# Patient Record
Sex: MALE | Race: OTHER | Employment: UNEMPLOYED | URBAN - METROPOLITAN AREA
[De-identification: names, ages, dates, MRNs, and addresses within clinical notes are randomized per-mention and may not be internally consistent; named-entity substitution may affect disease eponyms.]

---

## 2017-01-04 ENCOUNTER — OFFICE VISIT (OUTPATIENT)
Dept: CARDIOLOGY CLINIC | Age: 57
End: 2017-01-04

## 2017-01-04 VITALS
WEIGHT: 208 LBS | SYSTOLIC BLOOD PRESSURE: 119 MMHG | HEIGHT: 71 IN | HEART RATE: 63 BPM | BODY MASS INDEX: 29.12 KG/M2 | DIASTOLIC BLOOD PRESSURE: 60 MMHG

## 2017-01-04 DIAGNOSIS — Z95.5 S/P CORONARY ARTERY STENT PLACEMENT: ICD-10-CM

## 2017-01-04 DIAGNOSIS — I25.708 CORONARY ARTERY DISEASE OF BYPASS GRAFT OF NATIVE HEART WITH STABLE ANGINA PECTORIS (HCC): Primary | ICD-10-CM

## 2017-01-04 DIAGNOSIS — I10 ESSENTIAL HYPERTENSION: ICD-10-CM

## 2017-01-04 DIAGNOSIS — E11.9 TYPE 2 DIABETES MELLITUS WITHOUT COMPLICATION, UNSPECIFIED LONG TERM INSULIN USE STATUS: ICD-10-CM

## 2017-01-04 DIAGNOSIS — E78.5 DYSLIPIDEMIA: ICD-10-CM

## 2017-01-04 NOTE — PROGRESS NOTES
HISTORY OF PRESENT ILLNESS  Claudia Medrano is a 49 y.o. male. Hypertension   The history is provided by the patient. This is a chronic problem. The current episode started more than 1 week ago. The problem occurs every several days. The problem has not changed since onset. Associated symptoms include chest pain. Pertinent negatives include no shortness of breath. Chest Pain (Angina)    The history is provided by the patient. This is a chronic problem. The current episode started more than 1 week ago. The problem has been gradually improving. The problem occurs every several days. The pain is associated with exertion. The pain is present in the substernal region. The pain is at a severity of 2/10. The pain is mild. The quality of the pain is described as tightness. The pain does not radiate. Pertinent negatives include no claudication, no cough, no diaphoresis, no dizziness, no fever, no hemoptysis, no leg pain, no lower extremity edema, no malaise/fatigue, no nausea, no orthopnea, no palpitations, no PND, no shortness of breath, no sputum production, no vomiting and no weakness. Risk factors include cardiac disease, dyslipidemia, diabetes mellitus, hypertension and male gender. His past medical history is significant for DM and HTN. Procedural history includes cardiac catheterization, echocardiogram and cardiac stents. Review of Systems   Constitutional: Negative for chills, diaphoresis, fever, malaise/fatigue and weight loss. HENT: Negative for congestion, ear discharge, ear pain, hearing loss, nosebleeds and tinnitus. Eyes: Negative for blurred vision. Respiratory: Negative for cough, hemoptysis, sputum production, shortness of breath, wheezing and stridor. Cardiovascular: Positive for chest pain. Negative for palpitations, orthopnea, claudication, leg swelling and PND. Gastrointestinal: Negative for heartburn, nausea and vomiting. Musculoskeletal: Negative for myalgias and neck pain.    Skin: Negative for itching and rash. Neurological: Negative for dizziness, tingling, tremors, focal weakness, loss of consciousness and weakness. Psychiatric/Behavioral: Negative for depression and suicidal ideas. Family History   Problem Relation Age of Onset    Family history unknown: Yes       Past Medical History   Diagnosis Date    CAD (coronary artery disease)     Diabetes ([facility] [address]     Essential hypertension     Hyperlipidemia        Past Surgical History   Procedure Laterality Date    Hx heart catheterization      Hx coronary artery bypass graft      Hx coronary stent placement         Social History   Substance Use Topics    Smoking status: Never Smoker    Smokeless tobacco: Not on file    Alcohol use Not on file       No Known Allergies    Outpatient Prescriptions Marked as Taking for the 1/4/17 encounter (Office Visit) with Ines Fuller MD   Medication Sig Dispense Refill    Ranolazine (RANEXA) 1,000 mg Tb12 Take 1 Tab by mouth two (2) times a day. 60 Tab 3    aspirin delayed-release 81 mg tablet Take  by mouth daily.  atorvastatin (LIPITOR) 40 mg tablet Take  by mouth daily.  losartan (COZAAR) 25 mg tablet Take  by mouth two (2) times a day.  metFORMIN (GLUCOPHAGE) 500 mg tablet Take  by mouth two (2) times daily (with meals).  nitroglycerin (NITROSTAT) 0.4 mg SL tablet by SubLINGual route every five (5) minutes as needed for Chest Pain.  pantoprazole (PROTONIX) 40 mg tablet Take 40 mg by mouth daily.  prasugrel (EFFIENT) 10 mg tablet Take  by mouth.  metoprolol tartrate (LOPRESSOR) 25 mg tablet Take 1 Tab by mouth two (2) times a day. 60 Tab 4        Visit Vitals    /60    Pulse 63    Ht 5' 11\" (1.803 m)    Wt 94.3 kg (208 lb)    BMI 29.01 kg/m2     Physical Exam   Constitutional: He is oriented to person, place, and time. He appears well-developed and well-nourished. No distress. HENT:   Head: Atraumatic.    Mouth/Throat: No oropharyngeal exudate. Eyes: Conjunctivae are normal. Right eye exhibits no discharge. Left eye exhibits no discharge. No scleral icterus. Neck: Neck supple. No JVD present. No tracheal deviation present. No thyromegaly present. Cardiovascular: Normal rate and regular rhythm. Exam reveals no gallop. No murmur heard. Pulmonary/Chest: Effort normal and breath sounds normal. No stridor. No respiratory distress. He has no wheezes. He has no rales. He exhibits no tenderness. Abdominal: Soft. There is no tenderness. There is no rebound and no guarding. Musculoskeletal: Normal range of motion. He exhibits no edema or tenderness. Lymphadenopathy:     He has no cervical adenopathy. Neurological: He is alert and oriented to person, place, and time. He exhibits normal muscle tone. Skin: Skin is warm. He is not diaphoretic. Psychiatric: He has a normal mood and affect. His behavior is normal.     ekg sinus rhythm with no acute st-t changes  ASSESSMENT and PLAN    ICD-10-CM ICD-9-CM    1. Coronary artery disease of bypass graft of native heart with stable angina pectoris ([facility]) I25.708 414.05      413.9    2. S/P coronary artery stent placement Z95.5 V45.82 AMB POC EKG ROUTINE W/ 12 LEADS, INTER & REP   3. Essential hypertension I10 401.9    4. Dyslipidemia E78.5 272.4    5. Type 2 diabetes mellitus without complication, unspecified long term insulin use status ([facility]) E11.9 250.00      Orders Placed This Encounter    AMB POC EKG ROUTINE W/ 12 LEADS, INTER & REP     Order Specific Question:   Reason for Exam:     Answer:   follow up     Follow-up Disposition:  Return in about 6 months (around 7/4/2017). reviewed diet, exercise and weight control  cardiovascular risk and specific lipid/LDL goals reviewed  use of aspirin to prevent MI and TIA's discussed. Patient's symptom now improving on ranexa 1000mg bid. Continue intense risk factor modification.   Will obtain lipid panel and consider decreasing lipitor dose due to muscle pain.

## 2017-01-04 NOTE — PROGRESS NOTES
1. Have you been to the ER, urgent care clinic since your last visit? Hospitalized since your last visit? No    2. Have you seen or consulted any other health care providers outside of the [address] [place] [address] since your last visit? Include any pap smears or colon screening. No     3. Since your last visit, have you had any of the following symptoms? no         4. Have you had any blood work, X-rays or cardiac testing?       No

## 2017-01-04 NOTE — MR AVS SNAPSHOT
Visit Information Date & Time Provider Department Dept. Phone Encounter #  
 1/4/2017 12:30 PM Carla Johnston MD [facility] [id number] Follow-up Instructions Return in about 6 months (around 7/4/2017). Follow-up and Disposition History Your Appointments 7/26/2017  1:00 PM  
Follow Up with Carla Johnston MD  
[facility] ([address]) Appt Note: 6 month follow up  
 [address] [facility], [facility] [address]  
  
   
 [address] [place] [id number] Upcoming Health Maintenance Date Due Hepatitis C Screening 1960 HEMOGLOBIN A1C Q6M 1962 LIPID PANEL Q1 1962 FOOT EXAM Q1 2/21/1972 MICROALBUMIN Q1 2/21/1972 EYE EXAM RETINAL OR DILATED Q1 2/21/1972 Pneumococcal 19-64 Medium Risk (1 of 1 - PPSV23) 2/21/1981 DTaP/Tdap/Td series (1 - Tdap) 2/21/1983 FOBT Q 1 YEAR AGE 50-75 2/21/2012 INFLUENZA AGE 9 TO ADULT 8/1/2016 Allergies as of 1/4/2017  Review Complete On: 1/4/2017 By: Carla Johnston MD  
 No Known Allergies Current Immunizations  Never Reviewed No immunizations on file. Not reviewed this visit You Were Diagnosed With   
  
 Codes Comments Coronary artery disease of bypass graft of native heart with stable angina pectoris ([facility] [address]    -  Primary ICD-10-CM: I25.708 ICD-9-CM: 414.05, 413.9 S/P coronary artery stent placement     ICD-10-CM: Z95.5 ICD-9-CM: V45.82 Essential hypertension     ICD-10-CM: I10 
ICD-9-CM: 401.9 Dyslipidemia     ICD-10-CM: E78.5 ICD-9-CM: 272.4 Type 2 diabetes mellitus without complication, unspecified long term insulin use status ([facility])     ICD-10-CM: E11.9 ICD-9-CM: 250.00 Vitals BP Pulse Height(growth percentile) Weight(growth percentile) BMI Smoking Status 119/60 63 5' 11\" (1.803 m) 208 lb (94.3 kg) 29.01 kg/m2 Never Smoker Vitals History BMI and BSA Data Body Mass Index Body Surface Area  
 29.01 kg/m 2 2.17 m 2 Preferred Pharmacy Pharmacy Name Phone [facility] [address] [address] Your Updated Medication List  
  
   
This list is accurate as of: 1/4/17  1:21 PM.  Always use your most recent med list.  
  
  
  
  
 aspirin delayed-release 81 mg tablet Take  by mouth daily. atorvastatin 40 mg tablet Commonly known as:  LIPITOR Take  by mouth daily. EFFIENT 10 mg tablet Generic drug:  prasugrel Take  by mouth.  
  
 losartan 25 mg tablet Commonly known as:  COZAAR Take  by mouth two (2) times a day. metFORMIN 500 mg tablet Commonly known as:  GLUCOPHAGE Take  by mouth two (2) times daily (with meals). metoprolol tartrate 25 mg tablet Commonly known as:  LOPRESSOR Take 1 Tab by mouth two (2) times a day. nitroglycerin 0.4 mg SL tablet Commonly known as:  NITROSTAT  
by SubLINGual route every five (5) minutes as needed for Chest Pain.  
  
 pantoprazole 40 mg tablet Commonly known as:  PROTONIX Take 40 mg by mouth daily. Ranolazine 1,000 mg Tb12 Commonly known as:  RANEXA Take 1 Tab by mouth two (2) times a day. Follow-up Instructions Return in about 6 months (around 7/4/2017). Patient Instructions Learning About Coronary Artery Disease (CAD) What is coronary artery disease? Coronary artery disease (CAD) occurs when plaque builds up in the arteries that bring oxygen-rich blood to your heart. Plaque is a fatty substance made of cholesterol, calcium, and other substances in the blood. This process is called hardening of the arteries, or atherosclerosis. What happens when you have coronary artery disease? · Plaque may narrow the coronary arteries. Narrowed arteries cause poor blood flow.  This can lead to angina symptoms such as chest pain or discomfort. If blood flow is completely blocked, you could have a heart attack. · You can slow CAD and reduce the risk of future problems by making changes in your lifestyle. These include quitting smoking and eating heart-healthy foods. · Treatments for CAD, along with changes in your lifestyle, can help you live a longer and healthier life. How can you prevent coronary artery disease? · Do not smoke. It may be the best thing you can do to prevent heart disease. If you need help quitting, talk to your doctor about stop-smoking programs and medicines. These can increase your chances of quitting for good. · Be active. Get at least 30 minutes of exercise on most days of the week. Walking is a good choice. You also may want to do other activities, such as running, swimming, cycling, or playing tennis or team sports. · Eat heart-healthy foods. Eat more fruits and vegetables and less foods that contain saturated and trans fats. Limit alcohol, sodium, and sweets. · Stay at a healthy weight. Lose weight if you need to. · Manage other health problems such as diabetes, high blood pressure, and high cholesterol. · Manage stress. Stress can hurt your heart. To keep stress low, talk about your problems and feelings. Don't keep your feelings hidden. · If you have talked about it with your doctor, take a low-dose aspirin every day. Aspirin can help certain people lower their risk of a heart attack or stroke. But taking aspirin isn't right for everyone, because it can cause serious bleeding. Do not start taking daily aspirin unless your doctor knows about it. How is coronary artery disease treated? · Your doctor will suggest that you make lifestyle changes. For example, your doctor may ask you to eat healthy foods, quit smoking, lose extra weight, and be more active. · You will have to take medicines.  
· Your doctor may suggest a procedure to open narrowed or blocked arteries. This is called angioplasty. Or your doctor may suggest using healthy blood vessels to create detours around narrowed or blocked arteries. This is called bypass surgery. Follow-up care is a key part of your treatment and safety. Be sure to make and go to all appointments, and call your doctor if you are having problems. It's also a good idea to know your test results and keep a list of the medicines you take. Where can you learn more? Go to http://karl-faye.info/. Enter ([phone] [phone] in the search box to learn more about \"Learning About Coronary Artery Disease (CAD). \" Current as of: January 27, 2016 Content Version: 11.1 © 0953-4509 [employer]. Care instructions adapted under license by [employer] (which disclaims liability or warranty for this information). If you have questions about a medical condition or this instruction, always ask your healthcare professional. [address] any warranty or liability for your use of this information. Patient Instructions History Introducing [facility]! Dusty Spivey introduces Trusight patient portal. Now you can access parts of your medical record, email your doctor's office, and request medication refills online. 1. In your internet browser, go to [email or web address]. [employer] 2. Click on the First Time User? Click Here link in the Sign In box. You will see the New Member Sign Up page. 3. Enter your Trusight Access Code exactly as it appears below. You will not need to use this code after youve completed the sign-up process. If you do not sign up before the expiration date, you must request a new code. · Trusight Access Code: [email or web address] Expires: 4/4/2017 12:15 PM 
 
4. Enter the last four digits of your Social Security Number (xxxx) and Date of Birth (mm/dd/yyyy) as indicated and click Submit. You will be taken to the next sign-up page. 5. Create a ClearApp ID. This will be your ClearApp login ID and cannot be changed, so think of one that is secure and easy to remember. 6. Create a ClearApp password. You can change your password at any time. 7. Enter your Password Reset Question and Answer. This can be used at a later time if you forget your password. 8. Enter your e-mail address. You will receive e-mail notification when new information is available in [address] 9. Click Sign Up. You can now view and download portions of your medical record. 10. Click the Download Summary menu link to download a portable copy of your medical information. If you have questions, please visit the Frequently Asked Questions section of the ClearApp website. Remember, ClearApp is NOT to be used for urgent needs. For medical emergencies, dial 911. Now available from your iPhone and Android! Please provide this summary of care documentation to your next provider. Your primary care clinician is listed as Ceci Prim. If you have any questions after today's visit, please call [phone].

## 2017-01-04 NOTE — PATIENT INSTRUCTIONS
Learning About Coronary Artery Disease (CAD)  What is coronary artery disease? Coronary artery disease (CAD) occurs when plaque builds up in the arteries that bring oxygen-rich blood to your heart. Plaque is a fatty substance made of cholesterol, calcium, and other substances in the blood. This process is called hardening of the arteries, or atherosclerosis. What happens when you have coronary artery disease? · Plaque may narrow the coronary arteries. Narrowed arteries cause poor blood flow. This can lead to angina symptoms such as chest pain or discomfort. If blood flow is completely blocked, you could have a heart attack. · You can slow CAD and reduce the risk of future problems by making changes in your lifestyle. These include quitting smoking and eating heart-healthy foods. · Treatments for CAD, along with changes in your lifestyle, can help you live a longer and healthier life. How can you prevent coronary artery disease? · Do not smoke. It may be the best thing you can do to prevent heart disease. If you need help quitting, talk to your doctor about stop-smoking programs and medicines. These can increase your chances of quitting for good. · Be active. Get at least 30 minutes of exercise on most days of the week. Walking is a good choice. You also may want to do other activities, such as running, swimming, cycling, or playing tennis or team sports. · Eat heart-healthy foods. Eat more fruits and vegetables and less foods that contain saturated and trans fats. Limit alcohol, sodium, and sweets. · Stay at a healthy weight. Lose weight if you need to. · Manage other health problems such as diabetes, high blood pressure, and high cholesterol. · Manage stress. Stress can hurt your heart. To keep stress low, talk about your problems and feelings. Don't keep your feelings hidden. · If you have talked about it with your doctor, take a low-dose aspirin every day.  Aspirin can help certain people lower their risk of a heart attack or stroke. But taking aspirin isn't right for everyone, because it can cause serious bleeding. Do not start taking daily aspirin unless your doctor knows about it. How is coronary artery disease treated? · Your doctor will suggest that you make lifestyle changes. For example, your doctor may ask you to eat healthy foods, quit smoking, lose extra weight, and be more active. · You will have to take medicines. · Your doctor may suggest a procedure to open narrowed or blocked arteries. This is called angioplasty. Or your doctor may suggest using healthy blood vessels to create detours around narrowed or blocked arteries. This is called bypass surgery. Follow-up care is a key part of your treatment and safety. Be sure to make and go to all appointments, and call your doctor if you are having problems. It's also a good idea to know your test results and keep a list of the medicines you take. Where can you learn more? Go to http://karl-faye.info/. Enter ([phone] [phone] in the search box to learn more about \"Learning About Coronary Artery Disease (CAD). \"  Current as of: January 27, 2016  Content Version: 11.1  © 1365-0232 [employer]. Care instructions adapted under license by [employer] (which disclaims liability or warranty for this information). If you have questions about a medical condition or this instruction, always ask your healthcare professional. [place] [address] any warranty or liability for your use of this information.

## 2017-01-18 RX ORDER — CLOPIDOGREL BISULFATE 75 MG/1
75 TABLET ORAL DAILY
Qty: 90 TAB | Refills: 3 | Status: SHIPPED | OUTPATIENT
Start: 2017-01-18 | End: 2017-06-03

## 2017-01-26 ENCOUNTER — TELEPHONE (OUTPATIENT)
Dept: CARDIOLOGY CLINIC | Age: 57
End: 2017-01-26

## 2017-01-26 NOTE — TELEPHONE ENCOUNTER
Patient's son calling to discuss extreme cost for Ranexa, will attempt to apply for patient assistance.

## 2017-01-26 NOTE — TELEPHONE ENCOUNTER
Insurance company called and prior authorizations is not needed for Ranexa. Pt has not met deductable yet, co pay card may be effective, or when deductable is met drug will cost normal copay.

## 2017-01-27 NOTE — TELEPHONE ENCOUNTER
Patient's son returning call. Patient does not have the co pay card. Ranexa copay card mailed to his home address.

## 2017-04-06 RX ORDER — METOPROLOL TARTRATE 25 MG/1
TABLET, FILM COATED ORAL
Qty: 60 TAB | Refills: 0 | Status: SHIPPED | OUTPATIENT
Start: 2017-04-06 | End: 2017-06-03

## 2017-05-19 ENCOUNTER — HOSPITAL ENCOUNTER (INPATIENT)
Age: 57
LOS: 15 days | Discharge: NURSING, REHAB OR LONG-TERM CARE | DRG: 091 | End: 2017-06-03
Attending: HOSPITALIST | Admitting: HOSPITALIST
Payer: COMMERCIAL

## 2017-05-19 ENCOUNTER — APPOINTMENT (OUTPATIENT)
Dept: GENERAL RADIOLOGY | Age: 57
DRG: 091 | End: 2017-05-19
Attending: INTERNAL MEDICINE
Payer: COMMERCIAL

## 2017-05-19 DIAGNOSIS — J96.11 CHRONIC RESPIRATORY FAILURE WITH HYPOXIA (HCC): ICD-10-CM

## 2017-05-19 DIAGNOSIS — G93.1 MODERATE ANOXIC-ISCHEMIC ENCEPHALOPATHY (HCC): ICD-10-CM

## 2017-05-19 DIAGNOSIS — G93.1 ANOXIC BRAIN INJURY (HCC): ICD-10-CM

## 2017-05-19 DIAGNOSIS — Z86.74 H/O CARDIAC ARREST: ICD-10-CM

## 2017-05-19 DIAGNOSIS — I65.23 STENOSIS OF BOTH INTERNAL CAROTID ARTERIES: ICD-10-CM

## 2017-05-19 DIAGNOSIS — Z71.89 COUNSELING REGARDING GOALS OF CARE: ICD-10-CM

## 2017-05-19 DIAGNOSIS — E78.5 DYSLIPIDEMIA: ICD-10-CM

## 2017-05-19 DIAGNOSIS — I63.312 THROMBOTIC STROKE INVOLVING LEFT MIDDLE CEREBRAL ARTERY (HCC): ICD-10-CM

## 2017-05-19 DIAGNOSIS — R40.4 TRANSIENT ALTERATION OF AWARENESS: ICD-10-CM

## 2017-05-19 DIAGNOSIS — I67.82 MODERATE ANOXIC-ISCHEMIC ENCEPHALOPATHY (HCC): ICD-10-CM

## 2017-05-19 DIAGNOSIS — I10 ESSENTIAL HYPERTENSION: ICD-10-CM

## 2017-05-19 PROBLEM — J96.90 RESPIRATORY FAILURE (HCC): Status: ACTIVE | Noted: 2017-05-19

## 2017-05-19 LAB
ALBUMIN SERPL BCP-MCNC: 1.8 G/DL (ref 3.5–5)
ALBUMIN/GLOB SERPL: 0.3 {RATIO} (ref 1.1–2.2)
ALP SERPL-CCNC: 236 U/L (ref 45–117)
ALT SERPL-CCNC: 58 U/L (ref 12–78)
ANION GAP BLD CALC-SCNC: 10 MMOL/L (ref 5–15)
AST SERPL W P-5'-P-CCNC: 46 U/L (ref 15–37)
BASOPHILS # BLD AUTO: 0 K/UL (ref 0–0.1)
BASOPHILS # BLD: 0 % (ref 0–1)
BILIRUB SERPL-MCNC: 0.6 MG/DL (ref 0.2–1)
BUN SERPL-MCNC: 13 MG/DL (ref 6–20)
BUN/CREAT SERPL: 26 (ref 12–20)
CALCIUM SERPL-MCNC: 7.4 MG/DL (ref 8.5–10.1)
CHLORIDE SERPL-SCNC: 105 MMOL/L (ref 97–108)
CO2 SERPL-SCNC: 24 MMOL/L (ref 21–32)
CREAT SERPL-MCNC: 0.5 MG/DL (ref 0.7–1.3)
DIFFERENTIAL METHOD BLD: ABNORMAL
EOSINOPHIL # BLD: 0.1 K/UL (ref 0–0.4)
EOSINOPHIL NFR BLD: 1 % (ref 0–7)
ERYTHROCYTE [DISTWIDTH] IN BLOOD BY AUTOMATED COUNT: 17.3 % (ref 11.5–14.5)
GLOBULIN SER CALC-MCNC: 5.7 G/DL (ref 2–4)
GLUCOSE BLD STRIP.AUTO-MCNC: 105 MG/DL (ref 65–100)
GLUCOSE BLD STRIP.AUTO-MCNC: 86 MG/DL (ref 65–100)
GLUCOSE SERPL-MCNC: 76 MG/DL (ref 65–100)
HCT VFR BLD AUTO: 21.8 % (ref 36.6–50.3)
HGB BLD-MCNC: 6.8 G/DL (ref 12.1–17)
LYMPHOCYTES # BLD AUTO: 17 % (ref 12–49)
LYMPHOCYTES # BLD: 1.7 K/UL (ref 0.8–3.5)
MAGNESIUM SERPL-MCNC: 2.4 MG/DL (ref 1.6–2.4)
MCH RBC QN AUTO: 26.9 PG (ref 26–34)
MCHC RBC AUTO-ENTMCNC: 31.2 G/DL (ref 30–36.5)
MCV RBC AUTO: 86.2 FL (ref 80–99)
MONOCYTES # BLD: 0.9 K/UL (ref 0–1)
MONOCYTES NFR BLD AUTO: 9 % (ref 5–13)
NEUTS SEG # BLD: 7.1 K/UL (ref 1.8–8)
NEUTS SEG NFR BLD AUTO: 73 % (ref 32–75)
PHOSPHATE SERPL-MCNC: 2.3 MG/DL (ref 2.6–4.7)
PLATELET # BLD AUTO: 372 K/UL (ref 150–400)
POTASSIUM SERPL-SCNC: 3.4 MMOL/L (ref 3.5–5.1)
PROT SERPL-MCNC: 7.5 G/DL (ref 6.4–8.2)
RBC # BLD AUTO: 2.53 M/UL (ref 4.1–5.7)
RBC MORPH BLD: ABNORMAL
SERVICE CMNT-IMP: ABNORMAL
SERVICE CMNT-IMP: NORMAL
SODIUM SERPL-SCNC: 139 MMOL/L (ref 136–145)
WBC # BLD AUTO: 9.8 K/UL (ref 4.1–11.1)

## 2017-05-19 PROCEDURE — 74011250637 HC RX REV CODE- 250/637: Performed by: HOSPITALIST

## 2017-05-19 PROCEDURE — 77030008793 HC TU TRACH CUF COVD -B

## 2017-05-19 PROCEDURE — 77030021668 HC NEB PREFIL KT VYRM -A

## 2017-05-19 PROCEDURE — 94002 VENT MGMT INPAT INIT DAY: CPT

## 2017-05-19 PROCEDURE — 80053 COMPREHEN METABOLIC PANEL: CPT | Performed by: HOSPITALIST

## 2017-05-19 PROCEDURE — 36415 COLL VENOUS BLD VENIPUNCTURE: CPT | Performed by: HOSPITALIST

## 2017-05-19 PROCEDURE — 84100 ASSAY OF PHOSPHORUS: CPT | Performed by: HOSPITALIST

## 2017-05-19 PROCEDURE — 83735 ASSAY OF MAGNESIUM: CPT | Performed by: HOSPITALIST

## 2017-05-19 PROCEDURE — 85025 COMPLETE CBC W/AUTO DIFF WBC: CPT | Performed by: HOSPITALIST

## 2017-05-19 PROCEDURE — 74011250636 HC RX REV CODE- 250/636: Performed by: INTERNAL MEDICINE

## 2017-05-19 PROCEDURE — 82962 GLUCOSE BLOOD TEST: CPT

## 2017-05-19 PROCEDURE — 74011250637 HC RX REV CODE- 250/637: Performed by: INTERNAL MEDICINE

## 2017-05-19 PROCEDURE — 65610000006 HC RM INTENSIVE CARE

## 2017-05-19 PROCEDURE — 77030018798 HC PMP KT ENTRL FED COVD -A

## 2017-05-19 PROCEDURE — 71010 XR CHEST PORT: CPT

## 2017-05-19 PROCEDURE — 74011250637 HC RX REV CODE- 250/637: Performed by: NURSE PRACTITIONER

## 2017-05-19 RX ORDER — FAMOTIDINE 20 MG/1
20 TABLET, FILM COATED ORAL 2 TIMES DAILY
Status: DISCONTINUED | OUTPATIENT
Start: 2017-05-19 | End: 2017-05-26

## 2017-05-19 RX ORDER — LIDOCAINE 50 MG/G
1 PATCH TOPICAL EVERY 24 HOURS
Status: DISCONTINUED | OUTPATIENT
Start: 2017-05-19 | End: 2017-06-03 | Stop reason: HOSPADM

## 2017-05-19 RX ORDER — ATORVASTATIN CALCIUM 40 MG/1
40 TABLET, FILM COATED ORAL
Status: DISCONTINUED | OUTPATIENT
Start: 2017-05-19 | End: 2017-06-03 | Stop reason: HOSPADM

## 2017-05-19 RX ORDER — PRASUGREL 10 MG/1
10 TABLET, FILM COATED ORAL DAILY
Status: DISCONTINUED | OUTPATIENT
Start: 2017-05-20 | End: 2017-06-03 | Stop reason: HOSPADM

## 2017-05-19 RX ORDER — SODIUM CHLORIDE 0.9 % (FLUSH) 0.9 %
5-10 SYRINGE (ML) INJECTION AS NEEDED
Status: DISCONTINUED | OUTPATIENT
Start: 2017-05-19 | End: 2017-06-03 | Stop reason: HOSPADM

## 2017-05-19 RX ORDER — IPRATROPIUM BROMIDE AND ALBUTEROL SULFATE 2.5; .5 MG/3ML; MG/3ML
3 SOLUTION RESPIRATORY (INHALATION)
COMMUNITY

## 2017-05-19 RX ORDER — ONDANSETRON 2 MG/ML
4 INJECTION INTRAMUSCULAR; INTRAVENOUS
Status: DISCONTINUED | OUTPATIENT
Start: 2017-05-19 | End: 2017-06-03 | Stop reason: HOSPADM

## 2017-05-19 RX ORDER — ONDANSETRON 4 MG/1
4 TABLET, FILM COATED ORAL
COMMUNITY
End: 2017-06-03

## 2017-05-19 RX ORDER — INSULIN LISPRO 100 [IU]/ML
INJECTION, SOLUTION INTRAVENOUS; SUBCUTANEOUS EVERY 6 HOURS
Status: DISCONTINUED | OUTPATIENT
Start: 2017-05-19 | End: 2017-06-03 | Stop reason: HOSPADM

## 2017-05-19 RX ORDER — MUPIROCIN 20 MG/G
OINTMENT TOPICAL EVERY 12 HOURS
Status: COMPLETED | OUTPATIENT
Start: 2017-05-19 | End: 2017-05-23

## 2017-05-19 RX ORDER — MAGNESIUM SULFATE 100 %
4 CRYSTALS MISCELLANEOUS AS NEEDED
Status: DISCONTINUED | OUTPATIENT
Start: 2017-05-19 | End: 2017-06-03 | Stop reason: HOSPADM

## 2017-05-19 RX ORDER — SODIUM CHLORIDE 0.9 % (FLUSH) 0.9 %
5-10 SYRINGE (ML) INJECTION EVERY 8 HOURS
Status: DISCONTINUED | OUTPATIENT
Start: 2017-05-19 | End: 2017-06-03 | Stop reason: HOSPADM

## 2017-05-19 RX ORDER — LIDOCAINE 50 MG/G
1 PATCH TOPICAL EVERY 24 HOURS
COMMUNITY

## 2017-05-19 RX ORDER — PRASUGREL 10 MG/1
10 TABLET, FILM COATED ORAL DAILY
COMMUNITY
End: 2017-06-14

## 2017-05-19 RX ORDER — OXYCODONE HCL 20 MG/ML
5 CONCENTRATE, ORAL ORAL
Status: DISCONTINUED | OUTPATIENT
Start: 2017-05-19 | End: 2017-06-02

## 2017-05-19 RX ORDER — CARVEDILOL 3.12 MG/1
3.12 TABLET ORAL 2 TIMES DAILY WITH MEALS
COMMUNITY
End: 2017-06-03

## 2017-05-19 RX ORDER — DEXTROSE MONOHYDRATE 100 MG/ML
125-250 INJECTION, SOLUTION INTRAVENOUS AS NEEDED
Status: DISCONTINUED | OUTPATIENT
Start: 2017-05-19 | End: 2017-06-03 | Stop reason: HOSPADM

## 2017-05-19 RX ORDER — PIPERACILLIN SODIUM, TAZOBACTAM SODIUM 3; .375 G/15ML; G/15ML
3.38 INJECTION, POWDER, LYOPHILIZED, FOR SOLUTION INTRAVENOUS EVERY 6 HOURS
COMMUNITY
End: 2017-06-03

## 2017-05-19 RX ORDER — GUAIFENESIN 100 MG/5ML
81 LIQUID (ML) ORAL DAILY
Status: DISCONTINUED | OUTPATIENT
Start: 2017-05-20 | End: 2017-06-03 | Stop reason: HOSPADM

## 2017-05-19 RX ORDER — ENOXAPARIN SODIUM 100 MG/ML
40 INJECTION SUBCUTANEOUS EVERY 24 HOURS
Status: DISCONTINUED | OUTPATIENT
Start: 2017-05-19 | End: 2017-06-03 | Stop reason: HOSPADM

## 2017-05-19 RX ORDER — CARVEDILOL 3.12 MG/1
3.12 TABLET ORAL 2 TIMES DAILY WITH MEALS
Status: DISCONTINUED | OUTPATIENT
Start: 2017-05-19 | End: 2017-05-30

## 2017-05-19 RX ORDER — FAMOTIDINE 20 MG/1
20 TABLET, FILM COATED ORAL 2 TIMES DAILY
COMMUNITY
End: 2017-06-03

## 2017-05-19 RX ORDER — HYDROCODONE BITARTRATE AND ACETAMINOPHEN 5; 325 MG/1; MG/1
1 TABLET ORAL
Status: DISCONTINUED | OUTPATIENT
Start: 2017-05-19 | End: 2017-05-19

## 2017-05-19 RX ORDER — IPRATROPIUM BROMIDE AND ALBUTEROL SULFATE 2.5; .5 MG/3ML; MG/3ML
3 SOLUTION RESPIRATORY (INHALATION)
Status: DISCONTINUED | OUTPATIENT
Start: 2017-05-19 | End: 2017-06-03 | Stop reason: HOSPADM

## 2017-05-19 RX ORDER — OXYCODONE AND ACETAMINOPHEN 5; 325 MG/1; MG/1
1 TABLET ORAL
COMMUNITY
End: 2017-06-03

## 2017-05-19 RX ADMIN — Medication 10 ML: at 21:51

## 2017-05-19 RX ADMIN — MUPIROCIN: 20 OINTMENT TOPICAL at 16:35

## 2017-05-19 RX ADMIN — CARVEDILOL 3.12 MG: 3.12 TABLET, FILM COATED ORAL at 16:36

## 2017-05-19 RX ADMIN — Medication 10 ML: at 16:36

## 2017-05-19 RX ADMIN — FAMOTIDINE 20 MG: 20 TABLET ORAL at 18:44

## 2017-05-19 RX ADMIN — ENOXAPARIN SODIUM 40 MG: 40 INJECTION SUBCUTANEOUS at 16:35

## 2017-05-19 RX ADMIN — ATORVASTATIN CALCIUM 40 MG: 40 TABLET, FILM COATED ORAL at 21:51

## 2017-05-19 RX ADMIN — MUPIROCIN: 20 OINTMENT TOPICAL at 21:50

## 2017-05-19 NOTE — CONSULTS
[address], [address] [facility]  [address] [facility]     Date of  Admission: 5/19/2017  2:02 PM     Admission type:Elective    Consult for: s/p cardiac arrest, known CAD  Consult by: hospitalist      Subjective:     Job Barron is a 56 y.o. male with PMH HTN, HLD, DM, CAD s/p CABG (2014) who was transferred from [facility]. Per Intensivist note, \"Pt is a 56 Lin male who is transferred to [facility] from [place] [address]. Was in [place] at a wedding party April 28 and became ill with nausea and chest pain. While in a car, he became unresponsive and EMS activated. Pt found pulseless on side of the road and resuscitation started by EMS. He had ROSC and taken to [facility] where CPR resumed for asystole. Pt had emesis within [facility] airway and reintubated. Pt eventually had ROCS after another 10 minutes and placed in hypothermia protocol. Pt kept in CCU and required pressors, abx. He was seen by Cardiology and Neurology. Pt felt not to have ACS. LVEF 75% on pressors with minimal enzymes. Head Ct showed anoxic injury. Pt had trach and PEG on 5/14. He was transferred to [address] [place] [address] 5/17 where he had CABGx 4 a couple of years ago and was cared for by Shiva Ron. After transfer, it became apparent that his insurance would only provide coverage in a [place] [address]. Note from Dr. Carol Humphrey describes complex coronary anatomy and stenosis of one of his grafts. It ws suggested that pt have another CABG but family declines and he has since seen a cardiologist in [facility]"    Prior to the event, Mr. Margarito Quinn had began following with Dr. Leanna De La Vega for his cardiology needs. Last stress test in our system 05/15, which was negative for ischemia. No caths in our system, but notes by Dr. Niall Wayne at [facility] indicate multiple stents and in-stent restenoses. No ECHOs in our system.        ROS limited due to patient condition and limited English. Patient denies pain at this time. Cardiac risk factors: dyslipidemia, diabetes mellitus, obesity, sedentary life style, male gender, hypertension. Patient Active Problem List    Diagnosis Date Noted    H/O cardiac arrest 05/19/2017    Coronary artery disease of bypass graft with stable angina pectoris ([facility] 10/05/2016    S/P coronary artery stent placement 10/05/2016    Essential hypertension 10/05/2016    Dyslipidemia 10/05/2016    Type 2 diabetes mellitus without complication ([facility] 16/89/6427    Chest pain 10/05/2016      Sophie Rodriguez MD  Past Medical History:   Diagnosis Date    Anoxic brain injury [facility])     following cardiac arrest; MRI shows involvement BG and temporal lobes    Aspiration pneumonia ([facility]     during cardiac arrest intubation    Bradycardia     CAD (coronary artery disease)     Cardiac arrest ([facility]     no intervention by [place] cardiology.   [address] [facility]    Diabetes [facility])     Essential hypertension     Hyperlipidemia       Social History     Social History    Marital status:      Spouse name: N/A    Number of children: N/A    Years of education: N/A     Social History Main Topics    Smoking status: Never Smoker    Smokeless tobacco: Not on file    Alcohol use Not on file    Drug use: Not on file    Sexual activity: Not on file     Other Topics Concern    Not on file     Social History Narrative     Allergies   Allergen Reactions    Cymbalta [Duloxetine] Unknown (comments)      Family History   Problem Relation Age of Onset    Family history unknown: Yes      Current Facility-Administered Medications   Medication Dose Route Frequency    mupirocin (BACTROBAN) 2 % ointment   Topical Q12H    insulin lispro (HUMALOG) injection   SubCUTAneous Q6H    glucose chewable tablet 16 g  4 Tab Oral PRN    dextrose 10% infusion 125-250 mL  125-250 mL IntraVENous PRN    glucagon (GLUCAGEN) injection 1 mg  1 mg IntraMUSCular PRN    enoxaparin (LOVENOX) injection 40 mg  40 mg SubCUTAneous Q24H    sodium chloride (NS) flush 5-10 mL  5-10 mL IntraVENous Q8H    sodium chloride (NS) flush 5-10 mL  5-10 mL IntraVENous PRN    acetaminophen (TYLENOL) solution 650 mg  650 mg Per G Tube Q6H PRN    ondansetron (ZOFRAN) injection 4 mg  4 mg IntraVENous Q6H PRN    famotidine (PEPCID) tablet 20 mg  20 mg Per G Tube BID    albuterol-ipratropium (DUO-NEB) 2.5 MG-0.5 MG/3 ML  3 mL Nebulization Q4H PRN    carvedilol (COREG) tablet 3.125 mg  3.125 mg Per G Tube BID WITH MEALS    [START ON 5/20/2017] prasugrel (EFFIENT) tablet 10 mg  10 mg Per G Tube DAILY        Review of Symptoms: unable to perform fully due to patient condition with anoxic brain injury, trach, and language barrier. Objective:      Visit Vitals    /60    Pulse 61    Temp 98.6 °F (37 °C)    Resp 18    SpO2 100%       Physical:   General: obese, [place] ([facility]) male, resting in bed in NAD. Eyes close between questions or stimulation. Heart: RRR, distant heart sounds   Lungs: diminished; upper airway secretions;  trach  Abdomen: Soft, +BS, NTND   Extremities: LE chan +DP/PT, no edema   Neurologic: trached;  Nods head. Moves LUE and BLE to command. Data Review:   Recent Labs      05/19/17   1607   WBC  9.8   HGB  6.8*   HCT  21.8*   PLT  372     No results for input(s): NA, K, CL, CO2, GLU, BUN, CREA, CA, MG, PHOS, ALB, TBIL, TBILI, SGOT, ALT, INR in the last 72 hours. No lab exists for component:  BNP, INREXT, INREXT    No results for input(s): TROIQ, CPK, CKMB in the last 72 hours.       Intake/Output Summary (Last 24 hours) at 05/19/17 1648  Last data filed at 05/19/17 1411   Gross per 24 hour   Intake                0 ml   Output                0 ml   Net                0 ml        Cardiographics    Telemetry: SR  ECG: ordered   Echocardiogram: ordered   CXRAY: no acute process        Assessment:       Active Problems:    Coronary artery disease of bypass graft with stable angina pectoris ([facility] (10/5/2016)      Essential hypertension (10/5/2016)      Dyslipidemia (10/5/2016)      Type 2 diabetes mellitus without complication ([facility] (16/1/9265)      H/O cardiac arrest (5/19/2017)      Overview: 4/28/17:  OSH in [facility]         Plan:     Nyasia Irvin is a 56 y.o. male who was transferred from an OSH as above in [facility]. S/p cardiac arrest with anoxic brain injury. Extensive CAD history. · BB, effient, add statin and ASA   · ECHO ordered will help re-eval EF and WMA  · Add ACEi/ARB if EF low on ECHO   · Dr. Jeanine Peterson to contact Dr. Aamir Kaufman for further information on difficult coronary anatomy and prior caths. · Unclear on how patient's neurological status will progress      Thank you for consulting [facility]. Will follow. Dr. Arnie Miller to see over the weekend.         Nathalie Thompson NP  DNP, RN, AGACNP-BC

## 2017-05-19 NOTE — PROGRESS NOTES
Initial Nutrition Assessment:    INTERVENTIONS/RECOMMENDATIONS:   · Initiate TF: TwoCal @ 10 ml/hr advancing 10 ml/hr as tolerated q 12 hrs to a goal rate of 50 ml/hr plus 75 ml water flush q 4 hrs  · Goal rate of 50 ml/hr provides: 2200 kcals, 92 g pro and 1220 ml free water  · Does not meet hydration needs, will need adjusted by primary team  · Monitor lytes, at risk for refeeding due to no nutrition in 5 days    ASSESSMENT:   Chart reviewed. RD called by CCU RN for TF orders. Pt was transferred to [facility] today. S/p PEG placement 5 days ago and per conversation with family and RN, outside facility has not started TF yet. Pt was having low BG, RN wanting to start TF. Past Medical History:   Diagnosis Date    Anoxic brain injury [facility])     following cardiac arrest    Aspiration pneumonia ([facility]     during cardiac arrest intubation    CAD (coronary artery disease)     Diabetes ([facility]     Essential hypertension     Hyperlipidemia        Diet Order: NPO  % Eaten:  No data found. Pertinent Medications: [x]Reviewed []Other  Pertinent Labs: [x]Reviewed []Other  Food Allergies: [x]NKFA  []Other   Last BM: unknown     Skin:    [x] Intact   [] Incision  [] Breakdown  [] Other: Wt Readings from Last 30 Encounters:   01/04/17 94.3 kg (208 lb)   10/05/16 92.5 kg (204 lb)       Anthropometrics:   Height:   Weight:     IBW (%IBW):   ( ) UBW (%UBW):   (  %)   Last Weight Metrics:  Weight Loss Metrics 1/4/2017 10/5/2016   Today's Wt 208 lb 204 lb   BMI 29.01 kg/m2 28.45 kg/m2       BMI: There is no height or weight on file to calculate BMI. This BMI is indicative of:   []Underweight    []Normal    []Overweight    [] Obesity   [] Extreme Obesity (BMI>40)     Estimated Nutrition Needs (Based on):   2250 Kcals/day (MSJ: 1725 x 1.3) , 90 g (1 g/kg) Protein  Carbohydrate:  At Least 130 g/day  Fluids: 2250 mL/day (1ml/kcal) or per primary team    NUTRITION DIAGNOSES:   Problem:  Altered GI function      Etiology: related to dysphagia     Signs/Symptoms: as evidenced by PEG dependent      NUTRITION INTERVENTIONS:    Enteral/Parenteral Nutrition: Initiate enteral nutrition                GOAL:   meet TF goal rate in 2-4 days    LEARNING NEEDS (Diet, Food/Nutrient-Drug Interaction):    [x] None Identified   [] Identified and Education Provided/Documented   [] Identified and Pt declined/was not appropriate     Cultureal, Christianity, OR Ethnic Dietary Needs:    [x] None Identified   [] Identified and Addressed     [x] Interdisciplinary Care Plan Reviewed/Documented    [x] Discharge Planning:  TBD     MONITORING /EVALUATION:      Food/Nutrient Intake Outcomes: Enteral/parenteral nutrition intake  Physical Signs/Symptoms Outcomes: Weight/weight change, Electrolyte and renal profile, Glucose profile, GI    NUTRITION RISK:    [] High              [x] Moderate           []  Low  []  Minimal/Uncompromised    PT SEEN FOR:    [x]  MD Consult: []Calorie Count      []Diabetic Diet Education        []Diet Education     []Electrolyte Management     [x]General Nutrition Management and Supplements     []Management of Tube Feeding     []TPN Recommendations    []  RN Referral:  []MST score >=2     []Enteral/Parenteral Nutrition PTA     []Pregnant: Gestational DM or Multigestation     []Pressure Ulcer/Wound Care needs        []  Low BMI  []  DTR Referral       Long Elder RDN  Pager [phone]      Weekend Pager [phone]

## 2017-05-19 NOTE — IP AVS SNAPSHOT
Current Discharge Medication List  
  
START taking these medications Dose & Instructions Dispensing Information Comments Morning Noon Evening Bedtime  
 acetaminophen 325 mg tablet Commonly known as:  TYLENOL Your last dose was: Your next dose is:    
   
   
 Dose:  650 mg  
2 Tabs by Per G Tube route every four (4) hours as needed for Pain. Quantity:  30 Tab Refills:  0  
     
   
   
   
  
 aspirin 81 mg chewable tablet Replaces:  aspirin delayed-release 81 mg tablet Your last dose was: Your next dose is:    
   
   
 Dose:  81 mg  
1 Tab by Per G Tube route daily. Quantity:  30 Tab Refills:  1  
     
   
   
   
  
 furosemide 40 mg tablet Commonly known as:  LASIX Your last dose was: Your next dose is:    
   
   
 Dose:  40 mg  
1 Tab by Per G Tube route daily. Quantity:  30 Tab Refills:  1  
     
   
   
   
  
 levoFLOXacin 750 mg tablet Commonly known as:  Georgianne Pique Your last dose was: Your next dose is:    
   
   
 Dose:  750 mg  
1 Tab by Per G Tube route Daily (before breakfast) for 5 days. Quantity:  5 Tab Refills:  0  
     
   
   
   
  
 pantoprazole 40 mg granules for oral suspension Commonly known as:  PROTONIX Replaces:  pantoprazole 40 mg tablet Your last dose was: Your next dose is:    
   
   
 Dose:  40 mg  
40 mg by Per G Tube route Daily (before breakfast). Quantity:  30 Each Refills:  1  
     
   
   
   
  
 traMADol 50 mg tablet Commonly known as:  ULTRAM  
   
Your last dose was: Your next dose is:    
   
   
 Dose:  50 mg  
1 Tab by Per G Tube route every six (6) hours as needed. Max Daily Amount: 200 mg. Quantity:  25 Tab Refills:  0 CONTINUE these medications which have CHANGED Dose & Instructions Dispensing Information Comments Morning Noon Evening Bedtime  
 atorvastatin 40 mg tablet Commonly known as:  LIPITOR What changed:   
- how much to take 
- how to take this Your last dose was: Your next dose is:    
   
   
 Dose:  40 mg  
1 Tab by Per G Tube route daily. Quantity:  30 Tab Refills:  1 CONTINUE these medications which have NOT CHANGED Dose & Instructions Dispensing Information Comments Morning Noon Evening Bedtime DUONEB 2.5 mg-0.5 mg/3 ml Nebu Generic drug:  albuterol-ipratropium Your last dose was: Your next dose is:    
   
   
 Dose:  3 mL  
3 mL by Nebulization route every four (4) hours as needed. Refills:  0  
     
   
   
   
  
 prasugrel 10 mg tablet Commonly known as:  EFFIENT Your last dose was: Your next dose is:    
   
   
 Dose:  10 mg  
10 mg by PEG Tube route daily. Refills:  0 STOP taking these medications   
 aspirin delayed-release 81 mg tablet Replaced by:  aspirin 81 mg chewable tablet  
   
  
 clopidogrel 75 mg Tab Commonly known as:  PLAVIX COREG 3.125 mg tablet Generic drug:  carvedilol  
   
  
 losartan 25 mg tablet Commonly known as:  COZAAR  
   
  
 metFORMIN 500 mg tablet Commonly known as:  GLUCOPHAGE  
   
  
 metoprolol tartrate 25 mg tablet Commonly known as:  LOPRESSOR  
   
  
 nitroglycerin 0.4 mg SL tablet Commonly known as:  NITROSTAT  
   
  
 pantoprazole 40 mg tablet Commonly known as:  PROTONIX Replaced by:  pantoprazole 40 mg granules for oral suspension PEPCID 20 mg tablet Generic drug:  famotidine PERCOCET 5-325 mg per tablet Generic drug:  oxyCODONE-acetaminophen Ranolazine 1,000 mg Tb12 Commonly known as:  RANEXA  
   
  
 ZOFRAN (AS HYDROCHLORIDE) 4 mg tablet Generic drug:  ondansetron hcl ZOSYN 3.375 gram injection Generic drug:  piperacillin-tazobactam  
   
  
  
ASK your doctor about these medications Dose & Instructions Dispensing Information Comments Morning Noon Evening Bedtime  
 lidocaine 5 % Commonly known as:  Margaret First Your last dose was: Your next dose is:    
   
   
 Dose:  1 Patch 1 Patch by TransDERmal route every twenty-four (24) hours. Apply patch to the affected area for 12 hours a day and remove for 12 hours a day. Refills:  0  
     
   
   
   
  
 vancomycin 1,000 mg 1,000 mg IVPB Your last dose was: Your next dose is:    
   
   
 Dose:  1000 mg  
1,000 mg by IntraVENous route. Refills:  0 Where to Get Your Medications Information on where to get these meds will be given to you by the nurse or doctor. ! Ask your nurse or doctor about these medications  
  acetaminophen 325 mg tablet  
 aspirin 81 mg chewable tablet  
 atorvastatin 40 mg tablet  
 furosemide 40 mg tablet  
 levoFLOXacin 750 mg tablet  
 pantoprazole 40 mg granules for oral suspension  
 traMADol 50 mg tablet

## 2017-05-19 NOTE — H&P
Hospitalist Admission Note    NAME: Elena Garrett   :  1960   MRN:  [id number]     Date/Time:  2017 4:14 PM    Patient PCP: Bill Dacosta MD   Cardiologist in past:  Dr. Gilford Maltos and Dr. Lenora Cortez  ______________________________________________________________________   Assessment & Plan:  Anoxic brain injury following cardiac arrest  --outside MRI brain with abnormalities in basal ganglia and bilateral temporal lobes  --has right>left sided weakness (right face and right arm paralyze; right leg 2/5); left side 3/5 arm, 2/5 leg. Following commands given in his native [place] language from family members. --s/p PEG and trach     S/p out of hospital cardiac arrest 17, POA  CAD s/p CABG  x 3v at HD  Hx cardiac stents (twice before CABG and once after)  HTN  --hospitalized at [facility] in [place], [place]; transferred to [facility]  and to [facility]   --per Dr. Bahena Friend had stenosis of circumflex anastomosis graft, PTCA -->restenosis -->stent-->restenosis and redo bypass recommended but patient seeked second opinion with Dr. Leonra Cortez in [place] and reportedly told redo bypass not needed. --continue coreg, effient. EKG, cardiology consult    Hx aspiration PNA during intubation of cardiac   Chronic respiratory failure, s/p trach  -- treated with invanz in [place].  CXR here pending.    --d/w Dr. Gray Michael -- plan for trach collar during daytime but rest on pressure support during night time for now. Vomiting during transport to [place] with question of recurrent aspiration  --empiric started on zosyn and vanc at [facility]. Repeat CXR clear. Hold abx    Diarrhea  --C. Diff negative in [place] recently prior to transfer. Repeat C. Diff done at [facility] today. --has rectal tube. May be related to tubefeed.   Consider add metamucil.    --stop abx for now    DM 2  --SSI    Code: full  DVT prophylaxis: lovenox  Surrogate decision maker:  Wife \"Savanna\" Daniella Northern [phone], 2 children Subjective:   CHIEF COMPLAINT:  S/p cardiac    HISTORY OF PRESENT ILLNESS:     Della Londono is a 56 y.o. right handed [place] ([facility]) male who is transferred from [address] [place] [address] due to insurance reason. Patient with hx CAD s/p CABG 2014 and stents, DM, hyperlipidemia who had out of hospital cardiac arrest in [place] on 4/28/17 while attending rehearsal wedding dinner of his cousin's daughter. Patient was hospitalized at [facility], had vomited during intubation. Echo showed EF 74%, unclear whether he had MI but no cardiology intervention done. Patient was unresponsive after hypothermic protocol and outside MRI showed abnormal changes in basal ganglia and bilateral temporal lobes c/w with anoxic brain injury. Per cousin at bedside, he has become responsive to motor commands in past week but that right side seems weaker than left. Patient treated for aspiration pneumonia with abx. Underwent PEG and trach on 5/12. He had diarrhea; C. Diff was negative. Has rectal tube. Family requested transfer to [facility] where patient resides. Declined by [facility]. He was transfered to [facility] where his prior cardiologist, Dr. Lyn Hargrove, has clinical privilege. However, [facility] is not in network with his insurance so request made [facility]. [facility]    Two cousins present at bedside during exam.    Past Medical History:   Diagnosis Date    Anoxic brain injury [facility])     following cardiac arrest; MRI shows involvement BG and temporal lobes    Aspiration pneumonia ([facility]     during cardiac arrest intubation    Bradycardia     CAD (coronary artery disease)     Cardiac arrest ([address]     no intervention by [place] cardiology.   [facility]    Diabetes [facility])     Essential hypertension     Hyperlipidemia       Past Surgical History:   Procedure Laterality Date    HX CORONARY ARTERY BYPASS GRAFT      HX CORONARY STENT PLACEMENT      HX HEART CATHETERIZATION      HX OTHER SURGICAL  2017    PEG and trach     Social History   Substance Use Topics    Smoking status: Never Smoker    Smokeless tobacco: Not on file    Alcohol use Not on file    Drug use:  None  , living in [address] [place] [address],[address] x 4 years, speaks very little [place]. Wife speaks [place]. Has a son and daughter who speak English well. Unemployed prior to cardiac arrest.    FH: Father  MI age 62. DM, HTN    Allergies   Allergen Reactions    Cymbalta [Duloxetine] Rash        Prior to Admission medications    Medication Sig Start Date End Date Taking? Authorizing Provider   prasugrel (EFFIENT) 10 mg tablet 10 mg by PEG Tube route daily. Yes Historical Provider   carvedilol (COREG) 3.125 mg tablet 3.125 mg by PEG Tube route two (2) times daily (with meals). Yes Historical Provider   vancomycin 1,000 mg 1,000 mg IVPB 1,000 mg by IntraVENous route. Yes Historical Provider   oxyCODONE-acetaminophen (PERCOCET) 5-325 mg per tablet Take 1 Tab by mouth every four (4) hours as needed for Pain. Yes Historical Provider   lidocaine (LIDODERM) 5 % 1 Patch by TransDERmal route every twenty-four (24) hours. Apply patch to the affected area for 12 hours a day and remove for 12 hours a day. Yes Historical Provider   piperacillin-tazobactam (ZOSYN) 3.375 gram injection 3.375 g by IntraVENous route every six (6) hours. Yes Historical Provider   albuterol-ipratropium (DUONEB) 2.5 mg-0.5 mg/3 ml nebu 3 mL by Nebulization route every four (4) hours as needed. Yes Historical Provider    Zofran 4mg IV q6h prn   pepcid 20mg IV q12h      REVIEW OF SYSTEMS:  POSITIVE= Bold. Negative = normal text  Unable to obtain due to mental status, trach    Objective:   VITALS:    Visit Vitals    /60    Pulse 61    Temp 98.6 °F (37 °C)    Resp 18    SpO2 100%     Temp (24hrs), Av.6 °F (37 °C), Min:98.6 °F (37 °C), Max:98.6 °F (37 °C)    There is no height or weight on file to calculate BMI.   PHYSICAL EXAM:    General:    Chronically ill stocky male, sleeping, arouses easily, follow commands only when given in his native language ([place]) by cousins, no distress, appears stated age. HEENT: Bushy hairy full beard tied into pony tail, atraumatic, anicteric sclerae, pink conjunctivae     No oral ulcers, mucosa dry, throat clear. Hearing intact. Neck:  Trach in place, large amount of white secretions  Lungs:   Junky wet diffuse bilateral rhonchi  Chest wall:  No tenderness  No Accessory muscle use. Heart:   Regular  rhythm,  No  murmur   No gallop. No edema. Abdomen:   Soft,mild distention. G tube insertion site and fasteners with small amount oozing dark red blood  Extremities: No cyanosis. No clubbing  Skin:     Not pale Not Jaundiced  No rashes. Intact heel skin   Psych:  Not anxious or agitated. Neurologic: Unable to gaze to right lateral.  Right cheek muscle paralysis. Not moving right arm, no  strength. Left arm 3/5, weak . Legs 2/5, Right leg weaker than left. Peripheral pulse: Bilateral, DP, 2+  Capillary refill:  normal    IMAGING RESULTS:   []       I have personally reviewed the actual   []     CXR  []     CT scan  CXR:  CT :  EKG:   ________________________________________________________________________  Care Plan discussed with:    Comments   Patient     Family      RN     Care Manager                    Consultant:  charo Wharton   ________________________________________________________________________  Prophylaxis:  GI pepcid   DVT lovenox   ________________________________________________________________________  Recommended Disposition: TBD, likely LTAC  _____________________________________________________  Code Status: presumed FULL.   Immediate family not present currently.  ________________________________________________________________________  TOTAL TIME:  65 minutes      Comments    y Reviewed previous records from [address] [place] [address] Rd ______________________________________________________________________  Nati Sparks MD      Procedures: see electronic medical records for all procedures/Xrays and details which were not copied into this note but were reviewed prior to creation of Plan.     LAB DATA REVIEWED:    Recent Results (from the past 24 hour(s))   GLUCOSE, POC    Collection Time: 05/19/17  2:22 PM   Result Value Ref Range    Glucose (POC) 86 65 - 100 mg/dL    Performed by Emi Contreras

## 2017-05-19 NOTE — PROGRESS NOTES
1900 Bedside verbal report received from [address] Assessment complete. Patient is alert. Follow commands with the left side. Right side is flaccid. Limited english. Lung sounds are clear. Bowel sounds are active. Condom catheter draining clear yellow urine. flexi seal in place draining stool. 2200 Family requesting pain patch for patient's back. Patient nodded to family yes that his back was hurting. Family does not want him to have narcotics. Paged hospitalist on call. 1 Spoke with Dr. Marlee Sanchez received order for Lidocaine patch. 2250 patch placed on patient's back. Family going home for the night. 0000 Assessment complete. No changes from previous. 0130 Increased rate of tube feeding to 20mL/hr    0400 Assessment complete. No changes from previous. 5191 Patient placed on 40% trach collar.  Patient tolerating well.    0700 bedside verbal report given to [facility] JENNIFER GRANADOS RN

## 2017-05-19 NOTE — IP AVS SNAPSHOT
[address] [facility] 
[phone] Patient: Mellisa Peterson MRN: [id number] JAYNA:[birth year] You are allergic to the following Allergen Reactions Cymbalta (Duloxetine) Rash Recent Documentation Height Weight BMI Smoking Status 1.803 m 82.2 kg 25.29 kg/m2 Never Smoker Emergency Contacts Name Discharge Info Relation Home Work Mobile Gurinder Anne  Spouse [3] [phone] ESE Cordero  Child [2] [phone] Bart Guadalupe  Child [2] [phone] About your hospitalization You were admitted on:  May 19, 2017 You last received care in the:  [facility] You were discharged on:  Mimi 3, 2017 Unit phone number:  [phone] Why you were hospitalized Your primary diagnosis was: Anoxic Brain Injury (Hcc) Your diagnoses also included:  H/O Cardiac Arrest, Coronary Artery Disease Of Bypass Graft With Stable Angina Pectoris (Hcc), Essential Hypertension, Dyslipidemia, Type 2 Diabetes Mellitus Without Complication (Hcc), Respiratory Failure (Hcc), Pneumonia Due To Serratia Marcescens (Hcc), Lice Infestation, Cardiomyopathy (Hcc), Stenosis Of Both Internal Carotid Arteries, 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), Moderate Anoxic-Ischemic Encephalopathy (Hcc), Counseling Regarding Goals Of Care Providers Seen During Your Hospitalizations Provider Role Specialty Primary office phone Stella Madrigal MD Attending Provider Internal Medicine [phone] Hossein Brooks MD Attending Provider Internal Medicine [phone] Clovis Arroyo MD Attending Provider Internal Medicine [phone] Juan Alberto Cherry MD Attending Provider Internal Medicine [phone] Your Primary Care Physician (PCP) Primary Care Physician Office Phone Office Fax [address] [address] [phone] Follow-up Information Follow up With Details Comments Contact Info Rodrigue Batista MD   [address] [place] [address] 
[phone] Ranjan Pastrana MD In 1 month  [address] [facility] 
[phone] [facility] In 1 week  [address] [address] [address]   [address] [address] Octavio Chris MD In 1 month  [address] [facility] [address] [facility] 
[phone] Current Discharge Medication List  
  
START taking these medications Dose & Instructions Dispensing Information Comments Morning Noon Evening Bedtime  
 acetaminophen 325 mg tablet Commonly known as:  TYLENOL Your last dose was: Your next dose is:    
   
   
 Dose:  650 mg  
2 Tabs by Per G Tube route every four (4) hours as needed for Pain. Quantity:  30 Tab Refills:  0  
     
   
   
   
  
 aspirin 81 mg chewable tablet Replaces:  aspirin delayed-release 81 mg tablet Your last dose was: Your next dose is:    
   
   
 Dose:  81 mg  
1 Tab by Per G Tube route daily. Quantity:  30 Tab Refills:  1  
     
   
   
   
  
 furosemide 40 mg tablet Commonly known as:  LASIX Your last dose was: Your next dose is:    
   
   
 Dose:  40 mg  
1 Tab by Per G Tube route daily. Quantity:  30 Tab Refills:  1  
     
   
   
   
  
 levoFLOXacin 750 mg tablet Commonly known as:  Clifton Belling Your last dose was: Your next dose is:    
   
   
 Dose:  750 mg  
1 Tab by Per G Tube route Daily (before breakfast) for 5 days. Quantity:  5 Tab Refills:  0  
     
   
   
   
  
 pantoprazole 40 mg granules for oral suspension Commonly known as:  PROTONIX Replaces:  pantoprazole 40 mg tablet Your last dose was: Your next dose is:    
   
   
 Dose:  40 mg  
40 mg by Per G Tube route Daily (before breakfast). Quantity:  30 Each Refills:  1 traMADol 50 mg tablet Commonly known as:  ULTRAM  
   
Your last dose was: Your next dose is:    
   
   
 Dose:  50 mg  
1 Tab by Per G Tube route every six (6) hours as needed. Max Daily Amount: 200 mg. Quantity:  25 Tab Refills:  0 CONTINUE these medications which have CHANGED Dose & Instructions Dispensing Information Comments Morning Noon Evening Bedtime  
 atorvastatin 40 mg tablet Commonly known as:  LIPITOR What changed:   
- how much to take 
- how to take this Your last dose was: Your next dose is:    
   
   
 Dose:  40 mg  
1 Tab by Per G Tube route daily. Quantity:  30 Tab Refills:  1 CONTINUE these medications which have NOT CHANGED Dose & Instructions Dispensing Information Comments Morning Noon Evening Bedtime DUONEB 2.5 mg-0.5 mg/3 ml Nebu Generic drug:  albuterol-ipratropium Your last dose was: Your next dose is:    
   
   
 Dose:  3 mL  
3 mL by Nebulization route every four (4) hours as needed. Refills:  0  
     
   
   
   
  
 prasugrel 10 mg tablet Commonly known as:  EFFIENT Your last dose was: Your next dose is:    
   
   
 Dose:  10 mg  
10 mg by PEG Tube route daily. Refills:  0 STOP taking these medications   
 aspirin delayed-release 81 mg tablet Replaced by:  aspirin 81 mg chewable tablet  
   
  
 clopidogrel 75 mg Tab Commonly known as:  PLAVIX COREG 3.125 mg tablet Generic drug:  carvedilol  
   
  
 losartan 25 mg tablet Commonly known as:  COZAAR  
   
  
 metFORMIN 500 mg tablet Commonly known as:  GLUCOPHAGE  
   
  
 metoprolol tartrate 25 mg tablet Commonly known as:  LOPRESSOR  
   
  
 nitroglycerin 0.4 mg SL tablet Commonly known as:  NITROSTAT  
   
  
 pantoprazole 40 mg tablet Commonly known as:  PROTONIX Replaced by:  pantoprazole 40 mg granules for oral suspension PEPCID 20 mg tablet Generic drug:  famotidine PERCOCET 5-325 mg per tablet Generic drug:  oxyCODONE-acetaminophen Ranolazine 1,000 mg Tb12 Commonly known as:  RANEXA  
   
  
 ZOFRAN (AS HYDROCHLORIDE) 4 mg tablet Generic drug:  ondansetron hcl ZOSYN 3.375 gram injection Generic drug:  piperacillin-tazobactam  
   
  
  
ASK your doctor about these medications Dose & Instructions Dispensing Information Comments Morning Noon Evening Bedtime  
 lidocaine 5 % Commonly known as:  Jean Repress Your last dose was: Your next dose is:    
   
   
 Dose:  1 Patch 1 Patch by TransDERmal route every twenty-four (24) hours. Apply patch to the affected area for 12 hours a day and remove for 12 hours a day. Refills:  0  
     
   
   
   
  
 vancomycin 1,000 mg 1,000 mg IVPB Your last dose was: Your next dose is:    
   
   
 Dose:  1000 mg  
1,000 mg by IntraVENous route. Refills:  0 Where to Get Your Medications Information on where to get these meds will be given to you by the nurse or doctor. ! Ask your nurse or doctor about these medications  
  acetaminophen 325 mg tablet  
 aspirin 81 mg chewable tablet  
 atorvastatin 40 mg tablet  
 furosemide 40 mg tablet  
 levoFLOXacin 750 mg tablet  
 pantoprazole 40 mg granules for oral suspension  
 traMADol 50 mg tablet Discharge Instructions HOSPITALIST DISCHARGE INSTRUCTIONS 
 
NAME: Nyasia Irvin :  1960 MRN:  [id number] Date/Time:  6/3/2017 8:30 AM 
 
ADMIT DATE: 2017 DISCHARGE DATE: 6/3/2017 Attending Physician: Helen Edward MD 
 
DISCHARGE DIAGNOSIS: 
Hypotension 
tracheobronchitis Medications: Per above medication reconciliation. Pain Management: per above medications Recommended diet: peg feeds, advance as tolerated to goal of 45ml/hr Recommended activity: as tolerated Wound care ( peg site) :q 8hrs, remove split sponge, cleanse with NS moistened q-tips and strips of 4x4's under bumper, dry with dry q-tip and dry 4x4 strips, place a small amount of Sensicare zinc cream on Q-tip and apply to skin under bumper. Replace split drainage sponge and dry 4x4, secure with paper tape. Indwelling devices: pinto Required Lab work: per rehab Glucose management:  poc and achs Sliding scale per sah Code status: full Outside physician follow up: Follow-up Information Follow up With Details Comments Contact Info Floyd Astudillo MD   [address] [facility] [address] [facility] [address] 
[phone] Lashae Gill MD In 1 month  [facility] 
[phone] [facility] In 1 week  hospitals [address] [facility] [address]   [phone] Kira López MD In 1 month  [address] [place] [address] [address] [facility] 
[phone] Skilled nursing facility/ SNF MD responsible for above on discharge. Information obtained by : 
I understand that if any problems occur once I am at home I am to contact my physician. I understand and acknowledge receipt of the instructions indicated above. Physician's or R.N.'s Signature                                                                  Date/Time Patient or Repres Discharge Instructions Attachments/References HEART FAILURE: AVOIDING TRIGGERS (ENGLISH) Discharge Orders None MyChart Announcement We are excited to announce that we are making your provider's discharge notes available to you in itzat. You will see these notes when they are completed and signed by the physician that discharged you from your recent hospital stay. If you have any questions or concerns about any information you see in itzat, please call the Health Information Department where you were seen or reach out to your Primary Care Provider for more information about your plan of care. Introducing [facility]! [place] [employer] introduces itzat patient portal. Now you can access parts of your medical record, email your doctor's office, and request medication refills online. 1. In your internet browser, go to [email or web address] 2. Click on the First Time User? Click Here link in the Sign In box. You will see the New Member Sign Up page. 3. Enter your itzat Access Code exactly as it appears below. You will not need to use this code after youve completed the sign-up process. If you do not sign up before the expiration date, you must request a new code. · itzat Access Code: [email or web address] Expires: 8/2/2017  5:02 PM 
 
4. Enter the last four digits of your Social Security Number (xxxx) and Date of Birth (mm/dd/yyyy) as indicated and click Submit. You will be taken to the next sign-up page. 5. Create a itzat ID. This will be your itzat login ID and cannot be changed, so think of one that is secure and easy to remember. 6. Create a itzat password. You can change your password at any time. 7. Enter your Password Reset Question and Answer. This can be used at a later time if you forget your password. 8. Enter your e-mail address. You will receive e-mail notification when new information is available in [address] 9. Click Sign Up. You can now view and download portions of your medical record.  
10. Click the Download Summary menu link to download a portable copy of your medical information. If you have questions, please visit the Frequently Asked Questions section of the [employer] website. Remember, Mult[employer] is NOT to be used for urgent needs. For medical emergencies, dial 911. Now available from your iPhone and Android! General Information Please provide this summary of care documentation to your next provider. Patient Signature:  ____________________________________________________________ Date:  ____________________________________________________________  
  
Michelle Varma Provider Signature:  ____________________________________________________________ Date:  ____________________________________________________________ More Information Avoiding Triggers With Heart Failure: Care Instructions Your Care Instructions Triggers are anything that make your heart failure flare up. A flare-up is also called \"sudden heart failure\" or \"acute heart failure. \" When you have a flare-up, fluid builds up in your lungs, and you have problems breathing. You might need to go to the hospital. By watching for changes in your condition and avoiding triggers, you can prevent heart failure flare-ups. Follow-up care is a key part of your treatment and safety. Be sure to make and go to all appointments, and call your doctor if you are having problems. It's also a good idea to know your test results and keep a list of the medicines you take. How can you care for yourself at home? Watch for changes in your weight and condition · Weigh yourself without clothing at the same time each day. Record your weight. Call your doctor if you gain 3 pounds or more in 2 to 3 days. A sudden weight gain may mean that your heart failure is getting worse. · Keep a daily record of your symptoms. Write down any changes in how you feel, such as new shortness of breath, cough, or problems eating.  Also record if your ankles are more swollen than usual and if you have to urinate in the night more often. Note anything that you ate or did that could have triggered these changes. Limit sodium Sodium causes your body to hold on to extra water. This may cause your heart failure symptoms to get worse. People get most of their sodium from processed foods. Fast food and restaurant meals also tend to be very high in sodium. · Your doctor may suggest that you limit sodium to 2,000 milligrams (mg) a day or less. That is less than 1 teaspoon of salt a day, including all the salt you eat in cooking or in packaged foods. · Read food labels on cans and food packages. They tell you how much sodium you get in one serving. Check the serving size. If you eat more than one serving, you are getting more sodium. · Be aware that sodium can come in forms other than salt, including monosodium glutamate (MSG), sodium citrate, and sodium bicarbonate (baking soda). MSG is often added to Asian food. You can sometimes ask for food without MSG or salt. · Slowly reducing salt will help you adjust to the taste. Take the salt shaker off the table. · Flavor your food with garlic, lemon juice, onion, vinegar, herbs, and spices instead of salt. Do not use soy sauce, steak sauce, onion salt, garlic salt, mustard, or ketchup on your food, unless it is labeled \"low-sodium\" or \"low-salt. \" 
· Make your own salad dressings, sauces, and ketchup without adding salt. · Use fresh or frozen ingredients, instead of canned ones, whenever you can. Choose low-sodium canned goods. · Eat less processed food and food from restaurants, including fast food. Exercise as directed Moderate, regular exercise is very good for your heart. It improves your blood flow and helps control your weight. But too much exercise can stress your heart and cause a heart failure flare-up.  
· Check with your doctor before you start an exercise program. 
 · Walking is an easy way to get exercise. Start out slowly. Gradually increase the length and pace of your walk. Swimming, riding a bike, and using a treadmill are also good forms of exercise. · When you exercise, watch for signs that your heart is working too hard. You are pushing yourself too hard if you cannot talk while you are exercising. If you become short of breath or dizzy or have chest pain, stop, sit down, and rest. 
· Do not exercise when you do not feel well. Take medicines correctly · Take your medicines exactly as prescribed. Call your doctor if you think you are having a problem with your medicine. · Make a list of all the medicines you take. Include those prescribed to you by other doctors and any over-the-counter medicines, vitamins, or supplements you take. Take this list with you when you go to any doctor. · Take your medicines at the same time every day. It may help you to post a list of all the medicines you take every day and what time of day you take them. · Make taking your medicine as simple as you can. Plan times to take your medicines when you are doing other things, such as eating a meal or getting ready for bed. This will make it easier to remember to take your medicines. · Get organized. Use helpful tools, such as daily or weekly pill containers. When should you call for help? Call 911 if you have symptoms of sudden heart failure such as: 
· You have severe trouble breathing. · You cough up pink, foamy mucus. · You have a new irregular or rapid heartbeat. Call your doctor now or seek immediate medical care if: 
· You have new or increased shortness of breath. · You are dizzy or lightheaded, or you feel like you may faint. · You have sudden weight gain, such as 3 pounds or more in 2 to 3 days. · You have increased swelling in your legs, ankles, or feet. · You are suddenly so tired or weak that you cannot do your usual activities. Watch closely for changes in your health, and be sure to contact your doctor if you develop new symptoms. Where can you learn more? Go to http://karl-faye.info/. Enter G938 in the search box to learn more about \"Avoiding Triggers With Heart Failure: Care Instructions. \" Current as of: November 15, 2016 Content Version: 11.2 © 5221-3994 [employer]. Care instructions adapted under license by [employer] (which disclaims liability or warranty for this information). If you have questions about a medical condition or this instruction, always ask your healthcare professional. [address] any warranty or liability for your use of this information.

## 2017-05-19 NOTE — PROGRESS NOTES
[facility] Consult Service Progress NOTE  Pulmonary, Critical Care, and Sleep Medicine    Name: Mercy Jules MRN: [id number]   : 1960 Hospital: [facility]   Date: 2017  Admission Date: 2017     Chart and notes reviewed. Data reviewed. Pt seen on rounds earlier today. I have evaluated and examined the patient. Pt is acutely ill. Reviewed the evaluation and will assist in implementing the plan as outlined by Dr. Aristeo Latham and team    Pt is a 56 [place] male who is transferred to [facility] from [place] [address]. Was in [place] at a wedding party  and became ill with nausea and chest pain. While in a car, he became unresponsive and EMS activated. Pt found pulseless on side of the road and resuscitation started by EMS. He had ROSC and taken to [facility] where CPR resumed for asystole. Pt had emesis within [facility] airway and reintubated. Pt eventually had ROCS after another 10 minutes and placed in hypothermia protocol. Pt kept in CCU and required pressors, abx. He was seen by Cardiology and Neurology. Pt felt not to have ACS. LVEF 75% on pressors with minimal enzymes. Head Ct showed anoxic injury. Pt had trach and PEG on . He was transferred to [address] [place] [address]  where he had CABGx 4  a couple of years ago and was cared for by Svitlana Mcdonough. After transfer, it became apparent that his insurance would only provide [facility] Note from Dr. Cherie Reyna describes complex coronary anatomy and stenosis of one of his grafts.  It ws suggested that pt have another CABG but family declines and he has since seen a cardiologist in [facility] Day: 1        IMPRESSION:   · Acute respiratory failure with hypoxia- was not on home O2 prior to arrest  · S/p tracheostomy for prolonged vent dependence  · Dysphagia s/p PEG  · Diarrhea C diff negative in NC  · Anoxic brain injury- some cognitive recovery but significant motor deficits; CT and MRI done at [facility]-  Injury to basal ganglia and temporal lobes  · S/p cardiac arrest, s/p hypothermia protocol  · ASCVD  · H/o CABG [facility] 2014 and in  had another cath showing complex anatomy; has successful laser atherectomy PTCCA and stenting of the distal anastomotic site of the vein graft with Promus ROBERT; chest pain persisted; pt offered cath 2016 and possbile referral for another CABg at [facility] but evidently declined  · HTN  · Diabetes Mellitus  · Left ear deafness POA  · Language barrier- limited English but family available, complicated with trach and recent DORA  · Never smoker      RECOMMENDATIONS/PLAN:   · sputum culture; can hold on any abx for now  · keep sats low 90's  · Trach collar from 8 AM to 10 PM as tolerated and then place on vent overnight  · ABG on tach collar at bedtime BEFORE placing back on vent  · Repeat echo  · Resume tube feedings  · Follow glucose  · Discussed with Dr. Lety Duran  · Cardiology consult prior [facility]  · Case management consult LTAC  · CXR  · Empiric abx until cultures final   · Pulmonary toilet  · routine trach care  · PT, OT consults  · DVT prophylaxis  · Will be available to assist in medical management while in the CCU pending disposition     Code: No Order        Ventilator Settings:      Mode Rate TV Press PEEP FiO2 PIP Min.  Vent                              Vital Signs: Intake/Output: Intake/Output:   Visit Vitals    /61    Pulse 76    Temp 98.6 °F (37 °C)    Resp 24    SpO2 100%            Temp (24hrs), Av.6 °F (37 °C), Min:98.6 °F (37 °C), Max:98.6 °F (37 °C)   No intake or output data in the 24 hours ending 17 [id number] Last shift:         Last 3 shifts:           Telemetry:    normal sinus rhythm    Physical Exam:    General: large, obese Lin male well developed and well nourished, in no respiratory distress and acyanotic and moderately ill   HEENT: NCAT, bearded; no thrush? limited mouth opening,moist   Neck: No abnormally enlarged lymph nodes. Faith Oliveira #8   Chest: normal   Lungs: decreased air exchange bilaterally   Heart: Regular rate and rhythm or S1S2 present   Abdomen: soft, non-tender, without masses or organomegaly, PEG   :    Extremity: negative, cyanosis, clubbing;    Neuro: lethargic; opens eyes and EOMI; tongue midline; will follow simple commands and try to move left arm but no fine motor and right arm flaccid. Minimal BLE movement   Psych: lethargic   Skin: Pallor and Cool;   Pulses: Bilateral, Radial, 2+ Normal upper and lower extremity pulses   Capillary refill: normal brisk capillary refill, cool;      DATA:    MAR reviewed and pertinent medications noted or modified as needed    MEDS:   Current Facility-Administered Medications   Medication    mupirocin (BACTROBAN) 2 % ointment    insulin lispro (HUMALOG) injection    glucose chewable tablet 16 g    dextrose 10% infusion 125-250 mL    glucagon (GLUCAGEN) injection 1 mg    enoxaparin (LOVENOX) injection 40 mg          Labs:  No results for input(s): WBC, HGB, PLT, INR, APTT, HGBEXT, PLTEXT in the last 72 hours. No lab exists for component: FIB, DDMER, INREXT  No results for input(s): NA, K, CL, CO2, GLU, BUN, CREA, CA, MG, PHOS, LAC, ALB, TBIL, SGOT, ALT, AML, LPSE in the last 72 hours. No results for input(s): PH, PCO2, PO2, HCO3, FIO2 in the last 72 hours. No results for input(s): PH, PCO2, PO2, HCO3, FIO2 in the last 72 hours. No results for input(s): CPK, CKNDX, TROIQ in the last 72 hours.     No lab exists for component: CPKMB    No results found for: IRON, FE, TIBC, IBCT, PSAT, FERR    No results found for: SR, CRP, THIEN, ANAIGG, RA, RPR, RPRT, VDRLT, VDRLS, TSH, TSHEXT     No results found for: CPK, RCK1, RCK2, RCK3, RCK4, CKNDX, CKND1, TROPT, TROIQ, BNPP, BNP     No results found for: CULT    No results found for: TOXA1, RPR, HBCM, HBSAG, HAAB, HCAB, HCAB1, HAAT, G6PD, CRYAC, HIVGT, HIVR, HIV1, HIV12, HIVPC, HIVRPI    No results found for: VANCT, CPK    No results found for: COLOR, APPRN, SPGRU, JOHANNE, PROTU, GLUCU, KETU, BILU, BLDU, UROU, DILAN, LEUKU, WBCU, RBCU, UEPI, BACTU, CASTS, UCRY    Imaging:  [x]                           I have personally reviewed the patients radiographs and reports:clear    No results found for this or any previous visit. No results found for this or any previous visit.     Jensen Carney MD

## 2017-05-20 PROBLEM — G93.1 ANOXIC BRAIN INJURY (HCC): Status: ACTIVE | Noted: 2017-05-20

## 2017-05-20 LAB
ALBUMIN SERPL BCP-MCNC: 2.2 G/DL (ref 3.5–5)
ALBUMIN/GLOB SERPL: 0.3 {RATIO} (ref 1.1–2.2)
ALP SERPL-CCNC: 272 U/L (ref 45–117)
ALT SERPL-CCNC: 67 U/L (ref 12–78)
ANION GAP BLD CALC-SCNC: 7 MMOL/L (ref 5–15)
AST SERPL W P-5'-P-CCNC: 53 U/L (ref 15–37)
BILIRUB SERPL-MCNC: 1.3 MG/DL (ref 0.2–1)
BUN SERPL-MCNC: 12 MG/DL (ref 6–20)
BUN/CREAT SERPL: 17 (ref 12–20)
CALCIUM SERPL-MCNC: 8.6 MG/DL (ref 8.5–10.1)
CHLORIDE SERPL-SCNC: 98 MMOL/L (ref 97–108)
CO2 SERPL-SCNC: 30 MMOL/L (ref 21–32)
CREAT SERPL-MCNC: 0.72 MG/DL (ref 0.7–1.3)
ERYTHROCYTE [DISTWIDTH] IN BLOOD BY AUTOMATED COUNT: 16.7 % (ref 11.5–14.5)
FERRITIN SERPL-MCNC: 191 NG/ML (ref 26–388)
GLOBULIN SER CALC-MCNC: 6.4 G/DL (ref 2–4)
GLUCOSE BLD STRIP.AUTO-MCNC: 128 MG/DL (ref 65–100)
GLUCOSE BLD STRIP.AUTO-MCNC: 168 MG/DL (ref 65–100)
GLUCOSE BLD STRIP.AUTO-MCNC: 180 MG/DL (ref 65–100)
GLUCOSE BLD STRIP.AUTO-MCNC: 90 MG/DL (ref 65–100)
GLUCOSE SERPL-MCNC: 171 MG/DL (ref 65–100)
HCT VFR BLD AUTO: 35 % (ref 36.6–50.3)
HGB BLD-MCNC: 11.3 G/DL (ref 12.1–17)
IRON SATN MFR SERPL: 15 % (ref 20–50)
IRON SERPL-MCNC: 34 UG/DL (ref 35–150)
MCH RBC QN AUTO: 27.4 PG (ref 26–34)
MCHC RBC AUTO-ENTMCNC: 32.3 G/DL (ref 30–36.5)
MCV RBC AUTO: 85 FL (ref 80–99)
PLATELET # BLD AUTO: 420 K/UL (ref 150–400)
POTASSIUM SERPL-SCNC: 3.5 MMOL/L (ref 3.5–5.1)
PREALB SERPL-MCNC: 12.7 MG/DL (ref 20–40)
PROT SERPL-MCNC: 8.6 G/DL (ref 6.4–8.2)
RBC # BLD AUTO: 4.12 M/UL (ref 4.1–5.7)
SERVICE CMNT-IMP: ABNORMAL
SERVICE CMNT-IMP: NORMAL
SODIUM SERPL-SCNC: 135 MMOL/L (ref 136–145)
TIBC SERPL-MCNC: 229 UG/DL (ref 250–450)
VIT B12 SERPL-MCNC: 632 PG/ML (ref 211–911)
WBC # BLD AUTO: 12.8 K/UL (ref 4.1–11.1)

## 2017-05-20 PROCEDURE — 77030020186 HC BOOT HL PROTCT SAGE -B

## 2017-05-20 PROCEDURE — G8987 SELF CARE CURRENT STATUS: HCPCS

## 2017-05-20 PROCEDURE — 86920 COMPATIBILITY TEST SPIN: CPT | Performed by: HOSPITALIST

## 2017-05-20 PROCEDURE — 80053 COMPREHEN METABOLIC PANEL: CPT | Performed by: INTERNAL MEDICINE

## 2017-05-20 PROCEDURE — P9016 RBC LEUKOCYTES REDUCED: HCPCS | Performed by: HOSPITALIST

## 2017-05-20 PROCEDURE — 36415 COLL VENOUS BLD VENIPUNCTURE: CPT | Performed by: HOSPITALIST

## 2017-05-20 PROCEDURE — 77030029131 HC ADMN ST IV BLD N DEHP ICUM -B

## 2017-05-20 PROCEDURE — 87186 SC STD MICRODIL/AGAR DIL: CPT | Performed by: INTERNAL MEDICINE

## 2017-05-20 PROCEDURE — 94003 VENT MGMT INPAT SUBQ DAY: CPT

## 2017-05-20 PROCEDURE — 36430 TRANSFUSION BLD/BLD COMPNT: CPT

## 2017-05-20 PROCEDURE — 83540 ASSAY OF IRON: CPT | Performed by: HOSPITALIST

## 2017-05-20 PROCEDURE — 74011250636 HC RX REV CODE- 250/636: Performed by: INTERNAL MEDICINE

## 2017-05-20 PROCEDURE — 74011636637 HC RX REV CODE- 636/637: Performed by: INTERNAL MEDICINE

## 2017-05-20 PROCEDURE — 74011000250 HC RX REV CODE- 250: Performed by: HOSPITALIST

## 2017-05-20 PROCEDURE — 65610000006 HC RM INTENSIVE CARE

## 2017-05-20 PROCEDURE — G8979 MOBILITY GOAL STATUS: HCPCS

## 2017-05-20 PROCEDURE — 77030032490 HC SLV COMPR SCD KNE COVD -B

## 2017-05-20 PROCEDURE — 86900 BLOOD TYPING SEROLOGIC ABO: CPT | Performed by: HOSPITALIST

## 2017-05-20 PROCEDURE — 97161 PT EVAL LOW COMPLEX 20 MIN: CPT

## 2017-05-20 PROCEDURE — 87077 CULTURE AEROBIC IDENTIFY: CPT | Performed by: INTERNAL MEDICINE

## 2017-05-20 PROCEDURE — 82607 VITAMIN B-12: CPT | Performed by: HOSPITALIST

## 2017-05-20 PROCEDURE — 77030018836 HC SOL IRR NACL ICUM -A

## 2017-05-20 PROCEDURE — G8978 MOBILITY CURRENT STATUS: HCPCS

## 2017-05-20 PROCEDURE — 74011250637 HC RX REV CODE- 250/637: Performed by: INTERNAL MEDICINE

## 2017-05-20 PROCEDURE — 97166 OT EVAL MOD COMPLEX 45 MIN: CPT

## 2017-05-20 PROCEDURE — 74011250637 HC RX REV CODE- 250/637: Performed by: HOSPITALIST

## 2017-05-20 PROCEDURE — 85027 COMPLETE CBC AUTOMATED: CPT | Performed by: INTERNAL MEDICINE

## 2017-05-20 PROCEDURE — 82962 GLUCOSE BLOOD TEST: CPT

## 2017-05-20 PROCEDURE — 30233N0 TRANSFUSION OF AUTOLOGOUS RED BLOOD CELLS INTO PERIPHERAL VEIN, PERCUTANEOUS APPROACH: ICD-10-PCS | Performed by: HOSPITALIST

## 2017-05-20 PROCEDURE — 82728 ASSAY OF FERRITIN: CPT | Performed by: HOSPITALIST

## 2017-05-20 PROCEDURE — 87070 CULTURE OTHR SPECIMN AEROBIC: CPT | Performed by: INTERNAL MEDICINE

## 2017-05-20 PROCEDURE — 74011250637 HC RX REV CODE- 250/637: Performed by: NURSE PRACTITIONER

## 2017-05-20 PROCEDURE — 74011250636 HC RX REV CODE- 250/636: Performed by: HOSPITALIST

## 2017-05-20 PROCEDURE — 84134 ASSAY OF PREALBUMIN: CPT | Performed by: HOSPITALIST

## 2017-05-20 PROCEDURE — 93306 TTE W/DOPPLER COMPLETE: CPT

## 2017-05-20 RX ORDER — FUROSEMIDE 10 MG/ML
40 INJECTION INTRAMUSCULAR; INTRAVENOUS ONCE
Status: COMPLETED | OUTPATIENT
Start: 2017-05-20 | End: 2017-05-20

## 2017-05-20 RX ORDER — SODIUM CHLORIDE 9 MG/ML
250 INJECTION, SOLUTION INTRAVENOUS AS NEEDED
Status: DISCONTINUED | OUTPATIENT
Start: 2017-05-20 | End: 2017-06-03 | Stop reason: HOSPADM

## 2017-05-20 RX ORDER — PEPPERMINT OIL
SPIRIT ORAL ONCE
Status: COMPLETED | OUTPATIENT
Start: 2017-05-20 | End: 2017-05-20

## 2017-05-20 RX ADMIN — FAMOTIDINE 20 MG: 20 TABLET ORAL at 08:29

## 2017-05-20 RX ADMIN — ATORVASTATIN CALCIUM 40 MG: 40 TABLET, FILM COATED ORAL at 21:13

## 2017-05-20 RX ADMIN — POTASSIUM PHOSPHATE, MONOBASIC AND POTASSIUM PHOSPHATE, DIBASIC: 224; 236 INJECTION, SOLUTION INTRAVENOUS at 01:53

## 2017-05-20 RX ADMIN — CARVEDILOL 3.12 MG: 3.12 TABLET, FILM COATED ORAL at 18:57

## 2017-05-20 RX ADMIN — FUROSEMIDE 40 MG: 10 INJECTION, SOLUTION INTRAMUSCULAR; INTRAVENOUS at 13:30

## 2017-05-20 RX ADMIN — Medication 10 ML: at 06:00

## 2017-05-20 RX ADMIN — ACETAMINOPHEN 650 MG: 325 SOLUTION ORAL at 21:13

## 2017-05-20 RX ADMIN — ASPIRIN 81 MG CHEWABLE TABLET 81 MG: 81 TABLET CHEWABLE at 08:28

## 2017-05-20 RX ADMIN — Medication 10 ML: at 22:00

## 2017-05-20 RX ADMIN — MUPIROCIN: 20 OINTMENT TOPICAL at 08:29

## 2017-05-20 RX ADMIN — INSULIN LISPRO 2 UNITS: 100 INJECTION, SOLUTION INTRAVENOUS; SUBCUTANEOUS at 11:28

## 2017-05-20 RX ADMIN — PRASUGREL HYDROCHLORIDE 10 MG: 10 TABLET, FILM COATED ORAL at 08:28

## 2017-05-20 RX ADMIN — FAMOTIDINE 20 MG: 20 TABLET ORAL at 18:57

## 2017-05-20 RX ADMIN — CARVEDILOL 3.12 MG: 3.12 TABLET, FILM COATED ORAL at 08:29

## 2017-05-20 RX ADMIN — MUPIROCIN: 20 OINTMENT TOPICAL at 21:00

## 2017-05-20 RX ADMIN — INSULIN LISPRO 2 UNITS: 100 INJECTION, SOLUTION INTRAVENOUS; SUBCUTANEOUS at 19:00

## 2017-05-20 RX ADMIN — ACETAMINOPHEN 650 MG: 325 SOLUTION ORAL at 08:28

## 2017-05-20 RX ADMIN — Medication: at 11:03

## 2017-05-20 RX ADMIN — Medication 10 ML: at 13:30

## 2017-05-20 RX ADMIN — ENOXAPARIN SODIUM 40 MG: 40 INJECTION SUBCUTANEOUS at 14:42

## 2017-05-20 NOTE — PROGRESS NOTES
1900 Verbal report received from [address] Assessment complete. Patient is alert and following commands with family translating. Pupils are equal and reactive to light. Lung sounds are coarse. Copious amounts of sputum. Pulses palpable. Peg tube in place infusing TwoCal @ 30mL/hr. PIV in place and capped. Condom catheter draining clear yellow urine. Rectal tube in place draining stool. 2100 Family very demanding and hyper vigilant. Asking staff multiple questions. 0000 Assessment complete. No changes from previous. 0202 Nurse into do trach care and 1 lice found on trach collar dressing. Several nits noted in beard. Nursing supervisor notified. 18 Spoke with Dr. Ni Garcia informed him of findings. Order received for [facility] treatment. House keeping notified. 0400 Assessment complete. No changes from previous. 0530 Kwell shampoos applied to beard and scalp. Beard combed through with fine tooth comb. Attempted to brush hair. Very large mat behind patient's neck.

## 2017-05-20 NOTE — PROGRESS NOTES
Problem: Self Care Deficits Care Plan (Adult)  Goal: *Acute Goals and Plan of Care (Insert Text)  Occupational Therapy Goals  Initiated 5/20/2017   1. Patient will perform self-feeding with moderate assistance seated in bed with right UE and verbal cues, when pt is cleared to eat within 7 day(s). 2. Patient will follow one step simple commands 50 % of the time, with in 7 days  3. Patient will be able to tolerate rolling in bed with max assist of 2 and attempt to assist with right UE, with in 7 days. 4. Patient will be able to be able to sit on EOb for 1 minute with total assist of 2 to 3 persons with in 7 days. OCCUPATIONAL THERAPY EVALUATION  Patient: Jacquie Fabian [de-identified]56 y.o. male)  Date: 5/20/2017  Primary Diagnosis: Cardiac Arrest  Respiratory failure ([facility]        Precautions:          ASSESSMENT :  Based on the objective data described below, the patient presents with generalized weakness, decreased endurance, strength, functional mobility, cognition and ADLs. Pt was independent in all areas prior to April 2017 when pt had cardiac arrest and anoxic brain injury. Pt at [facility] was cleared by nursing to see only at bed level. All VSS, and family was in the room to assist with translation, pt does not speak [place]. Pt was drowsy, and able to move his right arm minimal amount with multiple cues and encouragement. Pt was able to move right leg and foot but very weak all over. Pt has no vol movement  in LUE noted and trace of tone in triceps on left arm. Pt was left in bed with family in the room and will continue to see pt for tx, and recommend that pt have further therapy at discharge at SNF at this time. .     Patient will benefit from skilled intervention to address the above impairments.   Patients rehabilitation potential is considered to be Guarded  Factors which may influence rehabilitation potential include:   [ ]                None noted  [X]                Mental ability/status  [X] Medical condition  [ ]                Home/family situation and support systems  [ ]                Safety awareness  [ ]                Pain tolerance/management  [ ]                Other:        PLAN :  Recommendations and Planned Interventions:  [X]                  Self Care Training                  [ ]           Therapeutic Activities  [X]                  Functional Mobility Training    [ ]           Cognitive Retraining  [X]                  Therapeutic Exercises           [X]           Endurance Activities  [ ]                  Balance Training                   [ ]           Neuromuscular Re-Education  [ ]                  Visual/Perceptual Training     [X]      Home Safety Training  [X]                  Patient Education                 [X]           Family Training/Education  [ ]                  Other (comment):     Frequency/Duration: Patient will be followed by occupational therapy 4 times a week to address goals. Discharge Recommendations: Laron Mane  Further Equipment Recommendations for Discharge: tbd       SUBJECTIVE:   Patient stated -pt is not able to speak.       OBJECTIVE DATA SUMMARY:   HISTORY:   Past Medical History:   Diagnosis Date    Anoxic brain injury ([facility]       following cardiac arrest; MRI shows involvement BG and temporal lobes    Aspiration pneumonia ([facility]       during cardiac arrest intubation    Bradycardia      CAD (coronary artery disease)      Cardiac arrest ([facility]       no intervention by [place] cardiology.   [address] [facility]    Diabetes [facility])      Essential hypertension      Hyperlipidemia       Past Surgical History:   Procedure Laterality Date    HX CORONARY ARTERY BYPASS GRAFT        HX CORONARY STENT PLACEMENT        HX HEART CATHETERIZATION        HX OTHER SURGICAL   05/12/2017     PEG and trach        Prior Level of Function/Home Situation: pt has been in acute care, since his brain injury in April, but prior to April pt was independent in all areas. Expanded or extensive additional review of patient history:         [X]  Right hand dominant             [ ]  Left hand dominant     EXAMINATION OF PERFORMANCE DEFICITS:  Cognitive/Behavioral Status:  Neurologic State: Eyes open spontaneously  Orientation Level: Unable to verbalize  Cognition: Follows commands              Skin: in good healgh     Edema: none noted     Hearing:        Vision/Perceptual:    Tracking:  (unable to assess)                                 Range of Motion:     AROM: Grossly decreased, non-functional                          Strength:     Strength: Grossly decreased, non-functional                 Coordination:  Coordination: Grossly decreased, non-functional  Fine Motor Skills-Upper: Left Impaired;Right Impaired    Gross Motor Skills-Upper: Left Impaired;Right Impaired     Tone & Sensation:     Tone: Abnormal                          Balance:  Sitting:  (did not occur)  Standing:  (did not occur)     Functional Mobility and Transfers for ADLs:  Bed Mobility:  Rolling: Total assistance  Supine to Sit: Total assistance  Sit to Supine: Total assistance     Transfers:      not assessed     ADL Assessment:  Feeding: Total assistance     Oral Facial Hygiene/Grooming: Total assistance     Bathing: Total assistance     Upper Body Dressing: Total assistance     Lower Body Dressing: Total assistance     Toileting:  Total assistance               Functional Measure:   Fugl-Carlos Assessment of Motor Recovery after Stroke:       Reflex Activity  Flexors/Biceps/Fingers: None  Extensors/Triceps: None  Reflex Subtotal: 0     Volitional Movement Within Synergies  Shoulder Retraction: None  Shoulder Elevation: None  Shoulder Abduction (90 degrees): None  Shoulder External Rotation: None  Elbow Flexion: None  Forearm Supination: None  Shoulder Adduction/Internal Rotation: None  Elbow Extension: None  Forearm Pronation: None  Subtotal: 0     Volitional Movement Mixing Synergies  Hand to Lumbar Spine: None  Shoulder Flexion (0-90 degrees): None  Pronation-Supination: None  Subtotal: 0     Volitional Movement With Little or No Synergy  Shoulder Abduction (0-90 degrees): None  Shoulder Flexion ( degrees): None  Pronation/Supination: None  Subtotal : 0     Normal Reflex Activity  Biceps, Triceps, Finger Flexors: None  Subtotal : 0     Upper Extremity Total   Upper Extremity Total: 0     Wrist  Stability at 15 Degree Dorsiflexion: None  Repeated Dorsiflexion/ Volar Flexion: None  Stability at 15 Degree Dorsiflexion: None  Repeated Dorsiflexion/ Volar Flexion: None  Circumduction: None  Wrist Total: 0     Hand  Mass Flexion: None  Mass Extension: None  Grasp A: None  Grasp B: None  Grasp C: None  Grasp D: None  Grasp E: None  Hand Total: 0     Coordination/Speed  Tremor: Marked  Dysmetria: Marked  Time: >5s  Coordination/Speed Total : 0     Total A-D  Total A-D (Motor Function): 0/66      Percentage of impairment CH  0% CI  1-19% CJ  20-39% CK  40-59% CL  60-79% CM  80-99% CN  100%   Fugl-Carlos score: 0-66 66 53-65 39-52 26-38 13-25 1-12    0      This is a reliable/valid measure of arm function after a neurological event. It has established value to characterize functional status and for measuring spontaneous and therapy-induced recovery; tests proximal and distal motor functions. Fugl-Carlos Assessment  UE scores recorded between five and 30 days post neurologic event can be used to predict UE recovery at six months post neurologic event. Severe = 0-21 points   Moderately Severe = 22-33 points   Moderate = 34-47 points   Mild = 48-66 points  KARINA Sutherland, SHAUNA Baldwin, & AMBREEN Roland (1992). Measurement of motor recovery after stroke: Outcome assessment and sample size requirements. Stroke, 23, pp. 6724-3398. ------------------------------------------------------------------------------------------------------------------------------------------------------------------  MCID:  Stroke:   Kareem Gill et al, 2001; n = 171; mean age 79 (6) years; assessed within 16 (12) days of stroke, Acute Stroke)  FMA Motor Scores from Admission to Discharge   · 10 point increase in FMA Upper Extremity = 1.5 change in discharge FIM  · 10 point increase in FMA Lower Extremity = 1.9 change in discharge FIM  MDC:   Stroke:   Manisha Alfred et al, 2008, n = 14, mean age = 59.9 (14.6) years, assessed on average 14 (6.5) months post stroke, Chronic Stroke)   · FMA = 5.2 points for the Upper Extremity portion of the assessment      G codes: In compliance with CMSs Claims Based Outcome Reporting, the following G-code set was chosen for this patient based on their primary functional limitation being treated: The outcome measure chosen to determine the severity of the functional limitation was the [facility] with a score of 0/66 which was correlated with the impairment scale. · Self Care:               - CURRENT STATUS:    CN - 100% impaired, limited or restricted               - GOAL STATUS:           CM - 80%-99% impaired, limited or restricted               - D/C STATUS:                       ---------------To be determined---------------       Occupational Therapy Evaluation Charge Determination   History Examination Decision-Making   HIGH Complexity : Extensive review of history including physical, cognitive and psychosocial history  HIGH Complexity : 5 or more performance deficits relating to physical, cognitive , or psychosocial skils that result in activity limitations and / or participation restrictions HIGH Complexity : Patient presents with comorbidities that affect occupational performance.  Signifigant modification of tasks or assistance (eg, physical or verbal) with assessment (s) is necessary to enable patient to complete evaluation       Based on the above components, the patient evaluation is determined to be of the following complexity level: HIGH      Pain:  Pain Scale 1: Numeric (0 - 10)  Pain Intensity 1: 0  Pain Location 1: Neck  Pain Orientation 1: Medial     Pain Intervention(s) 1: Medication (see MAR)  Activity Tolerance:   vss  Please refer to the flowsheet for vital signs taken during this treatment.   After treatment:   [ ]  Patient left in no apparent distress sitting up in chair  [X]  Patient left in no apparent distress in bed  [X]  Call bell left within reach  [X]  Nursing notified  [X]  Caregiver present  [ ]  Bed alarm activated

## 2017-05-20 NOTE — PROGRESS NOTES
Hospitalist Progress Note    NAME: Evangelist Petit   :  1960   MRN:  [id number]       Interim Hospital Summary: 56 y.o. male whom presented on 2017 with      Assessment / Plan:  Anoxic brain injury following cardiac arrest in [place]  >1 month ago  --outside MRI brain with abnormalities in basal ganglia and bilateral temporal lobes  --has right>left sided weakness (right face and right arm paralyze; right leg 2/5); left side 3/5 arm, 2/5 leg. Following commands given in his native [place] language from family members. --s/p PEG and trach   Will consider Neurology consult if opinion needed regarding prognosis     S/p out of hospital cardiac arrest 17, POA  CAD s/p CABG 2014 x 3v at HD  Hx cardiac stents (twice before CABG and once after)  HTN  Severe Anemia POA  --hospitalized at [facility] in [place], [place]; transferred to [facility]  and to [facility]   --per Dr. Shelby Vega had stenosis of circumflex anastomosis graft, PTCA -->restenosis -->stent-->restenosis and redo bypass recommended but patient seeked second opinion with Dr. Miller Giles in [place] and reportedly told redo bypass not needed. --continue coreg, effient. EKG,   cardiology consulted & following- awaiting Echo for further recommendations  Transfuse 2 units PRBC with H/H check in between units  IV lasix x 1 between 2 units    Hx aspiration PNA during intubation of cardiac   Chronic respiratory failure, s/p trach  -- treated with invanz in [place]. CXR clear here. Off Vent this AM  Cont trach collar as per pulmonary today- following     Vomiting during transport to [facility] with question of recurrent aspiration  --empiric started on zosyn and vanc at [facility]. Repeat CXR neg       Diarrhea  --C. Diff negative in [place] recently prior to transfer. Repeat C. Diff done at [facility] yesterday  --has rectal tube. May be related to tubefeed.  Consider add metamucil.   --stop abx for now     DM 2  --SSI     Code: full  DVT prophylaxis: lovenox  Surrogate decision maker: Wife \"Savanna\" Margarito Quinn [phone], 2 children      Body mass index is 28.87 kg/(m^2). Recommended Disposition: LTAC versus SNF- to be determined     Subjective:     Chief Complaint / Reason for Physician Visit F/U Anoxic brain injury, CHF  \"non verbal\". Discussed with RN events overnight. Review of Systems:  Symptom Y/N Comments  Symptom Y/N Comments   Fever/Chills    Chest Pain     Poor Appetite    Edema     Cough    Abdominal Pain     Sputum    Joint Pain     SOB/HAM    Pruritis/Rash     Nausea/vomit    Tolerating PT/OT     Diarrhea    Tolerating Diet     Constipation    Other       Could NOT obtain due to: Non verbal     Objective:     VITALS:   Last 24hrs VS reviewed since prior progress note.  Most recent are:  Patient Vitals for the past 24 hrs:   Temp Pulse Resp BP SpO2   05/20/17 1445 98.8 °F (37.1 °C) 76 24 121/67 100 %   05/20/17 1430 98.6 °F (37 °C) 80 27 126/69 100 %   05/20/17 1415 98.9 °F (37.2 °C) 81 26 135/81 100 %   05/20/17 1400 98.5 °F (36.9 °C) 78 25 132/72 100 %   05/20/17 1345 98.9 °F (37.2 °C) 80 25 132/69 100 %   05/20/17 1315 98.9 °F (37.2 °C) 78 25 130/69 100 %   05/20/17 1300 - 63 18 - 100 %   05/20/17 1230 98.9 °F (37.2 °C) 76 19 124/65 100 %   05/20/17 1215 - 61 17 109/59 100 %   05/20/17 1200 98.7 °F (37.1 °C) 66 20 117/61 100 %   05/20/17 1145 - 64 20 116/60 100 %   05/20/17 1130 98.8 °F (37.1 °C) 78 23 133/73 100 %   05/20/17 1115 98.6 °F (37 °C) 70 24 137/70 100 %   05/20/17 1100 98.7 °F (37.1 °C) 69 29 (!) 151/131 100 %   05/20/17 1045 98.5 °F (36.9 °C) 72 25 144/72 100 %   05/20/17 1030 98.7 °F (37.1 °C) 68 26 136/67 100 %   05/20/17 1027 98.7 °F (37.1 °C) 70 27 143/74 100 %   05/20/17 1000 - 78 23 142/74 100 %   05/20/17 0900 - 78 25 146/77 100 %   05/20/17 0800 98.7 °F (37.1 °C) 78 22 157/70 100 %   05/20/17 0700 - 87 27 147/77 100 %   05/20/17 0624 - - - - 100 %   05/20/17 0600 - 68 18 123/64 100 %   05/20/17 0500 - 68 12 104/53 100 %   05/20/17 0431 - 66 16 - 100 %   05/20/17 0400 98.9 °F (37.2 °C) 77 19 114/58 100 %   05/20/17 0300 - 85 17 143/67 100 %   05/20/17 0100 - 67 16 111/50 100 %   05/20/17 0002 - 76 18 - 100 %   05/20/17 0001 98.8 °F (37.1 °C) 88 16 111/61 100 %   05/19/17 2300 - 74 15 113/60 100 %   05/19/17 2200 - 73 18 129/60 100 %   05/19/17 2112 - 79 17 131/65 100 %   05/19/17 2000 98.9 °F (37.2 °C) 72 18 116/65 100 %   05/19/17 1954 - 82 19 - 100 %   05/19/17 1800 - 70 12 132/60 100 %   05/19/17 1754 - 72 19 - 100 %   05/19/17 1700 - 80 24 141/66 100 %   05/19/17 1600 98.8 °F (37.1 °C) 61 20 124/63 100 %   05/19/17 1500 - 61 18 125/60 100 %       Intake/Output Summary (Last 24 hours) at 05/20/17 1454  Last data filed at 05/20/17 1430   Gross per 24 hour   Intake            870.5 ml   Output             2190 ml   Net          -1319.5 ml        PHYSICAL EXAM:  General: WD, WN. Alert, cooperative, no acute distress    EENT:  EOMI. Anicteric sclerae. MMM, Trach collar over the trach tube noted +  Resp:  Coarse BS noted +. No accessory muscle use  CV:  Regular  rhythm,  No edema  GI:  Soft, Non distended, Non tender.  +Bowel sounds, PEG tube noted +  Neurologic:  Alert and oriented X 2, able to follow simple commands in native language , R >L weakness noted upper & lower extremities  Psych:   ? insight. Not anxious nor agitated  Skin:  No rashes. No jaundice    Reviewed most current lab test results and cultures  YES  Reviewed most current radiology test results   YES  Review and summation of old records today    NO  Reviewed patient's current orders and MAR    YES  PMH/SH reviewed - no change compared to H&P  ________________________________________________________________________  Care Plan discussed with:    Comments   Patient x    Family  x At bedside in ICU # [phone] x    Care Manager     Consultant  x Dr Jarad De Leon (Pulmonary)                     Multidiciplinary team rounds were held today with , nursing, pharmacist and clinical coordinator. Patient's plan of care was discussed; medications were reviewed and discharge planning was addressed. ________________________________________________________________________  Total NON critical care TIME:  36   Minutes    Total CRITICAL CARE TIME Spent:   Minutes non procedure based      Comments   >50% of visit spent in counseling and coordination of care     ________________________________________________________________________  Yonathan Lynch MD     Procedures: see electronic medical records for all procedures/Xrays and details which were not copied into this note but were reviewed prior to creation of Plan. LABS:  I reviewed today's most current labs and imaging studies.   Pertinent labs include:  Recent Labs      05/19/17   1607   WBC  9.8   HGB  6.8*   HCT  21.8*   PLT  372     Recent Labs      05/19/17   1607   NA  139   K  3.4*   CL  105   CO2  24   GLU  76   BUN  13   CREA  0.50*   CA  7.4*   MG  2.4   PHOS  2.3*   ALB  1.8*   TBILI  0.6   SGOT  46*   ALT  58       Signed: Yonathan Lynch MD

## 2017-05-20 NOTE — PROGRESS NOTES
Problem: Mobility Impaired (Adult and Pediatric)  Goal: *Acute Goals and Plan of Care (Insert Text)  Physical Therapy Goals  Initiated 5/20/2017  1. Patient will roll side to side in bed with maximal assistance x2 within 7 day(s). 2. Patient will follow simple, one-step commands 50% of the time within 7 day(s). 3. Patient and family will demonstrate independence with BLE and BUE AA/PROM HEP within 7 day(s). 4. Patient will tolerate sitting in chair position in bed for 1 hr within 7 day(s). PHYSICAL THERAPY EVALUATION  Patient: Maxi Andrew [de-identified]56 y.o. male)  Date: 5/20/2017  Primary Diagnosis: Cardiac Arrest  Respiratory failure ([facility] [address]        Precautions:    Deaf L ear      ASSESSMENT :  Based on the objective data described below, the patient presents with impaired command following, language barrier, increased lethargy/drowsiness, flaccid R UE and R LE, 2-/5 strength in L ankle PF/DF and trace initiation of L quad however no other movement noted in L LE. Pt received supine in bed w/ family members present in room who were able to translate as pt does not speak English as his native language. Pt with impaired command following throughout as well as delayed initiation, with pt following ~25% of simple commands given to him by his family members. Both R LE and R UE flaccid this date with no active initiation of any motor movements seen. With L ankle, pt able to perform ankle PF/DF upon command as well as demonstrate quad set through L quad. However, no other active movements noted. Pt increasingly lethargic/drowsy this date, requiring increase verbal cueing to maintain eyes in open position as well as for attention to task. Per family, pt had been participating with PT/OT at previous hospitals, working on bed level exercises and tolerance of bed in chair position. However, pt has no sat up since initial cardiac arrest on 4/28/17. Upon discharge, recommend LTAC.          Patient will benefit from skilled intervention to address the above impairments. Patients rehabilitation potential is considered to be Guarded  Factors which may influence rehabilitation potential include:   [ ]         None noted  [X]         Mental ability/status  [X]         Medical condition  [ ]         Home/family situation and support systems  [ ]         Safety awareness  [ ]         Pain tolerance/management  [ ]         Other:        PLAN :  Recommendations and Planned Interventions:  [X]           Bed Mobility Training             [ ]    Neuromuscular Re-Education  [X]           Transfer Training                   [ ]    Orthotic/Prosthetic Training  [ ]           Gait Training                         [ ]    Modalities  [X]           Therapeutic Exercises           [ ]    Edema Management/Control  [X]           Therapeutic Activities            [X]    Patient and Family Training/Education  [ ]           Other (comment):     Frequency/Duration: Patient will be followed by physical therapy  4 times a week to address goals. Discharge Recommendations: LTAC  Further Equipment Recommendations for Discharge: TBD by facility        SUBJECTIVE:   Patient nonverbal throughout      OBJECTIVE DATA SUMMARY:   HISTORY:    Past Medical History:   Diagnosis Date    Anoxic brain injury ([facility] [address]       following cardiac arrest; MRI shows involvement BG and temporal lobes    Aspiration pneumonia ([facility] [address]       during cardiac arrest intubation    Bradycardia      CAD (coronary artery disease)      Cardiac arrest ([facility] [address]       no intervention by [address] [place] [address] cardiology.   [address] [facility]    Diabetes [facility])      Essential hypertension      Hyperlipidemia       Past Surgical History:   Procedure Laterality Date    HX CORONARY ARTERY BYPASS GRAFT        HX CORONARY STENT PLACEMENT        HX HEART CATHETERIZATION        HX OTHER SURGICAL   05/12/2017     PEG and trach     Prior Level of Function/Home Situation: Pt s/p cardiac arrest and resultant anoxic brain injury on 17. Since that time, pt has been in 2 different hospitals as well as bedbound/dependent/total care. Per family, pt has been participating with PT/OT however working on bed level exercises and tolerance of bed in chair position. Pt has not sat up or sat on EOB since initial cardiac arrest. Prior to cardiac arrest on 17, pt was independent w/ ambulation, ADLs, and was working. Personal factors and/or comorbidities impacting plan of care:            EXAMINATION/PRESENTATION/DECISION MAKING:   Critical Behavior:  Neurologic State: Eyes open spontaneously  Orientation Level: Unable to verbalize  Cognition: Follows commands     Hearing:     Skin:  Intact  Edema: Not noted   Range Of Motion:  AROM: Grossly decreased, non-functional                       Strength:    Strength: Grossly decreased, non-functional                    Tone & Sensation:   Tone: Abnormal                              Coordination:  Coordination: Grossly decreased, non-functional  Vision:      Functional Mobility:  Bed Mobility:              Transfers:                             Balance:   Sitting:  (did not occur)  Standing:  (did not occur)  Ambulation/Gait Training:              Gait Description (WDL):  (did not occur)                                                  Ambulation did not occur. Functional Measure:  Barthel Index:      Bathin  Bladder: 0  Bowels: 0  Groomin  Dressin  Feedin  Mobility: 0  Stairs: 0  Toilet Use: 0  Transfer (Bed to Chair and Back): 0  Total: 0         Barthel and G-code impairment scale:  Percentage of impairment CH  0% CI  1-19% CJ  20-39% CK  40-59% CL  60-79% CM  80-99% CN  100%   Barthel Score 0-100 100 99-80 79-60 59-40 20-39 1-19    0   Barthel Score 0-20 20 17-19 13-16 9-12 5-8 1-4 0      The Barthel ADL Index: Guidelines  1. The index should be used as a record of what a patient does, not as a record of what a patient could do.   2. The main aim is to establish degree of independence from any help, physical or verbal, however minor and for whatever reason. 3. The need for supervision renders the patient not independent. 4. A patient's performance should be established using the best available evidence. Asking the patient, friends/relatives and nurses are the usual sources, but direct observation and common sense are also important. However direct testing is not needed. 5. Usually the patient's performance over the preceding 24-48 hours is important, but occasionally longer periods will be relevant. 6. Middle categories imply that the patient supplies over 50 per cent of the effort. 7. Use of aids to be independent is allowed. Palmira Pacheco, Barthel, D.W. ([id number]). Functional evaluation: the Barthel Index. [facility] [address] [facility] (14)2. Anil Newby marily CJ Marcano, Huan Virgen, Cisco Ward., [place], [address] [place] [address] Ave (1999). Measuring the change indisability after inpatient rehabilitation; comparison of the responsiveness of the Barthel Index and Functional [place] Measure. Journal of Neurology, Neurosurgery, and Psychiatry, 66(4), 265-820. Walker Garcia, N.J.A, ANIRUDH Palma, & Mauricio Lara MJAMAR. (2004.) Assessment of post-stroke quality of life in cost-effectiveness studies: The usefulness of the Barthel Index and the EuroQoL-5D. Quality of Life Research, 13, 435-54         G codes: In compliance with CMSs Claims Based Outcome Reporting, the following G-code set was chosen for this patient based on their primary functional limitation being treated: The outcome measure chosen to determine the severity of the functional limitation was the Barthel Index with a score of 0/100 which was correlated with the impairment scale.       · Mobility - Walking and Moving Around:               - CURRENT STATUS:    CN - 100% impaired, limited or restricted               - GOAL STATUS:           CM - 80%-99% impaired, limited or restricted               - D/C STATUS: ---------------To be determined---------------      Physical Therapy Evaluation Charge Determination   History Examination Presentation Decision-Making   HIGH Complexity :3+ comorbidities / personal factors will impact the outcome/ POC  HIGH Complexity : 4+ Standardized tests and measures addressing body structure, function, activity limitation and / or participation in recreation  HIGH Complexity : Unstable and unpredictable characteristics  HIGH Complexity : FOTO score of 1- 25       Based on the above components, the patient evaluation is determined to be of the following complexity level: HIGH      Pain:  Pain Scale 1: Numeric (0 - 10)  Pain Intensity 1: 0              Activity Tolerance:   VSS throughout via trach collar  Please refer to the flowsheet for vital signs taken during this treatment. After treatment:   [ ]         Patient left in no apparent distress sitting up in chair  [X]         Patient left in no apparent distress in bed  [X]         Call bell left within reach  [X]         Nursing notified  [X]         Caregiver present  [ ]         Bed alarm activated      COMMUNICATION/EDUCATION:   The patients plan of care was discussed with: Occupational Therapist and Registered Nurse.  [X]         Fall prevention education was provided and the patient/caregiver indicated understanding. [X]         Patient/family have participated as able in goal setting and plan of care. [X]         Patient/family agree to work toward stated goals and plan of care. [ ]         Patient understands intent and goals of therapy, but is neutral about his/her participation. [ ]         Patient is unable to participate in goal setting and plan of care.      Thank you for this referral.  Aura Biggs, PT, DPT   Time Calculation: 14 mins

## 2017-05-20 NOTE — PROGRESS NOTES
[facility] Consult Service Progress NOTE  Pulmonary, Critical Care, and Sleep Medicine    Name: Sophia Martínez MRN: [id number]   : 1960 Hospital: [facility]   Date: 2017  Admission Date: 2017   No new events this am   Was on vent support last PM now on trach collar  Opens eyes to command    HPI:    Pt is a 56 Lin male who is transferred to [facility] from [place] [address]. Was in [place] at a wedding party  and became ill with nausea and chest pain. While in a car, he became unresponsive and EMS activated. Pt found pulseless on side of the road and resuscitation started by EMS. He had ROSC and taken to [facility] where CPR resumed for asystole. Pt had emesis within [place] airway and reintubated. Pt eventually had ROCS after another 10 minutes and placed in hypothermia protocol. Pt kept in CCU and required pressors, abx. He was seen by Cardiology and Neurology. Pt felt not to have ACS. LVEF 75% on pressors with minimal enzymes. Head Ct showed anoxic injury. Pt had trach and PEG on . He was transferred to [address] [place] [address]  where he had CABGx 4  a couple of years ago and was cared for by Felecia Damon. After transfer, it became apparent that his insurance would only provide coverage in a [facility] 6. Note from Dr. Karen Martinez describes complex coronary anatomy and stenosis of one of his grafts.  It ws suggested that pt have another CABG but family declines and he has since seen a cardiologist in [facility]: 2        IMPRESSION:   · Acute respiratory failure with hypoxia-   · S/p tracheostomy for prolonged vent dependence  · Dysphagia s/p PEG  · Diarrhea C diff negative in [place]  · Anoxic brain injury- some cognitive recovery but significant motor deficits; CT and MRI done at [address]-  Injury to basal ganglia and temporal lobes  · S/p cardiac arrest, s/p hypothermia protocol  · ASCVD  · H/o CABG [facility] 2014 and in  had another cath showing complex anatomy; has successful laser atherectomy PTCCA and stenting of the distal anastomotic site of the vein graft with Promus ROBERT; chest pain persisted; pt offered cath 2016 and possbile referral for another CABg at [address] [place] [address] but evidently declined  · HTN  · Diabetes Mellitus  · Left ear deafness POA  · Language barrier- limited English but family available, complicated with trach and recent DORA  · Never smoker  · Anemia      RECOMMENDATIONS/PLAN:     · Trach collar as tolerated and   · Blood transfusion ordered              tube feedings  · Glucose control  · Empiric abx until cultures final   · Pulmonary toilet  · routine trach care  · PT, OT consults  · DVT prophylaxis  · Discussed with nurse and family     Code: Full Code        Ventilator Settings:      Mode Rate TV Press PEEP FiO2 PIP Min.  Vent   CPAP    500 ml 12 cm H2O  5 cm H20 (P) 40 %  18 cm H2O  5.64 l/min        Vital Signs: Intake/Output: Intake/Output:   Visit Vitals    /74 (BP 1 Location: Right arm, BP Patient Position: At rest)    Pulse 68    Temp 98.7 °F (37.1 °C)    Resp 26    Ht 5' 11\" (1.803 m)    Wt 93.9 kg (207 lb 0.2 oz)    SpO2 100%    BMI 28.87 kg/m2      O2 Device: (P) Tracheostomy, Tracheal collar  O2 Flow Rate (L/min): 10 l/min  Temp (24hrs), Av.8 °F (37.1 °C), Min:98.6 °F (37 °C), Max:98.9 °F (37.2 °C)     Intake/Output Summary (Last 24 hours) at 17 1042  Last data filed at 17 0600   Gross per 24 hour   Intake              325 ml   Output             1065 ml   Net             -740 ml    Last shift:         Last 3 shifts:  190 -  0700  In: 325   Out: 4332 [Urine:1065]         Telemetry:    normal sinus rhythm    Physical Exam:    General: large, obese Lin male well developed and well nourished, in no respiratory distress and acyanotic and moderately ill   HEENT: NCAT, bearded; no thrush? limited mouth opening,moist   Neck: No abnormally enlarged lymph nodes. Aviva Gay #8   Chest: normal   Lungs: decreased air exchange bilaterally   Heart: Regular rate and rhythm or S1S2 present   Abdomen: soft, non-tender, without masses or organomegaly, PEG   :    Extremity: negative, cyanosis, clubbing;    Neuro: lethargic; opens eyes and EOMI; tongue midline; will follow simple commands and try to move left arm but no fine motor and right arm flaccid. Minimal BLE movement   Psych: AMS   Skin: dry        DATA:    MAR reviewed and pertinent medications noted or modified as needed    MEDS:   Current Facility-Administered Medications   Medication    0.9% sodium chloride infusion 250 mL    peppermint spirit solution    mupirocin (BACTROBAN) 2 % ointment    insulin lispro (HUMALOG) injection    glucose chewable tablet 16 g    dextrose 10% infusion 125-250 mL    glucagon (GLUCAGEN) injection 1 mg    enoxaparin (LOVENOX) injection 40 mg    sodium chloride (NS) flush 5-10 mL    sodium chloride (NS) flush 5-10 mL    ondansetron (ZOFRAN) injection 4 mg    famotidine (PEPCID) tablet 20 mg    albuterol-ipratropium (DUO-NEB) 2.5 MG-0.5 MG/3 ML    carvedilol (COREG) tablet 3.125 mg    prasugrel (EFFIENT) tablet 10 mg    aspirin chewable tablet 81 mg    atorvastatin (LIPITOR) tablet 40 mg    oxyCODONE (ROXICODONE INTENSOL) 20 mg/mL concentrated solution 5 mg    acetaminophen (TYLENOL) solution 650 mg    lidocaine (LIDODERM) 5 % patch 1 Patch          Labs:  Recent Labs      05/19/17   1607   WBC  9.8   HGB  6.8*   PLT  372     Recent Labs      05/19/17   1607   NA  139   K  3.4*   CL  105   CO2  24   GLU  76   BUN  13   CREA  0.50*   CA  7.4*   MG  2.4   PHOS  2.3*   ALB  1.8*   SGOT  46*   ALT  58     No results for input(s): PH, PCO2, PO2, HCO3, FIO2 in the last 72 hours. No results for input(s): PH, PCO2, PO2, HCO3, FIO2 in the last 72 hours. No results for input(s): CPK, CKNDX, TROIQ in the last 72 hours.     No lab exists for component: CPKMB    Lab Results   Component Value Date/Time    Iron 34 05/20/2017 01:14 AM    TIBC 229 05/20/2017 01:14 AM    Iron % saturation 15 05/20/2017 01:14 AM    Ferritin 191 05/20/2017 01:14 AM       No results found for: SR, CRP, THIEN, ANAIGG, RA, RPR, RPRT, VDRLT, VDRLS, TSH, TSHEXT, TSHEXT     No results found for: CPK, RCK1, RCK2, RCK3, RCK4, CKNDX, CKND1, TROPT, TROIQ, BNPP, BNP     No results found for: CULT    No results found for: TOXA1, RPR, HBCM, HBSAG, HAAB, HCAB, HCAB1, HAAT, G6PD, CRYAC, HIVGT, HIVR, HIV1, HIV12, HIVPC, HIVRPI    No results found for: VANCT, CPK    No results found for: COLOR, APPRN, SPGRU, JOHANNE, PROTU, GLUCU, KETU, BILU, BLDU, UROU, DILAN, LEUKU, WBCU, RBCU, UEPI, BACTU, CASTS, UCRY    Imaging:  [x]                        personally reviewed the patients radiographs and reports:clear      Results from Hospital Encounter encounter on 05/19/17   XR CHEST PORT   Narrative EXAM:  XR CHEST PORT    INDICATION:  Tube Placement    COMPARISON:  None. FINDINGS: A portable AP radiograph of the chest was obtained at 15:20 hours. The  patient is on a cardiac monitor. The distal portion of a tracheostomy tube  projects in expected position. The lungs are clear with no pneumothorax. There  are median sternotomy wires and surgical clips compatible with prior CABG. The  cardiac and mediastinal contours and pulmonary vascularity are normal.  The  chest wall structures and visualized upper abdomen show no acute findings with  incidental note of degenerative spine changes and gastrostomy tube with inflated  balloon. Impression IMPRESSION: Tracheostomy in position. No acute findings with incidental findings  as above. No results found for this or any previous visit.     Laura Ceja MD

## 2017-05-20 NOTE — PROGRESS NOTES
Cardiology Progress Note            [address] [address]  [phone]    5/20/2017 2:38 PM    Admit Date: 5/19/2017    Admit Diagnosis: Cardiac Arrest;Respiratory failure ([address]    Subjective:     Cameron Moran is stable - trach/peg - opens eyes to command    Visit Vitals    /81    Pulse 81    Temp 98.9 °F (37.2 °C)    Resp 26    Ht 5' 11\" (1.803 m)    Wt 207 lb 0.2 oz (93.9 kg)    SpO2 100%    BMI 28.87 kg/m2     Current Facility-Administered Medications   Medication Dose Route Frequency    0.9% sodium chloride infusion 250 mL  250 mL IntraVENous PRN    mupirocin (BACTROBAN) 2 % ointment   Topical Q12H    insulin lispro (HUMALOG) injection   SubCUTAneous Q6H    glucose chewable tablet 16 g  4 Tab Oral PRN    dextrose 10% infusion 125-250 mL  125-250 mL IntraVENous PRN    glucagon (GLUCAGEN) injection 1 mg  1 mg IntraMUSCular PRN    enoxaparin (LOVENOX) injection 40 mg  40 mg SubCUTAneous Q24H    sodium chloride (NS) flush 5-10 mL  5-10 mL IntraVENous Q8H    sodium chloride (NS) flush 5-10 mL  5-10 mL IntraVENous PRN    ondansetron (ZOFRAN) injection 4 mg  4 mg IntraVENous Q6H PRN    famotidine (PEPCID) tablet 20 mg  20 mg Per G Tube BID    albuterol-ipratropium (DUO-NEB) 2.5 MG-0.5 MG/3 ML  3 mL Nebulization Q4H PRN    carvedilol (COREG) tablet 3.125 mg  3.125 mg Per G Tube BID WITH MEALS    prasugrel (EFFIENT) tablet 10 mg  10 mg Per G Tube DAILY    aspirin chewable tablet 81 mg  81 mg Per G Tube DAILY    atorvastatin (LIPITOR) tablet 40 mg  40 mg Per G Tube QHS    oxyCODONE (ROXICODONE INTENSOL) 20 mg/mL concentrated solution 5 mg  5 mg Per G Tube Q4H PRN    acetaminophen (TYLENOL) solution 650 mg  650 mg Per G Tube Q4H PRN    lidocaine (LIDODERM) 5 % patch 1 Patch  1 Patch TransDERmal Q24H         Objective:      Visit Vitals    /81    Pulse 81    Temp 98.9 °F (37.2 °C)    Resp 26    Ht 5' 11\" (1.803 m)    Wt 207 lb 0.2 oz (93.9 kg)    SpO2 100%    BMI 28.87 kg/m2       Physical Exam:  Abdomen: soft, non-tender  Extremities: extremities normal  Heart: regular rate and rhythm  Lungs: clear to auscultation bilaterally  Pulses: 2+ and symmetric    Data Review:   Labs:    Recent Labs      05/19/17   1607   WBC  9.8   HGB  6.8*   HCT  21.8*   PLT  372     Recent Labs      05/19/17   1607   NA  139   K  3.4*   CL  105   CO2  24   GLU  76   BUN  13   CREA  0.50*   CA  7.4*   MG  2.4   PHOS  2.3*   ALB  1.8*   TBILI  0.6   SGOT  46*   ALT  58       No results for input(s): TROIQ, CPK, CKMB in the last 72 hours. Intake/Output Summary (Last 24 hours) at 05/20/17 1438  Last data filed at 05/20/17 1400   Gross per 24 hour   Intake            870.5 ml   Output             1840 ml   Net           -969.5 ml        Telemetry: nsr    Assessment:     Active Problems:    Coronary artery disease of bypass graft with stable angina pectoris ([facility] (10/5/2016)      Essential hypertension (10/5/2016)      Dyslipidemia (10/5/2016)      Type 2 diabetes mellitus without complication ([facility] (78/3/3592)      H/O cardiac arrest (5/19/2017)      Overview: 4/28/17:  OSH in [place]      Respiratory failure ([facility] (5/19/2017)      Anoxic brain injury ([facility] (5/20/2017)        Plan:     Hemanth Hebert is critically ill but hemodynamically stable. Anemia is considerable.  Cont cont asa/effient/bb     Tiffanie Lin MD, [facility]    5/20/2017

## 2017-05-21 LAB
ABO + RH BLD: NORMAL
ANION GAP BLD CALC-SCNC: 7 MMOL/L (ref 5–15)
BLD PROD TYP BPU: NORMAL
BLD PROD TYP BPU: NORMAL
BLOOD GROUP ANTIBODIES SERPL: NORMAL
BPU ID: NORMAL
BPU ID: NORMAL
BUN SERPL-MCNC: 13 MG/DL (ref 6–20)
BUN/CREAT SERPL: 19 (ref 12–20)
CALCIUM SERPL-MCNC: 8.7 MG/DL (ref 8.5–10.1)
CHLORIDE SERPL-SCNC: 100 MMOL/L (ref 97–108)
CK MB CFR SERPL CALC: 2.9 % (ref 0–2.5)
CK MB SERPL-MCNC: 1.5 NG/ML (ref 5–25)
CK SERPL-CCNC: 52 U/L (ref 39–308)
CO2 SERPL-SCNC: 31 MMOL/L (ref 21–32)
CREAT SERPL-MCNC: 0.67 MG/DL (ref 0.7–1.3)
CROSSMATCH RESULT,%XM: NORMAL
CROSSMATCH RESULT,%XM: NORMAL
ERYTHROCYTE [DISTWIDTH] IN BLOOD BY AUTOMATED COUNT: 17 % (ref 11.5–14.5)
GLUCOSE BLD STRIP.AUTO-MCNC: 163 MG/DL (ref 65–100)
GLUCOSE BLD STRIP.AUTO-MCNC: 173 MG/DL (ref 65–100)
GLUCOSE BLD STRIP.AUTO-MCNC: 183 MG/DL (ref 65–100)
GLUCOSE BLD STRIP.AUTO-MCNC: 200 MG/DL (ref 65–100)
GLUCOSE SERPL-MCNC: 162 MG/DL (ref 65–100)
HCT VFR BLD AUTO: 37.2 % (ref 36.6–50.3)
HGB BLD-MCNC: 11.6 G/DL (ref 12.1–17)
MCH RBC QN AUTO: 26.4 PG (ref 26–34)
MCHC RBC AUTO-ENTMCNC: 31.2 G/DL (ref 30–36.5)
MCV RBC AUTO: 84.5 FL (ref 80–99)
PLATELET # BLD AUTO: 398 K/UL (ref 150–400)
POTASSIUM SERPL-SCNC: 3.6 MMOL/L (ref 3.5–5.1)
RBC # BLD AUTO: 4.4 M/UL (ref 4.1–5.7)
SERVICE CMNT-IMP: ABNORMAL
SODIUM SERPL-SCNC: 138 MMOL/L (ref 136–145)
SPECIMEN EXP DATE BLD: NORMAL
STATUS OF UNIT,%ST: NORMAL
STATUS OF UNIT,%ST: NORMAL
TROPONIN I SERPL-MCNC: <0.04 NG/ML
UNIT DIVISION, %UDIV: 0
UNIT DIVISION, %UDIV: 0
WBC # BLD AUTO: 11.4 K/UL (ref 4.1–11.1)

## 2017-05-21 PROCEDURE — 65610000006 HC RM INTENSIVE CARE

## 2017-05-21 PROCEDURE — 74011250637 HC RX REV CODE- 250/637: Performed by: HOSPITALIST

## 2017-05-21 PROCEDURE — 80048 BASIC METABOLIC PNL TOTAL CA: CPT | Performed by: INTERNAL MEDICINE

## 2017-05-21 PROCEDURE — 77030010547 HC BG URIN W/UMETER -A

## 2017-05-21 PROCEDURE — 74011250636 HC RX REV CODE- 250/636: Performed by: INTERNAL MEDICINE

## 2017-05-21 PROCEDURE — 82550 ASSAY OF CK (CPK): CPT | Performed by: INTERNAL MEDICINE

## 2017-05-21 PROCEDURE — 93005 ELECTROCARDIOGRAM TRACING: CPT

## 2017-05-21 PROCEDURE — 74011250637 HC RX REV CODE- 250/637: Performed by: INTERNAL MEDICINE

## 2017-05-21 PROCEDURE — 77030021668 HC NEB PREFIL KT VYRM -A

## 2017-05-21 PROCEDURE — 85027 COMPLETE CBC AUTOMATED: CPT | Performed by: INTERNAL MEDICINE

## 2017-05-21 PROCEDURE — 77010033678 HC OXYGEN DAILY

## 2017-05-21 PROCEDURE — 82962 GLUCOSE BLOOD TEST: CPT

## 2017-05-21 PROCEDURE — 84484 ASSAY OF TROPONIN QUANT: CPT | Performed by: INTERNAL MEDICINE

## 2017-05-21 PROCEDURE — 36415 COLL VENOUS BLD VENIPUNCTURE: CPT | Performed by: INTERNAL MEDICINE

## 2017-05-21 PROCEDURE — 74011636637 HC RX REV CODE- 636/637: Performed by: INTERNAL MEDICINE

## 2017-05-21 PROCEDURE — 74011250637 HC RX REV CODE- 250/637: Performed by: NURSE PRACTITIONER

## 2017-05-21 PROCEDURE — 74011250637 HC RX REV CODE- 250/637

## 2017-05-21 RX ORDER — LINDANE 10 MG/ML
SHAMPOO, SUSPENSION TOPICAL
Status: COMPLETED | OUTPATIENT
Start: 2017-05-21 | End: 2017-05-21

## 2017-05-21 RX ORDER — GUAIFENESIN 100 MG/5ML
400 SOLUTION ORAL 2 TIMES DAILY
Status: DISCONTINUED | OUTPATIENT
Start: 2017-05-21 | End: 2017-05-25

## 2017-05-21 RX ORDER — FUROSEMIDE 10 MG/ML
20 INJECTION INTRAMUSCULAR; INTRAVENOUS 2 TIMES DAILY
Status: DISCONTINUED | OUTPATIENT
Start: 2017-05-21 | End: 2017-05-27

## 2017-05-21 RX ADMIN — GUAIFENESIN 400 MG: 100 SOLUTION ORAL at 17:40

## 2017-05-21 RX ADMIN — CARVEDILOL 3.12 MG: 3.12 TABLET, FILM COATED ORAL at 08:52

## 2017-05-21 RX ADMIN — ENOXAPARIN SODIUM 40 MG: 40 INJECTION SUBCUTANEOUS at 16:23

## 2017-05-21 RX ADMIN — OXYCODONE HYDROCHLORIDE 5 MG: 100 SOLUTION ORAL at 17:42

## 2017-05-21 RX ADMIN — FUROSEMIDE 20 MG: 10 INJECTION, SOLUTION INTRAMUSCULAR; INTRAVENOUS at 16:24

## 2017-05-21 RX ADMIN — ASPIRIN 81 MG CHEWABLE TABLET 81 MG: 81 TABLET CHEWABLE at 08:52

## 2017-05-21 RX ADMIN — ACETAMINOPHEN 650 MG: 325 SOLUTION ORAL at 16:23

## 2017-05-21 RX ADMIN — OXYCODONE HYDROCHLORIDE 5 MG: 100 SOLUTION ORAL at 12:03

## 2017-05-21 RX ADMIN — INSULIN LISPRO 3 UNITS: 100 INJECTION, SOLUTION INTRAVENOUS; SUBCUTANEOUS at 06:00

## 2017-05-21 RX ADMIN — MUPIROCIN: 20 OINTMENT TOPICAL at 22:39

## 2017-05-21 RX ADMIN — FUROSEMIDE 20 MG: 10 INJECTION, SOLUTION INTRAMUSCULAR; INTRAVENOUS at 22:31

## 2017-05-21 RX ADMIN — NITROGLYCERIN 1 INCH: 20 OINTMENT TOPICAL at 19:00

## 2017-05-21 RX ADMIN — FAMOTIDINE 20 MG: 20 TABLET ORAL at 17:40

## 2017-05-21 RX ADMIN — ATORVASTATIN CALCIUM 40 MG: 40 TABLET, FILM COATED ORAL at 22:30

## 2017-05-21 RX ADMIN — NITROGLYCERIN 1 INCH: 20 OINTMENT TOPICAL at 23:56

## 2017-05-21 RX ADMIN — MUPIROCIN: 20 OINTMENT TOPICAL at 08:53

## 2017-05-21 RX ADMIN — PRASUGREL HYDROCHLORIDE 10 MG: 10 TABLET, FILM COATED ORAL at 08:52

## 2017-05-21 RX ADMIN — INSULIN LISPRO 2 UNITS: 100 INJECTION, SOLUTION INTRAVENOUS; SUBCUTANEOUS at 00:00

## 2017-05-21 RX ADMIN — Medication 10 ML: at 06:00

## 2017-05-21 RX ADMIN — INSULIN LISPRO 3 UNITS: 100 INJECTION, SOLUTION INTRAVENOUS; SUBCUTANEOUS at 12:00

## 2017-05-21 RX ADMIN — FAMOTIDINE 20 MG: 20 TABLET ORAL at 08:52

## 2017-05-21 RX ADMIN — ACETAMINOPHEN 650 MG: 325 SOLUTION ORAL at 08:53

## 2017-05-21 RX ADMIN — LINDANE: 10 SHAMPOO, SUSPENSION TOPICAL at 04:00

## 2017-05-21 RX ADMIN — Medication 10 ML: at 16:24

## 2017-05-21 RX ADMIN — INSULIN LISPRO 2 UNITS: 100 INJECTION, SOLUTION INTRAVENOUS; SUBCUTANEOUS at 17:55

## 2017-05-21 RX ADMIN — Medication 10 ML: at 22:30

## 2017-05-21 RX ADMIN — CARVEDILOL 3.12 MG: 3.12 TABLET, FILM COATED ORAL at 16:23

## 2017-05-21 NOTE — PROGRESS NOTES
1707  Outside call received from Dr. Jeri Kirk stating he is from [facility].  Requests that hospitalist taking care of this patient call him directly. His phone number is [phone]. I have paged Dr. Moe Herrera.     [phone]  Information given to Dr. Shirin Yang

## 2017-05-21 NOTE — PROGRESS NOTES
Hospitalist Progress Note    NAME: Leanne Every   :  1960   MRN:  [id number]       Interim Hospital Summary: 56 y.o. male whom presented on 2017 with      Assessment / Plan:  Bear Lice infestation POA  Lindane Shampoo application    Anoxic brain injury following cardiac arrest in [place]  >1 month ago  --outside MRI brain with abnormalities in basal ganglia and bilateral temporal lobes  --has right>left sided weakness (right face and right arm paralyze; right leg 2/5); left side 3/5 arm, 2/5 leg. Following commands given in his native [place] language from family members. --s/p PEG and trach   Will consider Neurology consult if opinion needed regarding prognosis     S/p out of hospital cardiac arrest 17, POA  CAD s/p CABG  x 3v at HD  Hx cardiac stents (twice before CABG and once after)  HTN  Severe Anemia POA - Hb improved to 11.6 s/p 2 units PRBCs  --hospitalized at [facility] in [place], [place]; transferred to [facility]  and to [facility]   --per Dr. Vandana Olson had stenosis of circumflex anastomosis graft, PTCA -->restenosis -->stent-->restenosis and redo bypass recommended but patient seeked second opinion with Dr. Abhinav Ramirez in [place] and reportedly told redo bypass not needed. --continue coreg, effient. EKG,   Cardiology consulted & following-   Echo showing diffuse Hypokinesis with EF 30-35% (new?)  S/p Transfuse 2 units PRBC  S/p IV lasix x 1 between 2 units, cont lasix 20mg BID    Hx aspiration PNA during intubation  For cardiac  arrest  Chronic respiratory failure, s/p trach  -- treated with invanz in [place]. CXR clear here. Remains Off Vent  Cont trach collar as per pulmonary today- following  Will add lasix  Will add Mucinex     Vomiting during transport to [facility] with question of recurrent aspiration  --empiric started on zosyn and vanc at [facility]. Repeat CXR neg       Diarrhea  --C. Diff negative in [place] recently prior to transfer. Repeat C.  Diff done at [facility] yesterday  --has rectal tube. May be related to tubefeed. Consider add metamucil.   --stop abx for now     DM 2  --SSI for now     Code: full  DVT prophylaxis: lovenox  Surrogate decision maker: Wife \"Savanna\" Lissette Madrdi [phone], 2 son & daughter (rina) # [phone]      Body mass index is 28.87 kg/(m^2). Recommended Disposition: LTAC versus SNF- to be determined     Subjective:     Chief Complaint / Reason for Physician Visit F/U Anoxic brain injury, CHF  \"non verbal\". Discussed with RN events overnight. Review of Systems:  Symptom Y/N Comments  Symptom Y/N Comments   Fever/Chills    Chest Pain     Poor Appetite    Edema     Cough    Abdominal Pain     Sputum    Joint Pain     SOB/HAM    Pruritis/Rash     Nausea/vomit    Tolerating PT/OT     Diarrhea    Tolerating Diet     Constipation    Other       Could NOT obtain due to: Non verbal     Objective:     VITALS:   Last 24hrs VS reviewed since prior progress note.  Most recent are:  Patient Vitals for the past 24 hrs:   Temp Pulse Resp BP SpO2   05/21/17 0900 - 80 21 125/73 100 %   05/21/17 0800 98.9 °F (37.2 °C) 70 18 113/62 100 %   05/21/17 0756 - - - - 100 %   05/21/17 0700 - 68 21 105/60 100 %   05/21/17 0600 - 80 21 122/76 100 %   05/21/17 0500 - 73 18 118/60 100 %   05/21/17 0402 - 82 24 125/64 100 %   05/21/17 0300 - 70 18 109/59 100 %   05/21/17 0200 - 79 23 120/64 100 %   05/21/17 0100 - 67 17 112/69 100 %   05/21/17 0001 - 63 17 105/53 100 %   05/20/17 2300 - 64 22 100/55 100 %   05/20/17 2200 - 71 21 100/56 100 %   05/20/17 2000 98.8 °F (37.1 °C) 81 25 133/73 100 %   05/20/17 1900 - 79 23 135/67 100 %   05/20/17 1800 - 86 25 - 100 %   05/20/17 1700 - 80 26 120/61 100 %   05/20/17 1630 98.9 °F (37.2 °C) 83 24 128/66 100 %   05/20/17 1600 99 °F (37.2 °C) 83 27 122/63 100 %   05/20/17 1515 98.8 °F (37.1 °C) 87 17 142/69 100 %   05/20/17 1500 98.9 °F (37.2 °C) 87 17 128/72 100 %   05/20/17 1445 98.8 °F (37.1 °C) 81 27 121/67 100 %   05/20/17 1430 98.6 °F (37 °C) 80 27 126/69 100 %   05/20/17 1415 98.9 °F (37.2 °C) 81 26 135/81 100 %   05/20/17 1400 98.5 °F (36.9 °C) 78 25 132/72 100 %   05/20/17 1345 98.9 °F (37.2 °C) 80 25 132/69 100 %       Intake/Output Summary (Last 24 hours) at 05/21/17 1324  Last data filed at 05/21/17 0800   Gross per 24 hour   Intake            718.8 ml   Output             1575 ml   Net           -856.2 ml        PHYSICAL EXAM:  General: WD, WN. Alert, cooperative, no acute distress    EENT:  EOMI. Anicteric sclerae. MMM, Trach collar over the trach tube noted +, Beard with nits (lice) seen+  Resp:  Coarse BS noted +. No accessory muscle use  CV:  Regular  rhythm,  No edema  GI:  Soft, Non distended, Non tender.  +Bowel sounds, PEG tube noted +  Neurologic:  Alert and oriented X 2, able to follow simple commands in native language , R >L weakness noted upper & lower extremities  Psych:   ? insight. Not anxious nor agitated  Skin:  No rashes. No jaundice    Reviewed most current lab test results and cultures  YES  Reviewed most current radiology test results   YES  Review and summation of old records today    NO  Reviewed patient's current orders and MAR    YES  PMH/ reviewed - no change compared to H&P  ________________________________________________________________________  Care Plan discussed with:    Comments   Patient x    Family  x Daughter Loki on phone at bedside   RN x    Care Manager     Consultant  x Dr Delaney Spann (Pulmonary)                     Multidiciplinary team rounds were held today with , nursing, pharmacist and clinical coordinator. Patient's plan of care was discussed; medications were reviewed and discharge planning was addressed.      ________________________________________________________________________  Total NON critical care TIME:  36   Minutes    Total CRITICAL CARE TIME Spent:   Minutes non procedure based      Comments   >50% of visit spent in counseling and coordination of care ________________________________________________________________________  Jelly Davis MD     Procedures: see electronic medical records for all procedures/Xrays and details which were not copied into this note but were reviewed prior to creation of Plan. LABS:  I reviewed today's most current labs and imaging studies.   Pertinent labs include:  Recent Labs      05/21/17   0338 05/20/17   1755  05/19/17   1607   WBC  11.4*  12.8*  9.8   HGB  11.6*  11.3*  6.8*   HCT  37.2  35.0*  21.8*   PLT  398  420*  372     Recent Labs      05/21/17   0338  05/20/17   1939  05/19/17   1607   NA  138  135*  139   K  3.6  3.5  3.4*   CL  100  98  105   CO2  31  30  24   GLU  162*  171*  76   BUN  13  12  13   CREA  0.67*  0.72  0.50*   CA  8.7  8.6  7.4*   MG   --    --   2.4   PHOS   --    --   2.3*   ALB   --   2.2*  1.8*   TBILI   --   1.3*  0.6   SGOT   --   53*  46*   ALT   --   67  58       Signed: Jelly Davis MD

## 2017-05-21 NOTE — PROGRESS NOTES
Bedside report received from [facility]                   Assessment, Background, Procedure summary, Intake/Output, MAR, and recent results discussed. Care assumed. Patient's family pleasant but very demanding and hyper-vigilent requiring a great deal of attention through out the day. Approximately 20-30 family members in and out of room through out the day. Informed family repeatedly of two person limit in room several times which was repeatedly ignored. Family asking questions about patient's status and calling other family members on phone from out of state for update. Repeatedly attempted to set boundaries and explained the need for information to be given to patient's immediate family and have them update the other members, without success. Immediate family states that other family members are to be given information as well. Patient's wife also states children are able to consent for patient as there is a language barrier. Family  and extended family continue to call nurse in room to answer questions and be updated frequently. Verbal report given to oncoming nurse Stan Chavarria RN    Report consisted of patients Situation, Background, Assessment, and   Recommendations    Information was reviewed with the receiving nurse. Opportunity for questions and clarification was provided.       Soy Elaine RN

## 2017-05-21 NOTE — PROGRESS NOTES
[facility] Consult Service Progress NOTE  Pulmonary, Critical Care, and Sleep Medicine    Name: Nyasia Irvin MRN: [id number]   : 1960 Hospital: [facility]   Date: 2017  Admission Date: 2017   Off vent greater than 24 hours  Continues with copious thin secretions white and foamy              IMPRESSION:   · Acute respiratory failure with hypoxia-   · S/p tracheostomy for prolonged vent dependence  · Dysphagia s/p PEG  · Diarrhea C diff negative in [place]  · Anoxic brain injury- some cognitive recovery but significant motor deficits; CT and MRI done at [address]-  Injury to basal ganglia and temporal lobes  · S/p cardiac arrest, s/p hypothermia protocol  · ASCVD  · H/o CABG [facility] 2014 and in  had another cath showing complex anatomy; has successful laser atherectomy PTCCA and stenting of the distal anastomotic site of the vein graft with Promus ROBERT; chest pain persisted; pt offered cath 2016 and possbile referral for another CABg at [facility] but evidently declined  · HTN  · Diabetes Mellitus  · Left ear deafness POA  · Language barrier- limited English but family available, complicated with trach and recent DORA  · Never smoker  · Anemia      RECOMMENDATIONS/PLAN:     · Trach collar as tolerated                   Blood transfusion ordered              tube feedings  · Glucose control  · Empiric abx   · Pulmonary toilet  · routine trach care-downsize as appropriate  · PT, OT consults  · DVT prophylaxis  · AM CXR     Code: Full Code        Ventilator Settings:      Mode Rate TV Press PEEP FiO2 PIP Min.  Vent   CPAP    500 ml 12 cm H2O  5 cm H20 28 %  18 cm H2O  5.64 l/min        Vital Signs: Intake/Output: Intake/Output:   Visit Vitals    /73    Pulse 80    Temp 98.9 °F (37.2 °C)    Resp 21    Ht 5' 11\" (1.803 m)    Wt 93.9 kg (207 lb 0.2 oz)    SpO2 100%    BMI 28.87 kg/m2      O2 Device: Tracheostomy, Tracheal collar  O2 Flow Rate (L/min): 10 l/min  Temp (24hrs), Av.8 °F (37.1 °C), Min:98.5 °F (36.9 °C), Max:99 °F (37.2 °C)     Intake/Output Summary (Last 24 hours) at 17 1145  Last data filed at 17 0800   Gross per 24 hour   Intake           1085.1 ml   Output             1575 ml   Net           -489.9 ml    Last shift:       07 - 1900  In: 135 [I.V.:10]  Out: 0   Last 3 shifts: 1901 -  07  In: 1449.3 [I.V.:30]  Out: 3065 [Urine:2640; Drains:425]         Telemetry:    normal sinus rhythm    Physical Exam:    General: large, obese [place] male well developed and well nourished, in no respiratory distress and acyanotic and moderately ill   HEENT: NCAT, bearded; no thrush? limited mouth opening,moist   Neck: No abnormally enlarged lymph nodes. Clovis Aceves #8   Chest: normal   Lungs: decreased air exchange bilaterally   Heart: Regular rate and rhythm or S1S2 present   Abdomen: soft, non-tender, without masses or organomegaly, PEG   :    Extremity: negative, cyanosis, clubbing;    Neuro: lethargic; opens eyes and EOMI; tongue midline; will follow simple commands and try to move left arm but no fine motor and right arm flaccid.  Minimal BLE movement   Psych: AMS   Skin: dry        DATA:    MAR reviewed and pertinent medications noted or modified as needed    MEDS:   Current Facility-Administered Medications   Medication    0.9% sodium chloride infusion 250 mL    mupirocin (BACTROBAN) 2 % ointment    insulin lispro (HUMALOG) injection    glucose chewable tablet 16 g    dextrose 10% infusion 125-250 mL    glucagon (GLUCAGEN) injection 1 mg    enoxaparin (LOVENOX) injection 40 mg    sodium chloride (NS) flush 5-10 mL    sodium chloride (NS) flush 5-10 mL    ondansetron (ZOFRAN) injection 4 mg    famotidine (PEPCID) tablet 20 mg    albuterol-ipratropium (DUO-NEB) 2.5 MG-0.5 MG/3 ML    carvedilol (COREG) tablet 3.125 mg    prasugrel (EFFIENT) tablet 10 mg    aspirin chewable tablet 81 mg    atorvastatin (LIPITOR) tablet 40 mg    oxyCODONE (ROXICODONE INTENSOL) 20 mg/mL concentrated solution 5 mg    acetaminophen (TYLENOL) solution 650 mg    lidocaine (LIDODERM) 5 % patch 1 Patch          Labs:  Recent Labs      05/21/17 0338 05/20/17   1755  05/19/17   1607   WBC  11.4*  12.8*  9.8   HGB  11.6*  11.3*  6.8*   PLT  398  420*  372     Recent Labs      05/21/17   0338 05/20/17   1939  05/19/17   1607   NA  138  135*  139   K  3.6  3.5  3.4*   CL  100  98  105   CO2  31  30  24   GLU  162*  171*  76   BUN  13  12  13   CREA  0.67*  0.72  0.50*   CA  8.7  8.6  7.4*   MG   --    --   2.4   PHOS   --    --   2.3*   ALB   --   2.2*  1.8*   SGOT   --   53*  46*   ALT   --   67  58     No results for input(s): PH, PCO2, PO2, HCO3, FIO2 in the last 72 hours. No results for input(s): PH, PCO2, PO2, HCO3, FIO2 in the last 72 hours. No results for input(s): CPK, CKNDX, TROIQ in the last 72 hours.     No lab exists for component: CPKMB    Lab Results   Component Value Date/Time    Iron 34 05/20/2017 01:14 AM    TIBC 229 05/20/2017 01:14 AM    Iron % saturation 15 05/20/2017 01:14 AM    Ferritin 191 05/20/2017 01:14 AM       No results found for: SR, CRP, THIEN, ANAIGG, RA, RPR, RPRT, VDRLT, VDRLS, TSH, TSHEXT, TSHEXT     No results found for: CPK, RCK1, RCK2, RCK3, RCK4, CKNDX, CKND1, TROPT, TROIQ, BNPP, BNP     Lab Results   Component Value Date/Time    Culture result: PENDING 05/20/2017 03:43 AM       No results found for: TOXA1, RPR, HBCM, HBSAG, HAAB, HCAB, HCAB1, HAAT, G6PD, CRYAC, HIVGT, HIVR, HIV1, HIV12, HIVPC, HIVRPI    No results found for: VANCT, CPK    No results found for: COLOR, APPRN, SPGRU, JOHANNE, PROTU, GLUCU, KETU, BILU, BLDU, UROU, DILAN, LEUKU, WBCU, RBCU, UEPI, BACTU, CASTS, UCRY    Imaging:  [x]                        personally reviewed the patients radiographs and reports:clear      Results from Hospital Encounter encounter on 05/19/17   XR CHEST PORT   Narrative EXAM:  XR CHEST PORT    INDICATION:  Tube Placement    COMPARISON:  None. FINDINGS: A portable AP radiograph of the chest was obtained at 15:20 hours. The  patient is on a cardiac monitor. The distal portion of a tracheostomy tube  projects in expected position. The lungs are clear with no pneumothorax. There  are median sternotomy wires and surgical clips compatible with prior CABG. The  cardiac and mediastinal contours and pulmonary vascularity are normal.  The  chest wall structures and visualized upper abdomen show no acute findings with  incidental note of degenerative spine changes and gastrostomy tube with inflated  balloon. Impression IMPRESSION: Tracheostomy in position. No acute findings with incidental findings  as above. No results found for this or any previous visit.     Jia Bergeron MD

## 2017-05-22 ENCOUNTER — APPOINTMENT (OUTPATIENT)
Dept: GENERAL RADIOLOGY | Age: 57
DRG: 091 | End: 2017-05-22
Attending: INTERNAL MEDICINE
Payer: COMMERCIAL

## 2017-05-22 PROBLEM — J15.6 PNEUMONIA DUE TO SERRATIA MARCESCENS (HCC): Status: ACTIVE | Noted: 2017-05-22

## 2017-05-22 PROBLEM — B85.2 LICE INFESTATION: Status: ACTIVE | Noted: 2017-05-22

## 2017-05-22 PROBLEM — I42.9 CARDIOMYOPATHY (HCC): Status: ACTIVE | Noted: 2017-05-22

## 2017-05-22 LAB
ANION GAP BLD CALC-SCNC: 8 MMOL/L (ref 5–15)
ATRIAL RATE: 88 BPM
BUN SERPL-MCNC: 15 MG/DL (ref 6–20)
BUN/CREAT SERPL: 21 (ref 12–20)
CALCIUM SERPL-MCNC: 9.1 MG/DL (ref 8.5–10.1)
CALCULATED P AXIS, ECG09: 37 DEGREES
CALCULATED R AXIS, ECG10: 57 DEGREES
CALCULATED T AXIS, ECG11: 57 DEGREES
CHLORIDE SERPL-SCNC: 97 MMOL/L (ref 97–108)
CO2 SERPL-SCNC: 31 MMOL/L (ref 21–32)
CREAT SERPL-MCNC: 0.73 MG/DL (ref 0.7–1.3)
DIAGNOSIS, 93000: NORMAL
ERYTHROCYTE [DISTWIDTH] IN BLOOD BY AUTOMATED COUNT: 17 % (ref 11.5–14.5)
GLUCOSE BLD STRIP.AUTO-MCNC: 136 MG/DL (ref 65–100)
GLUCOSE BLD STRIP.AUTO-MCNC: 150 MG/DL (ref 65–100)
GLUCOSE BLD STRIP.AUTO-MCNC: 173 MG/DL (ref 65–100)
GLUCOSE BLD STRIP.AUTO-MCNC: 212 MG/DL (ref 65–100)
GLUCOSE BLD STRIP.AUTO-MCNC: 227 MG/DL (ref 65–100)
GLUCOSE SERPL-MCNC: 195 MG/DL (ref 65–100)
HCT VFR BLD AUTO: 39.9 % (ref 36.6–50.3)
HGB BLD-MCNC: 12.6 G/DL (ref 12.1–17)
MAGNESIUM SERPL-MCNC: 2.4 MG/DL (ref 1.6–2.4)
MCH RBC QN AUTO: 27.2 PG (ref 26–34)
MCHC RBC AUTO-ENTMCNC: 31.6 G/DL (ref 30–36.5)
MCV RBC AUTO: 86.2 FL (ref 80–99)
P-R INTERVAL, ECG05: 160 MS
PLATELET # BLD AUTO: 407 K/UL (ref 150–400)
POTASSIUM SERPL-SCNC: 3.8 MMOL/L (ref 3.5–5.1)
Q-T INTERVAL, ECG07: 366 MS
QRS DURATION, ECG06: 72 MS
QTC CALCULATION (BEZET), ECG08: 442 MS
RBC # BLD AUTO: 4.63 M/UL (ref 4.1–5.7)
SERVICE CMNT-IMP: ABNORMAL
SODIUM SERPL-SCNC: 136 MMOL/L (ref 136–145)
VENTRICULAR RATE, ECG03: 88 BPM
WBC # BLD AUTO: 13.6 K/UL (ref 4.1–11.1)

## 2017-05-22 PROCEDURE — 92523 SPEECH SOUND LANG COMPREHEN: CPT

## 2017-05-22 PROCEDURE — 80048 BASIC METABOLIC PNL TOTAL CA: CPT | Performed by: INTERNAL MEDICINE

## 2017-05-22 PROCEDURE — 74011250637 HC RX REV CODE- 250/637: Performed by: INTERNAL MEDICINE

## 2017-05-22 PROCEDURE — 97112 NEUROMUSCULAR REEDUCATION: CPT

## 2017-05-22 PROCEDURE — 74011250636 HC RX REV CODE- 250/636: Performed by: INTERNAL MEDICINE

## 2017-05-22 PROCEDURE — 65610000006 HC RM INTENSIVE CARE

## 2017-05-22 PROCEDURE — 83735 ASSAY OF MAGNESIUM: CPT | Performed by: INTERNAL MEDICINE

## 2017-05-22 PROCEDURE — 85027 COMPLETE CBC AUTOMATED: CPT | Performed by: INTERNAL MEDICINE

## 2017-05-22 PROCEDURE — 74011250637 HC RX REV CODE- 250/637: Performed by: HOSPITALIST

## 2017-05-22 PROCEDURE — 36415 COLL VENOUS BLD VENIPUNCTURE: CPT | Performed by: INTERNAL MEDICINE

## 2017-05-22 PROCEDURE — 74011636637 HC RX REV CODE- 636/637: Performed by: INTERNAL MEDICINE

## 2017-05-22 PROCEDURE — 97530 THERAPEUTIC ACTIVITIES: CPT

## 2017-05-22 PROCEDURE — 82962 GLUCOSE BLOOD TEST: CPT

## 2017-05-22 PROCEDURE — 74011250637 HC RX REV CODE- 250/637: Performed by: NURSE PRACTITIONER

## 2017-05-22 PROCEDURE — 71010 XR CHEST PORT: CPT

## 2017-05-22 RX ORDER — VALSARTAN 80 MG/1
40 TABLET ORAL DAILY
Status: DISCONTINUED | OUTPATIENT
Start: 2017-05-23 | End: 2017-05-30

## 2017-05-22 RX ORDER — METFORMIN HYDROCHLORIDE 500 MG/1
500 TABLET ORAL 2 TIMES DAILY WITH MEALS
Status: DISCONTINUED | OUTPATIENT
Start: 2017-05-22 | End: 2017-05-30

## 2017-05-22 RX ORDER — LEVOFLOXACIN 5 MG/ML
750 INJECTION, SOLUTION INTRAVENOUS EVERY 24 HOURS
Status: DISCONTINUED | OUTPATIENT
Start: 2017-05-22 | End: 2017-05-22

## 2017-05-22 RX ORDER — LEVOFLOXACIN 5 MG/ML
750 INJECTION, SOLUTION INTRAVENOUS EVERY 24 HOURS
Status: DISCONTINUED | OUTPATIENT
Start: 2017-05-22 | End: 2017-05-23

## 2017-05-22 RX ADMIN — Medication 10 ML: at 06:44

## 2017-05-22 RX ADMIN — GUAIFENESIN 400 MG: 100 SOLUTION ORAL at 17:26

## 2017-05-22 RX ADMIN — METFORMIN HYDROCHLORIDE 500 MG: 500 TABLET, FILM COATED ORAL at 16:15

## 2017-05-22 RX ADMIN — METFORMIN HYDROCHLORIDE 500 MG: 500 TABLET, FILM COATED ORAL at 11:37

## 2017-05-22 RX ADMIN — Medication 10 ML: at 21:04

## 2017-05-22 RX ADMIN — GUAIFENESIN 400 MG: 100 SOLUTION ORAL at 08:46

## 2017-05-22 RX ADMIN — FAMOTIDINE 20 MG: 20 TABLET ORAL at 17:26

## 2017-05-22 RX ADMIN — OXYCODONE HYDROCHLORIDE 5 MG: 100 SOLUTION ORAL at 10:07

## 2017-05-22 RX ADMIN — MUPIROCIN: 20 OINTMENT TOPICAL at 21:04

## 2017-05-22 RX ADMIN — INSULIN LISPRO 3 UNITS: 100 INJECTION, SOLUTION INTRAVENOUS; SUBCUTANEOUS at 12:03

## 2017-05-22 RX ADMIN — CARVEDILOL 3.12 MG: 3.12 TABLET, FILM COATED ORAL at 08:46

## 2017-05-22 RX ADMIN — ACETAMINOPHEN 650 MG: 325 SOLUTION ORAL at 17:26

## 2017-05-22 RX ADMIN — MUPIROCIN: 20 OINTMENT TOPICAL at 08:48

## 2017-05-22 RX ADMIN — FUROSEMIDE 20 MG: 10 INJECTION, SOLUTION INTRAMUSCULAR; INTRAVENOUS at 21:04

## 2017-05-22 RX ADMIN — ENOXAPARIN SODIUM 40 MG: 40 INJECTION SUBCUTANEOUS at 15:15

## 2017-05-22 RX ADMIN — ATORVASTATIN CALCIUM 40 MG: 40 TABLET, FILM COATED ORAL at 21:27

## 2017-05-22 RX ADMIN — OXYCODONE HYDROCHLORIDE 5 MG: 100 SOLUTION ORAL at 02:12

## 2017-05-22 RX ADMIN — FUROSEMIDE 20 MG: 10 INJECTION, SOLUTION INTRAMUSCULAR; INTRAVENOUS at 08:47

## 2017-05-22 RX ADMIN — LEVOFLOXACIN 750 MG: 5 INJECTION, SOLUTION INTRAVENOUS at 08:46

## 2017-05-22 RX ADMIN — NITROGLYCERIN 1 INCH: 20 OINTMENT TOPICAL at 06:44

## 2017-05-22 RX ADMIN — ACETAMINOPHEN 650 MG: 325 SOLUTION ORAL at 22:22

## 2017-05-22 RX ADMIN — ASPIRIN 81 MG CHEWABLE TABLET 81 MG: 81 TABLET CHEWABLE at 08:47

## 2017-05-22 RX ADMIN — INSULIN LISPRO 2 UNITS: 100 INJECTION, SOLUTION INTRAVENOUS; SUBCUTANEOUS at 23:54

## 2017-05-22 RX ADMIN — ACETAMINOPHEN 650 MG: 325 SOLUTION ORAL at 08:46

## 2017-05-22 RX ADMIN — INSULIN LISPRO 2 UNITS: 100 INJECTION, SOLUTION INTRAVENOUS; SUBCUTANEOUS at 00:06

## 2017-05-22 RX ADMIN — Medication 10 ML: at 15:15

## 2017-05-22 RX ADMIN — FAMOTIDINE 20 MG: 20 TABLET ORAL at 08:47

## 2017-05-22 RX ADMIN — CARVEDILOL 3.12 MG: 3.12 TABLET, FILM COATED ORAL at 16:15

## 2017-05-22 RX ADMIN — PRASUGREL HYDROCHLORIDE 10 MG: 10 TABLET, FILM COATED ORAL at 08:47

## 2017-05-22 RX ADMIN — INSULIN LISPRO 3 UNITS: 100 INJECTION, SOLUTION INTRAVENOUS; SUBCUTANEOUS at 06:50

## 2017-05-22 NOTE — PROGRESS NOTES
Attended Interdisciplinary rounds in Critical Care Unit, where patient care was discussed. Visit by: Kaylee Elder. Brandyn Somers.  Genesis Schneider MA, [address]

## 2017-05-22 NOTE — PROGRESS NOTES
Problem: Mobility Impaired (Adult and Pediatric)  Goal: *Acute Goals and Plan of Care (Insert Text)  Physical Therapy Goals  Initiated 5/20/2017  1. Patient will roll side to side in bed with maximal assistance x2 within 7 day(s). 2. Patient will follow simple, one-step commands 50% of the time within 7 day(s). 3. Patient and family will demonstrate independence with BLE and BUE AA/PROM HEP within 7 day(s). 4. Patient will tolerate sitting in chair position in bed for 1 hr within 7 day(s). PHYSICAL THERAPY TREATMENT  Patient: Jeremy Michelle (89 y.o. male)  Date: 5/22/2017  Diagnosis: Cardiac Arrest  Respiratory failure ([facility] Anoxic brain injury ([facility] [address]       Precautions: Contact      ASSESSMENT:  Patient received resting in bed, agreeable and cleared for PT. Patient required max-total A x 2 for bed mobility and supine to sit. Patient's family reports he was sitting EOB with 1-2 assist at the other facilities. Patient tolerated sitting EOB x 5-10 minutes with CGA-min A. VSS although did have significant decrease in SBP and patient reports dizziness, appearing diaphoretic. Patient able to reach with L UE to ipsilateral and contralateral side to facilitate trunk rotation. Patient with slowed processing with duration of sitting on EOB and returned to supine with total A x 2 at the conclusion of PT treatment session. Patient will benefit from LTAC/SNF vs TBD pending progress at discharge. Progression toward goals:  [X]    Improving appropriately and progressing toward goals  [ ]    Improving slowly and progressing toward goals  [ ]    Not making progress toward goals and plan of care will be adjusted       PLAN:  Patient continues to benefit from skilled intervention to address the above impairments. Continue treatment per established plan of care.   Discharge Recommendations:  Skilled Nursing/LTAC, vs Facility and To Be Determined  Further Equipment Recommendations for Discharge:  TBD       SUBJECTIVE:   Patient non-verbal but able to speak to brother in law      OBJECTIVE DATA SUMMARY:   Critical Behavior:  Neurologic State: Alert  Orientation Level: Oriented to place, Oriented to person (with y/n questions)  Cognition: Follows commands, Decreased attention/concentration     Functional Mobility Training:  Bed Mobility:  Rolling: Maximum assistance;Assist x2  Supine to Sit: Maximum assistance;Assist x2  Sit to Supine: Total assistance;Assist x2  Scooting: Maximum assistance;Assist x2        Transfers:  Sit to Stand:  (unable this date)                                Balance:  Sitting: Impaired; Without support  Sitting - Static: Fair (occasional)  Sitting - Dynamic: Fair (occasional)  Standing:  (not tested)  Ambulation/Gait Training:                                                  Neuro Re-Education:  Tolerated sitting EOB x 5-10 minutes with CGA-min A. Performed reaching with L UE to ipsilateral and contralateral side with cues. Therapeutic Exercises:      Pain:  Pain Scale 1: Adult Nonverbal Pain Scale  Pain Intensity 1: 0              Activity Tolerance:   Improving. VSS although SBP dropped with time from 140's to 120's  Please refer to the flowsheet for vital signs taken during this treatment.   After treatment:   [ ]    Patient left in no apparent distress sitting up in chair  [X]    Patient left in no apparent distress in bed  [X]    Call bell left within reach  [X]    Nursing notified  [X]    Caregiver present  [ ]    Bed alarm activated      COMMUNICATION/COLLABORATION:   The patients plan of care was discussed with: Occupational Therapist, Registered Nurse and      Imelda Stafford, PT, DPT   Time Calculation: 28 mins

## 2017-05-22 NOTE — PROGRESS NOTES
Pressure Ulcer Prevention In basket Alert Received for Ulices < 14 (moderate risk).      Suggested Care Plan/Interventions for Nursing  1. Complete Ulices Pressure Ulcer Risk Scale and use sub scores to identify appropriate interventions. 2. Perform Assessment: skin, changes in LOC, visual cues for pain, monitor skin under medical devices  3. Respond to Reduced Sensory Perception: changes in LOC, check visual cues for pain, float heels, suspension boots, pressure redistribution bed/mattress/chair cushion, turning and reposition approximately every 2 hours (pillows & wedges), pad between skin to skin, turn & reposition  4. Manage Moisture: absorbent under pads, internal / external urinary device, internal /  external fecal device, minimize layers, contain wound drainage, access need for specialty bed, limit adult briefs, maintain skin hydration (lotion/cream), moisture barrier, offer toileting every hour  5. Promote Activity: increase time out of bed, chair cushion, PT/OT evaluation, trapeze to reposition, pressure redistribution bed/mattress/chair  6. Address Reduced Mobility: float heels / suspension boot, HOB 30 degrees or less, pressure redistribution bed/mattress/cushion, PT / OT evaluation, turn and reposition approximately every 2 hours (pillows & wedges)  7. Promote Nutrition: document food / fluid / supplement intake, encourage/assist with meals as needed  8. Reduce Friction and Shear: transferring/repositioning devices (lift/draw sheet), lift team/ patient mobility team, feet elevated on foot rest, minimize layers, foam dressing / transparent film / skin sealants, protective barrier creams and emollients, transfer aides (board, Eleonora lift, ceiling lift, stand assist), HOB 30 degrees or less, trapeze to reposition.   Wound Care Team

## 2017-05-22 NOTE — PROGRESS NOTES
Hospitalist Progress Note    NAME: Della Londono   :  1960   MRN:  [id number]       Interim Hospital Summary: 56 y.o. male whom presented on 2017 with cardiomyopathy, s/p trach, S/p PEG at [facility] transferred to [facility] for 1 day then transferred to [address] [place] [address] due to insurance reasons     Assessment / Plan:  Bear Lice infestation POA  S/p Lindane Shampoo application yesterday    Anoxic brain injury following cardiac arrest in [place]  >1 month ago  --outside MRI brain with abnormalities in basal ganglia and bilateral temporal lobes  --has right>left sided weakness (right face and right arm paralyze; right leg 2/5); left side 3/5 arm, 2/5 leg. Following commands given in his native [place] language from family members. --s/p PEG and trach   Can consider Neurology consult if opinion needed regarding prognosis if need  Seems to be at his new baseline as per the family at bedside     S/p out of hospital cardiac arrest 17, POA  CAD s/p CABG  x 3v at HD  Hx cardiac stents (twice before CABG and once after)  HTN  Severe Anemia POA - Hb improved to 11.6 s/p 2 units PRBCs  --hospitalized at [facility] in [place], [place]; transferred to [facility]  and to [address] [place] [address]   --per Dr. Adrianne Lizama had stenosis of circumflex anastomosis graft, PTCA -->restenosis -->stent-->restenosis and redo bypass recommended but patient seeked second opinion with Dr. Gloria Mcguire in [place] and reportedly told redo bypass not needed. --continue coreg, effient. EKG,   Cardiology consulted & following-   Echo showing diffuse Hypokinesis with EF 30-35% (new?)  S/p Transfuse 2 units PRBC  S/p IV lasix x 1 between 2 units, cont lasix 20mg BID    Hx aspiration PNA during intubation  For cardiac  arrest  Chronic respiratory failure, s/p trach  -- treated with invanz in [place]. CXR clear here.    Sputum Cx at [facility] growing Pan sensitive Serratia Marcescens     Remains Off Vent >48hrs now, agrees pt stable to transfer off the Unit  Cont trach collar as per pulmonary today- following  Cont Lasix  Cont Mucinex  Start empiric Levaquin IV today as pt does have leukocytosis, increased Sputum/secretion & OSH sputum Cx growing Serratia sp & Sputum Cx here growing Gram neg Rods    Vomiting during transport to [place] with question of recurrent aspiration  --empiric started on zosyn and vanc at [facility]. Repeat CXR neg       Diarrhea  --C. Diff negative in [place] recently prior to transfer. Repeat C. Diff done at [facility] yesterday  --has rectal tube. May be related to tubefeed. Consider add metamucil.   --stop abx for now     DM 2  --SSI for now     Code: full  DVT prophylaxis: lovenox  Surrogate decision maker: Wife \"Savanna\" Renae Ortiz [phone], 2 son & daughter (rina) # [phone]      Body mass index is 28.87 kg/(m^2). Recommended Disposition: LTAC versus SNF- to be determined     Subjective:     Chief Complaint / Reason for Physician Visit F/U Anoxic brain injury, CHF  \"non verbal\". Discussed with RN events overnight. Review of Systems:  Symptom Y/N Comments  Symptom Y/N Comments   Fever/Chills    Chest Pain     Poor Appetite    Edema     Cough    Abdominal Pain     Sputum    Joint Pain     SOB/HAM    Pruritis/Rash     Nausea/vomit    Tolerating PT/OT     Diarrhea    Tolerating Diet     Constipation    Other       Could NOT obtain due to: Non verbal due to trach? Versus TBI     Objective:     VITALS:   Last 24hrs VS reviewed since prior progress note.  Most recent are:  Patient Vitals for the past 24 hrs:   Temp Pulse Resp BP SpO2   05/22/17 1500 - (!) 109 14 142/76 100 %   05/22/17 1400 - 97 18 112/80 100 %   05/22/17 1300 - 90 17 123/90 100 %   05/22/17 1200 98.3 °F (36.8 °C) 85 14 107/57 99 %   05/22/17 1100 - 99 17 133/83 100 %   05/22/17 1000 - (!) 101 18 128/74 100 %   05/22/17 0900 - 99 18 139/83 100 %   05/22/17 0800 98.4 °F (36.9 °C) 95 17 122/69 98 %   05/22/17 0700 - (!) 103 24 - 98 %   05/22/17 0600 - 76 18 110/57 98 %   05/22/17 0500 - 92 23 122/66 98 % 05/22/17 0400 98.9 °F (37.2 °C) 92 21 108/75 98 %   05/22/17 0300 - 73 16 112/62 97 %   05/22/17 0200 - 95 23 131/69 98 %   05/22/17 0100 - 94 19 126/76 99 %   05/22/17 0000 98.6 °F (37 °C) 84 21 143/78 99 %   05/21/17 2300 - 73 17 133/82 100 %   05/21/17 2200 - 86 21 132/67 98 %   05/21/17 2100 - 73 17 114/61 100 %   05/21/17 2000 98.7 °F (37.1 °C) 71 15 107/63 100 %   05/21/17 1900 - 82 15 - 100 %   05/21/17 1800 - 84 14 139/79 100 %   05/21/17 1700 - 71 16 121/67 100 %   05/21/17 1600 98.8 °F (37.1 °C) 76 18 143/79 100 %       Intake/Output Summary (Last 24 hours) at 05/22/17 1559  Last data filed at 05/22/17 1400   Gross per 24 hour   Intake             1565 ml   Output             1475 ml   Net               90 ml        PHYSICAL EXAM:  General: WD, WN. Alert, cooperative, no acute distress    EENT:  EOMI. Anicteric sclerae. MMM, Trach collar over the trach tube noted +, Beard with nits (lice) seen+  Resp:  Coarse BS noted +. No accessory muscle use  CV:  Regular  rhythm,  No edema  GI:  Soft, Non distended, Non tender.  +Bowel sounds, PEG tube noted +  Neurologic:  Alert and oriented X 2, able to follow simple commands in native language , R >L weakness noted upper & lower extremities  Psych:   ? insight. Not anxious nor agitated  Skin:  No rashes. No jaundice    Reviewed most current lab test results and cultures  YES  Reviewed most current radiology test results   YES  Review and summation of old records today    NO  Reviewed patient's current orders and MAR    YES  PMH/SH reviewed - no change compared to H&P  ________________________________________________________________________  Care Plan discussed with:    Comments   Patient x    Family  x Daughter Sangeet at bedside   RN x    Care Manager     Consultant  x SLP                     Multidiciplinary team rounds were held today with , nursing, pharmacist and clinical coordinator.   Patient's plan of care was discussed; medications were reviewed and discharge planning was addressed. ________________________________________________________________________  Total NON critical care TIME:  36   Minutes    Total CRITICAL CARE TIME Spent:   Minutes non procedure based      Comments   >50% of visit spent in counseling and coordination of care     ________________________________________________________________________  Yonathan Lynch MD     Procedures: see electronic medical records for all procedures/Xrays and details which were not copied into this note but were reviewed prior to creation of Plan. LABS:  I reviewed today's most current labs and imaging studies.   Pertinent labs include:  Recent Labs      05/22/17 0429 05/21/17   0338  05/20/17   1755   WBC  13.6*  11.4*  12.8*   HGB  12.6  11.6*  11.3*   HCT  39.9  37.2  35.0*   PLT  407*  398  420*     Recent Labs      05/22/17 0429 05/21/17   0338  05/20/17   1939  05/19/17   1607   NA  136  138  135*  139   K  3.8  3.6  3.5  3.4*   CL  97  100  98  105   CO2  31  31  30  24   GLU  195*  162*  171*  76   BUN  15  13  12  13   CREA  0.73  0.67*  0.72  0.50*   CA  9.1  8.7  8.6  7.4*   MG  2.4   --    --   2.4   PHOS   --    --    --   2.3*   ALB   --    --   2.2*  1.8*   TBILI   --    --   1.3*  0.6   SGOT   --    --   53*  46*   ALT   --    --   67  58       Signed: Yonathan Lynch MD

## 2017-05-22 NOTE — PROGRESS NOTES
Problem: Self Care Deficits Care Plan (Adult)  Goal: *Acute Goals and Plan of Care (Insert Text)  Occupational Therapy Goals  Initiated 5/20/2017   1. Patient will perform self-feeding with moderate assistance seated in bed with right UE and verbal cues, when pt is cleared to eat within 7 day(s). 2. Patient will follow one step simple commands 50 % of the time, with in 7 days  3. Patient will be able to tolerate rolling in bed with max assist of 2 and attempt to assist with right UE, with in 7 days. 4. Patient will be able to be able to sit on EOb for 1 minute with total assist of 2 to 3 persons with in 7 days. OCCUPATIONAL THERAPY TREATMENT  Patient: Jermaine Ortega (44 y.o. male)  Date: 5/22/2017  Diagnosis: Cardiac Arrest  Respiratory failure ([facility] [address] Anoxic brain injury ([facility] [address]       Precautions: Contact      ASSESSMENT:  Pt was supine in bed and was cleared to see by nursing and all VSS,. Pt was max of 2 to total assist for supine to sit. He was able to sit on EOB with CGA for 5 to 10 minutes. Pt was able to use left arm and reach to right and left side of body. Pt noted to no longer respond when family was giving him directions and pt was asked if he was tired and he nodded yes. Pt was put back to bed and all VSS stable during the session. He was positioned in bed and family in room. Recommendations for pt at discharge to be determined pending his progress, SNF or in pt  Progression toward goals:  [X]       Improving appropriately and progressing toward goals  [ ]       Improving slowly and progressing toward goals  [ ]       Not making progress toward goals and plan of care will be adjusted       PLAN:  Patient continues to benefit from skilled intervention to address the above impairments. Continue treatment per established plan of care. Discharge Recommendations:   To Be Determined  Further Equipment Recommendations for Discharge:  tbd       SUBJECTIVE:   Patient stated -pt is non verbal. OBJECTIVE DATA SUMMARY:   Cognitive/Behavioral Status:  Neurologic State: Alert  Orientation Level: Oriented to place;Oriented to person (with y/n questions)  Cognition: Follows commands;Decreased attention/concentration     Perseveration: No perseveration noted        Functional Mobility and Transfers for ADLs:  Bed Mobility:  Rolling: Maximum assistance;Assist x2  Supine to Sit: Maximum assistance;Assist x2  Sit to Supine: Total assistance;Assist x2  Scooting: Maximum assistance;Assist x2     Transfers:  Sit to Stand:  (unable this date)        Balance:  Sitting: Impaired; Without support  Sitting - Static: Fair (occasional)  Sitting - Dynamic: Fair (occasional)  Standing:  (not tested)     ADL Intervention:     Total assist for ADLs. Pain:  Pain Scale 1: Adult Nonverbal Pain Scale  Pain Intensity 1: 0              Activity Tolerance:   vss  Please refer to the flowsheet for vital signs taken during this treatment.   After treatment:   [ ] Patient left in no apparent distress sitting up in chair  [X] Patient left in no apparent distress in bed  [X] Call bell left within reach  [X] Nursing notified  [X] Caregiver present  [ ] Bed alarm activated      COMMUNICATION/COLLABORATION:   The patients plan of care was discussed with: Physical Therapist, Registered Nurse and family     Jerrod Poole OT  Time Calculation: 29 mins

## 2017-05-22 NOTE — PROGRESS NOTES
2000 vss. Afebrile. Assessment as noted. Trach care completed. Trach tie changed. Copious tan secretions around trach. Pt's condom cath came off of patient with pt's spont movement. Pt cleaned, bedpad changed and condom cath replaced. Will monitor. 0000 pt's condom cath again removed with pt movement. Pt incontinent of large amount of urine. Daughter at the bedside very demanding about which way pt is turned and when. Otherwise assessment remains unchanged. Will monitor. 0200 pt nods his head 'yes' when asked if in pain. 5mg roxicodone given via peg tube. 0430 labs drawn and sent. Condom cath again removed with pt movement. Replaced condom cath and bedpad changed. Daughter wants pt to have mouth swabs with ice cold water. Pt appears to be attempting to drink water from the swabs, even though water is given with a swab that is connected to suction. Pt then coughing immediately afterward. Informed the daughter that the patient couldn't have anymore ice water swabs until he is evaluated by speech. Otherwise assessment remains unchanged. Will monitor.

## 2017-05-22 NOTE — PROGRESS NOTES
Care Management:    Cardiac arrest last month down in [place] and was in [facility] down there up until he transferred to [facility] on May 18th. He then turned around and transferred to [facility] secondary to insurance issues. He is now being monitored and treated in the ICU . Family is at bedside. He has cardiac arrest and he also aspirated during intubation post arrest. Anoxic brain injury and he now has trach and peg. He is a full code . He is on trach collar and he has tube feeds. Prior to his hospitalization he was independent. I have consulted [employer] to see what he might qualify for , unsure what his future care needs are going to be. His wife Zoie Sanchez is his emergency contact and NOK. Care Management Interventions  PCP Verified by CM: Yes  Physical Therapy Consult: Yes  Occupational Therapy Consult: Yes  Speech Therapy Consult: Yes  Current Support Network:  (Lives in an apartment and lives with family. According to family he was independent including working and driving prior to his hospitalization. )  Plan discussed with Pt/Family/Caregiver: Yes     Chart reviewed and we will cont to follow. We will wait for PT and OT recommendations. Jorje Nascimento Pending sale to [facility] ac [phone]    Addendum: [employer] aware of this patient but will wait until it is more clear as to what he will need before screening for Medicaid. I have asked them to look at disability with the family. PT and OT are working with patient and discharge needs are still to be determined.

## 2017-05-22 NOTE — PROGRESS NOTES
[facility] Consult Service Progress NOTE  Pulmonary, Critical Care, and Sleep Medicine    Name: Jeremy Michelle MRN: [id number]   : 1960 Hospital: [address] [facility]   Date: 2017  Admission Date: 2017   Off vent greater than 48 hours  Continues with copious thin secretions white and foamy. Pt was more alert and interactive yesterday. NO acute or new issues noted. IMPRESSION:   · Acute respiratory failure with hypoxia- has tolerated trach collar. · S/p tracheostomy for prolonged vent dependence  · Dysphagia s/p PEG  · Diarrhea C diff negative in [place]  · Anoxic brain injury- some cognitive recovery but significant motor deficits; CT and MRI done at [address]-  Injury to basal ganglia and temporal lobes  · S/p cardiac arrest, s/p hypothermia protocol  · ASCVD  · H/o CABG [facility] 2014 and in  had another cath showing complex anatomy; has successful laser atherectomy PTCCA and stenting of the distal anastomotic site of the vein graft with Promus ROBERT; chest pain persisted; pt offered cath 2016 and possbile referral for another CABg at [facility] but evidently declined  · HTN  · Diabetes Mellitus  · Left ear deafness POA  · Language barrier- limited English but family available, complicated with trach and recent DORA  · Never smoker  · Anemia  · Discussed with nurse and pts Daughter. RECOMMENDATIONS/PLAN:     · Trach collar as tolerated, would not change trach at this point. Continue with routine trach care. · Blood transfusion prn   · tube feedings, Will ask Speech Path to see as well. · Glucose control  · Empiric abx   · Pulmonary toilet  · PT, OT consults  · DVT prophylaxis  · If stable today, will transfer to PCU.       Code: Full Code          Vital Signs: Intake/Output: Intake/Output:   Visit Vitals    /57    Pulse 76    Temp 98.9 °F (37.2 °C)    Resp 18    Ht 5' 11\" (1.803 m)    Wt 93.9 kg (207 lb 0.2 oz)  SpO2 98%    BMI 28.87 kg/m2      O2 Device: Tracheal collar  O2 Flow Rate (L/min): 10 l/min  Temp (24hrs), Av.8 °F (37.1 °C), Min:98.6 °F (37 °C), Max:98.9 °F (37.2 °C)     Intake/Output Summary (Last 24 hours) at 17 0719  Last data filed at 17 0600   Gross per 24 hour   Intake             1185 ml   Output             1175 ml   Net               10 ml    Last shift:         Last 3 shifts: 1901 - 700  In: 5963 [I.V.:30]  Out: 3796 [Urine:1100; Drains:75]         Telemetry:    normal sinus rhythm    Physical Exam:    General: large, obese [place] male well developed and well nourished, in no respiratory distress and acyanotic and moderately ill   HEENT: NCAT, bearded; no thrush? limited mouth opening,moist   Neck: No abnormally enlarged lymph nodes. Faith Oliveira #8   Chest: normal   Lungs: decreased air exchange bilaterally   Heart: Regular rate and rhythm or S1S2 present   Abdomen: soft, non-tender, without masses or organomegaly, PEG   :    Extremity: negative, cyanosis, clubbing;    Neuro: lethargic; opens eyes and EOMI; tongue midline; will follow simple commands and try to move left arm but no fine motor and right arm flaccid.  Minimal BLE movement   Psych: AMS   Skin: dry        DATA:    MAR reviewed and pertinent medications noted or modified as needed    MEDS:   Current Facility-Administered Medications   Medication    furosemide (LASIX) injection 20 mg    guaiFENesin (ROBITUSSIN) 100 mg/5 mL oral liquid 400 mg    0.9% sodium chloride infusion 250 mL    mupirocin (BACTROBAN) 2 % ointment    insulin lispro (HUMALOG) injection    glucose chewable tablet 16 g    dextrose 10% infusion 125-250 mL    glucagon (GLUCAGEN) injection 1 mg    enoxaparin (LOVENOX) injection 40 mg    sodium chloride (NS) flush 5-10 mL    sodium chloride (NS) flush 5-10 mL    ondansetron (ZOFRAN) injection 4 mg    famotidine (PEPCID) tablet 20 mg    albuterol-ipratropium (DUO-NEB) 2.5 MG-0.5 MG/3 ML  carvedilol (COREG) tablet 3.125 mg    prasugrel (EFFIENT) tablet 10 mg    aspirin chewable tablet 81 mg    atorvastatin (LIPITOR) tablet 40 mg    oxyCODONE (ROXICODONE INTENSOL) 20 mg/mL concentrated solution 5 mg    acetaminophen (TYLENOL) solution 650 mg    lidocaine (LIDODERM) 5 % patch 1 Patch          Labs:  Recent Labs      05/22/17   0429 05/21/17   0338  05/20/17   1755   WBC  13.6*  11.4*  12.8*   HGB  12.6  11.6*  11.3*   PLT  407*  398  420*     Recent Labs      05/22/17   0429 05/21/17   0338  05/20/17   1939  05/19/17   1607   NA  136  138  135*  139   K  3.8  3.6  3.5  3.4*   CL  97  100  98  105   CO2  31  31  30  24   GLU  195*  162*  171*  76   BUN  15  13  12  13   CREA  0.73  0.67*  0.72  0.50*   CA  9.1  8.7  8.6  7.4*   MG  2.4   --    --   2.4   PHOS   --    --    --   2.3*   ALB   --    --   2.2*  1.8*   SGOT   --    --   53*  46*   ALT   --    --   67  58     No results for input(s): PH, PCO2, PO2, HCO3, FIO2 in the last 72 hours. No results for input(s): PH, PCO2, PO2, HCO3, FIO2 in the last 72 hours.   Recent Labs      05/21/17   1910   CPK  52   CKNDX  2.9*   TROIQ  <0.04       Lab Results   Component Value Date/Time    Iron 34 05/20/2017 01:14 AM    TIBC 229 05/20/2017 01:14 AM    Iron % saturation 15 05/20/2017 01:14 AM    Ferritin 191 05/20/2017 01:14 AM       No results found for: SR, CRP, THIEN, ANAIGG, RA, RPR, RPRT, VDRLT, VDRLS, TSH, TSHEXT, TSHEXT     Lab Results   Component Value Date/Time    CK 52 05/21/2017 07:10 PM    CK-MB Index 2.9 05/21/2017 07:10 PM    Troponin-I, Qt. <0.04 05/21/2017 07:10 PM        Lab Results   Component Value Date/Time    Culture result: MODERATE  GRAM NEGATIVE RODS   05/20/2017 03:43 AM    Culture result: NO NORMAL RESPIRATORY BETTE ISOLATED  SO FAR   05/20/2017 03:43 AM       No results found for: TOXA1, RPR, HBCM, HBSAG, HAAB, HCAB, HCAB1, HAAT, G6PD, CRYAC, HIVGT, HIVR, HIV1, HIV12, HIVPC, HIVRPI    Lab Results   Component Value Date/Time CK 52 05/21/2017 07:10 PM       No results found for: COLOR, APPRN, SPGRU, JOHANNE, PROTU, GLUCU, KETU, BILU, BLDU, UROU, DILAN, LEUKU, WBCU, RBCU, UEPI, BACTU, CASTS, UCRY    Imaging:  [x]                        personally reviewed the patients radiographs and reports:clear      Results from [place] encounter on 05/19/17   XR CHEST PORT   Narrative EXAM:  XR CHEST PORT  Clinical history: Line placement      INDICATION:  tube/line placement    COMPARISON:  5/19/2017    FINDINGS: A portable semierect view of the chest was obtained at 04 32 hours. The patient is on a cardiac monitor. Tracheostomy unchanged. . The lungs are  clear with no pneumothorax. There are median sternotomy wires and surgical clips  compatible with prior CABG. The cardiac and mediastinal contours and pulmonary  vascularity are normal.  The chest wall structures and visualized upper abdomen  show no acute findings with incidental note of degenerative spine changes . Impression IMPRESSION:    No interval change. Tracheostomy. No results found for this or any previous visit.     Jacob Cabrera MD

## 2017-05-22 NOTE — DIABETES MGMT
DTC Progress Note    Recommendations/ Comments: Pt discussed with rounding team and Dr. Bridget Granados. Plan to resume metformin 500mg ac b/d via feeding tube. Daytime BG at goal, however, fasting BG elevated. Chart reviewed on Timmy Cummings during Multidisciplinary Rounds. A1c:   No results found for: HBA1C, HGBE8, XCF8XVZS        Recent Glucose Results:   Lab Results   Component Value Date/Time     (H) 05/22/2017 04:29 AM    GLUCPOC 212 (H) 05/22/2017 06:43 AM    GLUCPOC 150 (H) 05/21/2017 11:58 PM    GLUCPOC 163 (H) 05/21/2017 05:44 PM        Lab Results   Component Value Date/Time    Creatinine 0.73 05/22/2017 04:29 AM     Estimated Creatinine Clearance: 133.7 mL/min (based on Cr of 0.73). Active Orders   Diet    DIET NPO        PO intake: No data found. Will continue to follow as needed. Thank you.   ALBA PayneN, RN, [address]

## 2017-05-22 NOTE — PROGRESS NOTES
0700:  Bedside and Verbal shift change report given to Castro Burris RN  (oncoming nurse) by Ana Grimaldo RN  (offgoing nurse). Report included the following information SBAR, Kardex, ED Summary, Procedure Summary, Intake/Output, MAR, Accordion, Recent Results and Med Rec Status. 0800:  Assessment completed pt alert and following simple commands. VSS. On trach collar tolerating well. Pt daughter at the beside POC discussed will continue to monitor closely. 0900:  Pt resting comfortably however daughter requested pt needs to be medicated for pain. Pt medicated per-daughter request.     1130:  Pt evaluated by speech. Pt remains NPO not even very wet swabs allowed, pt at a very high risk for aspiration per-speech. Pt's daughter requested pt to have ice chips. RN informed pt's daughter that pt failed his trial today and pt will be re-evaluated tomorrow. However, pt's daughter insisted that she was told my speech therapist that pt can have ice water and ice chips. Speech therapist called back in to the room for further clarification. 1200:  Reassessment completed no change noted. 1400:  Frequent mouth care provided. 1600:  Reassessment completed no chnage noted. 1700:  Family at the bedside update provided. 1800:  Pt resting in bed with eyes closed. 1910:  Bedside and Verbal shift change report given to 1818 MAGAN Coronado  (oncoming nurse) by Castro Burris RN  (offgoing nurse). Report included the following information SBAR, Kardex, ED Summary, Procedure Summary, Intake/Output, MAR, Accordion, Recent Results and Med Rec Status.

## 2017-05-22 NOTE — PROGRESS NOTES
[address]  [phone]      Cardiology Progress Note      5/22/2017 12:20 PM    Admit Date: 5/19/2017    Admit Diagnosis:   Cardiac Arrest  Respiratory failure ([facility] [address]    Subjective:     Arsenio Sahu  is a 56 y.o. male with PMH HTN, HLD, DM, CAD s/p CABG (2014) who was transferred from OSH where he had been admitted s/p cardiac arrest 4/28. Overnight events:  -VSS  -labs stable  -no weights; I/O incomplete-patient pulls off external catheter    -Mr. Danilo Toussaint opens his eyes, nods, and follows basic commands. Limited English comprehension and no family at bedside currently. No acute need for  during assessment. Patient denies chest pain.       Visit Vitals    /80    Pulse 97    Temp 98.3 °F (36.8 °C)    Resp 18    Ht 5' 11\" (1.803 m)    Wt 93.9 kg (207 lb 0.2 oz)    SpO2 100%    BMI 28.87 kg/m2       Current Facility-Administered Medications   Medication Dose Route Frequency    levoFLOXacin (LEVAQUIN) 750 mg in D5W IVPB  750 mg IntraVENous Q24H    metFORMIN (GLUCOPHAGE) tablet 500 mg  500 mg Oral BID WITH MEALS    furosemide (LASIX) injection 20 mg  20 mg IntraVENous BID    guaiFENesin (ROBITUSSIN) 100 mg/5 mL oral liquid 400 mg  400 mg Per G Tube BID    0.9% sodium chloride infusion 250 mL  250 mL IntraVENous PRN    mupirocin (BACTROBAN) 2 % ointment   Topical Q12H    insulin lispro (HUMALOG) injection   SubCUTAneous Q6H    glucose chewable tablet 16 g  4 Tab Oral PRN    dextrose 10% infusion 125-250 mL  125-250 mL IntraVENous PRN    glucagon (GLUCAGEN) injection 1 mg  1 mg IntraMUSCular PRN    enoxaparin (LOVENOX) injection 40 mg  40 mg SubCUTAneous Q24H    sodium chloride (NS) flush 5-10 mL  5-10 mL IntraVENous Q8H    sodium chloride (NS) flush 5-10 mL  5-10 mL IntraVENous PRN    ondansetron (ZOFRAN) injection 4 mg  4 mg IntraVENous Q6H PRN    famotidine (PEPCID) tablet 20 mg  20 mg Per G Tube BID    albuterol-ipratropium (DUO-NEB) 2.5 MG-0.5 MG/3 ML  3 mL Nebulization Q4H PRN    carvedilol (COREG) tablet 3.125 mg  3.125 mg Per G Tube BID WITH MEALS    prasugrel (EFFIENT) tablet 10 mg  10 mg Per G Tube DAILY    aspirin chewable tablet 81 mg  81 mg Per G Tube DAILY    atorvastatin (LIPITOR) tablet 40 mg  40 mg Per G Tube QHS    oxyCODONE (ROXICODONE INTENSOL) 20 mg/mL concentrated solution 5 mg  5 mg Per G Tube Q4H PRN    acetaminophen (TYLENOL) solution 650 mg  650 mg Per G Tube Q4H PRN    lidocaine (LIDODERM) 5 % patch 1 Patch  1 Patch TransDERmal Q24H       Objective:      Physical Exam:  General: obese, [place] [facility] male, resting in bed in NAD. Opens eyes to verbal.    Heart: RRR, distant heart sounds   Lungs: diminished; trach  Abdomen: Soft, +BS, NTND   Extremities: LE chan +DP/PT, no edema   Neurologic: trached; Nods head.  Moves LUE and BLE to command.        Data Review:   Recent Labs      05/22/17   0429 05/21/17   0338  05/20/17   1755   WBC  13.6*  11.4*  12.8*   HGB  12.6  11.6*  11.3*   HCT  39.9  37.2  35.0*   PLT  407*  398  420*     Recent Labs      05/22/17   0429  05/21/17   0338  05/20/17   1939  05/19/17   1607   NA  136  138  135*  139   K  3.8  3.6  3.5  3.4*   CL  97  100  98  105   CO2  31  31  30  24   GLU  195*  162*  171*  76   BUN  15  13  12  13   CREA  0.73  0.67*  0.72  0.50*   CA  9.1  8.7  8.6  7.4*   MG  2.4   --    --   2.4   PHOS   --    --    --   2.3*   ALB   --    --   2.2*  1.8*   TBILI   --    --   1.3*  0.6   SGOT   --    --   53*  46*   ALT   --    --   67  58       Recent Labs      05/21/17   1910   TROIQ  <0.04   CPK  52   CKMB  1.5         Intake/Output Summary (Last 24 hours) at 05/22/17 1427  Last data filed at 05/22/17 1300   Gross per 24 hour   Intake             1565 ml   Output             1275 ml   Net              290 ml        Telemetry: SR-ST; 6 beat NSVT   ECG: NSR; artifact   Echocardiogram: EF 30-35%; moderate diffuse hypokinesis; mild-mod MR; mild-mod TR  CXRAY: no acute process     Assessment:     Principal Problem:    Anoxic brain injury ([facility] [address] (5/20/2017)    Active Problems:    Coronary artery disease of bypass graft with stable angina pectoris ([address] (10/5/2016)      Essential hypertension (10/5/2016)      Dyslipidemia (10/5/2016)      Type 2 diabetes mellitus without complication ([facility] [address] (45/3/3580)      H/O cardiac arrest (5/19/2017)      Overview: 4/28/17:  OSH in [place]      Respiratory failure ([address] (5/19/2017)      Pneumonia due to Serratia marcescens ([facility] [address] (7/46/0377)      Lice infestation (7/65/5708)      Cardiomyopathy ([facility] [address] (5/22/2017)        Plan:     Cardiomyopathy: EF 30-35% s/p cardiac arrest 4/28 and history of extensive CAD. OSH ECHO had a higher EF, but patient was on pressors at that time.   Likely ischemic cardiomyopathy due to history and HPI vs damage from prolonged cardiac arrest.    · Continue ASA, BB, statin for CAD  · Continue BB, lasix:  Will add ARB that patient was on in the past for cardiomyopathy    · Further ischemic evaluation based on patient progression and determination whether patient could have realistic benefits with further interventions with his complicated CAD history        Eulogio Turner NP DNP, RN, AGACNP-BC

## 2017-05-22 NOTE — PROGRESS NOTES
Problem: Voice Impaired (Adult)  Goal: *Acute Goals and Plan of Care (Insert Text)  5/22/2017  Speech path goals:  1. Pt will tolerate PMV trials once he has #6 trach tube with no change in vital signs prn.   2. Pt will participate with swallow eval once tolerating PMV. 3. Pt will follow 2 step commands and answer mod level y/n questions with 80% acc. 4. Pt will participate with eval of verbal expression once he is tolerating PMV. SPEECH LANGUAGE PATHOLOGY EVALUATION  Patient: Analilia Poon (56 y.o. male)  Date: 5/22/2017  Primary Diagnosis: Cardiac Arrest  Respiratory failure ([facility] [address]        Precautions: aspiration        ASSESSMENT :from [facility] and language is [place]. Dtr was the interpretor and talks loudly due to his being [facility]. R ear is the better ear. He had cardiac arrest 4/28/17 and suffered an DORA. Has a trach on 28% TC and PEG with TF. Nothing by mouth. Based on the objective data described below, the patient presents with #8 Shiley with cuff down. He was unable to voice with finger occlusion. Suspect he is not getting air flow past trach tube and would benefit from trach being downsized. He followed one step commands with high accy but only 1/2 with 2 step commands. Has limited mobility of L hand and severely reduced movement with R hand for command following. He answered simple y/n questions with high accy. Unable to voice and has only mouthed water to dtr. He attempted to count to 3 with model provided by the dtr. He was offered ice chips which he masticated well with swallow elicited with adequate hyolaryngeal excursion. With sips of water he swallowed 4-5 times for a small bolus indicating piecemeal swallow or pharyngeal residue. Coughing noted after the sips suggestive of laryngeal penetration or aspiration. Patient will benefit from skilled intervention to address the above impairments.   Patients rehabilitation potential is considered to be Good  Factors which may influence rehabilitation potential include:   [ ]              None noted  [X]              Mental ability/status  [X]              Medical condition  [X]              Home/family situation and support systems  [X]              Safety awareness  [ ]              Pain tolerance/management  [ ]              Other:        PLAN :  Recommendations and Planned Interventions:  Trach change to allow phonation and use of PMV for communication s/p DORA. Swallow eval which was initiated today. Frequency/Duration: Patient will be followed by speech-language pathology 4 times a week to address goals. Discharge Recommendations: To Be Determined       SUBJECTIVE:   Patient was cooperative. OBJECTIVE:       Past Medical History:   Diagnosis Date    Anoxic brain injury ([facility] [address]       following cardiac arrest; MRI shows involvement BG and temporal lobes    Aspiration pneumonia (HCC)       during cardiac arrest intubation    Bradycardia      CAD (coronary artery disease)      Cardiac arrest ([facility] [address]       no intervention by [place] cardiology.   [address] [facility] [facility]    Diabetes [facility])      Essential hypertension      Hyperlipidemia       Past Surgical History:   Procedure Laterality Date    HX CORONARY ARTERY BYPASS GRAFT        HX CORONARY STENT PLACEMENT        HX HEART CATHETERIZATION        HX OTHER SURGICAL   05/12/2017     PEG and trach     Prior Level of Function/Home Situation:      Mental Status:  Neurologic State: Alert  Orientation Level: Oriented to place, Oriented to person (with y/n questions)  Cognition: Follows commands, Decreased attention/concentration     Perseveration: No perseveration noted     Motor Speech:  Oral-Motor Structure/Motor Speech  Face:  (R facial weakness)  Labial: Right droop  Dentition: Full;Natural  Lingual: Decreased rate;Decreased strength (decreased range)  Velum: Unable to visualize  Mandible: No impairment  Apraxic Characteristics: None  Dysarthric Characteristics: None  Intelligibility: No impairment  Overall Impairment Severity: None  Language Comprehension and Expression:  Auditory Comprehension  Auditory Impairment: Yes  Response to Basic Yes/No Questions (%): 100 %  One-Step Basic Commands (%): 100 %  Two-Step Basic Commands (%): 50 %  Interfering Components: Processing speed; Hearing  Effective Techniques: Extra processing time; Increased volume  Verbal Expression  Primary Mode of Expression: Verbal (only verbalized \"water\" in [place])  Initiation: Impaired (%)  Automatic Speech Task: Impaired (comment)  Conversation: Non-fluent                                                       G Codes: In compliance with CMSs Claims Based Outcome Reporting, the following G-code set was chosen for this patient based the use of the NOMS functional outcome to quantify this patient's level of  impairment. Using the NOMS, the patient was determined to be at level  for  which correlates with the CN= 100% level of severity. Based on the objective assessment provided within this note, the current, goal, and discharge g-codes are as follows: Motor   Current  CN= 100%   Goal  CJ= 20-39%           Pain:  Pain Scale 1: Adult Nonverbal Pain Scale  Pain Intensity 1: 0     After treatment:   [ ]              Patient left in no apparent distress sitting up in chair  [X]              Patient left in no apparent distress in bed  [X]              Call bell left within reach  [X]              Nursing notified  [X]              Caregiver present  [ ]              Bed alarm activated      COMMUNICATION/EDUCATION:   The patients plan of care including recommendations and planned interventions was discussed with: Registered Nurse. Patient was educated regarding His deficit(s) of voicing as this relates to His diagnosis of DORA with trach. . He does not understand the trach tube and voicing. [X]  Patient/family have participated as able in goal setting and plan of care.   [ ]  Patient/family agree to work toward stated goals and plan of care. [ ]  Patient understands intent and goals of therapy, but is neutral about his/her participation. [ ]  Patient is unable to participate in goal setting and plan of care.      Thank you for this referral.  Herrera Vega, SLP  Time Calculation: 10 mins

## 2017-05-22 NOTE — PROGRESS NOTES
Critical care interdisciplinary rounds held on 05/22/2017. Following members present, Pharmacy, Diabetes Treatment, Case Management, Occupational Therapy, Physical Therapy, Pastoral Care  and Nutrition. Has PCU orders. Plan of care discussed. See clinical pathway fo plan of care and interventions and desired outcomes.
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
## 2017-05-23 LAB
ANION GAP BLD CALC-SCNC: 8 MMOL/L (ref 5–15)
BUN SERPL-MCNC: 24 MG/DL (ref 6–20)
BUN/CREAT SERPL: 30 (ref 12–20)
CALCIUM SERPL-MCNC: 8.8 MG/DL (ref 8.5–10.1)
CHLORIDE SERPL-SCNC: 96 MMOL/L (ref 97–108)
CO2 SERPL-SCNC: 32 MMOL/L (ref 21–32)
CREAT SERPL-MCNC: 0.8 MG/DL (ref 0.7–1.3)
ERYTHROCYTE [DISTWIDTH] IN BLOOD BY AUTOMATED COUNT: 17.1 % (ref 11.5–14.5)
GLUCOSE BLD STRIP.AUTO-MCNC: 130 MG/DL (ref 65–100)
GLUCOSE BLD STRIP.AUTO-MCNC: 131 MG/DL (ref 65–100)
GLUCOSE BLD STRIP.AUTO-MCNC: 144 MG/DL (ref 65–100)
GLUCOSE BLD STRIP.AUTO-MCNC: 176 MG/DL (ref 65–100)
GLUCOSE SERPL-MCNC: 204 MG/DL (ref 65–100)
HCT VFR BLD AUTO: 38.5 % (ref 36.6–50.3)
HGB BLD-MCNC: 12.2 G/DL (ref 12.1–17)
MCH RBC QN AUTO: 27.4 PG (ref 26–34)
MCHC RBC AUTO-ENTMCNC: 31.7 G/DL (ref 30–36.5)
MCV RBC AUTO: 86.3 FL (ref 80–99)
PLATELET # BLD AUTO: 340 K/UL (ref 150–400)
POTASSIUM SERPL-SCNC: 3.9 MMOL/L (ref 3.5–5.1)
RBC # BLD AUTO: 4.46 M/UL (ref 4.1–5.7)
SERVICE CMNT-IMP: ABNORMAL
SODIUM SERPL-SCNC: 136 MMOL/L (ref 136–145)
WBC # BLD AUTO: 12.9 K/UL (ref 4.1–11.1)

## 2017-05-23 PROCEDURE — 85027 COMPLETE CBC AUTOMATED: CPT | Performed by: INTERNAL MEDICINE

## 2017-05-23 PROCEDURE — 74011250637 HC RX REV CODE- 250/637: Performed by: INTERNAL MEDICINE

## 2017-05-23 PROCEDURE — 74011250637 HC RX REV CODE- 250/637: Performed by: NURSE PRACTITIONER

## 2017-05-23 PROCEDURE — 74011250637 HC RX REV CODE- 250/637: Performed by: HOSPITALIST

## 2017-05-23 PROCEDURE — 77030018798 HC PMP KT ENTRL FED COVD -A

## 2017-05-23 PROCEDURE — 77030018846 HC SOL IRR STRL H20 ICUM -A

## 2017-05-23 PROCEDURE — 74011250636 HC RX REV CODE- 250/636: Performed by: INTERNAL MEDICINE

## 2017-05-23 PROCEDURE — 80048 BASIC METABOLIC PNL TOTAL CA: CPT | Performed by: INTERNAL MEDICINE

## 2017-05-23 PROCEDURE — 77030018861

## 2017-05-23 PROCEDURE — 74011636637 HC RX REV CODE- 636/637: Performed by: INTERNAL MEDICINE

## 2017-05-23 PROCEDURE — 36415 COLL VENOUS BLD VENIPUNCTURE: CPT | Performed by: INTERNAL MEDICINE

## 2017-05-23 PROCEDURE — 77030020291 HC FLEXSEAL FMS BMS -C

## 2017-05-23 PROCEDURE — 97112 NEUROMUSCULAR REEDUCATION: CPT

## 2017-05-23 PROCEDURE — 97530 THERAPEUTIC ACTIVITIES: CPT

## 2017-05-23 PROCEDURE — 92507 TX SP LANG VOICE COMM INDIV: CPT

## 2017-05-23 PROCEDURE — 65660000000 HC RM CCU STEPDOWN

## 2017-05-23 PROCEDURE — 82962 GLUCOSE BLOOD TEST: CPT

## 2017-05-23 RX ORDER — NYSTATIN 100000 [USP'U]/ML
500000 SUSPENSION ORAL 4 TIMES DAILY
Status: DISCONTINUED | OUTPATIENT
Start: 2017-05-23 | End: 2017-06-03 | Stop reason: HOSPADM

## 2017-05-23 RX ORDER — LEVOFLOXACIN 5 MG/ML
750 INJECTION, SOLUTION INTRAVENOUS EVERY 24 HOURS
Status: DISCONTINUED | OUTPATIENT
Start: 2017-05-24 | End: 2017-06-02

## 2017-05-23 RX ADMIN — CARVEDILOL 3.12 MG: 3.12 TABLET, FILM COATED ORAL at 07:49

## 2017-05-23 RX ADMIN — ACETAMINOPHEN 650 MG: 325 SOLUTION ORAL at 15:49

## 2017-05-23 RX ADMIN — FUROSEMIDE 20 MG: 10 INJECTION, SOLUTION INTRAMUSCULAR; INTRAVENOUS at 10:19

## 2017-05-23 RX ADMIN — METFORMIN HYDROCHLORIDE 500 MG: 500 TABLET, FILM COATED ORAL at 16:45

## 2017-05-23 RX ADMIN — ENOXAPARIN SODIUM 40 MG: 40 INJECTION SUBCUTANEOUS at 14:18

## 2017-05-23 RX ADMIN — LEVOFLOXACIN 750 MG: 5 INJECTION, SOLUTION INTRAVENOUS at 08:40

## 2017-05-23 RX ADMIN — NYSTATIN 500000 UNITS: 100000 SUSPENSION ORAL at 17:12

## 2017-05-23 RX ADMIN — ASPIRIN 81 MG CHEWABLE TABLET 81 MG: 81 TABLET CHEWABLE at 08:49

## 2017-05-23 RX ADMIN — FUROSEMIDE 20 MG: 10 INJECTION, SOLUTION INTRAMUSCULAR; INTRAVENOUS at 20:45

## 2017-05-23 RX ADMIN — OXYCODONE HYDROCHLORIDE 5 MG: 100 SOLUTION ORAL at 17:38

## 2017-05-23 RX ADMIN — MUPIROCIN: 20 OINTMENT TOPICAL at 20:45

## 2017-05-23 RX ADMIN — METFORMIN HYDROCHLORIDE 500 MG: 500 TABLET, FILM COATED ORAL at 07:50

## 2017-05-23 RX ADMIN — MUPIROCIN: 20 OINTMENT TOPICAL at 08:56

## 2017-05-23 RX ADMIN — GUAIFENESIN 400 MG: 100 SOLUTION ORAL at 10:11

## 2017-05-23 RX ADMIN — CARVEDILOL 3.12 MG: 3.12 TABLET, FILM COATED ORAL at 16:50

## 2017-05-23 RX ADMIN — GUAIFENESIN 400 MG: 100 SOLUTION ORAL at 17:08

## 2017-05-23 RX ADMIN — ACETAMINOPHEN 650 MG: 325 SOLUTION ORAL at 20:44

## 2017-05-23 RX ADMIN — FAMOTIDINE 20 MG: 20 TABLET ORAL at 10:31

## 2017-05-23 RX ADMIN — INSULIN LISPRO 2 UNITS: 100 INJECTION, SOLUTION INTRAVENOUS; SUBCUTANEOUS at 07:07

## 2017-05-23 RX ADMIN — FAMOTIDINE 20 MG: 20 TABLET ORAL at 17:16

## 2017-05-23 RX ADMIN — VALSARTAN 40 MG: 80 TABLET ORAL at 10:14

## 2017-05-23 RX ADMIN — NYSTATIN 500000 UNITS: 100000 SUSPENSION ORAL at 14:15

## 2017-05-23 RX ADMIN — ACETAMINOPHEN 650 MG: 325 SOLUTION ORAL at 08:02

## 2017-05-23 RX ADMIN — PRASUGREL HYDROCHLORIDE 10 MG: 10 TABLET, FILM COATED ORAL at 08:49

## 2017-05-23 RX ADMIN — OXYCODONE HYDROCHLORIDE 5 MG: 100 SOLUTION ORAL at 00:42

## 2017-05-23 RX ADMIN — INSULIN LISPRO 2 UNITS: 100 INJECTION, SOLUTION INTRAVENOUS; SUBCUTANEOUS at 18:01

## 2017-05-23 RX ADMIN — Medication 10 ML: at 14:18

## 2017-05-23 RX ADMIN — Medication 10 ML: at 07:07

## 2017-05-23 RX ADMIN — ATORVASTATIN CALCIUM 40 MG: 40 TABLET, FILM COATED ORAL at 23:58

## 2017-05-23 NOTE — PROGRESS NOTES
Problem: Self Care Deficits Care Plan (Adult)  Goal: *Acute Goals and Plan of Care (Insert Text)  Occupational Therapy Goals  Initiated 5/20/2017   1. Patient will perform self-feeding with moderate assistance seated in bed with right UE and verbal cues, when pt is cleared to eat within 7 day(s). 2. Patient will follow one step simple commands 50 % of the time, with in 7 days  3. Patient will be able to tolerate rolling in bed with max assist of 2 and attempt to assist with right UE, with in 7 days. 4. Patient will be able to be able to sit on EOb for 1 minute with total assist of 2 to 3 persons with in 7 days. OCCUPATIONAL THERAPY TREATMENT  Patient: Domingo Camp (22 y.o. male)  Date: 5/23/2017  Diagnosis: Cardiac Arrest  Respiratory failure ([facility] [address] Anoxic brain injury ([facility] [address]       Precautions: Contact      ASSESSMENT:  Pt was cleared to see by nursing and all VSS. Pt was on RA, trach collar and family was in the room and translating for therapy. Pt was mod to max assist of 2 for bed mobility and he was able to sit on EOB with SBA today. He was able to stand with mod to max assist of 2 and noted to have a posterior lean and VSS while pt was standing. He fatigued quickly and had to sit down, stood approx 30 to 40 seconds. He was max assist of 2 for supine to sit and was positioned in bed and HOB elevated. Pt is making progress and recommend that he have further therapy at discharge at in pt rehab. Progression toward goals:  [X]       Improving appropriately and progressing toward goals  [ ]       Improving slowly and progressing toward goals  [ ]       Not making progress toward goals and plan of care will be adjusted       PLAN:  Patient continues to benefit from skilled intervention to address the above impairments. Continue treatment per established plan of care.   Discharge Recommendations:  Inpatient Rehab  Further Equipment Recommendations for Discharge:  tbd       SUBJECTIVE:   Patient stated -pt non verbal.      OBJECTIVE DATA SUMMARY:   Cognitive/Behavioral Status:  Neurologic State: Alert  Orientation Level: Unable to verbalize  Cognition: Follows commands              Functional Mobility and Transfers for ADLs:  Bed Mobility:  Rolling: Maximum assistance;Assist x2  Supine to Sit: Maximum assistance;Assist x2  Sit to Supine: Maximum assistance;Assist x2  Scooting: Maximum assistance;Assist x2     Transfers:  Sit to Stand: Maximum assistance;Assist x2        Balance:  Sitting: Impaired; Without support  Sitting - Static: Fair (occasional)  Sitting - Dynamic: Fair (occasional)  Standing: Impaired; With support  Standing - Static: Constant support;Poor  Standing - Dynamic : Poor     ADL Intervention:     Pt is total assist for ADLs. Neuro Re-Education:   worked with pt on right UE movement, and pt has a trace of shoulder elevation but no other movement noted and no increased tone. Therapeutic Exercises:      Pain:  Pain Scale 1: Adult Nonverbal Pain Scale  Pain Intensity 1: 0           Pain Intervention(s) 1: Medication (see MAR)  Activity Tolerance:   vss  Please refer to the flowsheet for vital signs taken during this treatment.   After treatment:   [ ] Patient left in no apparent distress sitting up in chair  [X] Patient left in no apparent distress in bed  [X] Call bell left within reach  [X] Nursing notified  [X] Caregiver present  [ ] Bed alarm activated      COMMUNICATION/COLLABORATION:   The patients plan of care was discussed with: Physical Therapist, Registered Nurse and family     Leticia Molina OT  Time Calculation: 41 mins

## 2017-05-23 NOTE — PROGRESS NOTES
2000:  Pt SpO2 100% on arrival, started titration O2 down to achieve desired low 90's SpO2. Pt taken off of trach collar at 2030, maintaining SpO2 at 97-99%. Will continue to monitor. 2100:  Replaced pt inner cannula do to clogging, pt able to clear own secretions.

## 2017-05-23 NOTE — PROGRESS NOTES
Problem: Mobility Impaired (Adult and Pediatric)  Goal: *Acute Goals and Plan of Care (Insert Text)  Physical Therapy Goals  Initiated 5/20/2017  1. Patient will roll side to side in bed with maximal assistance x2 within 7 day(s). 2. Patient will follow simple, one-step commands 50% of the time within 7 day(s). 3. Patient and family will demonstrate independence with BLE and BUE AA/PROM HEP within 7 day(s). 4. Patient will tolerate sitting in chair position in bed for 1 hr within 7 day(s). PHYSICAL THERAPY TREATMENT  Patient: Allyson Davis (9 y.o. male)  Date: 5/23/2017  Diagnosis: Cardiac Arrest  Respiratory failure ([facility] [address] Anoxic brain injury ([facility] [address]       Precautions: Contact      ASSESSMENT:  Patient received resting in bed, agreeable and cleared for PT. Co-treated with OT for patient safety and tolerance. Patient's daughter present to translate. Patient required max A x 2 for bed mobility. Patient with VSS although reports dizziness with sitting and increased with standing. Patient with improved sitting balance, requiring CGA at all times for safety. Patient able to weight shift anteriorly with verbal cues to correct sitting balance. Patient able to attempt one stand with mod-max A x 2. Patient with posterior trunk lean in standing, requiring mod A to correct. Upon standing, patient with increased dizziness and slowed processing and with slight decrease in SBP although not significant. Patient returned to supine with max A x 2 at the conclusion of PT treatment session. Patient tolerated 38 minute treatment session with VSS throughout. He has excellent family support with daughter and brother in law present and interactive throughout session. Patient is motivated and hardworking, making him an excellent candidate for IP rehab vs. TBD pending progress.    Progression toward goals:  [X]    Improving appropriately and progressing toward goals  [ ]    Improving slowly and progressing toward goals  [ ]    Not making progress toward goals and plan of care will be adjusted       PLAN:  Patient continues to benefit from skilled intervention to address the above impairments. Continue treatment per established plan of care. Discharge Recommendations:  Inpatient Rehab vs. TBD  Further Equipment Recommendations for Discharge:  TBD       SUBJECTIVE:   Patient nodding yes/no to questions. OBJECTIVE DATA SUMMARY:   Critical Behavior:  Neurologic State: Alert  Orientation Level: Unable to verbalize  Cognition: Follows commands     Functional Mobility Training:  Bed Mobility:  Rolling: Maximum assistance;Assist x2  Supine to Sit: Maximum assistance;Assist x2  Sit to Supine: Maximum assistance;Assist x2  Scooting: Maximum assistance;Assist x2        Transfers:  Sit to Stand: Maximum assistance;Assist x2  Stand to Sit: Maximum assistance;Assist x2                             Balance:  Sitting: Impaired; Without support  Sitting - Static: Fair (occasional)  Sitting - Dynamic: Fair (occasional)  Standing: Impaired; With support  Standing - Static: Constant support;Poor  Standing - Dynamic : Poor  Ambulation/Gait Training:   unable this date                                                            Neuro Re-Education:  Provided max manual cues on R distal quad and glute to facilitate knee, hip, and trunk extension for upright standing posture in midline. Patient with posterior trunk lean, corrected with mod A for anterior weight shift. Therapeutic Exercises:      Pain:  Pain Scale 1: Adult Nonverbal Pain Scale  Pain Intensity 1: 0           Pain Intervention(s) 1: Medication (see MAR)  Activity Tolerance:   Fair, VSS on RA with activity. Please refer to the flowsheet for vital signs taken during this treatment.   After treatment:   [ ]    Patient left in no apparent distress sitting up in chair  [X]    Patient left in no apparent distress in bed  [X]    Call bell left within reach  [X]    Nursing notified  [X]    Caregiver present  [ ]    Bed alarm activated      COMMUNICATION/COLLABORATION:   The patients plan of care was discussed with: Occupational Therapist, Registered Nurse and      Imelda Rose, PT, DPT   Time Calculation: 38 mins

## 2017-05-23 NOTE — PROGRESS NOTES
[facility] Consult Service Progress NOTE  Pulmonary, Critical Care, and Sleep Medicine    Name: Leanne Santos MRN: [id number]   : 1960 Hospital: [facility]   Date: 2017  Admission Date: 2017   Off vent greater than 48 hours  Continues with copious thin secretions, Seems to clear airway ok with current trach. Pt was more alert and interactive yesterday. NO acute or new issues noted. IMPRESSION:   · Acute respiratory failure with hypoxia- has tolerated trach collar. · S/p tracheostomy for prolonged vent dependence, Anticipate down size in next 24-48 hrs if stable. · Dysphagia s/p PEG, appreciate speech path assistance. · Diarrhea C diff negative in [place]  · Anoxic brain injury- some cognitive recovery but significant motor deficits; CT and MRI done at [address]-  Injury to basal ganglia and temporal lobes  · S/p cardiac arrest, s/p hypothermia protocol  · ASCVD  · H/o CABG [facility] 2014 and in  had another cath showing complex anatomy; has successful laser atherectomy PTCCA and stenting of the distal anastomotic site of the vein graft with Promus ROBERT; chest pain persisted; pt offered cath 2016 and possbile referral for another CABg at [facility] but evidently declined  · HTN  · Diabetes Mellitus  · Left ear deafness POA  · Language barrier- limited English but family available, complicated with trach and recent DORA  · Never smoker  · Anemia  · Discussed with nurse and pts Daughter. Discussed with speech Therapy. RECOMMENDATIONS/PLAN:     · Trach collar as tolerated, Possible  change trach in next 24-48 hrs. Continue with routine trach care. · Blood transfusion prn   · Continue tube feedings  · Glucose control  · Empiric abx   · Pulmonary toilet  · PT, OT consults  · DVT prophylaxis  · Day 2 waiting for transfer out of ICU., will transfer to PCU.       Code: Full Code          Vital Signs: Intake/Output: Intake/Output:   Visit Vitals    /74    Pulse 99    Temp 99.5 °F (37.5 °C)    Resp 18    Ht 5' 11\" (1.803 m)    Wt 93.9 kg (207 lb 0.2 oz)    SpO2 98%    BMI 28.87 kg/m2      O2 Device: Room air  O2 Flow Rate (L/min): 10 l/min  Temp (24hrs), Av.5 °F (36.9 °C), Min:98.1 °F (36.7 °C), Max:99.5 °F (37.5 °C)     Intake/Output Summary (Last 24 hours) at 17 0911  Last data filed at 17 0856   Gross per 24 hour   Intake             2020 ml   Output              575 ml   Net             1445 ml    Last shift:       07 -  190  In: 370   Out: -   Last 3 shifts: 1901 -  0700  In: 6545 [I.V.:150]  Out: 1850 [Urine:1675; Drains:175]         Telemetry:    normal sinus rhythm    Physical Exam:    General: large, obese [place] male well developed and well nourished, in no respiratory distress and acyanotic   HEENT: NCAT, bearded; no thrush? limited mouth opening,moist   Neck: No abnormally enlarged lymph nodes. Tamia Appl #8   Chest: normal   Lungs: decreased air exchange bilaterally   Heart: Regular rate and rhythm or S1S2 present   Abdomen: soft, non-tender, without masses or organomegaly, PEG   :    Extremity: negative, cyanosis, clubbing;    Neuro: lethargic; opens eyes and EOMI; tongue midline; will follow simple commands and try to move left arm but no fine motor and right arm flaccid.  Minimal BLE movement   Psych: AMS   Skin: dry        DATA:    MAR reviewed and pertinent medications noted or modified as needed    MEDS:   Current Facility-Administered Medications   Medication    levoFLOXacin (LEVAQUIN) 750 mg in D5W IVPB    metFORMIN (GLUCOPHAGE) tablet 500 mg    valsartan (DIOVAN) tablet 40 mg    furosemide (LASIX) injection 20 mg    guaiFENesin (ROBITUSSIN) 100 mg/5 mL oral liquid 400 mg    0.9% sodium chloride infusion 250 mL    mupirocin (BACTROBAN) 2 % ointment    insulin lispro (HUMALOG) injection    glucose chewable tablet 16 g    dextrose 10% infusion 125-250 mL    glucagon (GLUCAGEN) injection 1 mg    enoxaparin (LOVENOX) injection 40 mg    sodium chloride (NS) flush 5-10 mL    sodium chloride (NS) flush 5-10 mL    ondansetron (ZOFRAN) injection 4 mg    famotidine (PEPCID) tablet 20 mg    albuterol-ipratropium (DUO-NEB) 2.5 MG-0.5 MG/3 ML    carvedilol (COREG) tablet 3.125 mg    prasugrel (EFFIENT) tablet 10 mg    aspirin chewable tablet 81 mg    atorvastatin (LIPITOR) tablet 40 mg    oxyCODONE (ROXICODONE INTENSOL) 20 mg/mL concentrated solution 5 mg    acetaminophen (TYLENOL) solution 650 mg    lidocaine (LIDODERM) 5 % patch 1 Patch          Labs:  Recent Labs      05/23/17 0450 05/22/17 0429 05/21/17 0338   WBC  12.9*  13.6*  11.4*   HGB  12.2  12.6  11.6*   PLT  340  407*  398     Recent Labs      05/23/17 0450 05/22/17 0429 05/21/17 0338 05/20/17 1939   NA  136  136  138  135*   K  3.9  3.8  3.6  3.5   CL  96*  97  100  98   CO2  32  31  31  30   GLU  204*  195*  162*  171*   BUN  24*  15  13  12   CREA  0.80  0.73  0.67*  0.72   CA  8.8  9.1  8.7  8.6   MG   --   2.4   --    --    ALB   --    --    --   2.2*   SGOT   --    --    --   53*   ALT   --    --    --   67     No results for input(s): PH, PCO2, PO2, HCO3, FIO2 in the last 72 hours. No results for input(s): PH, PCO2, PO2, HCO3, FIO2 in the last 72 hours.   Recent Labs      05/21/17 1910   CPK  52   CKNDX  2.9*   TROIQ  <0.04       Lab Results   Component Value Date/Time    Iron 34 05/20/2017 01:14 AM    TIBC 229 05/20/2017 01:14 AM    Iron % saturation 15 05/20/2017 01:14 AM    Ferritin 191 05/20/2017 01:14 AM       No results found for: SR, CRP, THIEN, ANAIGG, RA, RPR, RPRT, VDRLT, VDRLS, TSH, TSHEXT, TSHEXT     Lab Results   Component Value Date/Time    CK 52 05/21/2017 07:10 PM    CK-MB Index 2.9 05/21/2017 07:10 PM    Troponin-I, Qt. <0.04 05/21/2017 07:10 PM        Lab Results   Component Value Date/Time    Culture result: MODERATE  SERRATIA MARCESCENS   05/20/2017 03:43 AM    Culture result: NO NORMAL RESPIRATORY BETTE ISOLATED  SO FAR   05/20/2017 03:43 AM       No results found for: TOXA1, RPR, HBCM, HBSAG, HAAB, HCAB, HCAB1, HAAT, G6PD, CRYAC, HIVGT, HIVR, HIV1, HIV12, HIVPC, HIVRPI    Lab Results   Component Value Date/Time    CK 52 05/21/2017 07:10 PM       No results found for: COLOR, APPRN, SPGRU, JOHANNE, PROTU, GLUCU, KETU, BILU, BLDU, UROU, DILAN, [address], WBCU, RBCU, UEPI, BACTU, CASTS, UCRY    Imaging:  [x]                        personally reviewed the patients radiographs and reports:clear      Results from [place] encounter on 05/19/17   XR CHEST PORT   Narrative EXAM:  XR CHEST PORT  Clinical history: Line placement      INDICATION:  tube/line placement    COMPARISON:  5/19/2017    FINDINGS: A portable semierect view of the chest was obtained at 04 32 hours. The patient is on a cardiac monitor. Tracheostomy unchanged. . The lungs are  clear with no pneumothorax. There are median sternotomy wires and surgical clips  compatible with prior CABG. The cardiac and mediastinal contours and pulmonary  vascularity are normal.  The chest wall structures and visualized upper abdomen  show no acute findings with incidental note of degenerative spine changes . Impression IMPRESSION:    No interval change. Tracheostomy. No results found for this or any previous visit.     Coleen Hernandez MD

## 2017-05-23 NOTE — PROGRESS NOTES
Nutrition Assessment:    RECOMMENDATIONS:   Continue TF @ goal rate    ASSESSMENT:   Chart reviewed, case discussed during CCU rounds. Pt is on room air with TF running at goal rate with minimal residuals (0-5mL). TF is adequate to meet >100% kcal and protein needs. Labs reviewed, WNL. Pt with flexiseal in place with daily liquid stools. SLP is working with pt. Dietitians Intervention(s)/Plan(s): Continue TF @ goal rate  SUBJECTIVE/OBJECTIVE:     Diet Order: NPO, Other (comment) (TF via PEG: TwoCal HN @ 50mL/h + 150mL flush q 4h (provides 2400kcals/100gPro/1740mL))  % Eaten:  No data found. TwoCal HN  at 50 mL/hr flush with 150 mL  Q4H  via PEG Tube   Residuals: 5 mL    Pertinent Medications:pepcid, lasix, humalog levaquin, glucophage. Chemistries:  Lab Results   Component Value Date/Time    Sodium 136 05/23/2017 04:50 AM    Potassium 3.9 05/23/2017 04:50 AM    Chloride 96 05/23/2017 04:50 AM    CO2 32 05/23/2017 04:50 AM    Anion gap 8 05/23/2017 04:50 AM    Glucose 204 05/23/2017 04:50 AM    BUN 24 05/23/2017 04:50 AM    Creatinine 0.80 05/23/2017 04:50 AM    BUN/Creatinine ratio 30 05/23/2017 04:50 AM    GFR est AA >60 05/23/2017 04:50 AM    GFR est non-AA >60 05/23/2017 04:50 AM    Calcium 8.8 05/23/2017 04:50 AM    Albumin 2.2 05/20/2017 07:39 PM      Anthropometrics: Height: 5' 11\" (180.3 cm) Weight: 93.9 kg (207 lb 0.2 oz)    IBW (%IBW):   ( ) UBW (%UBW):   (  %)    BMI: Body mass index is 28.87 kg/(m^2). This BMI is indicative of:  []Underweight   []Normal   [x]Overweight   [] Obesity   [] Extreme Obesity (BMI>40)  Estimated Nutrition Needs (Based on): 2250 Kcals/day (MSJ: 1725 x 1.3) , 90 g (1 g/kg) Protein  Carbohydrate: At Least 130 g/day  Fluids: 2000 mL/day    Last BM: 5/23-flexiseal    [x]Active     []Hyperactive  []Hypoactive       [] Absent   BS  Skin:    [] Intact   [] Incision  [] Breakdown   [] DTI   [x] Tears/Excoriation/Abrasion  []Edema [] Other:    Wt Readings from Last 30 Encounters:   05/19/17 93.9 kg (207 lb 0.2 oz)   01/04/17 94.3 kg (208 lb)   10/05/16 92.5 kg (204 lb)      NUTRITION DIAGNOSES:   Problem:  Altered GI function      Etiology: related to dysphagia     Signs/Symptoms: as evidenced by PEG dependent     Previous dx re: altered GI function continues. NUTRITION INTERVENTIONS:    Enteral/Parenteral Nutrition: Other (Continue TF @ goal rate)                GOAL:   Pt will tolerate TF @ goal rate with residuals <250mL in 2-4 days.      NUTRITION MONITORING AND EVALUATION   Previous Goal: meet TF goal rate in 2-4 days   Previous Goal Met: Yes   Previous Recommendations Implemented: Yes   Cultural, Baptism, or Ethnic Dietary Needs: None   LEARNING NEEDS (Diet, Food/Nutrient-Drug Interaction):    [x] None Identified   [] Identified and Education Provided/Documented   [] Identified and Pt declined/was not appropriate      [x] Interdisciplinary Care Plan Reviewed/Documented    [x] Participated in Discharge Planning: See TF orders above    [x] Interdisciplinary Rounds     NUTRITION RISK:    [x] High              [x] Moderate           []  Low  []  Minimal/Uncompromised      Tanya Aviles RD  Pager [phone]  Weekend Pager [phone]

## 2017-05-23 NOTE — PROGRESS NOTES
Problem: Voice Impaired (Adult)  Goal: *Acute Goals and Plan of Care (Insert Text)  5/22/2017  Speech path goals:  1. Pt will tolerate PMV trials once he has #6 trach tube with no change in vital signs prn.   2. Pt will participate with swallow eval once tolerating PMV. 3. Pt will follow 2 step commands and answer mod level y/n questions with 80% acc. 4. Pt will participate with eval of verbal expression once he is tolerating PMV. SPEECH LANGUAGE PATHOLOGY TREATMENT  Patient: Analilia Poon (56 y.o. male)  Date: 5/23/2017  Diagnosis: Cardiac Arrest  Respiratory failure ([facility] [address] Anoxic brain injury ([facility] [address]           ASSESSMENT:  Pt is alert and following 2 step commands with 80% acc. He performed commands using his L hand, eyes, and tongue. Wiggled his toes on his L foot better than on his R foot. Processing seems more timely today. Still not phonating with #8 trach finger occluded. Suspect he is not passing air around the #8 even with the cuff down. Family is eager for him to take po as he asks for water. They are quenching his thirst with the oral care product on his tongue. They keep it on ice to make it cold. Await change of trach to #6 when the physicians feel he is ready from secretion standpoint. Goal of PMV and swallowing is forthcoming. He speaks [place] and [place] and some [place]. The language barrier is made easier by the family who serve as interpretors. Progression toward goals:  [ ]       Improving appropriately and progressing toward goals  [X]       Improving slowly and progressing toward goals  [ ]       Not making progress toward goals and plan of care will be adjusted       PLAN:  Patient continues to benefit from skilled intervention to address the above impairments. Continue treatment per established plan of care. Discharge Recommendations: To Be Determined       SUBJECTIVE:   Patient waved goodbye.        OBJECTIVE:   Mental Status:  Neurologic State: Alert  Orientation Level: Unable to verbalize  Cognition: Follows commands     Perseveration: No perseveration noted     Treatment & Interventions:                                Language Comprehension and Expression:  Auditory Comprehension   Two-Step Basic Commands (%): 80 %  Verbal Expression   pt is still not able to phonate with cuff down on his #8 trach. He is most likely not passing air around the trach.                       Response & Tolerance to Activities:   good  Pain:  Pain Scale 1: Adult Nonverbal Pain Scale  Pain Intensity 1: 0     After treatment:   [ ]       Patient left in no apparent distress sitting up in chair  [X]       Patient left in no apparent distress in bed  [X]       Call bell left within reach  [X]       Nursing notified  [X]       Caregiver present  [ ]       Bed alarm activated      COMMUNICATION/COLLABORATION:         The patients plan of care was discussed with: Registered Nurse     Elizabeth Dallas SLP  Time Calculation: 10 mins

## 2017-05-23 NOTE — PROGRESS NOTES
[address] [place] [address]  [phone]      Cardiology Progress Note      5/23/2017 1350 PM    Admit Date: 5/19/2017    Admit Diagnosis:   Cardiac Arrest  Respiratory failure ([facility]    Subjective:     Timmy Cummings  is a 56 y.o. male with PMH HTN, HLD, DM, CAD s/p CABG (2014) who was transferred from OSH where he had been admitted s/p cardiac arrest 4/28. Overnight events:  -VSS  -labs stable  -no weights; I/O fairly even with tube feedings removed   -Mr. Dorothy Burns opens his eyes, nods, and follows basic commands. Limited English comprehension and no family at bedside currently. No acute need for  during assessment. Patient denies chest pain. Endorses throat pain today.       Visit Vitals    /67    Pulse 95    Temp 98.6 °F (37 °C)    Resp 18    Ht 5' 11\" (1.803 m)    Wt 93.9 kg (207 lb 0.2 oz)    SpO2 100%    BMI 28.87 kg/m2       Current Facility-Administered Medications   Medication Dose Route Frequency    [START ON 5/24/2017] levoFLOXacin (LEVAQUIN) 750 mg in D5W IVPB  750 mg IntraVENous Q24H    nystatin (MYCOSTATIN) 100,000 unit/mL oral suspension 500,000 Units  500,000 Units Oral QID    metFORMIN (GLUCOPHAGE) tablet 500 mg  500 mg Oral BID WITH MEALS    valsartan (DIOVAN) tablet 40 mg  40 mg Oral DAILY    furosemide (LASIX) injection 20 mg  20 mg IntraVENous BID    guaiFENesin (ROBITUSSIN) 100 mg/5 mL oral liquid 400 mg  400 mg Per G Tube BID    0.9% sodium chloride infusion 250 mL  250 mL IntraVENous PRN    mupirocin (BACTROBAN) 2 % ointment   Topical Q12H    insulin lispro (HUMALOG) injection   SubCUTAneous Q6H    glucose chewable tablet 16 g  4 Tab Oral PRN    dextrose 10% infusion 125-250 mL  125-250 mL IntraVENous PRN    glucagon (GLUCAGEN) injection 1 mg  1 mg IntraMUSCular PRN    enoxaparin (LOVENOX) injection 40 mg  40 mg SubCUTAneous Q24H    sodium chloride (NS) flush 5-10 mL  5-10 mL IntraVENous Q8H    sodium chloride (NS) flush 5-10 mL  5-10 mL IntraVENous PRN    ondansetron (ZOFRAN) injection 4 mg  4 mg IntraVENous Q6H PRN    famotidine (PEPCID) tablet 20 mg  20 mg Per G Tube BID    albuterol-ipratropium (DUO-NEB) 2.5 MG-0.5 MG/3 ML  3 mL Nebulization Q4H PRN    carvedilol (COREG) tablet 3.125 mg  3.125 mg Per G Tube BID WITH MEALS    prasugrel (EFFIENT) tablet 10 mg  10 mg Per G Tube DAILY    aspirin chewable tablet 81 mg  81 mg Per G Tube DAILY    atorvastatin (LIPITOR) tablet 40 mg  40 mg Per G Tube QHS    oxyCODONE (ROXICODONE INTENSOL) 20 mg/mL concentrated solution 5 mg  5 mg Per G Tube Q4H PRN    acetaminophen (TYLENOL) solution 650 mg  650 mg Per G Tube Q4H PRN    lidocaine (LIDODERM) 5 % patch 1 Patch  1 Patch TransDERmal Q24H       Objective:      Physical Exam:  General: obese, [place] [facility] male, resting in bed in NAD. Alert and interactive. Heart: RRR, distant heart sounds   Lungs: diminished; trach  Abdomen: Soft, +BS, NTND   Extremities: LE chan +DP/PT, no edema   Neurologic: trached; Nods head. Moves LUE and BLE to command.  Now with slight movement to RUE as well.         Data Review:   Recent Labs      05/23/17 0450 05/22/17 0429  05/21/17   0338   WBC  12.9*  13.6*  11.4*   HGB  12.2  12.6  11.6*   HCT  38.5  39.9  37.2   PLT  340  407*  398     Recent Labs      05/23/17   0450  05/22/17   0429  05/21/17   0338  05/20/17   1939   NA  136  136  138  135*   K  3.9  3.8  3.6  3.5   CL  96*  97  100  98   CO2  32  31  31  30   GLU  204*  195*  162*  171*   BUN  24*  15  13  12   CREA  0.80  0.73  0.67*  0.72   CA  8.8  9.1  8.7  8.6   MG   --   2.4   --    --    ALB   --    --    --   2.2*   TBILI   --    --    --   1.3*   SGOT   --    --    --   53*   ALT   --    --    --   67       Recent Labs      05/21/17   1910   TROIQ  <0.04   CPK  52   CKMB  1.5         Intake/Output Summary (Last 24 hours) at 05/23/17 1457  Last data filed at 05/23/17 1200   Gross per 24 hour   Intake             2300 ml   Output 800 ml   Net             1500 ml        Telemetry: SR-ST; rare PVC  ECG: NSR; artifact   Echocardiogram: EF 30-35%; moderate diffuse hypokinesis; mild-mod MR; mild-mod TR  CXRAY: no acute process     Assessment:     Principal Problem:    Anoxic brain injury ([facility] (5/20/2017)    Active Problems:    Coronary artery disease of bypass graft with stable angina pectoris ([facility] (10/5/2016)      Essential hypertension (10/5/2016)      Dyslipidemia (10/5/2016)      Type 2 diabetes mellitus without complication ([facility] (51/9/9950)      H/O cardiac arrest (5/19/2017)      Overview: 4/28/17:  OSH in [place]      Respiratory failure ([facility] (5/19/2017)      Pneumonia due to Serratia marcescens ([facility] (8/16/4579)      Lice infestation (9/61/2750)      Cardiomyopathy ([facility] (5/22/2017)        Plan:     Cardiomyopathy: EF 30-35% s/p cardiac arrest 4/28 and history of extensive CAD. OSH ECHO had a higher EF, but patient was on pressors at that time. Likely ischemic cardiomyopathy due to history and HPI vs damage from prolonged cardiac arrest.    · Continue ASA, BB, statin for CAD  · Continue BB, ARB, lasix: for cardiomyopathy   · Further ischemic evaluation based on patient progression and determination whether patient could have realistic benefits with further interventions with his complicated CAD history    No changes in recommendations today. Patient slowly progressing.         Sunday Melo, HITESH  DNP, RN, AGACNP-BC

## 2017-05-23 NOTE — PROGRESS NOTES
Hospitalist Progress Note    NAME: Abdulkadir Anne   :  1960   MRN:  [id number]       Interim Hospital Summary: 56 y.o. male whom presented on 2017 with cardiomyopathy, s/p trach, S/p PEG at [facility] transferred to [facility] for 1 day then transferred to ED [facility] due to insurance reasons     Assessment / Plan:    Anoxic brain injury following cardiac arrest in [place]  >1 month ago  --outside MRI brain with abnormalities in basal ganglia and bilateral temporal lobes  --has right>left sided weakness (right face and right arm paralyze; right leg 2/5); left side 3/5 arm, 2/5 leg. Following commands given in his native [place] language from family members. --s/p PEG and trach   Can consider Neurology consult if opinion needed regarding prognosis if need  Seems to be at his new baseline as per the family at bedside     S/p out of hospital cardiac arrest 17, POA  CAD s/p CABG 2014 x 3v at HD  Hx cardiac stents (twice before CABG and once after)  HTN  Severe Anemia POA - Hb improved to 11.6 s/p 2 units PRBCs  --hospitalized at [facility] in [place], [place]; transferred to [facility]  and to ED [facility]   --per Dr. Deep Dolan had stenosis of circumflex anastomosis graft, PTCA -->restenosis -->stent-->restenosis and redo bypass recommended but patient seeked second opinion with Dr. Yenifer Delvalle in [place] and reportedly told redo bypass not needed. --continue coreg, effient. Cardiology consulted & following-   Echo showing diffuse Hypokinesis with EF 30-35% (new?)  S/p Transfuse 2 units PRBC  cont lasix 20mg BID  Betablocker, efficient, statin and asa      Hx aspiration PNA during intubation  For cardiac  arrest  Chronic respiratory failure, s/p trach  -- treated with invanz in [place]. CXR clear here.    Sputum Cx at [facility] growing Pan sensitive Serratia Marcescens     Remains Off Vent >48hrs now,   Cont trach collar as per pulmonary, following  Cont Lasix  Cont Mucinex  On empiric levo   Sputum/secretion & OSH sputum Cx growing Serratia sp & Sputum Cx here growing Gram neg Rods    Vomiting during transport to [place] with question of recurrent aspiration  --empiric started on zosyn and vanc at [facility]. Repeat CXR neg       Diarrhea  --C. Diff negative in [place] recently prior to transfer. Repeat C. Diff done at [facility] yesterday  --has rectal tube. May be related to tube feed. Off the antibiotics   DM 2  --SSI for now    Lice infestation  S/p treatment on 5/21     Code: full  DVT prophylaxis: lovenox  Surrogate decision maker: Wife \"Savanna\" Nida Guest [phone], 2 son & daughter (rina) # [phone]      Body mass index is 28.87 kg/(m^2). Recommended Disposition: LTAC versus SNF- to be determined     Subjective:     Chief Complaint / Reason for Physician Visit F/U Anoxic brain injury, CHF  Non verbal Discussed with RN events overnight. Review of Systems:  Symptom Y/N Comments  Symptom Y/N Comments   Fever/Chills    Chest Pain     Poor Appetite    Edema     Cough    Abdominal Pain     Sputum    Joint Pain     SOB/HAM    Pruritis/Rash     Nausea/vomit    Tolerating PT/OT     Diarrhea    Tolerating Diet     Constipation    Other       Could NOT obtain due to: Non verbal     Objective:     VITALS:   Last 24hrs VS reviewed since prior progress note.  Most recent are:  Patient Vitals for the past 24 hrs:   Temp Pulse Resp BP SpO2   05/23/17 1200 - 100 18 106/62 99 %   05/23/17 1100 - 94 18 115/76 99 %   05/23/17 1006 - 94 18 130/74 99 %   05/23/17 0900 - 93 20 111/67 99 %   05/23/17 0800 - 99 18 151/74 98 %   05/23/17 0743 99.5 °F (37.5 °C) 100 16 144/79 99 %   05/23/17 0700 - (!) 102 26 140/90 98 %   05/23/17 0600 - (!) 105 21 135/79 97 %   05/23/17 0500 - 93 17 112/72 97 %   05/23/17 0400 98.2 °F (36.8 °C) 85 14 115/61 98 %   05/23/17 0300 - 88 16 107/66 96 %   05/23/17 0200 - 100 20 112/68 96 %   05/23/17 0100 - 91 18 106/68 97 %   05/23/17 0000 98.7 °F (37.1 °C) (!) 106 18 134/76 99 %   05/22/17 2300 - (!) 104 21 138/74 99 %   05/22/17 2200 - 95 20 124/70 97 %   05/22/17 2100 - 95 25 134/81 98 %   05/22/17 2000 98.3 °F (36.8 °C) 90 20 138/74 98 %   05/22/17 1800 - 91 16 108/72 99 %   05/22/17 1700 - 94 18 125/79 100 %   05/22/17 1600 98.1 °F (36.7 °C) (!) 101 19 127/72 100 %   05/22/17 1500 - (!) 109 14 142/76 100 %   05/22/17 1400 - 97 18 112/80 100 %   05/22/17 1300 - 90 17 123/90 100 %       Intake/Output Summary (Last 24 hours) at 05/23/17 1229  Last data filed at 05/23/17 1036   Gross per 24 hour   Intake             2000 ml   Output              525 ml   Net             1475 ml        PHYSICAL EXAM:  General: WD, WN. Alert, cooperative, no acute distress    EENT:  EOMI. Anicteric sclerae. MMM, trach collar  Resp:  B/l air entry. No accessory muscle use  CV:  Regular  rhythm,  No edema  GI:  Soft, Non distended, Non tender.  +Bowel sounds,peg tube +  Neurologic:  Alert and oriented X 2,   Psych:    Not anxious nor agitated  Skin:  No rashes. No jaundice    Reviewed most current lab test results and cultures  YES  Reviewed most current radiology test results   YES  Review and summation of old records today    NO  Reviewed patient's current orders and MAR    YES  PMH/SH reviewed - no change compared to H&P  ________________________________________________________________________  Care Plan discussed with:    Comments   Patient y    Family      RN y    Care Manager     Consultant  x SLP                     Multidiciplinary team rounds were held today with , nursing, pharmacist and clinical coordinator. Patient's plan of care was discussed; medications were reviewed and discharge planning was addressed.      ________________________________________________________________________  Total NON critical care TIME:  25  Minutes    Total CRITICAL CARE TIME Spent:   Minutes non procedure based      Comments   >50% of visit spent in counseling and coordination of care ________________________________________________________________________  Sandra Rodriguez MD     Procedures: see electronic medical records for all procedures/Xrays and details which were not copied into this note but were reviewed prior to creation of Plan. LABS:  I reviewed today's most current labs and imaging studies.   Pertinent labs include:  Recent Labs      05/23/17 0450 05/22/17 0429 05/21/17   0338   WBC  12.9*  13.6*  11.4*   HGB  12.2  12.6  11.6*   HCT  38.5  39.9  37.2   PLT  340  407*  398     Recent Labs      05/23/17 0450 05/22/17 0429 05/21/17 0338  05/20/17   1939   NA  136  136  138  135*   K  3.9  3.8  3.6  3.5   CL  96*  97  100  98   CO2  32  31  31  30   GLU  204*  195*  162*  171*   BUN  24*  15  13  12   CREA  0.80  0.73  0.67*  0.72   CA  8.8  9.1  8.7  8.6   MG   --   2.4   --    --    ALB   --    --    --   2.2*   TBILI   --    --    --   1.3*   SGOT   --    --    --   53*   ALT   --    --    --   67       Signed: Sandra Rodriguez MD

## 2017-05-24 ENCOUNTER — APPOINTMENT (OUTPATIENT)
Dept: GENERAL RADIOLOGY | Age: 57
DRG: 091 | End: 2017-05-24
Attending: INTERNAL MEDICINE
Payer: COMMERCIAL

## 2017-05-24 LAB
ALBUMIN SERPL BCP-MCNC: 2.4 G/DL (ref 3.5–5)
ALBUMIN/GLOB SERPL: 0.4 {RATIO} (ref 1.1–2.2)
ALP SERPL-CCNC: 243 U/L (ref 45–117)
ALT SERPL-CCNC: 46 U/L (ref 12–78)
ANION GAP BLD CALC-SCNC: 10 MMOL/L (ref 5–15)
AST SERPL W P-5'-P-CCNC: 28 U/L (ref 15–37)
BACTERIA SPEC CULT: ABNORMAL
BILIRUB SERPL-MCNC: 0.7 MG/DL (ref 0.2–1)
BUN SERPL-MCNC: 22 MG/DL (ref 6–20)
BUN/CREAT SERPL: 29 (ref 12–20)
CALCIUM SERPL-MCNC: 9.6 MG/DL (ref 8.5–10.1)
CHLORIDE SERPL-SCNC: 95 MMOL/L (ref 97–108)
CO2 SERPL-SCNC: 29 MMOL/L (ref 21–32)
CREAT SERPL-MCNC: 0.75 MG/DL (ref 0.7–1.3)
ERYTHROCYTE [DISTWIDTH] IN BLOOD BY AUTOMATED COUNT: 16.7 % (ref 11.5–14.5)
GLOBULIN SER CALC-MCNC: 6.4 G/DL (ref 2–4)
GLUCOSE BLD STRIP.AUTO-MCNC: 136 MG/DL (ref 65–100)
GLUCOSE BLD STRIP.AUTO-MCNC: 144 MG/DL (ref 65–100)
GLUCOSE BLD STRIP.AUTO-MCNC: 155 MG/DL (ref 65–100)
GLUCOSE SERPL-MCNC: 114 MG/DL (ref 65–100)
GRAM STN SPEC: ABNORMAL
HCT VFR BLD AUTO: 38.4 % (ref 36.6–50.3)
HGB BLD-MCNC: 12.2 G/DL (ref 12.1–17)
MAGNESIUM SERPL-MCNC: 2.2 MG/DL (ref 1.6–2.4)
MCH RBC QN AUTO: 27.5 PG (ref 26–34)
MCHC RBC AUTO-ENTMCNC: 31.8 G/DL (ref 30–36.5)
MCV RBC AUTO: 86.5 FL (ref 80–99)
PHOSPHATE SERPL-MCNC: 3.1 MG/DL (ref 2.6–4.7)
PLATELET # BLD AUTO: 312 K/UL (ref 150–400)
POTASSIUM SERPL-SCNC: 3.7 MMOL/L (ref 3.5–5.1)
PROT SERPL-MCNC: 8.8 G/DL (ref 6.4–8.2)
RBC # BLD AUTO: 4.44 M/UL (ref 4.1–5.7)
SERVICE CMNT-IMP: ABNORMAL
SODIUM SERPL-SCNC: 134 MMOL/L (ref 136–145)
WBC # BLD AUTO: 12.1 K/UL (ref 4.1–11.1)

## 2017-05-24 PROCEDURE — 77030018626 HC TU TRACH CUF3 COVD -B

## 2017-05-24 PROCEDURE — 77030021668 HC NEB PREFIL KT VYRM -A

## 2017-05-24 PROCEDURE — 84100 ASSAY OF PHOSPHORUS: CPT | Performed by: INTERNAL MEDICINE

## 2017-05-24 PROCEDURE — 31502 CHANGE OF WINDPIPE AIRWAY: CPT

## 2017-05-24 PROCEDURE — 77030009834 HC MSK O2 TRACH VYRM -A

## 2017-05-24 PROCEDURE — 74011250637 HC RX REV CODE- 250/637: Performed by: INTERNAL MEDICINE

## 2017-05-24 PROCEDURE — 77010026065 HC OXYGEN MINIMUM MEDICAL AIR

## 2017-05-24 PROCEDURE — 82962 GLUCOSE BLOOD TEST: CPT

## 2017-05-24 PROCEDURE — 74011250636 HC RX REV CODE- 250/636: Performed by: INTERNAL MEDICINE

## 2017-05-24 PROCEDURE — 77030018861

## 2017-05-24 PROCEDURE — 36415 COLL VENOUS BLD VENIPUNCTURE: CPT | Performed by: INTERNAL MEDICINE

## 2017-05-24 PROCEDURE — 77030018846 HC SOL IRR STRL H20 ICUM -A

## 2017-05-24 PROCEDURE — 77010033678 HC OXYGEN DAILY

## 2017-05-24 PROCEDURE — 92507 TX SP LANG VOICE COMM INDIV: CPT

## 2017-05-24 PROCEDURE — 0B21XFZ CHANGE TRACHEOSTOMY DEVICE IN TRACHEA, EXTERNAL APPROACH: ICD-10-PCS | Performed by: INTERNAL MEDICINE

## 2017-05-24 PROCEDURE — 74011250637 HC RX REV CODE- 250/637: Performed by: HOSPITALIST

## 2017-05-24 PROCEDURE — 71010 XR CHEST PORT: CPT

## 2017-05-24 PROCEDURE — 80053 COMPREHEN METABOLIC PANEL: CPT | Performed by: INTERNAL MEDICINE

## 2017-05-24 PROCEDURE — 65660000000 HC RM CCU STEPDOWN

## 2017-05-24 PROCEDURE — 77030018836 HC SOL IRR NACL ICUM -A

## 2017-05-24 PROCEDURE — 85027 COMPLETE CBC AUTOMATED: CPT | Performed by: INTERNAL MEDICINE

## 2017-05-24 PROCEDURE — 77030018798 HC PMP KT ENTRL FED COVD -A

## 2017-05-24 PROCEDURE — 74011250637 HC RX REV CODE- 250/637: Performed by: NURSE PRACTITIONER

## 2017-05-24 PROCEDURE — 77030006998

## 2017-05-24 PROCEDURE — 77030010545

## 2017-05-24 PROCEDURE — 74011636637 HC RX REV CODE- 636/637: Performed by: INTERNAL MEDICINE

## 2017-05-24 PROCEDURE — 83735 ASSAY OF MAGNESIUM: CPT | Performed by: INTERNAL MEDICINE

## 2017-05-24 RX ADMIN — METFORMIN HYDROCHLORIDE 500 MG: 500 TABLET, FILM COATED ORAL at 17:28

## 2017-05-24 RX ADMIN — FUROSEMIDE 20 MG: 10 INJECTION, SOLUTION INTRAMUSCULAR; INTRAVENOUS at 22:27

## 2017-05-24 RX ADMIN — VALSARTAN 40 MG: 80 TABLET ORAL at 12:43

## 2017-05-24 RX ADMIN — INSULIN LISPRO 2 UNITS: 100 INJECTION, SOLUTION INTRAVENOUS; SUBCUTANEOUS at 12:42

## 2017-05-24 RX ADMIN — INSULIN LISPRO 2 UNITS: 100 INJECTION, SOLUTION INTRAVENOUS; SUBCUTANEOUS at 06:43

## 2017-05-24 RX ADMIN — LEVOFLOXACIN 750 MG: 5 INJECTION, SOLUTION INTRAVENOUS at 09:28

## 2017-05-24 RX ADMIN — ACETAMINOPHEN 650 MG: 325 SOLUTION ORAL at 22:32

## 2017-05-24 RX ADMIN — OXYCODONE HYDROCHLORIDE 5 MG: 100 SOLUTION ORAL at 12:49

## 2017-05-24 RX ADMIN — Medication 10 ML: at 15:35

## 2017-05-24 RX ADMIN — Medication 10 ML: at 19:54

## 2017-05-24 RX ADMIN — NYSTATIN 500000 UNITS: 100000 SUSPENSION ORAL at 12:42

## 2017-05-24 RX ADMIN — ASPIRIN 81 MG CHEWABLE TABLET 81 MG: 81 TABLET CHEWABLE at 09:34

## 2017-05-24 RX ADMIN — Medication 10 ML: at 22:29

## 2017-05-24 RX ADMIN — GUAIFENESIN 400 MG: 100 SOLUTION ORAL at 09:33

## 2017-05-24 RX ADMIN — METFORMIN HYDROCHLORIDE 500 MG: 500 TABLET, FILM COATED ORAL at 09:33

## 2017-05-24 RX ADMIN — ENOXAPARIN SODIUM 40 MG: 40 INJECTION SUBCUTANEOUS at 17:28

## 2017-05-24 RX ADMIN — FUROSEMIDE 20 MG: 10 INJECTION, SOLUTION INTRAMUSCULAR; INTRAVENOUS at 09:34

## 2017-05-24 RX ADMIN — Medication 10 ML: at 06:43

## 2017-05-24 RX ADMIN — ATORVASTATIN CALCIUM 40 MG: 40 TABLET, FILM COATED ORAL at 22:32

## 2017-05-24 RX ADMIN — ACETAMINOPHEN 650 MG: 325 SOLUTION ORAL at 04:25

## 2017-05-24 RX ADMIN — Medication 10 ML: at 09:31

## 2017-05-24 RX ADMIN — NYSTATIN 500000 UNITS: 100000 SUSPENSION ORAL at 00:11

## 2017-05-24 RX ADMIN — CARVEDILOL 3.12 MG: 3.12 TABLET, FILM COATED ORAL at 09:34

## 2017-05-24 RX ADMIN — FAMOTIDINE 20 MG: 20 TABLET ORAL at 09:33

## 2017-05-24 RX ADMIN — CARVEDILOL 3.12 MG: 3.12 TABLET, FILM COATED ORAL at 17:27

## 2017-05-24 RX ADMIN — PRASUGREL HYDROCHLORIDE 10 MG: 10 TABLET, FILM COATED ORAL at 12:43

## 2017-05-24 RX ADMIN — NYSTATIN 500000 UNITS: 100000 SUSPENSION ORAL at 17:28

## 2017-05-24 RX ADMIN — NYSTATIN 500000 UNITS: 100000 SUSPENSION ORAL at 09:33

## 2017-05-24 RX ADMIN — GUAIFENESIN 400 MG: 100 SOLUTION ORAL at 17:28

## 2017-05-24 RX ADMIN — NYSTATIN 500000 UNITS: 100000 SUSPENSION ORAL at 22:32

## 2017-05-24 RX ADMIN — FAMOTIDINE 20 MG: 20 TABLET ORAL at 17:28

## 2017-05-24 RX ADMIN — Medication 10 ML: at 00:02

## 2017-05-24 NOTE — PROGRESS NOTES
0715:  Bedside handoff report [facility] from  [facility] RN (offgoing nurse). VSS; afebrile, able to follow commands; moderate strength on left side; no movement on right. Pupils equal round, reactive to light. Trach collar on 28% FiO2; SaO2 98%. Pt's daughter concerned that patient's HR is 80; writer explained this is not abnormal given patient's condition. Tube feedings at goal: twocal HN at 50 mL/hr; 10 mL of residual noted. 0802:  Acetaminophen given for pain of 2/10 via nonverbal pain scale. 1100:  Pt's condom catheter noted to be off; bed pad saturated with urine. Incontinence care preformed; new condom placed. Unable to measure accurate UOP.    1200:  Tracheostomy cleaned with 1/2 strength hydrogen peroxide; inner cannula changed; pt tolerated well. Pt noted to have white patches on tongue, Dr. Selina Amaya notified; orders received for nystatin oral solution. 1230:  Writer s/w clinical care lead, Omi Awan RN regarding pt's isolation continuation. After conferring with infection control department; precautions for louse must remain in place as patient noted to still have nits in beard. 1600:  Acetaminophen given for pain of 2/10 via nonverbal pain scale. 1900:  Bedside handoff report given to Symone Ji RN (oncoming nurse).       Steve Sigala, SHERRY

## 2017-05-24 NOTE — PROGRESS NOTES
Cm acknowleged consult to [facility]. Submitted via ecin.     [address], [place]  Ext [phone]

## 2017-05-24 NOTE — PROGRESS NOTES
[facility] Consult Service Progress NOTE  Pulmonary, Critical Care, and Sleep Medicine    Name: Eliceo Wheeler MRN: [id number]   : 1960 Hospital: [facility]   Date: 2017  Admission Date: 2017   Off vent greater than 48 hours  Continues with copious thin secretions, Seems to clear airway ok with current trach. Pt was more alert and interactive yesterday. NO acute or new issues noted. IMPRESSION:   · Acute respiratory failure with hypoxia- has tolerated trach collar. · S/p tracheostomy for prolonged vent dependence, looking at his trach on CXR,am concerned that a #6 trach will be too short!!!   · Dysphagia s/p PEG, appreciate speech path assistance. · Diarrhea C diff negative in [place]  · Anoxic brain injury- some cognitive recovery but significant motor deficits; CT and MRI done at [address]-  Injury to basal ganglia and temporal lobes  · S/p cardiac arrest, s/p hypothermia protocol  · ASCVD  · H/o CABG [address] 2014 and in  had another cath showing complex anatomy; has successful laser atherectomy PTCCA and stenting of the distal anastomotic site of the vein graft with Promus ROBERT; chest pain persisted; pt offered cath 2016 and possbile referral for another CABg at [facility] but evidently declined  · HTN  · Diabetes Mellitus  · Left ear deafness POA  · Language barrier- limited English but family available, complicated with trach and recent DORA  · Never smoker  · Anemia  · Discussed with nurse and pts Daughter. Discussed with speech Therapy.        RECOMMENDATIONS/PLAN:   · PCU monitoring  · Trach collar as tolerated,   · Will explore if we have an extra long # 6 trach; spoke with daughter at bedside; he must meet many milestones before trach can be removed safely: secretions, oxygenation, speech, swallow, cough and assess possibility of sleep apnea- His beard may pose a problem with keeping risk of tracheobronchitits down and may impair his ability to use CPAP mask  · Continue with routine trach care. · Blood transfusion prn   · Continue tube feedings  · Glucose control  · Empiric abx   · Pulmonary toilet  · PT, OT consults  · DVT prophylaxis     Code: Full Code          Vital Signs: Intake/Output: Intake/Output:   Visit Vitals    /71    Pulse (!) 102    Temp 97.8 °F (36.6 °C)    Resp 20    Ht 5' 11\" (1.803 m)    Wt 93.9 kg (207 lb 0.2 oz)    SpO2 100%    BMI 28.87 kg/m2      O2 Device: Tracheal collar  O2 Flow Rate (L/min): 10 l/min  Temp (24hrs), Av.5 °F (36.9 °C), Min:97.7 °F (36.5 °C), Max:99.2 °F (37.3 °C)     Intake/Output Summary (Last 24 hours) at 17 0662  Last data filed at 17 1350   Gross per 24 hour   Intake             1770 ml   Output              975 ml   Net              795 ml    Last shift:         Last 3 shifts:  1901 -  0700  In: 3190   Out: 8905 [Urine:1025; Drains:550]         Telemetry:    normal sinus rhythm    Physical Exam:    General: large, obese [place] male well developed and well nourished, in no respiratory distress and acyanotic   HEENT: NCAT, bearded; no thrush? limited mouth opening,moist   Neck: No abnormally enlarged lymph nodes.  Ok Cap #8   Chest: normal   Lungs: decreased air exchange bilaterally   Heart: Regular rate and rhythm or S1S2 present   Abdomen: soft, non-tender, without masses or organomegaly, PEG   :    Extremity: negative, cyanosis, clubbing;    Neuro: brighter; opens eyes and EOMI; tongue midline; will follow simple commands and now moving his left side more    Psych: restless   Skin: dry        DATA:    MAR reviewed and pertinent medications noted or modified as needed    MEDS:   Current Facility-Administered Medications   Medication    levoFLOXacin (LEVAQUIN) 750 mg in D5W IVPB    nystatin (MYCOSTATIN) 100,000 unit/mL oral suspension 500,000 Units    metFORMIN (GLUCOPHAGE) tablet 500 mg    valsartan (DIOVAN) tablet 40 mg    furosemide (LASIX) injection 20 mg    guaiFENesin (ROBITUSSIN) 100 mg/5 mL oral liquid 400 mg    0.9% sodium chloride infusion 250 mL    insulin lispro (HUMALOG) injection    glucose chewable tablet 16 g    dextrose 10% infusion 125-250 mL    glucagon (GLUCAGEN) injection 1 mg    enoxaparin (LOVENOX) injection 40 mg    sodium chloride (NS) flush 5-10 mL    sodium chloride (NS) flush 5-10 mL    ondansetron (ZOFRAN) injection 4 mg    famotidine (PEPCID) tablet 20 mg    albuterol-ipratropium (DUO-NEB) 2.5 MG-0.5 MG/3 ML    carvedilol (COREG) tablet 3.125 mg    prasugrel (EFFIENT) tablet 10 mg    aspirin chewable tablet 81 mg    atorvastatin (LIPITOR) tablet 40 mg    oxyCODONE (ROXICODONE INTENSOL) 20 mg/mL concentrated solution 5 mg    acetaminophen (TYLENOL) solution 650 mg    lidocaine (LIDODERM) 5 % patch 1 Patch          Labs:  Recent Labs      05/24/17 0304 05/23/17 0450 05/22/17 0429   WBC  12.1*  12.9*  13.6*   HGB  12.2  12.2  12.6   PLT  312  340  407*     Recent Labs      05/24/17 0304 05/23/17 0450 05/22/17 0429   NA  134*  136  136   K  3.7  3.9  3.8   CL  95*  96*  97   CO2  29  32  31   GLU  114*  204*  195*   BUN  22*  24*  15   CREA  0.75  0.80  0.73   CA  9.6  8.8  9.1   MG  2.2   --   2.4   PHOS  3.1   --    --    ALB  2.4*   --    --    SGOT  28   --    --    ALT  46   --    --      No results for input(s): PH, PCO2, PO2, HCO3, FIO2 in the last 72 hours. No results for input(s): PH, PCO2, PO2, HCO3, FIO2 in the last 72 hours.   Recent Labs      05/21/17   1910   CPK  52   CKNDX  2.9*   TROIQ  <0.04       Lab Results   Component Value Date/Time    Iron 34 05/20/2017 01:14 AM    TIBC 229 05/20/2017 01:14 AM    Iron % saturation 15 05/20/2017 01:14 AM    Ferritin 191 05/20/2017 01:14 AM       No results found for: SR, CRP, THIEN, ANAIGG, RA, RPR, RPRT, VDRLT, VDRLS, TSH, TSHEXT, TSHEXT     Lab Results   Component Value Date/Time    CK 52 05/21/2017 07:10 PM    CK-MB Index 2.9 05/21/2017 07:10 PM    Troponin-I, Qt. <0.04 05/21/2017 07:10 PM        Lab Results   Component Value Date/Time    Culture result: MODERATE  SERRATIA MARCESCENS   05/20/2017 03:43 AM    Culture result: LIGHT  ACINETOBACTER BAUMANNII/HAEMOLYTICUS   05/20/2017 03:43 AM    Culture result: NO NORMAL RESPIRATORY BETTE ISOLATED 05/20/2017 03:43 AM       No results found for: TOXA1, RPR, HBCM, HBSAG, HAAB, HCAB, HCAB1, HAAT, G6PD, CRYAC, HIVGT, HIVR, HIV1, HIV12, HIVPC, HIVRPI    Lab Results   Component Value Date/Time    CK 52 05/21/2017 07:10 PM       No results found for: COLOR, APPRN, SPGRU, JOHANNE, PROTU, GLUCU, KETU, BILU, BLDU, UROU, DILAN, LEUKU, WBCU, RBCU, UEPI, BACTU, CASTS, UCRY    Imaging:  [x]                        personally reviewed the patients radiographs and reports:clear      Results from [place] encounter on 05/19/17   XR CHEST PORT   Narrative INDICATION:  Respiratory distress. Tracheostomy. EXAM: CXR Portable. FINDINGS: Portable chest shows stable appearance including tracheostomy since  May 22. There is no apparent pneumothorax. Lungs show no acute findings. Heart  size is normal. There is prior CABG. There is no pulmonary edema. Impression IMPRESSION: No acute finding or significant change. No results found for this or any previous visit.     Whitley Dickinson MD

## 2017-05-24 NOTE — PROGRESS NOTES
[address]  [phone]      Cardiology Progress Note      5/24/2017 10AM    Admit Date: 5/19/2017    Admit Diagnosis:   Cardiac Arrest  Respiratory failure ([address]    Subjective:     Job Barron  is a 56 y.o. male with PMH HTN, HLD, DM, CAD s/p CABG (2014) who was transferred from OS where he had been admitted s/p cardiac arrest 4/28. Overnight events:  -VSS  -labs stable  -Mr. Margarito Quinn opens his eyes, nods, and follows basic commands. Limited English comprehension and no family at bedside currently. No acute need for  during assessment. Patient denies chest pain. Endorses left shoulder pain today.        Visit Vitals    /71    Pulse 78    Temp 97.8 °F (36.6 °C)    Resp 20    Ht 5' 11\" (1.803 m)    Wt 93.9 kg (207 lb 0.2 oz)    SpO2 98%    BMI 28.87 kg/m2       Current Facility-Administered Medications   Medication Dose Route Frequency    levoFLOXacin (LEVAQUIN) 750 mg in D5W IVPB  750 mg IntraVENous Q24H    nystatin (MYCOSTATIN) 100,000 unit/mL oral suspension 500,000 Units  500,000 Units Oral QID    metFORMIN (GLUCOPHAGE) tablet 500 mg  500 mg Oral BID WITH MEALS    valsartan (DIOVAN) tablet 40 mg  40 mg Oral DAILY    furosemide (LASIX) injection 20 mg  20 mg IntraVENous BID    guaiFENesin (ROBITUSSIN) 100 mg/5 mL oral liquid 400 mg  400 mg Per G Tube BID    0.9% sodium chloride infusion 250 mL  250 mL IntraVENous PRN    insulin lispro (HUMALOG) injection   SubCUTAneous Q6H    glucose chewable tablet 16 g  4 Tab Oral PRN    dextrose 10% infusion 125-250 mL  125-250 mL IntraVENous PRN    glucagon (GLUCAGEN) injection 1 mg  1 mg IntraMUSCular PRN    enoxaparin (LOVENOX) injection 40 mg  40 mg SubCUTAneous Q24H    sodium chloride (NS) flush 5-10 mL  5-10 mL IntraVENous Q8H    sodium chloride (NS) flush 5-10 mL  5-10 mL IntraVENous PRN    ondansetron (ZOFRAN) injection 4 mg  4 mg IntraVENous Q6H PRN    famotidine (PEPCID) tablet 20 mg  20 mg Per G Tube BID    albuterol-ipratropium (DUO-NEB) 2.5 MG-0.5 MG/3 ML  3 mL Nebulization Q4H PRN    carvedilol (COREG) tablet 3.125 mg  3.125 mg Per G Tube BID WITH MEALS    prasugrel (EFFIENT) tablet 10 mg  10 mg Per G Tube DAILY    aspirin chewable tablet 81 mg  81 mg Per G Tube DAILY    atorvastatin (LIPITOR) tablet 40 mg  40 mg Per G Tube QHS    oxyCODONE (ROXICODONE INTENSOL) 20 mg/mL concentrated solution 5 mg  5 mg Per G Tube Q4H PRN    acetaminophen (TYLENOL) solution 650 mg  650 mg Per G Tube Q4H PRN    lidocaine (LIDODERM) 5 % patch 1 Patch  1 Patch TransDERmal Q24H       Objective:      Physical Exam:  General: obese, [place] [facility] male, resting in bed in NAD. Alert and interactive. Heart: RRR, distant heart sounds   Lungs: diminished; trach; upper airway secretions  Abdomen: Soft, +BS, NTND   Extremities: LE chan +DP/PT, no edema   Neurologic: trached; Nods head. Moves LUE and BLE to command.  Now with slight movement to RUE as well.         Data Review:   Recent Labs      05/24/17   0304  05/23/17   0450  05/22/17   0429   WBC  12.1*  12.9*  13.6*   HGB  12.2  12.2  12.6   HCT  38.4  38.5  39.9   PLT  312  340  407*     Recent Labs      05/24/17   0304  05/23/17   0450  05/22/17   0429   NA  134*  136  136   K  3.7  3.9  3.8   CL  95*  96*  97   CO2  29  32  31   GLU  114*  204*  195*   BUN  22*  24*  15   CREA  0.75  0.80  0.73   CA  9.6  8.8  9.1   MG  2.2   --   2.4   PHOS  3.1   --    --    ALB  2.4*   --    --    TBILI  0.7   --    --    SGOT  28   --    --    ALT  46   --    --        Recent Labs      05/21/17   1910   TROIQ  <0.04   CPK  52   CKMB  1.5         Intake/Output Summary (Last 24 hours) at 05/24/17 1125  Last data filed at 05/24/17 1010   Gross per 24 hour   Intake             1490 ml   Output              975 ml   Net              515 ml        Telemetry: SR-ST; rare PVC  ECG: NSR; artifact   Echocardiogram: EF 30-35%; moderate diffuse hypokinesis; mild-mod MR; mild-mod TR  CXRAY: no acute process     Assessment:     Principal Problem:    Anoxic brain injury ([facility] (5/20/2017)    Active Problems:    Coronary artery disease of bypass graft with stable angina pectoris ([facility] (10/5/2016)      Essential hypertension (10/5/2016)      Dyslipidemia (10/5/2016)      Type 2 diabetes mellitus without complication ([facility] (89/1/7654)      H/O cardiac arrest (5/19/2017)      Overview: 4/28/17:  OSH in [place]      Respiratory failure ([address] (5/19/2017)      Pneumonia [facility] to Serratia marcescens [facility] (2/49/4854)      Lice infestation (0/50/7484)      Cardiomyopathy ([facility] (5/22/2017)        Plan:     Cardiomyopathy: EF 30-35% s/p cardiac arrest 4/28 and history of extensive CAD. OSH ECHO had a higher EF, but patient was on pressors at that time. Likely ischemic cardiomyopathy due to history and HPI vs damage from prolonged cardiac arrest.    · Continue ASA, BB, statin for CAD  · Continue BB, ARB, lasix: for cardiomyopathy   · Further ischemic evaluation based on patient progression and determination whether patient could have realistic benefits with further interventions with his complicated CAD history    No changes in recommendations today. Patient slowly progressing. Per Dr. Sadie Valdez, goals of care: \"Question will be long-term quality of life and goals of care. History of refractory intracoronary restenosis for which he declined bypass. Even if repeat intervention were to be eventually performed, extremely high risk of recurrence. \"    Nohemy Bowens, HITESH  DNP, RN, AGACNP-BC

## 2017-05-24 NOTE — ROUTINE PROCESS
Bedside and Verbal shift change report given to Edith Vo RN (oncoming nurse) by Aristeo Hall RN (offgoing nurse). Report included the following information SBAR, Kardex, Intake/Output and MAR.

## 2017-05-24 NOTE — ADT AUTH CERT NOTES
Patient Demographics        Patient Name [address] Sex  Address Phone       Rusty Juarez [id number] Male 1960 [address] [phone] (Home)  [phone] (Mobile) *Preferred*           CSN:       [id number]           Admit Date: Admit Time Room Bed       May 19, 2017  2:02 PM 2268 [[id number]] 01 [[id number]]           Attending Providers        Provider Pager From To       Zion Garcia MD  17       Shayna Ashley MD  17       Rosalba Gunter MD  17            Emergency Contact(s)        Name Relation Home Work Mobile     Myesha Spouse [phone]       ESE Cordero Child [phone]         Ya Min Child [phone]           Utilization Review           Respiratory Failure GRG - Care Day 6 (2017) by Felecia Castellanos RN        Review Entered Review Status       2017 Completed       Details              Care Day: 6 Care Date: 2017 Level of Care: Step Down       Guideline Day 3        Level Of Care       ( ) * Activity level acceptable              Clinical Status       (X) * Ventilation status appropriate       (X) * Airway status acceptable       (X) * Respiratory status acceptable       (X) * Stable chest findings       (X) * Neurologic status acceptable       (X) * Temperature status acceptable       ( ) * No infection, or status acceptable       ( ) * General Discharge Criteria met              Interventions       (X) * No chest tube, or status acceptable       (X) * Intake acceptable       ( ) * No inpatient interventions needed              2017 3:55 PM EDT by Milo Castrejon       Subject: Additional Clinical Information       cont tube feedings 112/71 78 97.8 20 98% 10l /min via trach collarcontact isolation wbc 12.1 gluc 155 144 occas rhonchi resp cx moderate serratia marcescens and klebsiella pneumoniaepulmonary following cont pcu monitoring, trach collar as tolerating, will explore if we have a extra long #6 trach; spoke with daughter at the bedside; he must meet many milestones before trach can be removed safely; secretions, oxygenation, speech, swallow, cough as assess possibility of sleep apnea.  his beard may pose a problem with keeping risk of tracheobronchitits down and may impair his ability to use CPAP mask cont routine trach care gluc control pulmonary toilet pt to speech consulting meds via g tube iv lasix 20 mg 2 x daily cm for dcp [facility] cardio consulting iv levaquin 750 mg q24h                                    * Milestone                  Respiratory Failure GRG - Care Day 5 (5/23/2017) by Mercy Ambrosio RN        Review Entered Review Status       5/24/2017 Completed       Details              Care Day: 5 Care Date: 5/23/2017 Level of Care: Step Down       Guideline Day 3        Level Of Care       ( ) * Activity level acceptable              Clinical Status       (X) * Ventilation status appropriate       (X) * Airway status acceptable       (X) * Respiratory status acceptable       (X) * Stable chest findings       (X) * Neurologic status acceptable       (X) * Temperature status acceptable       ( ) * No infection, or status acceptable       ( ) * General Discharge Criteria met              Interventions       (X) * No chest tube, or status acceptable       (X) * Intake acceptable       ( ) * No inpatient interventions needed              5/24/2017 11:03 AM EDT by Chanel Shabazz       Subject: Additional Clinical Information       able to follow commands moderate strength on left side, no movement on the right trach collar on 28% fio2 tube feedings 2 patrick HN @ 50 pt incontinent per PT pt improving appropriately and progressing toward goals pt cont to benefit from skilled intervention inpt rehabmax A x2 posterior trunk lean in standing req mod A to correct pt with increased dizziness upon standing and slowed processing pt tolerated treatment excellent support from family pt motivated and hardworking per OT pt mod to max assist x2 for bed mobility able to sit on EOB and SBA today fatigued rather quickly had to sit down stood approx 30-40 seconds making progress and rec inpt rehab per Speech improving slowly and progressing toward goals await change of trach to #6 when the md feels he is ready from secretion standpoint goal of PMV and swallowing is forthcoming 111/67 95 98.6 18 100% trach collar iv levaquin 750 mg q24h iv lasix 20 mg 2 x daily wbc 12.9 tele SR-ST rare PVCEF 30-35% cm assisting with dcp                                   * Milestone                  Respiratory Failure GRG - Care Day 4 (5/22/2017) by Sarahi Joshi        Review Entered Review Status       5/23/2017 Completed       Details              Care Day: 4 Care Date: 5/22/2017 Level of Care: Step Down       Guideline Day 3        Level Of Care       ( ) * Activity level acceptable       5/23/2017 5:50 PM EDT by Arminda Husain         max of 2 to total assist- for supine to sit.  He was able to sit on EOB with CGA for 5 to 10 minutes. ORDERS; PT, OT, ST.                     Clinical Status       (X) * Ventilation status appropriate       (X) * Airway status acceptable       (X) * Respiratory status acceptable       5/23/2017 5:50 PM EDT by Arminda Husain         98.3  °F (36.8  °C) 90 bp 138/74 -- At rest 20 sat 98 % Room air. On trache collar              (X) * Stable chest findings       (X) * Neurologic status acceptable       (X) * Temperature status acceptable       ( ) * No infection, or status acceptable       5/23/2017 5:50 PM EDT by Arminda Husain         Hx asp PNA.  on Emperic Levaquin.              ( ) * General Discharge Criteria met              Interventions       (X) * No chest tube, or status acceptable       (X) * Intake acceptable       5/23/2017 5:50 PM EDT by Arminda Husain         PEG FEEDINGS              ( ) * No inpatient interventions needed       5/23/2017 5:50 PM EDT by Jean Klein Riley KENNEY; (Per PEG; ASA , coreg, roxycodone  x 2, effient), lovenox sq, lasix iv, insulin sq, NTbid ointment, Levaquin iv.   Diet ; PEG tube for tube feeding. ( npo)                     5/23/2017 5:50 PM EDT by Guy Trinh       Subject: Additional Clinical Information               PER CARDIOLOGY; 56 y.o. male with PMH HTN, HLD, DM, CAD s/p CABG (2014) who was transferred from OSH where he had been admitted s/p cardiac arrest 4/28. Mr. Margarito Quinn opens his eyes, nods, and follows basic commands.   Limited English comprehension. Neurologic: trached; Nods head. Moves LUE and BLE to command. Cardiomyopathy: EF 30-35% s/p cardiac arrest 4/28 and history of extensive CAD.   OSH ECHO had a higher EF, but patient was on pressors at that time.   Likely ischemic cardiomyopathy due to history and HPI vs damage from prolonged cardiac arrest.         Continue ASA, BB, statin for CAD. Continue BB, lasix:   Will add ARB that patient was on in the past for cardiomyopathy   .  Further ischemic evaluation based on patient progression and determination whether patient could have realistic benefits with further interventions with his complicated CAD history                                              * Milestone              Additional Notes       wbc 13.6, plt 407. CXR 5/22 no change.        PER MEDICINE;        Bear Lice infestation POA-S/p Lindane Shampoo .       Anoxic brain injury following cardiac arrest in [place] >1 month ago       --outside MRI brain with abnormalities in basal ganglia and bilateral temporal lobes       --has right>left sided weakness (right face and right arm paralyze; right leg 2/5); left side 3/5 arm, 2/5 leg.  Following commands given in his native [place] language from family members.       --s/p PEG and trach 5/12       Can consider Neurology consult if opinion needed regarding prognosis if need.       Seems to be at his new baseline as per the family at bedside.                S/p out of hospital cardiac arrest 4/28/17, POA       CAD s/p CABG 2014 x 3v at HD       Hx cardiac stents (twice before CABG and once after)       HTN.       Severe Anemia POA - Hb improved to 11.6 s/p 2 units PRBCs.       --hospitalized at [facility] in [place], [place]; transferred to [facility] 5/18 and [facility]       --per Dr. Bahena Friend had stenosis of circumflex anastomosis graft, PTCA -->restenosis -->stent-->restenosis and redo bypass recommended but patient seeked second opinion with Dr. Lenora Cortez in [place] and reportedly told redo bypass not needed.       --continue coreg, effient. EKG,        Cardiology consulted & following-        Echo showing diffuse Hypokinesis with EF 30-35% (new? ). S/p Transfuse 2 units PRBC       S/p IV lasix x 1 between 2 units, cont lasix 20mg BID.               Hx aspiration PNA during intubation For cardiac arrest       Chronic respiratory failure, s/p trach.       -- treated with invanz in [place]. CXR clear here.        Sputum Cx at [facility] growing Pan sensitive Serratia Marcescens                Remains Off Vent >48hrs now, agrees pt stable to transfer off the Unit.       Cont trach collar as per pulmonary today- following.       Cont Lasix.       Cont Mucinex.       Start empiric Levaquin IV today as pt does have leukocytosis, increased Sputum/secretion & OSH sputum Cx growing Serratia sp & Sputum Cx here growing Gram neg Rods.               Vomiting during transport to [place] with question of recurrent aspiration. --empiric started on zosyn and vanc at [facility]. Repeat CXR neg.                        Diarrhea--C. Diff negative in [place] recently prior to transfer. Repeat C. Diff done at [facility] yesterday       --has rectal tube. May be related to tubefeed.  Consider add metamucil. --stop abx for now                DM 2--SSI for now                Recommended Disposition: LTAC versus SNF- to be determined

## 2017-05-24 NOTE — PROGRESS NOTES
Hospitalist Progress Note    NAME: Job Barron   :  1960   MRN:  [id number]       Interim Hospital Summary: 56 y.o. male whom presented on 2017 with  male whom presented on 2017 with cardiomyopathy, s/p trach, S/p PEG at [facility] transferred to [facility] for 1 day then transferred to [facility] due to insurance reasons     Assessment / Plan:  Anoxic brain injury following cardiac arrest in [place] >1 month ago  - outside MRI brain with abnormalities in basal ganglia and bilateral temporal lobes  - has right>left sided weakness; follow commends when instruction was given his native language [place])  - s/p PEG and trach ; pt reached the tube feeding goal   - no Neurology consult at this point, pt is at his base line      S/p out of hospital cardiac arrest 17, POA  CAD s/p CABG  x 3v at HD  Hx cardiac stents (twice before CABG and once after)  HTN  Severe Anemia POA   - Hgb 12.2 today; Received 2 units of PRBC on  for hgb 6.8  - hospitalized at [facility] in [place], [place]; transferred to [facility]  and to [facility]   - cardiology following; continue with BB, effient, statin, ARB,and ASA  - Echo (); EF 30-35%; moderate diffuse hypokinesis; mild-mod MR; mild-mod TR    - per Dr. Diop Brood had stenosis of circumflex anastomosis graft, PTCA -->restenosis -->stent-->restenosis and redo bypass recommended but patient seeked second opinion with Dr. Leanna De La Vega in [place] and reportedly told redo bypass not needed.      Hx aspiration PNA during intubation For cardiac arrest  Chronic respiratory failure, s/p trach  - Pulmonology following;  - treated with invanz in [place].  Today CXR clear   - continue with Levo; Sputum Cx from  grew Serratia Marcescens, acinetobacter, & klebsiella pneumoniae which are sensitive to Levo  - currently on trach collar (vent removed on )  - pulmonary toilet     Vomiting during transport to [place] with question of recurrent aspiration  - empiric started on zosyn and rod at [facility].   - Repeat CXR neg         Diarrhea  - C Diff negative in [place] recently prior to transfer. Repeat C. Diff done at [facility] yesterday  - has rectal tube. May be related to tube feed. DM 2  - SSI for now     Lice infestation  - S/p treatment on 5/2; still on isolation. At this point, we would prefer epidemiology team get involved before removing the isolation. Pt was treated for lice but his caregivers & family members have not been checked for the lice.      Code: full  DVT prophylaxis: lovenox  Surrogate decision maker: Wife \"Savanna\" Laura Flynn [phone], 2 son & daughter Bill Frausto) # [phone]        Body mass index is 28.87 kg/(m^2).     Recommended Disposition: LTAC versus SNF- to be determined         Subjective:     Chief Complaint / Reason for Physician Visit  Pt makes eye contact, but nonverbal.  Family members assist with communication. Discussed with RN events overnight. Review of Systems:  Symptom Y/N Comments  Symptom Y/N Comments   Fever/Chills    Chest Pain     Poor Appetite    Edema     Cough    Abdominal Pain     Sputum    Joint Pain     SOB/HAM    Pruritis/Rash     Nausea/vomit    Tolerating PT/OT     Diarrhea    Tolerating Diet     Constipation    Other       Could NOT obtain due to:      Objective:     VITALS:   Last 24hrs VS reviewed since prior progress note.  Most recent are:  Patient Vitals for the past 24 hrs:   Temp Pulse Resp BP SpO2   05/24/17 1417 - - - - 100 %   05/24/17 1236 98.9 °F (37.2 °C) 88 20 104/66 100 %   05/24/17 1042 - 78 - - 98 %   05/24/17 0934 - (!) 102 - 112/71 -   05/24/17 0814 97.8 °F (36.6 °C) 74 20 102/50 100 %   05/24/17 0620 - 86 - 110/75 100 %   05/24/17 0420 97.7 °F (36.5 °C) - - - -   05/23/17 2352 98.9 °F (37.2 °C) 98 22 130/77 100 %   05/23/17 2245 98.1 °F (36.7 °C) (!) 105 22 116/72 99 %   05/23/17 2100 - (!) 120 22 133/85 99 %   05/23/17 2000 - 95 17 118/68 100 %   05/23/17 1954 99.2 °F (37.3 °C) 98 17 107/64 100 %   05/23/17 1900 - 84 15 107/64 98 %   05/23/17 1600 99.1 °F (37.3 °C) 94 20 110/61 98 %       Intake/Output Summary (Last 24 hours) at 05/24/17 1504  Last data filed at 05/24/17 1010   Gross per 24 hour   Intake             1140 ml   Output              875 ml   Net              265 ml        PHYSICAL EXAM:  General: WD, WN. Alert, cooperative, no acute distress    EENT:  EOMI. Anicteric sclerae. MMM  Resp:  Decreased breath sounds, a few rhonchi, clears with suction. no wheezing or rales. No accessory muscle use  CV:  Regular  rhythm,  No edema  GI:  Soft, Non distended, Non tender.  +Bowel sounds  Neurologic:  Alert and follows commends when family provides the direction, non verbal  Psych:   poor insight. Not anxious nor agitated  Skin:  No rashes. No jaundice    Reviewed most current lab test results and cultures  YES  Reviewed most current radiology test results   YES  Review and summation of old records today    NO  Reviewed patient's current orders and MAR    YES  PMH/SH reviewed - no change compared to H&P  ________________________________________________________________________  Care Plan discussed with:    Comments   Patient y    Family  y    RN y    Care Manager y    Consultant                       y Multidiciplinary team rounds were held today with , nursing, pharmacist and clinical coordinator. Patient's plan of care was discussed; medications were reviewed and discharge planning was addressed. ________________________________________________________________________  Total NON critical care TIME:  30   Minutes    Total CRITICAL CARE TIME Spent:   Minutes non procedure based      Comments   >50% of visit spent in counseling and coordination of care     ________________________________________________________________________  Ileana Henson NP     Procedures: see electronic medical records for all procedures/Xrays and details which were not copied into this note but were reviewed prior to creation of Plan. LABS:  I reviewed today's most current labs and imaging studies.   Pertinent labs include:  Recent Labs      05/24/17 0304 05/23/17 0450 05/22/17 0429   WBC  12.1*  12.9*  13.6*   HGB  12.2  12.2  12.6   HCT  38.4  38.5  39.9   PLT  312  340  407*     Recent Labs      05/24/17 0304 05/23/17 0450 05/22/17 0429   NA  134*  136  136   K  3.7  3.9  3.8   CL  95*  96*  97   CO2  29  32  31   GLU  114*  204*  195*   BUN  22*  24*  15   CREA  0.75  0.80  0.73   CA  9.6  8.8  9.1   MG  2.2   --   2.4   PHOS  3.1   --    --    ALB  2.4*   --    --    TBILI  0.7   --    --    SGOT  28   --    --    ALT  46   --    --        Signed: )Linnea Szymanski, NP

## 2017-05-24 NOTE — PROGRESS NOTES
Pt was cleraed to see by keithg and as therapy entered the room pt appeared to be in distress. Coughing and increased secretions noted to be blood tinged and  Around and under the area of the trach. Pt was making gagging sounds, with increased secretions in pts mouth. Secretions  were suctioned from his mouth. Nursing notified that pts O2% ranged from 88% to 100% while he was gagging. Respiratory to be called to the room for pt and OT tx aborted. Will continue to follow pt and continue with POC.

## 2017-05-24 NOTE — PROGRESS NOTES
Bedside shift change report given to Di Padilla RN (oncoming nurse) by Aicha العلي RN (offgoing nurse). Report included the following information SBAR, Kardex, MAR and Recent Results. Pt producing moderate-large amount of thick mucous. Color of mucous ranges from clear-white to tan in color. Deep trach suctioning done x4, twice by RN and twice by RT from [id number]. Aicha العلي RN

## 2017-05-24 NOTE — PROGRESS NOTES
Problem: Voice Impaired (Adult)  Goal: *Acute Goals and Plan of Care (Insert Text)  5/22/2017  Speech path goals:  1. Pt will tolerate PMV trials once he has #6 trach tube with no change in vital signs prn.   2. Pt will participate with swallow eval once tolerating PMV. 3. Pt will follow 2 step commands and answer mod level y/n questions with 80% acc. Goal met 5/24. He is following 3 step commands with repetition with 100% acc. 4. Pt will participate with eval of verbal expression once he is tolerating PMV. SPEECH LANGUAGE PATHOLOGY TREATMENT  Patient: Jeremy Michelle (56 y.o. male)  Date: 5/24/2017  Diagnosis: Cardiac Arrest  Respiratory failure ([facility] [address] Anoxic brain injury ([facility] [address]           ASSESSMENT:  Pt with good auditory comprehension with delayed processing; following 3 step commands and answering complex y/n questions with 80% acc. . Unable to phonate but coughed strongly once with trach occlusion. Pt vomited his medicine and some curdled white pieces most likely due to his strong  coughing. R facial weakness has improved and is mild to mod. Tongue is at midline. Progression toward goals:  [ ]       Improving appropriately and progressing toward goals  [X]       Improving slowly and progressing toward goals  [ ]       Not making progress toward goals and plan of care will be adjusted       PLAN:  Patient continues to benefit from skilled intervention to address the above impairments. Continue treatment per established plan of care. Discharge Recommendations: To Be Determined       SUBJECTIVE:   Patient is alert and smiling when I spoke Yovana to him with the help of his family.       OBJECTIVE:   Mental Status:  Neurologic State: Alert  Orientation Level: Unable to verbalize  Cognition: Follows commands     Perseveration: No perseveration noted     Treatment & Interventions:                                      Language Comprehension and Expression:      100% with 3 step commands with delayed processing noted. 80% for complex y/n questions. no phonation when the trach was occluded but he did achieve one strong cough.             Response & Tolerance to Activities:   good  Pain:  Pain Scale 1: Adult Nonverbal Pain Scale  Pain Intensity 1: 0  Pain Location 1: Back  After treatment:   [ ]       Patient left in no apparent distress sitting up in chair  [X]       Patient left in no apparent distress in bed  [X]       Call bell left within reach  [X]       Nursing notified  [ ]       Caregiver present  [ ]       Bed alarm activated      COMMUNICATION/COLLABORATION:         The patients plan of care was discussed with: Registered Nurse     Cecelia Wu SLP  Time Calculation: 10 mins

## 2017-05-24 NOTE — PROGRESS NOTES
.    PCU SHIFT NURSING NOTE      Bedside shift change report given to Estefany Simpson (oncoming nurse) by Jeff Cain (offgoing nurse). Report included the following information SBAR, Kardex, Intake/Output, MAR and Recent Results. Shift Summary:   0900- Patient suctioned for large amount of thick secretions. Tube feeding tolerated. Family at bedside. 1330- Condom cath came off. Unable to get accurate output. Condom cath replaced. Suctioned again. Medicated for bilateral shoulder pain, per family. [address] changed by Dr. Akiko Morrison, size 6, extra long. Patient has pink tinged secretions now. Coughing frequently. Family remains at bedside. Admission Date 5/19/2017   Admission Diagnosis Cardiac Arrest  Respiratory failure ([facility] [address]   Consults IP CONSULT TO CARDIOLOGY        Consults   [x]PT   [x]OT   []Speech   [x]Case Management      [] Palliative      Cardiac Monitoring Order   [x]Yes   []No     IV drips   []Yes    Drip:                            Dose:  Drip:                            Dose:  Drip:                            Dose:   [x]No     GI Prophylaxis   []Yes   [x]No         DVT Prophylaxis   SCDs:             Alfred stockings:         [x] Medication   []Contraindicated   []None      Activity Level Activity Level: Bed Rest     Activity Assistance: Complete care   Purposeful Rounding every 1-2 hour? [x]Yes   Valencia Score  Total Score: 4   Bed Alarm (If score 3 or >)   []Yes   [] Refused (See signed refusal form in chart)   Ulices Score  Ulices Score: 12   Ulices Score (if score 14 or less)   []PMT consult   []Wound Care consult      []Specialty bed   [] Nutrition consult          Needs prior to discharge:   Home O2 required:    []Yes   []No    If yes, how much O2 required?     Other:    Last Bowel Movement: Last Bowel Movement Date: 05/23/17      Influenza Vaccine          Pneumonia Vaccine           Diet Active Orders   Diet    DIET NPO      LDAs Feeding Tube (Active)   Site Assessment Clean, dry, & intact 5/24/2017 3:30 AM   Dressing Status Clean, dry, & intact 5/24/2017  3:30 AM   Dressing Type Gauze 5/24/2017  3:30 AM   Tube Status Infusing 5/24/2017  3:30 AM   Action Taken Feed set changed 5/24/2017  3:30 AM   Drainage Description Tan 5/23/2017  4:00 PM   Gastric Residual (mL) 0 ml 5/24/2017  3:30 AM   Tube Feeding/Formula Options Twocal HN 5/24/2017  3:30 AM   Tube Feeding/Verify Rate (mL/hr) 50 5/24/2017  3:30 AM   Water Flush Volume (mL) 30 mL 5/24/2017  3:30 AM   Intake (ml) 50 ml 5/23/2017 10:00 PM   Medication Volume 40 ml 5/23/2017  5:19 PM   Output (ml) 0 ml 5/24/2017  3:30 AM                Peripheral IV 05/20/17 Left Forearm (Active)   Site Assessment Clean, dry, & intact 5/24/2017 12:53 AM   Phlebitis Assessment 0 5/24/2017 12:53 AM   Infiltration Assessment 0 5/24/2017 12:53 AM   Dressing Status Clean, dry, & intact 5/24/2017 12:53 AM   Dressing Type Transparent;Tape 5/24/2017 12:53 AM   Hub Color/Line Status Pink 5/24/2017 12:53 AM   Action Taken Open ports on tubing capped 5/23/2017  8:00 PM   Alcohol Cap Used Yes 5/23/2017  8:00 PM          Rectal Tube (Active)   Site Assessment Clean, dry, & intact 5/23/2017  8:00 PM   Drainage Description Brown 5/23/2017  8:00 PM   Drainage Chamber Level (ml) 550 ml 5/23/2017  8:00 PM   Output (ml) 225 ml 5/23/2017  6:55 PM       Condom Catheter (Active)   Criteria for Appropriate Use Strict I/Os 5/23/2017  8:00 PM   Status Other (comment) 5/23/2017  8:00 PM   Site Condition No abnormalities 5/23/2017  8:00 PM   Drainage Tube Clipped to Bed Yes 5/23/2017  8:00 PM   Catheter Secured to Thigh Yes 5/23/2017  8:00 PM   Tamper Seal Intact No 5/23/2017  8:00 PM   Bag Below Bladder/Not on Floor Yes 5/23/2017  8:00 PM   Lack of Dependent Loop in Tubing Yes 5/23/2017  8:00 PM   Drainage Bag Less Than Half Full Yes 5/23/2017  8:00 PM   Sterile Solution Used for  Irrigation N/A 5/23/2017  8:00 PM   Urine Output (mL) 300 ml 5/24/2017  6:46 AM      Airway - Tracheostomy Tube 05/12/17 (Active)   Site Assessment Drainage (comment) 5/24/2017  3:30 AM   Trach Dressing Changed Yes 5/24/2017  3:30 AM   [place] Cleaned With 1/2 strength peroxide 5/24/2017  3:30 AM   Trach Tie Changed No 5/24/2017  3:30 AM   Trach Cannula Changed No 5/24/2017  3:30 AM   Inner Cannula #8 Shiley 5/24/2017  3:30 AM   Line James Top of the lock 5/23/2017  4:00 PM   Side Secured Centered 5/24/2017  3:30 AM   Spare [place]avi [place] at Bedside Yes 5/24/2017  3:30 AM   Spare [place] Tube One Size Smaller at Bedside Yes 5/24/2017  3:30 AM   Ambu Bag With Mask at Bedside Yes 5/24/2017  3:30 AM              Urinary Catheter Condom Catheter-Criteria for Appropriate Use: Strict I/Os    Intake & Output   Date 05/23/17 0700 - 05/24/17 0659 05/24/17 0700 - 05/25/17 0659   Shift 6425-0948 6848-6047 24 Hour Total 0700-1859 1900-0659 24 Hour Total   I  N  T  A  K  E   NG/GT 0177 421 8550         Water Flush Volume (mL) (Feeding Tube) 750 150 900         Medication Volume (Feeding Tube) 590  590         Intake (ml) (Feeding Tube) 550 200 750       Shift Total  (mL/kg) 1890  (20.1) 350  (3.7) 2240  (23.9)      O  U  T  P  U  T   Urine  (mL/kg/hr) 100  (0.1) 650  (0.6) 750  (0.3)         Urine Output (mL) (Condom Catheter) 100 650 750       Emesis/NG output 0 0 0         Emesis Occurrence(s) 1 x  1 x         Output (ml) (Feeding Tube) 0 0 0       Drains 550  550         Output (ml) (Rectal Tube) 550  550       Shift Total  (mL/kg) 650  (6.9) 650  (6.9) 1300  (13.8)      NET 1240 -300 940      Weight (kg) 93.9 93.9 93.9 93.9 93.9 93.9         Readmission Risk Assessment Tool Score Low Risk            6       Total Score        4 More than 1 Admission in calendar year    2 Charlson Comorbidity Score        Criteria that do not apply:    Relationship with PCP    Patient Living Status    Patient Length of Stay > 5    Patient Insurance is Medicare, Medicaid or Self Pay       Expected Length of Stay 4d 4h   Actual Length of Stay 5

## 2017-05-24 NOTE — PROGRESS NOTES
..    PCU SHIFT NURSING NOTE      Bedside shift change report given to Miguel Stoner RN (oncoming nurse) by Carter Llamas (offgoing nurse). Report included the following information SBAR, Kardex, Intake/Output, MAR and Recent Results. Shift Summary:       Admission Date 5/19/2017   Admission Diagnosis Cardiac Arrest  Respiratory failure ([facility] [address]   Consults IP CONSULT TO CARDIOLOGY        Consults   [x]PT   [x]OT   []Speech   []Case Management      [] Palliative      Cardiac Monitoring Order   [x]Yes   []No     IV drips   []Yes    Drip:                            Dose:  Drip:                            Dose:  Drip:                            Dose:   [x]No     GI Prophylaxis   []Yes   []No         DVT Prophylaxis   SCDs:             Alfred stockings:         [x] Medication   []Contraindicated   []None      Activity Level Activity Level: Bed Rest     Activity Assistance: Complete care   Purposeful Rounding every 1-2 hour? [x]Yes   Valencia Score  Total Score: 4   Bed Alarm (If score 3 or >)   []Yes   [] Refused (See signed refusal form in chart)   Ulices Score  Ulices Score: 12   Ulices Score (if score 14 or less)   []PMT consult   []Wound Care consult      []Specialty bed   [] Nutrition consult          Needs prior to discharge:   Home O2 required:    [x]Yes   []No    If yes, how much O2 required?     Other:    Last Bowel Movement: Last Bowel Movement Date: 05/23/17      Influenza Vaccine          Pneumonia Vaccine           Diet Active Orders   Diet    DIET NPO      LDAs Feeding Tube (Active)   Site Assessment Clean, dry, & intact 5/24/2017  3:30 AM   Dressing Status Clean, dry, & intact 5/24/2017  3:30 AM   Dressing Type Gauze 5/24/2017  3:30 AM   Tube Status Infusing 5/24/2017  3:30 AM   Action Taken Feed set changed 5/24/2017  3:30 AM   Drainage Description Tan 5/23/2017  4:00 PM   Gastric Residual (mL) 0 ml 5/24/2017  3:30 AM   Tube Feeding/Formula Options Twocal HN 5/24/2017  3:30 AM   Tube Feeding/Verify Rate (mL/hr) 50 5/24/2017  3:30 AM   Water Flush Volume (mL) 30 mL 5/24/2017  3:30 AM   Intake (ml) 50 ml 5/23/2017 10:00 PM   Medication Volume 40 ml 5/23/2017  5:19 PM   Output (ml) 0 ml 5/24/2017  3:30 AM                Peripheral IV 05/20/17 Left Forearm (Active)   Site Assessment Clean, dry, & intact 5/24/2017 12:53 AM   Phlebitis Assessment 0 5/24/2017 12:53 AM   Infiltration Assessment 0 5/24/2017 12:53 AM   Dressing Status Clean, dry, & intact 5/24/2017 12:53 AM   Dressing Type Transparent;Tape 5/24/2017 12:53 AM   Hub Color/Line Status Pink 5/24/2017 12:53 AM   Action Taken Open ports on tubing capped 5/23/2017  8:00 PM   Alcohol Cap Used Yes 5/23/2017  8:00 PM          Rectal Tube (Active)   Site Assessment Clean, dry, & intact 5/23/2017  8:00 PM   Drainage Description Brown 5/23/2017  8:00 PM   Drainage Chamber Level (ml) 550 ml 5/23/2017  8:00 PM   Output (ml) 225 ml 5/23/2017  6:55 PM       Condom Catheter (Active)   Criteria for Appropriate Use Strict I/Os 5/23/2017  8:00 PM   Status Other (comment) 5/23/2017  8:00 PM   Site Condition No abnormalities 5/23/2017  8:00 PM   Drainage Tube Clipped to Bed Yes 5/23/2017  8:00 PM   Catheter Secured to Thigh Yes 5/23/2017  8:00 PM   Tamper Seal Intact No 5/23/2017  8:00 PM   Bag Below Bladder/Not on Floor Yes 5/23/2017  8:00 PM   Lack of Dependent Loop in Tubing Yes 5/23/2017  8:00 PM   Drainage Bag Less Than Half Full Yes 5/23/2017  8:00 PM   Sterile Solution Used for  Irrigation N/A 5/23/2017  8:00 PM   Urine Output (mL) 300 ml 5/24/2017  6:46 AM      Airway - Tracheostomy Tube 05/12/17 (Active)   Site Assessment Drainage (comment) 5/24/2017  3:30 AM   Trach Dressing Changed Yes 5/24/2017  3:30 AM   Trach Cleaned With 1/2 strength peroxide 5/24/2017  3:30 AM   Trach Tie Changed No 5/24/2017  3:30 AM   Trach Cannula Changed No 5/24/2017  3:30 AM   Inner Cannula #8 Stan 5/24/2017  3:30 AM   Line James Top of the lock 5/23/2017  4:00 PM   Side Secured Centered 5/24/2017  3:30 AM   Cyrena [place] at Bedside Yes 5/24/2017  3:30 AM   Spare Trach Tube One Size Smaller at Bedside Yes 5/24/2017  3:30 AM   Ambu Bag With Mask at Bedside Yes 5/24/2017  3:30 AM              Urinary Catheter Condom Catheter-Criteria for Appropriate Use: Strict I/Os    Intake & Output   Date 05/23/17 0700 - 05/24/17 0659 05/24/17 0700 - 05/25/17 0659   Shift 2000-4873 8944-4128 24 Hour Total 0700-1859 1900-0659 24 Hour Total   I  N  T  A  K  E   NG/GT 9632 728 2274         Water Flush Volume (mL) (Feeding Tube) 750 150 900         Medication Volume (Feeding Tube) 590  590         Intake (ml) (Feeding Tube) 550 200 750       Shift Total  (mL/kg) 1890  (20.1) 350  (3.7) 2240  (23.9)      O  U  T  P  U  T   Urine  (mL/kg/hr) 100  (0.1) 650  (0.6) 750  (0.3)         Urine Output (mL) (Condom Catheter) 100 650 750       Emesis/NG output 0 0 0         Emesis Occurrence(s) 1 x  1 x         Output (ml) (Feeding Tube) 0 0 0       Drains 550  550         Output (ml) (Rectal Tube) 550  550       Shift Total  (mL/kg) 650  (6.9) 650  (6.9) 1300  (13.8)      NET 1240 -300 940      Weight (kg) 93.9 93.9 93.9 93.9 93.9 93.9         Readmission Risk Assessment Tool Score Low Risk            6       Total Score        4 More than 1 Admission in calendar year    2 Charlson Comorbidity Score        Criteria that do not apply:    Relationship with PCP    Patient Living Status    Patient Length of Stay > 5    Patient Insurance is Medicare, Medicaid or Self Pay       Expected Length of Stay 4d 4h   Actual Length of Stay 5

## 2017-05-24 NOTE — PROGRESS NOTES
Billee Dates from [facility] informed CM that referral was received. [place] was following patient at [facility].    Patient will require authorization. CM will continue to follow.     [address] [facility]  Ext [phone]

## 2017-05-24 NOTE — PROGRESS NOTES
PT Note:  Chart reviewed and cleared by nursing. Patient was having difficulty breathing and was coughing/gagging. Oxygen sat dropped to 85% on 28% trach collar. Notified nursing of increased secretions in mouth and around trach. Deferred PT treatment due to above distress. Will follow up tomorrow.       Total time spent 10 minutes

## 2017-05-25 ENCOUNTER — APPOINTMENT (OUTPATIENT)
Dept: GENERAL RADIOLOGY | Age: 57
DRG: 091 | End: 2017-05-25
Attending: NURSE PRACTITIONER
Payer: COMMERCIAL

## 2017-05-25 LAB
GLUCOSE BLD STRIP.AUTO-MCNC: 103 MG/DL (ref 65–100)
GLUCOSE BLD STRIP.AUTO-MCNC: 110 MG/DL (ref 65–100)
GLUCOSE BLD STRIP.AUTO-MCNC: 147 MG/DL (ref 65–100)
GLUCOSE BLD STRIP.AUTO-MCNC: 175 MG/DL (ref 65–100)
GLUCOSE BLD STRIP.AUTO-MCNC: 96 MG/DL (ref 65–100)
GLUCOSE BLD STRIP.AUTO-MCNC: 97 MG/DL (ref 65–100)
SERVICE CMNT-IMP: ABNORMAL
SERVICE CMNT-IMP: NORMAL
SERVICE CMNT-IMP: NORMAL

## 2017-05-25 PROCEDURE — 77010033678 HC OXYGEN DAILY

## 2017-05-25 PROCEDURE — 74011636637 HC RX REV CODE- 636/637: Performed by: INTERNAL MEDICINE

## 2017-05-25 PROCEDURE — 77030010545

## 2017-05-25 PROCEDURE — 74011250637 HC RX REV CODE- 250/637: Performed by: INTERNAL MEDICINE

## 2017-05-25 PROCEDURE — 74011250637 HC RX REV CODE- 250/637: Performed by: EMERGENCY MEDICINE

## 2017-05-25 PROCEDURE — 74011250637 HC RX REV CODE- 250/637: Performed by: NURSE PRACTITIONER

## 2017-05-25 PROCEDURE — 74011250636 HC RX REV CODE- 250/636: Performed by: INTERNAL MEDICINE

## 2017-05-25 PROCEDURE — 65660000000 HC RM CCU STEPDOWN

## 2017-05-25 PROCEDURE — 51798 US URINE CAPACITY MEASURE: CPT

## 2017-05-25 PROCEDURE — 74011250637 HC RX REV CODE- 250/637: Performed by: HOSPITALIST

## 2017-05-25 PROCEDURE — 77010026065 HC OXYGEN MINIMUM MEDICAL AIR

## 2017-05-25 PROCEDURE — 74022 RADEX COMPL AQT ABD SERIES: CPT

## 2017-05-25 PROCEDURE — 82962 GLUCOSE BLOOD TEST: CPT

## 2017-05-25 RX ORDER — GUAIFENESIN 100 MG/5ML
400 SOLUTION ORAL ONCE
Status: COMPLETED | OUTPATIENT
Start: 2017-05-25 | End: 2017-05-25

## 2017-05-25 RX ORDER — TRIAMCINOLONE ACETONIDE 5 MG/G
OINTMENT TOPICAL 2 TIMES DAILY
Status: DISCONTINUED | OUTPATIENT
Start: 2017-05-25 | End: 2017-05-25

## 2017-05-25 RX ORDER — GUAIFENESIN/DEXTROMETHORPHAN 100-10MG/5
10 SYRUP ORAL 4 TIMES DAILY
Status: DISPENSED | OUTPATIENT
Start: 2017-05-25 | End: 2017-05-30

## 2017-05-25 RX ORDER — FLUOCINONIDE 0.5 MG/G
OINTMENT TOPICAL 2 TIMES DAILY
Status: DISCONTINUED | OUTPATIENT
Start: 2017-05-25 | End: 2017-05-25 | Stop reason: CLARIF

## 2017-05-25 RX ORDER — FLUOCINONIDE 0.5 MG/G
CREAM TOPICAL 2 TIMES DAILY
Status: DISCONTINUED | OUTPATIENT
Start: 2017-05-25 | End: 2017-06-03 | Stop reason: HOSPADM

## 2017-05-25 RX ADMIN — Medication 10 ML: at 14:36

## 2017-05-25 RX ADMIN — CARVEDILOL 3.12 MG: 3.12 TABLET, FILM COATED ORAL at 17:29

## 2017-05-25 RX ADMIN — GUAIFENESIN 400 MG: 100 SOLUTION ORAL at 09:53

## 2017-05-25 RX ADMIN — NYSTATIN 500000 UNITS: 100000 SUSPENSION ORAL at 09:54

## 2017-05-25 RX ADMIN — Medication 10 ML: at 21:24

## 2017-05-25 RX ADMIN — METFORMIN HYDROCHLORIDE 500 MG: 500 TABLET, FILM COATED ORAL at 09:54

## 2017-05-25 RX ADMIN — NYSTATIN 500000 UNITS: 100000 SUSPENSION ORAL at 14:35

## 2017-05-25 RX ADMIN — FAMOTIDINE 20 MG: 20 TABLET ORAL at 09:54

## 2017-05-25 RX ADMIN — VALSARTAN 40 MG: 80 TABLET ORAL at 09:54

## 2017-05-25 RX ADMIN — GUAIFENESIN AND DEXTROMETHORPHAN 10 ML: 100; 10 SYRUP ORAL at 14:35

## 2017-05-25 RX ADMIN — FUROSEMIDE 20 MG: 10 INJECTION, SOLUTION INTRAMUSCULAR; INTRAVENOUS at 21:23

## 2017-05-25 RX ADMIN — GUAIFENESIN 400 MG: 100 SOLUTION ORAL at 04:04

## 2017-05-25 RX ADMIN — NYSTATIN 500000 UNITS: 100000 SUSPENSION ORAL at 17:29

## 2017-05-25 RX ADMIN — METFORMIN HYDROCHLORIDE 500 MG: 500 TABLET, FILM COATED ORAL at 17:29

## 2017-05-25 RX ADMIN — PRASUGREL HYDROCHLORIDE 10 MG: 10 TABLET, FILM COATED ORAL at 09:54

## 2017-05-25 RX ADMIN — INSULIN LISPRO 2 UNITS: 100 INJECTION, SOLUTION INTRAVENOUS; SUBCUTANEOUS at 07:06

## 2017-05-25 RX ADMIN — FUROSEMIDE 20 MG: 10 INJECTION, SOLUTION INTRAMUSCULAR; INTRAVENOUS at 09:54

## 2017-05-25 RX ADMIN — ENOXAPARIN SODIUM 40 MG: 40 INJECTION SUBCUTANEOUS at 14:35

## 2017-05-25 RX ADMIN — ATORVASTATIN CALCIUM 40 MG: 40 TABLET, FILM COATED ORAL at 21:23

## 2017-05-25 RX ADMIN — OXYCODONE HYDROCHLORIDE 5 MG: 100 SOLUTION ORAL at 01:37

## 2017-05-25 RX ADMIN — GUAIFENESIN AND DEXTROMETHORPHAN 10 ML: 100; 10 SYRUP ORAL at 21:22

## 2017-05-25 RX ADMIN — FLUOCINONIDE: 0.5 CREAM TOPICAL at 18:04

## 2017-05-25 RX ADMIN — GUAIFENESIN AND DEXTROMETHORPHAN 10 ML: 100; 10 SYRUP ORAL at 17:29

## 2017-05-25 RX ADMIN — LEVOFLOXACIN 750 MG: 5 INJECTION, SOLUTION INTRAVENOUS at 09:53

## 2017-05-25 RX ADMIN — INSULIN LISPRO 2 UNITS: 100 INJECTION, SOLUTION INTRAVENOUS; SUBCUTANEOUS at 01:08

## 2017-05-25 RX ADMIN — FAMOTIDINE 20 MG: 20 TABLET ORAL at 17:29

## 2017-05-25 RX ADMIN — ACETAMINOPHEN 650 MG: 325 SOLUTION ORAL at 03:36

## 2017-05-25 RX ADMIN — ASPIRIN 81 MG CHEWABLE TABLET 81 MG: 81 TABLET CHEWABLE at 09:54

## 2017-05-25 RX ADMIN — CARVEDILOL 3.12 MG: 3.12 TABLET, FILM COATED ORAL at 09:54

## 2017-05-25 RX ADMIN — ACETAMINOPHEN 650 MG: 325 SOLUTION ORAL at 21:46

## 2017-05-25 RX ADMIN — NYSTATIN 500000 UNITS: 100000 SUSPENSION ORAL at 21:22

## 2017-05-25 RX ADMIN — Medication 10 ML: at 06:58

## 2017-05-25 NOTE — PROGRESS NOTES
Hospitalist Progress Note    NAME: Jeyson Chacon   :  1960   MRN:  [id number]       Interim Hospital Summary: 56 y.o. male whom presented on 2017 with      Assessment / Plan:  Tube feeding was stopped when we received the reports of emesis w/ tube feeding content in the morning. Also, the patient was c/o LUQ pain. Ordered portable chest and abdomen x-ray which have not done at this time. Spoke with radiology department. Informed those will be done later today. Advised the nursing staff continue to hold tube feeding for now. We will consider ABD CT after review the plain ABD image tomorrow. Pt's daughter commented on rash on left anterior cervical site; unable to appreciate induration or erythema. Pt's daughter insisted that he has been scratching a lot. No excoriated marks on exam.  Informed the daughter, she may apply hydrocortisone cream as need. Pt's daughter pointed out 0.5mm dark red caden on right side, tip of his tongue. No bleeding or swelling. Informed the daughter that no action required at this time. PRN mouth care which can be done by the family members as well as nursing staff. Anoxic brain injury following cardiac arrest in [place] >1 month ago  - outside MRI brain with abnormalities in basal ganglia and bilateral temporal lobes  - has right>left sided weakness; follow commends when instruction was given his native language [place])  - s/p PEG and trach ; pt reached the tube feeding goal   - no Neurology consult at this point, pt is at his base line      S/p out of hospital cardiac arrest 17, POA  CAD s/p CABG 2014 x 3v at HD  Hx cardiac stents (twice before CABG and once after)  HTN  Severe Anemia POA   - Hgb 12.2 on ;  Received 2 units of PRBC on  for hgb 6.8  - hospitalized at [facility] in [facility]; transferred to [facility]  and to [facility]   - cardiology following; continue with BB, effient, statin, ARB,and ASA  - Echo (); EF 30-35%; moderate diffuse hypokinesis; mild-mod MR; mild-mod TR     - per Dr. Bahena Friend had stenosis of circumflex anastomosis graft, PTCA -->restenosis -->stent-->restenosis and redo bypass recommended but patient seeked second opinion with Dr. Lenora Cortez in [place] and reportedly told redo bypass not needed.       Hx aspiration PNA during intubation For cardiac arrest  Chronic respiratory failure, s/p trach  - Pulmonology following;   - treated with invanz in [place]. Today CXR clear   - continue with Levo; Sputum Cx from 5/20 grew Serratia Marcescens, acinetobacter, & klebsiella pneumoniae which are sensitive to Levo  - currently on trach collar (vent removed on 5/21)  - trach tube changed on 5/24. #6 distal XLT trach 95mm in length; currently inflated until reflux/coughing/nausea issue have resolved. Continue with routine trach care  - pulmonary toilet      Vomiting during transport to [facility] with question of recurrent aspiration  - empiric started on zosyn and vanc at [facility].   - Repeat CXR neg      Diarrhea  - C Diff negative in [place] recently prior to transfer. Repeat C. Diff done at [facility] yesterday  - has rectal tube. May be related to tube feed.      DM 2  - SSI for now      Lice infestation  - S/p treatment on 5/2; still on isolation. At this point, we would prefer epidemiology team get involved before removing the isolation. Pt was treated for lice but his caregivers & family members have not been checked for the lice.      Code: full  DVT prophylaxis: lovenox  Surrogate decision maker: Wife \"Savanna\" Daniella Northern [phone], 2 son & daughter Andrés Ruiz) # [phone]          Body mass index is 28.87 kg/(m^2).     Recommended Disposition: inpatient rehab has been consulted       Subjective:     Chief Complaint / Reason for Physician Visit  Pt makes eye contact, but nonverbal. Family members assist with communication. Discussed with RN events overnight.      Review of Systems:  Symptom Y/N Comments  Symptom Y/N Comments   Fever/Chills Chest Pain     Poor Appetite    Edema     Cough    Abdominal Pain     Sputum    Joint Pain     SOB/HAM    Pruritis/Rash     Nausea/vomit    Tolerating PT/OT     Diarrhea    Tolerating Diet     Constipation    Other       Could NOT obtain due to:      Objective:     VITALS:   Last 24hrs VS reviewed since prior progress note. Most recent are:  Patient Vitals for the past 24 hrs:   Temp Pulse Resp BP SpO2   05/25/17 1729 - 99 - 109/66 -   05/25/17 1616 98.2 °F (36.8 °C) 97 20 100/63 98 %   05/25/17 1113 98.2 °F (36.8 °C) 92 20 101/65 98 %   05/25/17 0945 98.1 °F (36.7 °C) 98 20 131/77 100 %   05/25/17 0405 99.4 °F (37.4 °C) (!) 104 20 126/71 98 %   05/25/17 0111 98.5 °F (36.9 °C) 99 20 97/60 98 %   05/24/17 2143 100.1 °F (37.8 °C) (!) 108 20 114/64 98 %   05/24/17 1944 (!) 100.5 °F (38.1 °C) 96 24 111/66 100 %       Intake/Output Summary (Last 24 hours) at 05/25/17 1738  Last data filed at 05/25/17 1202   Gross per 24 hour   Intake             1579 ml   Output             1050 ml   Net              529 ml        PHYSICAL EXAM:  General: WD, WN. Alert, cooperative, no acute distress    EENT:  EOMI. Anicteric sclerae. MMM  Resp:  Decreased breath sounds, a few rhonchi, clears with suction, no wheezing or rales. No accessory muscle use  CV:  Regular  rhythm,  No edema  GI:  Soft, Non distended, LUQ tender.  +Bowel sounds  Neurologic:  Alert and follows commends when family provides the direction, non verbal  Psych:   Poor insight. Not anxious nor agitated  Skin:  No rashes.   No jaundice    Reviewed most current lab test results and cultures  YES  Reviewed most current radiology test results   YES  Review and summation of old records today    NO  Reviewed patient's current orders and MAR    YES  PMH/SH reviewed - no change compared to H&P  ________________________________________________________________________  Care Plan discussed with:    Comments   Patient y    Family  y    RN y    Care Manager y    Consultant  y  Venessa enciso Multidiciplinary team rounds were held today with , nursing, pharmacist and clinical coordinator. Patient's plan of care was discussed; medications were reviewed and discharge planning was addressed. ________________________________________________________________________  Total NON critical care TIME:  45  Minutes    Total CRITICAL CARE TIME Spent:   Minutes non procedure based      Comments   >50% of visit spent in counseling and coordination of care     ________________________________________________________________________  Mahad Marie NP     Procedures: see electronic medical records for all procedures/Xrays and details which were not copied into this note but were reviewed prior to creation of Plan. LABS:  I reviewed today's most current labs and imaging studies.   Pertinent labs include:  Recent Labs      05/24/17   0304  05/23/17   0450   WBC  12.1*  12.9*   HGB  12.2  12.2   HCT  38.4  38.5   PLT  312  340     Recent Labs      05/24/17   0304  05/23/17   0450   NA  134*  136   K  3.7  3.9   CL  95*  96*   CO2  29  32   GLU  114*  204*   BUN  22*  24*   CREA  0.75  0.80   CA  9.6  8.8   MG  2.2   --    PHOS  3.1   --    ALB  2.4*   --    TBILI  0.7   --    SGOT  28   --    ALT  46   --        Signed: )Linnea Szymanski, NP

## 2017-05-25 NOTE — PROGRESS NOTES
MDR - Cm reviewed family's preference for discharge planning. They have informed [facility] representative that their preference is [facility]. Reviewed request with PT.  PT is recommending inpatient rehab at this time. Adriana Weaver NP gave verbal consent to place [facility] consult. CM will send referral to [facility] and inform [facility] of families preference. 1329 - CM informed Demi at [facility] of patients preference for [facility]. [facility] will withdraw their request for authorization.      [address], RN CM  Ext [phone]

## 2017-05-25 NOTE — PROCEDURES
Pt with existing percutaneous tracheostomy tube in place now ready for initial trach change. Now has a # 8  trach. Secretions were removed by endotracheal catheter suctioning. Ambu bag was available for use. Sutures were removed. Cuff was deflated. The existing tube was removed with mild resistance. Stoma well healed but tunnel not well visualized. There was minimal bleeding. A distal  # 6 XLTcuffed Shiley trach inserted using stylet without difficulty. Sats were maintained by spontaneous efforts. Tracheostomy tube was secured with ties. Pt noted to have mild oral regurgitation of stomach contents. Cuff was inflated. Suction catheter passed again to remove residual secretions. No GI contents seen. Trach collar in place. Pt tolerated this well.        Osvaldo Fajardo MD

## 2017-05-25 NOTE — PROGRESS NOTES
Came to see patient and discussed with RN. Patient with vomiting this morning, TFs now held. After this occurred, cuff reinflated after communication with MD and RT. She reported it remains inflated at this time. Given this, will hold off on tx as he has met goals for comprehension and remaining goals of SLP are focused on PMV use. Thanks.      Meghann Meehan MS CCC-SLP

## 2017-05-25 NOTE — PROGRESS NOTES
[address] [address]  [phone]      Cardiology Progress Note      5/25/2017 1445    Admit Date: 5/19/2017    Admit Diagnosis:   Cardiac Arrest  Respiratory failure ([facility]    Subjective:     Sharon Connor  is a 56 y.o. male with PMH HTN, HLD, DM, CAD s/p CABG (2014) who was transferred from OSH where he had been admitted s/p cardiac arrest 4/28. Overnight events:  -VSS - intermittent tachycardia and mild fever overnight   -no labs  -see notes that patient vomited earlier today.    -Mr. Debbie Ramos opens his eyes, nods, and follows basic commands. Limited English comprehension and no family at bedside currently. No acute need for  during assessment. Patient endorses abd pain today.       Visit Vitals    /65 (BP 1 Location: Right arm)    Pulse 92    Temp 98.2 °F (36.8 °C)    Resp 20    Ht 5' 11\" (1.803 m)    Wt 93.9 kg (207 lb 0.2 oz)    SpO2 98%    BMI 28.87 kg/m2       Current Facility-Administered Medications   Medication Dose Route Frequency    guaiFENesin-dextromethorphan (ROBITUSSIN DM) 100-10 mg/5 mL syrup 10 mL  10 mL Per G Tube QID    [START ON 5/26/2017] pantoprazole (PROTONIX) 40 mg in sodium chloride 0.9 % 10 mL injection  40 mg IntraVENous ACB    fluocinoNIDE (LIDEX) 0.05 % cream   Topical BID    levoFLOXacin (LEVAQUIN) 750 mg in D5W IVPB  750 mg IntraVENous Q24H    nystatin (MYCOSTATIN) 100,000 unit/mL oral suspension 500,000 Units  500,000 Units Oral QID    metFORMIN (GLUCOPHAGE) tablet 500 mg  500 mg Oral BID WITH MEALS    valsartan (DIOVAN) tablet 40 mg  40 mg Oral DAILY    furosemide (LASIX) injection 20 mg  20 mg IntraVENous BID    0.9% sodium chloride infusion 250 mL  250 mL IntraVENous PRN    insulin lispro (HUMALOG) injection   SubCUTAneous Q6H    glucose chewable tablet 16 g  4 Tab Oral PRN    dextrose 10% infusion 125-250 mL  125-250 mL IntraVENous PRN    glucagon (GLUCAGEN) injection 1 mg  1 mg IntraMUSCular PRN    enoxaparin (LOVENOX) injection 40 mg  40 mg SubCUTAneous Q24H    sodium chloride (NS) flush 5-10 mL  5-10 mL IntraVENous Q8H    sodium chloride (NS) flush 5-10 mL  5-10 mL IntraVENous PRN    ondansetron (ZOFRAN) injection 4 mg  4 mg IntraVENous Q6H PRN    famotidine (PEPCID) tablet 20 mg  20 mg Per G Tube BID    albuterol-ipratropium (DUO-NEB) 2.5 MG-0.5 MG/3 ML  3 mL Nebulization Q4H PRN    carvedilol (COREG) tablet 3.125 mg  3.125 mg Per G Tube BID WITH MEALS    prasugrel (EFFIENT) tablet 10 mg  10 mg Per G Tube DAILY    aspirin chewable tablet 81 mg  81 mg Per G Tube DAILY    atorvastatin (LIPITOR) tablet 40 mg  40 mg Per G Tube QHS    oxyCODONE (ROXICODONE INTENSOL) 20 mg/mL concentrated solution 5 mg  5 mg Per G Tube Q4H PRN    acetaminophen (TYLENOL) solution 650 mg  650 mg Per G Tube Q4H PRN    lidocaine (LIDODERM) 5 % patch 1 Patch  1 Patch TransDERmal Q24H       Objective:      Physical Exam:  General: obese, [place] [facility] male, resting in bed in NAD. Alert and interactive. Heart: RRR, distant heart sounds   Lungs: diminished; trach; Abdomen: Soft, +BS, NTND   Extremities: LE chan +DP/PT, no edema   Neurologic: trached; Nods head. Moves LUE and BLE to command. Now with slight movement to RUE as well.         Data Review:   Recent Labs      05/24/17   0304  05/23/17   0450   WBC  12.1*  12.9*   HGB  12.2  12.2   HCT  38.4  38.5   PLT  312  340     Recent Labs      05/24/17   0304  05/23/17   0450   NA  134*  136   K  3.7  3.9   CL  95*  96*   CO2  29  32   GLU  114*  204*   BUN  22*  24*   CREA  0.75  0.80   CA  9.6  8.8   MG  2.2   --    PHOS  3.1   --    ALB  2.4*   --    TBILI  0.7   --    SGOT  28   --    ALT  46   --        No results for input(s): TROIQ, CPK, CKMB in the last 72 hours.       Intake/Output Summary (Last 24 hours) at 05/25/17 1459  Last data filed at 05/25/17 1202   Gross per 24 hour   Intake             1579 ml   Output             1200 ml   Net              379 ml Telemetry: SR-ST; rare PVC  ECG: NSR; artifact   Echocardiogram: EF 30-35%; moderate diffuse hypokinesis; mild-mod MR; mild-mod TR  CXRAY: no acute process     Assessment:     Principal Problem:    Anoxic brain injury ([facility] [address] (5/20/2017)    Active Problems:    Coronary artery disease of bypass graft with stable angina pectoris ([facility] [address] (10/5/2016)      Essential hypertension (10/5/2016)      Dyslipidemia (10/5/2016)      Type 2 diabetes mellitus without complication ([facility] [address] (45/5/4126)      H/O cardiac arrest (5/19/2017)      Overview: 4/28/17:  OSH in [place]      Respiratory failure ([facility] (5/19/2017)      Pneumonia due to Serratia marcescens ([facility] (7/20/7566)      Lice infestation (4/93/9601)      Cardiomyopathy ([facility] (5/22/2017)        Plan:     Cardiomyopathy: EF 30-35% s/p cardiac arrest 4/28 and history of extensive CAD. OSH ECHO had a higher EF, but patient was on pressors at that time. Likely ischemic cardiomyopathy due to history and HPI vs damage from prolonged cardiac arrest.    · Continue ASA, BB, statin for CAD  · Continue BB, ARB, lasix: for cardiomyopathy   · Further ischemic evaluation based on patient progression and determination whether patient could have realistic benefits with further interventions with his complicated CAD history. Dr. Riri Hong has been trying to reach Dr. Ky Sung to discuss. No changes in recommendations today. See that patient had vomiting today and TF are on hold. Also ABD pain.     -per hospitalist       Blank Bradley, HITESH  DNP, RN, AGACNP-BC

## 2017-05-25 NOTE — PROGRESS NOTES
46 Spoke with Dr. Khoi Goodman directly regarding patient's episode of vomiting what appeared to be tube feeding despite no residual from PEG. Order obtained to hold tube feeding until further notice. Discussed this with on coming nurse, Kenneth Willard RN.

## 2017-05-25 NOTE — PROGRESS NOTES
[facility] Consult Service Progress NOTE  Pulmonary, Critical Care, and Sleep Medicine    Name: Jeremy Michelle MRN: [id number]   : 1960 Hospital: [facility]   Date: 2017  Admission Date: 2017 old #8 percutaneous trach removed and #6 distal XLT placed. MIld reflux so cuff inflated. Suctioning of new trach negative for aspiration. Sputum culture reviewed     cough all night. Emesis this AM but cannot tell if cough related. Discussed management of trach with nurse and then daughter at bedside. Cough aggravated by bronchitis, trach ( but downsizing will help) reflux and cough     IMPRESSION:   · Acute respiratory failure with hypoxia- has tolerated trach collar and does not reallyneed O2  · Polymicrobial GN tracheobronchitis- Moderate serration, light Acinetobacter and scant Klebsiella; all sensitive to Levaquin- new trach placed 17  · S/p tracheostomy for prolonged vent dependence, looking at his trach on CXR,am concerned that a #6 trach will be too short!!! Pt needs extra long trach and explained to daughter at bedside that more downsizing is not likely unless custom long trach is used. Hopefully he will continue to progress and meet his milestones as listed below  · Dysphagia s/p PEG, appreciate speech path assistance.    · Diarrhea C diff negative in [place]  · Anoxic brain injury- some cognitive recovery but significant motor deficits; CT and MRI done at [address]-  Injury to basal ganglia and temporal lobes  · S/p cardiac arrest, s/p hypothermia protocol  · ASCVD  · H/o CABG [facility] 2014 and in  had another cath showing complex anatomy; has successful laser atherectomy PTCCA and stenting of the distal anastomotic site of the vein graft with Promus ROBERT; chest pain persisted; pt offered cath 2016 and possbile referral for another CABg at [facility] but evidently declined  · HTN  · Diabetes Mellitus  · Left ear deafness POA  · Language barrier- limited English but family available, complicated with trach and recent DORA  · Never smoker  · Anemia  · Discussed with nurse and pts Daughter. Discussed with speech Therapy. RECOMMENDATIONS/PLAN:   · PCU monitoring  · antitussive  · Trach collar as tolerated for humidification  · # 6 distal XLT trach is 95 mm in length  · Keep trach inflated until reflux resolves( and cough) this will minimize risk of accidental aspiration  · spoke with daughter again at bedside; he must meet many milestones before trach can be removed safely: secretions, oxygenation, speech, swallow, cough and assess possibility of sleep apnea- His beard may pose a problem with keeping risk of recurrent tracheobronchitits down and may impair his ability to use CPAP mask  · Continue with routine trach care.    · Blood transfusion prn   · Continue tube feedings in AM as tolerated  · Glucose control  · abx   · Pulmonary toilet  · PT, OT consults  · DVT prophylaxis     Code: Full Code          Vital Signs: Intake/Output: Intake/Output:   Visit Vitals    /65 (BP 1 Location: Right arm)    Pulse 92    Temp 98.2 °F (36.8 °C)    Resp 20    Ht 5' 11\" (1.803 m)    Wt 93.9 kg (207 lb 0.2 oz)    SpO2 98%    BMI 28.87 kg/m2      O2 Device: Tracheal collar  O2 Flow Rate (L/min): 5 l/min  Temp (24hrs), Av °F (37.2 °C), Min:97.9 °F (36.6 °C), Max:100.5 °F (38.1 °C)     Intake/Output Summary (Last 24 hours) at 17 1200  Last data filed at 17 1945   Gross per 24 hour   Intake             2329 ml   Output              550 ml   Net             1779 ml    Last shift:         Last 3 shifts: 1901 -  0700  In: 2689 [I.V.:150]  Out: 1200 [Urine:1200]         Telemetry:    normal sinus rhythm    Physical Exam:    General: large, obese [place] male well developed and well nourished, in no respiratory distress and acyanotic   HEENT: NCAT, bearded; no thrush? limited mouth opening,moist   Neck: No abnormally enlarged lymph nodes.  Trach #8   Chest: normal   Lungs: decreased air exchange bilaterally   Heart: Regular rate and rhythm or S1S2 present   Abdomen: soft, non-tender, without masses or organomegaly, PEG   :    Extremity: negative, cyanosis, clubbing;    Neuro: brighter; opens eyes and EOMI; tongue midline; will follow simple commands and now moving his left side more    Psych: restless   Skin: dry        DATA:    MAR reviewed and pertinent medications noted or modified as needed    MEDS:   Current Facility-Administered Medications   Medication    levoFLOXacin (LEVAQUIN) 750 mg in D5W IVPB    nystatin (MYCOSTATIN) 100,000 unit/mL oral suspension 500,000 Units    metFORMIN (GLUCOPHAGE) tablet 500 mg    valsartan (DIOVAN) tablet 40 mg    furosemide (LASIX) injection 20 mg    guaiFENesin (ROBITUSSIN) 100 mg/5 mL oral liquid 400 mg    0.9% sodium chloride infusion 250 mL    insulin lispro (HUMALOG) injection    glucose chewable tablet 16 g    dextrose 10% infusion 125-250 mL    glucagon (GLUCAGEN) injection 1 mg    enoxaparin (LOVENOX) injection 40 mg    sodium chloride (NS) flush 5-10 mL    sodium chloride (NS) flush 5-10 mL    ondansetron (ZOFRAN) injection 4 mg    famotidine (PEPCID) tablet 20 mg    albuterol-ipratropium (DUO-NEB) 2.5 MG-0.5 MG/3 ML    carvedilol (COREG) tablet 3.125 mg    prasugrel (EFFIENT) tablet 10 mg    aspirin chewable tablet 81 mg    atorvastatin (LIPITOR) tablet 40 mg    oxyCODONE (ROXICODONE INTENSOL) 20 mg/mL concentrated solution 5 mg    acetaminophen (TYLENOL) solution 650 mg    lidocaine (LIDODERM) 5 % patch 1 Patch          Labs:  Recent Labs      05/24/17   0304  05/23/17   0450   WBC  12.1*  12.9*   HGB  12.2  12.2   PLT  312  340     Recent Labs      05/24/17   0304  05/23/17   0450   NA  134*  136   K  3.7  3.9   CL  95*  96*   CO2  29  32   GLU  114*  204*   BUN  22*  24*   CREA  0.75  0.80   CA  9.6  8.8   MG  2.2   --    PHOS  3.1   --    ALB  2.4*   -- SGOT  28   --    ALT  46   --      No results for input(s): PH, PCO2, PO2, HCO3, FIO2 in the last 72 hours. No results for input(s): PH, PCO2, PO2, HCO3, FIO2 in the last 72 hours. No results for input(s): CPK, CKNDX, TROIQ in the last 72 hours. No lab exists for component: CPKMB    Lab Results   Component Value Date/Time    Iron 34 05/20/2017 01:14 AM    TIBC 229 05/20/2017 01:14 AM    Iron % saturation 15 05/20/2017 01:14 AM    Ferritin 191 05/20/2017 01:14 AM       No results found for: SR, CRP, THIEN, ANAIGG, RA, RPR, RPRT, VDRLT, VDRLS, TSH, TSHEXT, TSHEXT     Lab Results   Component Value Date/Time    CK 52 05/21/2017 07:10 PM    CK-MB Index 2.9 05/21/2017 07:10 PM    Troponin-I, Qt. <0.04 05/21/2017 07:10 PM        Lab Results   Component Value Date/Time    Culture result: MODERATE  SERRATIA MARCESCENS   05/20/2017 03:43 AM    Culture result: LIGHT  ACINETOBACTER BAUMANNII/HAEMOLYTICUS   05/20/2017 03:43 AM    Culture result: SCANT  KLEBSIELLA PNEUMONIAE   05/20/2017 03:43 AM    Culture result: NO NORMAL RESPIRATORY BETTE ISOLATED 05/20/2017 03:43 AM       No results found for: TOXA1, RPR, HBCM, HBSAG, HAAB, HCAB, HCAB1, HAAT, G6PD, CRYAC, HIVGT, HIVR, HIV1, HIV12, HIVPC, HIVRPI    Lab Results   Component Value Date/Time    CK 52 05/21/2017 07:10 PM       No results found for: COLOR, APPRN, SPGRU, JOHANNE, PROTU, GLUCU, KETU, BILU, BLDU, UROU, DILAN, LEUKU, WBCU, RBCU, UEPI, BACTU, CASTS, UCRY    Imaging:  [x]                        personally reviewed the patients radiographs and reports:clear      Results from [place] encounter on 05/19/17   XR CHEST PORT   Narrative INDICATION:  Respiratory distress. Tracheostomy. EXAM: CXR Portable. FINDINGS: Portable chest shows stable appearance including tracheostomy since  May 22. There is no apparent pneumothorax. Lungs show no acute findings. Heart  size is normal. There is prior CABG. There is no pulmonary edema.          Impression IMPRESSION: No acute finding or significant change. No results found for this or any previous visit.     Alberto Xavier MD

## 2017-05-25 NOTE — PROGRESS NOTES
.    PCU SHIFT NURSING NOTE      Bedside shift change report given to Bo Brown (oncoming nurse) by Tomer Hernandez RN (offgoing nurse). Report included the following information SBAR, Kardex, Intake/Output, MAR and Recent Results. Shift Summary:   0800- Night nurse suctioned patient. Patient vomited tube feeding. She notified Dr. Adonay Downey. Tube feeding remains held. [address] of bladder scan results from 0700. Stated to give am lasix dose and bladder scan patient after he voids. 80- Daughter states patient is complaining of indigestion. No medication ordered. Notified Hidalgo. She will be in to assess patient soon. 1145- Spoke to Chris Amaral in infection control. Stated that patient can come off isolation in no lice are seen. He was treated on Sunday. 1200- Condom cath draining. Post void residual 106. Speech was here and will see patient tomorrow due to coughing and secretions. 1500- Patient bathed bathed. Daughter concerned about PT seeing today. Admission Date 5/19/2017   Admission Diagnosis Cardiac Arrest  Respiratory failure ([facility]   Consults IP CONSULT TO CARDIOLOGY        Consults   [x]PT   [x]OT   [x]Speech   [x]Case Management      [] Palliative      Cardiac Monitoring Order   [x]Yes   []No     IV drips   []Yes    Drip:                            Dose:  Drip:                            Dose:  Drip:                            Dose:   [x]No     GI Prophylaxis   [x]Yes   []No         DVT Prophylaxis   SCDs:             Alfred stockings:         [x] Medication   []Contraindicated   []None      Activity Level Activity Level: Bed Rest     Activity Assistance: Complete care   Purposeful Rounding every 1-2 hour?    [x]Yes   Valencia Score  Total Score: 4   Bed Alarm (If score 3 or >)   []Yes   [] Refused (See signed refusal form in chart)   Ulices Score  Ulices Score: 13   Ulices Score (if score 14 or less)   []PMT consult   []Wound Care consult      []Specialty bed   [] Nutrition consult          Needs prior to discharge:   Home O2 required:    [x]Yes   []No    If yes, how much O2 required?     Other:    Last Bowel Movement: Last Bowel Movement Date: 05/24/17 (flexiseal)      Influenza Vaccine          Pneumonia Vaccine           Diet Active Orders   Diet    DIET NPO      LDAs Feeding Tube (Active)   Site Assessment Clean, dry, & intact 5/25/2017  1:15 AM   Dressing Status Clean, dry, & intact 5/25/2017  1:15 AM   Dressing Type Gauze 5/25/2017  1:15 AM   Tube Status Infusing 5/25/2017  1:15 AM   Action Taken Placement verified (comment) 5/25/2017  1:15 AM   Drainage Description Tan 5/23/2017  4:00 PM   Gastric Residual (mL) 0 ml 5/25/2017  1:15 AM   Tube Feeding/Formula Options Twocal HN 5/25/2017  1:15 AM   Tube Feeding/Verify Rate (mL/hr) 50 5/25/2017  1:15 AM   Water Flush Volume (mL) 500 mL 5/24/2017  7:44 PM   Intake (ml) 929 ml 5/24/2017  7:44 PM   Medication Volume 100 ml 5/24/2017 10:10 AM   Output (ml) 0 ml 5/24/2017  3:30 AM                Peripheral IV 05/20/17 Left Forearm (Active)   Site Assessment Clean, dry, & intact 5/25/2017  1:15 AM   Phlebitis Assessment 0 5/25/2017  1:15 AM   Infiltration Assessment 0 5/25/2017  1:15 AM   Dressing Status Clean, dry, & intact 5/25/2017  1:15 AM   Dressing Type Tape;Transparent 5/25/2017  1:15 AM   Hub Color/Line Status Pink;Capped 5/25/2017  1:15 AM   Action Taken Open ports on tubing capped 5/23/2017  8:00 PM   Alcohol Cap Used No 5/24/2017  7:45 PM          Rectal Tube (Active)   Site Assessment Clean, dry, & intact 5/24/2017  4:34 PM   Drainage Description Brown 5/24/2017  4:34 PM   Drainage Chamber Level (ml) 550 ml 5/23/2017  8:00 PM   Output (ml) 225 ml 5/23/2017  6:55 PM       Condom Catheter (Active)   Criteria for Appropriate Use Strict I/Os 5/24/2017  4:34 PM   Status Other (comment) 5/24/2017  4:34 PM   Site Condition No abnormalities 5/24/2017  4:34 PM   Drainage Tube Clipped to Bed Yes 5/24/2017  4:34 PM   Catheter Secured to Thigh Yes 5/24/2017  4:34 PM Tamper Seal Intact No 5/24/2017  4:34 PM   Bag Below Bladder/Not on Floor Yes 5/24/2017  4:34 PM   Lack of Dependent Loop in Tubing Yes 5/24/2017  4:34 PM   Drainage Bag Less Than Half Full Yes 5/24/2017  4:34 PM   Sterile Solution Used for  Irrigation N/A 5/24/2017  4:34 PM   Urine Output (mL) 400 ml 5/24/2017  7:45 PM      Airway - Tracheostomy Tube 05/12/17 (Active)   Site Assessment Clean, dry, & intact 5/25/2017  1:15 AM   Trach Dressing Changed Yes 5/25/2017  1:15 AM   Trach Cleaned With 1/2 strength peroxide 5/25/2017  1:15 AM   Trach Tie Changed No 5/25/2017  1:15 AM   Trach Cannula Changed No 5/25/2017  1:15 AM   Inner Cannula #6 Shiley 5/25/2017  1:15 AM   Line James Top of the lock 5/23/2017  4:00 PM   Side Secured Centered 5/25/2017  1:15 AM   Spare Trach at Bedside Yes 5/25/2017  1:15 AM   Spare Francee Oliveira Tube One Size Smaller at Bedside Yes 5/25/2017  1:15 AM   Ambu Bag With Mask at Bedside Yes 5/25/2017  1:15 AM              Urinary Catheter Condom Catheter-Criteria for Appropriate Use: Strict I/Os    Intake & Output   Date 05/24/17 0700 - 05/25/17 0659 05/25/17 0700 - 05/26/17 0659   Shift 8577-3595 5909-0058 24 Hour Total 0700-1859 1900-0659 24 Hour Total   I  N  T  A  K  E   I.V.  (mL/kg/hr) 150  (0.1)  150  (0.1)         Volume (levoFLOXacin (LEVAQUIN) 750 mg in D5W IVPB) 150  150       NG/ 1429 2279         Water Flush Volume (mL) (Feeding Tube) 300 500 800         Medication Volume (Feeding Tube) 100  100         Intake (ml) (Feeding Tube)        Shift Total  (mL/kg) 1000  (10.6) 1429  (15.2) 2429  (25.9)      O  U  T  P  U  T   Urine  (mL/kg/hr) 150  (0.1) 400  (0.4) 550  (0.2)         Urine Occurrence(s)  1 x 1 x         Urine Output (mL) (Condom Catheter) 150 400 550       Shift Total  (mL/kg) 150  (1.6) 400  (4.3) 550  (5.9)       1029 1879      Weight (kg) 93.9 93.9 93.9 93.9 93.9 93.9         Readmission Risk Assessment Tool Score Low Risk            9       Total Score        3 Patient Length of Stay > 5    4 More than 1 Admission in calendar year    2 Charlson Comorbidity Score        Criteria that do not apply:    Relationship with PCP    Patient Living Status    Patient Insurance is Medicare, Medicaid or Self Pay       Expected Length of Stay 4d 4h   Actual Length of Stay 6

## 2017-05-26 ENCOUNTER — APPOINTMENT (OUTPATIENT)
Dept: CT IMAGING | Age: 57
DRG: 091 | End: 2017-05-26
Attending: NURSE PRACTITIONER
Payer: COMMERCIAL

## 2017-05-26 LAB
ANION GAP BLD CALC-SCNC: 10 MMOL/L (ref 5–15)
BASOPHILS # BLD AUTO: 0.1 K/UL (ref 0–0.1)
BASOPHILS # BLD: 0 % (ref 0–1)
BUN SERPL-MCNC: 31 MG/DL (ref 6–20)
BUN/CREAT SERPL: 34 (ref 12–20)
CALCIUM SERPL-MCNC: 9 MG/DL (ref 8.5–10.1)
CHLORIDE SERPL-SCNC: 95 MMOL/L (ref 97–108)
CO2 SERPL-SCNC: 30 MMOL/L (ref 21–32)
CREAT SERPL-MCNC: 0.92 MG/DL (ref 0.7–1.3)
EOSINOPHIL # BLD: 0.2 K/UL (ref 0–0.4)
EOSINOPHIL NFR BLD: 2 % (ref 0–7)
ERYTHROCYTE [DISTWIDTH] IN BLOOD BY AUTOMATED COUNT: 16.7 % (ref 11.5–14.5)
GLUCOSE BLD STRIP.AUTO-MCNC: 104 MG/DL (ref 65–100)
GLUCOSE BLD STRIP.AUTO-MCNC: 116 MG/DL (ref 65–100)
GLUCOSE BLD STRIP.AUTO-MCNC: 96 MG/DL (ref 65–100)
GLUCOSE SERPL-MCNC: 105 MG/DL (ref 65–100)
HCT VFR BLD AUTO: 38.7 % (ref 36.6–50.3)
HGB BLD-MCNC: 12 G/DL (ref 12.1–17)
LYMPHOCYTES # BLD AUTO: 17 % (ref 12–49)
LYMPHOCYTES # BLD: 2.2 K/UL (ref 0.8–3.5)
MCH RBC QN AUTO: 26.6 PG (ref 26–34)
MCHC RBC AUTO-ENTMCNC: 31 G/DL (ref 30–36.5)
MCV RBC AUTO: 85.8 FL (ref 80–99)
MONOCYTES # BLD: 1.2 K/UL (ref 0–1)
MONOCYTES NFR BLD AUTO: 10 % (ref 5–13)
NEUTS SEG # BLD: 8.9 K/UL (ref 1.8–8)
NEUTS SEG NFR BLD AUTO: 71 % (ref 32–75)
PLATELET # BLD AUTO: 270 K/UL (ref 150–400)
POTASSIUM SERPL-SCNC: 3.5 MMOL/L (ref 3.5–5.1)
RBC # BLD AUTO: 4.51 M/UL (ref 4.1–5.7)
SERVICE CMNT-IMP: ABNORMAL
SERVICE CMNT-IMP: ABNORMAL
SERVICE CMNT-IMP: NORMAL
SODIUM SERPL-SCNC: 135 MMOL/L (ref 136–145)
WBC # BLD AUTO: 12.6 K/UL (ref 4.1–11.1)

## 2017-05-26 PROCEDURE — 74011250637 HC RX REV CODE- 250/637: Performed by: INTERNAL MEDICINE

## 2017-05-26 PROCEDURE — 95816 EEG AWAKE AND DROWSY: CPT | Performed by: PSYCHIATRY & NEUROLOGY

## 2017-05-26 PROCEDURE — 97112 NEUROMUSCULAR REEDUCATION: CPT

## 2017-05-26 PROCEDURE — 74011000250 HC RX REV CODE- 250: Performed by: NURSE PRACTITIONER

## 2017-05-26 PROCEDURE — 65660000000 HC RM CCU STEPDOWN

## 2017-05-26 PROCEDURE — 82962 GLUCOSE BLOOD TEST: CPT

## 2017-05-26 PROCEDURE — 74011250636 HC RX REV CODE- 250/636: Performed by: INTERNAL MEDICINE

## 2017-05-26 PROCEDURE — 74011250636 HC RX REV CODE- 250/636: Performed by: NURSE PRACTITIONER

## 2017-05-26 PROCEDURE — 74011250637 HC RX REV CODE- 250/637: Performed by: HOSPITALIST

## 2017-05-26 PROCEDURE — 74011250637 HC RX REV CODE- 250/637: Performed by: NURSE PRACTITIONER

## 2017-05-26 PROCEDURE — 36415 COLL VENOUS BLD VENIPUNCTURE: CPT | Performed by: NURSE PRACTITIONER

## 2017-05-26 PROCEDURE — 77030018798 HC PMP KT ENTRL FED COVD -A

## 2017-05-26 PROCEDURE — 77030018861

## 2017-05-26 PROCEDURE — 74011250636 HC RX REV CODE- 250/636: Performed by: HOSPITALIST

## 2017-05-26 PROCEDURE — 77010033678 HC OXYGEN DAILY

## 2017-05-26 PROCEDURE — 97530 THERAPEUTIC ACTIVITIES: CPT

## 2017-05-26 PROCEDURE — 85025 COMPLETE CBC W/AUTO DIFF WBC: CPT | Performed by: NURSE PRACTITIONER

## 2017-05-26 PROCEDURE — 80048 BASIC METABOLIC PNL TOTAL CA: CPT | Performed by: NURSE PRACTITIONER

## 2017-05-26 PROCEDURE — 77030018626 HC TU TRACH CUF3 COVD -B

## 2017-05-26 PROCEDURE — 74011636320 HC RX REV CODE- 636/320: Performed by: HOSPITALIST

## 2017-05-26 PROCEDURE — C9113 INJ PANTOPRAZOLE SODIUM, VIA: HCPCS | Performed by: NURSE PRACTITIONER

## 2017-05-26 PROCEDURE — 74177 CT ABD & PELVIS W/CONTRAST: CPT

## 2017-05-26 PROCEDURE — 77010026065 HC OXYGEN MINIMUM MEDICAL AIR

## 2017-05-26 RX ORDER — SODIUM CHLORIDE 9 MG/ML
50 INJECTION, SOLUTION INTRAVENOUS
Status: COMPLETED | OUTPATIENT
Start: 2017-05-26 | End: 2017-06-01

## 2017-05-26 RX ORDER — BARIUM SULFATE 20 MG/ML
900 SUSPENSION ORAL
Status: DISCONTINUED | OUTPATIENT
Start: 2017-05-26 | End: 2017-05-26

## 2017-05-26 RX ORDER — SODIUM CHLORIDE 0.9 % (FLUSH) 0.9 %
10 SYRINGE (ML) INJECTION
Status: COMPLETED | OUTPATIENT
Start: 2017-05-26 | End: 2017-05-26

## 2017-05-26 RX ADMIN — LEVOFLOXACIN 750 MG: 5 INJECTION, SOLUTION INTRAVENOUS at 09:10

## 2017-05-26 RX ADMIN — SODIUM CHLORIDE 40 MG: 9 INJECTION INTRAMUSCULAR; INTRAVENOUS; SUBCUTANEOUS at 06:22

## 2017-05-26 RX ADMIN — PRASUGREL HYDROCHLORIDE 10 MG: 10 TABLET, FILM COATED ORAL at 09:16

## 2017-05-26 RX ADMIN — FUROSEMIDE 20 MG: 10 INJECTION, SOLUTION INTRAMUSCULAR; INTRAVENOUS at 09:10

## 2017-05-26 RX ADMIN — NYSTATIN 500000 UNITS: 100000 SUSPENSION ORAL at 12:03

## 2017-05-26 RX ADMIN — NYSTATIN 500000 UNITS: 100000 SUSPENSION ORAL at 21:07

## 2017-05-26 RX ADMIN — ENOXAPARIN SODIUM 40 MG: 40 INJECTION SUBCUTANEOUS at 14:31

## 2017-05-26 RX ADMIN — GUAIFENESIN AND DEXTROMETHORPHAN 10 ML: 100; 10 SYRUP ORAL at 09:10

## 2017-05-26 RX ADMIN — FLUOCINONIDE: 0.5 CREAM TOPICAL at 18:39

## 2017-05-26 RX ADMIN — SODIUM CHLORIDE 40 MG: 9 INJECTION INTRAMUSCULAR; INTRAVENOUS; SUBCUTANEOUS at 09:37

## 2017-05-26 RX ADMIN — ATORVASTATIN CALCIUM 40 MG: 40 TABLET, FILM COATED ORAL at 21:07

## 2017-05-26 RX ADMIN — Medication 10 ML: at 06:21

## 2017-05-26 RX ADMIN — FLUOCINONIDE: 0.5 CREAM TOPICAL at 09:14

## 2017-05-26 RX ADMIN — GUAIFENESIN AND DEXTROMETHORPHAN 10 ML: 100; 10 SYRUP ORAL at 21:06

## 2017-05-26 RX ADMIN — Medication 10 ML: at 14:32

## 2017-05-26 RX ADMIN — ASPIRIN 81 MG CHEWABLE TABLET 81 MG: 81 TABLET CHEWABLE at 09:13

## 2017-05-26 RX ADMIN — Medication 10 ML: at 22:00

## 2017-05-26 RX ADMIN — IOPAMIDOL 100 ML: 755 INJECTION, SOLUTION INTRAVENOUS at 15:26

## 2017-05-26 RX ADMIN — NYSTATIN 500000 UNITS: 100000 SUSPENSION ORAL at 09:13

## 2017-05-26 RX ADMIN — VALSARTAN 40 MG: 80 TABLET ORAL at 09:11

## 2017-05-26 RX ADMIN — METFORMIN HYDROCHLORIDE 500 MG: 500 TABLET, FILM COATED ORAL at 09:11

## 2017-05-26 RX ADMIN — GUAIFENESIN AND DEXTROMETHORPHAN 10 ML: 100; 10 SYRUP ORAL at 18:37

## 2017-05-26 RX ADMIN — Medication 10 ML: at 15:26

## 2017-05-26 RX ADMIN — NYSTATIN 500000 UNITS: 100000 SUSPENSION ORAL at 18:37

## 2017-05-26 RX ADMIN — Medication 10 ML: at 09:18

## 2017-05-26 RX ADMIN — FUROSEMIDE 20 MG: 10 INJECTION, SOLUTION INTRAMUSCULAR; INTRAVENOUS at 21:06

## 2017-05-26 RX ADMIN — CARVEDILOL 3.12 MG: 3.12 TABLET, FILM COATED ORAL at 09:14

## 2017-05-26 RX ADMIN — CARVEDILOL 3.12 MG: 3.12 TABLET, FILM COATED ORAL at 18:37

## 2017-05-26 RX ADMIN — SODIUM CHLORIDE 50 ML/HR: 900 INJECTION, SOLUTION INTRAVENOUS at 15:27

## 2017-05-26 NOTE — PROGRESS NOTES
[facility] Consult Service Progress NOTE  Pulmonary, Critical Care, and Sleep Medicine    Name: Ericka Myrick MRN: [id number]   : 1960 Hospital: [facility]   Date: 2017  Admission Date: 2017 old #8 percutaneous trach removed and #6 distal XLT placed. MIld reflux so cuff inflated. Suctioning of new trach negative for aspiration. Sputum culture reviewed     cough all night. Emesis this AM but cannot tell if cough related. Discussed management of trach with nurse and then daughter at bedside. Cough aggravated by bronchitis, trach ( but downsizing will help) reflux and cough      more reflux and not tolerating TF. Less cough? Family at bedside. For abdominal CT scan. Trach intact. Now on antibiotics. No fever    IMPRESSION:   · Acute respiratory failure with hypoxia- has tolerated trach collar and does not really need O2  · Polymicrobial GN tracheobronchitis- Moderate serratia, light Acinetobacter and scant Klebsiella; all sensitive to Levaquin- new trach placed 17  · S/p tracheostomy for prolonged vent dependence, looking at his trach on CXR,am concerned that a #6 trach will be too short!!! Pt needs extra long trach and explained to daughter at bedside that more downsizing is not likely unless custom long trach is used. Hopefully he will continue to progress and meet his milestones as listed below  · GERD with heartburn  · Dysphagia s/p PEG, appreciate speech path assistance.    · Anoxic brain injury- some cognitive recovery but significant motor deficits; CT and MRI done at [address]-  Injury to basal ganglia and temporal lobes  · S/p cardiac arrest, s/p hypothermia protocol  · ASCVD  · H/o CABG [facility] 2014 and in  had another cath showing complex anatomy; has successful laser atherectomy PTCCA and stenting of the distal anastomotic site of the vein graft with Promus ROBERT; chest pain persisted; pt offered cath 2016 and azalea referral for another CABg at [facility] but evidently declined  · HTN  · Diabetes Mellitus  · Left ear deafness POA  · Language barrier- limited English but family available, complicated with trach and recent DORA  · Never smoker  · Anemia  · Discussed with nurse and pts Daughter. Discussed with speech Therapy. RECOMMENDATIONS/PLAN:   · PCU monitoring  · Antitussive  · IV abx  · Trach collar as tolerated for humidification  · # 6 distal XLT trach is 95 mm in length and trach should be changed monthly  · Keep trach inflated until reflux resolves( and cough) this will minimize risk of accidental aspiration  · spoke with daughter again at bedside; he must meet many milestones before trach can be removed safely: secretions, oxygenation, speech, swallow, cough and assess possibility of sleep apnea- His beard was trimmed but may pose a problem with keeping risk of recurrent tracheobronchitits down and may impair his ability to use CPAP mask  · Continue with routine trach care.    · Blood transfusion prn   · Continue tube feedings in AM as tolerated  · Glucose control   · Pulmonary toilet  · PT, OT consults  · DVT prophylaxis  · Will check Back after long weekend or sooner prn     Code: Full Code          Vital Signs: Intake/Output: Intake/Output:   Visit Vitals    /58 (BP 1 Location: Left arm, BP Patient Position: At rest)    Pulse 89    Temp 98.7 °F (37.1 °C)    Resp 20    Ht 5' 11\" (1.803 m)    Wt 93.9 kg (207 lb 0.2 oz)    SpO2 98%    BMI 28.87 kg/m2      O2 Device: Tracheal collar  O2 Flow Rate (L/min): 5 l/min  Temp (24hrs), Av.5 °F (36.9 °C), Min:98.2 °F (36.8 °C), Max:99.1 °F (37.3 °C)     Intake/Output Summary (Last 24 hours) at 17 1231  Last data filed at 17 0610   Gross per 24 hour   Intake              620 ml   Output              450 ml   Net              170 ml    Last shift:         Last 3 shifts: 1901 -  0700  In:  [I.V.:20]  Out: 1500 [Urine:1500]         Telemetry:    normal sinus rhythm    Physical Exam:    General: large, obese [place] male well developed and well nourished, in no respiratory distress and acyanotic   HEENT: NCAT, bearded; no thrush? limited mouth opening,moist   Neck: No abnormally enlarged lymph nodes.  Trach #8   Chest: normal   Lungs: decreased air exchange bilaterally   Heart: Regular rate and rhythm or S1S2 present   Abdomen: soft, non-tender, without masses or organomegaly, PEG   :    Extremity: negative, cyanosis, clubbing;    Neuro: brighter; opens eyes and EOMI; tongue midline; will follow simple commands and now moving his left side more    Psych: restless   Skin: dry        DATA:    MAR reviewed and pertinent medications noted or modified as needed    MEDS:   Current Facility-Administered Medications   Medication    guaiFENesin-dextromethorphan (ROBITUSSIN DM) 100-10 mg/5 mL syrup 10 mL    pantoprazole (PROTONIX) 40 mg in sodium chloride 0.9 % 10 mL injection    fluocinoNIDE (LIDEX) 0.05 % cream    levoFLOXacin (LEVAQUIN) 750 mg in D5W IVPB    nystatin (MYCOSTATIN) 100,000 unit/mL oral suspension 500,000 Units    metFORMIN (GLUCOPHAGE) tablet 500 mg    valsartan (DIOVAN) tablet 40 mg    furosemide (LASIX) injection 20 mg    0.9% sodium chloride infusion 250 mL    insulin lispro (HUMALOG) injection    glucose chewable tablet 16 g    dextrose 10% infusion 125-250 mL    glucagon (GLUCAGEN) injection 1 mg    enoxaparin (LOVENOX) injection 40 mg    sodium chloride (NS) flush 5-10 mL    sodium chloride (NS) flush 5-10 mL    ondansetron (ZOFRAN) injection 4 mg    famotidine (PEPCID) tablet 20 mg    albuterol-ipratropium (DUO-NEB) 2.5 MG-0.5 MG/3 ML    carvedilol (COREG) tablet 3.125 mg    prasugrel (EFFIENT) tablet 10 mg    aspirin chewable tablet 81 mg    atorvastatin (LIPITOR) tablet 40 mg    oxyCODONE (ROXICODONE INTENSOL) 20 mg/mL concentrated solution 5 mg    acetaminophen (TYLENOL) solution 650 mg    lidocaine (LIDODERM) 5 % patch 1 Patch          Labs:  Recent Labs      05/26/17   0313  05/24/17   0304   WBC  12.6*  12.1*   HGB  12.0*  12.2   PLT  270  312     Recent Labs      05/26/17   0313  05/24/17   0304   NA  135*  134*   K  3.5  3.7   CL  95*  95*   CO2  30  29   GLU  105*  114*   BUN  31*  22*   CREA  0.92  0.75   CA  9.0  9.6   MG   --   2.2   PHOS   --   3.1   ALB   --   2.4*   SGOT   --   28   ALT   --   46     No results for input(s): PH, PCO2, PO2, HCO3, FIO2 in the last 72 hours. No results for input(s): PH, PCO2, PO2, HCO3, FIO2 in the last 72 hours. No results for input(s): CPK, CKNDX, TROIQ in the last 72 hours.     No lab exists for component: CPKMB    Lab Results   Component Value Date/Time    Iron 34 05/20/2017 01:14 AM    TIBC 229 05/20/2017 01:14 AM    Iron % saturation 15 05/20/2017 01:14 AM    Ferritin 191 05/20/2017 01:14 AM       No results found for: SR, CRP, THIEN, ANAIGG, RA, RPR, RPRT, VDRLT, VDRLS, TSH, TSHEXT, TSHEXT     Lab Results   Component Value Date/Time    CK 52 05/21/2017 07:10 PM    CK-MB Index 2.9 05/21/2017 07:10 PM    Troponin-I, Qt. <0.04 05/21/2017 07:10 PM        Lab Results   Component Value Date/Time    Culture result: MODERATE  SERRATIA MARCESCENS   05/20/2017 03:43 AM    Culture result: LIGHT  ACINETOBACTER BAUMANNII/HAEMOLYTICUS   05/20/2017 03:43 AM    Culture result: SCANT  KLEBSIELLA PNEUMONIAE   05/20/2017 03:43 AM    Culture result: NO NORMAL RESPIRATORY BETTE ISOLATED 05/20/2017 03:43 AM       No results found for: TOXA1, RPR, HBCM, HBSAG, HAAB, HCAB, HCAB1, HAAT, G6PD, CRYAC, HIVGT, HIVR, HIV1, HIV12, HIVPC, HIVRPI    Lab Results   Component Value Date/Time    CK 52 05/21/2017 07:10 PM       No results found for: COLOR, APPRN, SPGRU, JOHANNE, PROTU, GLUCU, KETU, BILU, BLDU, UROU, DILAN, LEUKU, WBCU, RBCU, UEPI, BACTU, CASTS, UCRY    Imaging:  [x]                        personally reviewed the patients radiographs and reports:clear      Results from Hospital Encounter encounter on 05/19/17   XR ABD ACUTE W 1 V CHEST   Narrative EXAM:  XR ABD ACUTE W 1 V CHEST    INDICATION:  Abdominal pain    COMPARISON:  Chest radiograph 5/24/2017. FINDINGS:  3 supine and upright views of the abdomen were obtained. A gastrostomy tube projected at the proximal stomach is present. Slight gaseous distention of the bowel with a nonspecific distribution is seen. No free intraperitoneal air is appreciated. Degenerative change of the spine is noted. A semierect view of the chest was obtained. Median sternotomy sutures are present. A tracheostomy tube is seen in place  The heart is normal in size. The lungs are clear. No consolidation or pulmonary edema is evident. Impression IMPRESSION:    Nonspecific bowel gas pattern. No pneumonia or CHF. No results found for this or any previous visit.     Ella Goodman MD

## 2017-05-26 NOTE — PROGRESS NOTES
PCU SHIFT NURSING NOTE      Bedside shift change report given to Rut (oncoming nurse) by Дмитрий Traore (offgoing nurse). Report included the following information SBAR, Kardex, Intake/Output, MAR, Recent Results and Cardiac Rhythm NSR. Shift Summary:   1530 Report received from Evelia Lanes, RN, pt off unit for CT abd. SBAR, Kardex, Intake/Output, MAR, Recent Results or Cardiac Rhythm ST were discussed. Barbara Chenehn assumed care of the pt.  [address] pt returned from [facility]. Suctioned for large amount of sputum upon return, deep suctioning made pt gag and vomit small amount. 1700 CT results wnl, tf resumed @ 10 ml for tonight. Will adv slowly    Admission Date 5/19/2017   Admission Diagnosis Cardiac Arrest  Respiratory failure ([facility] [address]   Consults IP CONSULT TO CARDIOLOGY  IP CONSULT TO NEUROLOGY        Consults   [x]PT   [x]OT   [x]Speech   [x]Case Management      [x] Palliative      Cardiac Monitoring Order   [x]Yes   []No     IV drips   []Yes    Drip:                            Dose:  Drip:                            Dose:  Drip:                            Dose:   [x]No     GI Prophylaxis   [x]Yes   []No         DVT Prophylaxis   SCDs:             Alfred stockings:         [x] Medication   []Contraindicated   []None      Activity Level Activity Level: Bed Rest     Activity Assistance: Complete care   Purposeful Rounding every 1-2 hour? [x]Yes   Valencia Score  Total Score: 4   Bed Alarm (If score 3 or >)   [x]Yes   [] Refused (See signed refusal form in chart)   Ulices Score  Ulices Score: 11   Ulices Score (if score 14 or less)   [x]PMT consult   []Wound Care consult      [x]Specialty bed   [x] Nutrition consult          Needs prior to discharge:   Home O2 required:    [x]Yes   []No    If yes, how much O2 required?     Other:    Last Bowel Movement: Last Bowel Movement Date: 05/26/17      Influenza Vaccine          Pneumonia Vaccine           Diet Active Orders   Diet    DIET NPO      LDAs Feeding Tube (Active)   Site Assessment Clean, dry, & intact 5/26/2017  6:10 AM   Dressing Status Clean, dry, & intact 5/26/2017  6:10 AM   Dressing Type Gauze 5/25/2017  1:15 AM   Tube Status Flushed;Patent 5/26/2017  6:10 AM   Action Taken Placement verified (comment) 5/26/2017  6:10 AM   Drainage Description Clear 5/26/2017  6:10 AM   Gastric Residual (mL) 0 ml 5/25/2017  4:16 PM   Tube Feeding/Formula Options Twocal HN 5/25/2017  1:15 AM   Tube Feeding/Verify Rate (mL/hr) 50 5/25/2017  1:15 AM   Water Flush Volume (mL) 500 mL 5/26/2017  6:10 AM   Intake (ml) 929 ml 5/24/2017  7:44 PM   Medication Volume 100 ml 5/25/2017  4:16 PM   Output (ml) 0 ml 5/24/2017  3:30 AM                Peripheral IV 05/26/17 Right Hand (Active)   Site Assessment Clean;Dry 5/26/2017  3:00 PM   Phlebitis Assessment 0 5/26/2017  3:00 PM   Infiltration Assessment 0 5/26/2017  3:00 PM   Dressing Status Clean, dry, & intact 5/26/2017  3:00 PM   Dressing Type Tape;Transparent 5/26/2017  3:00 PM   Hub Color/Line Status Blue;Capped 5/26/2017  3:00 PM          Rectal Tube (Active)   Site Assessment Clean;Dry 5/26/2017  9:02 AM   Drainage Description Brown 5/26/2017  9:02 AM   Drainage Chamber Level (ml) 550 ml 5/23/2017  8:00 PM   Output (ml) 225 ml 5/23/2017  6:55 PM       Condom Catheter (Active)   Criteria for Appropriate Use Strict I/Os 5/26/2017  9:02 AM   Status Draining 5/26/2017  9:02 AM   Site Condition No abnormalities 5/26/2017  9:02 AM   Drainage Tube Clipped to Bed Yes 5/26/2017  9:02 AM   Catheter Secured to Thigh No 5/26/2017  9:02 AM   Tamper Seal Intact No 5/26/2017  9:02 AM   Bag Below Bladder/Not on Floor Yes 5/26/2017  9:02 AM   Lack of Dependent Loop in Tubing Yes 5/26/2017  9:02 AM   Drainage Bag Less Than Half Full Yes 5/26/2017  9:02 AM   Sterile Solution Used for  Irrigation N/A 5/26/2017  9:02 AM   Urine Output (mL) 300 ml 5/26/2017 11:55 AM      Airway - Tracheostomy Tube 05/12/17 (Active)   Site Assessment Drainage (comment) 5/26/2017  9:02 AM Trach Dressing Changed No 5/26/2017  9:02 AM   Trach Cleaned With 1/2 strength peroxide 5/26/2017  4:00 AM   Trach Tie Changed No 5/26/2017  4:00 AM   Trach Cannula Changed No 5/26/2017  4:00 AM   Inner Cannula #6 Shiley 5/26/2017  9:02 AM   Line James Top of the lock 5/23/2017  4:00 PM   Side Secured Centered 5/26/2017  9:02 AM   Spare Trach at Bedside Yes 5/26/2017  4:00 AM   Spare Clois Doles Tube One Size Smaller at Bedside Yes 5/26/2017  4:00 AM   Ambu Bag With Mask at Bedside Yes 5/26/2017  4:00 AM              Urinary Catheter Condom Catheter-Criteria for Appropriate Use: Strict I/Os    Intake & Output   Date 05/25/17 1900 - 05/26/17 0659 05/26/17 0700 - 05/27/17 0659   Shift 6747-7082 24 Hour Total 1469-4416 3176-5799 24 Hour Total   I  N  T  A  K  E   I.V.  (mL/kg/hr) 20 20         I.V. 20 20       NG/ 600         Water Flush Volume (mL) (Feeding Tube) 500 500         Medication Volume (Feeding Tube)  100       Shift Total  (mL/kg) 520  (5.5) 620  (6.6)      O  U  T  P  U  T   Urine  (mL/kg/hr) 450 1100 300  (0.3)  300      Urine Output (mL) (Condom Catheter) 450 1100 300  300    Shift Total  (mL/kg) 450  (4.8) 1100  (11.7) 300  (3.2)  300  (3.2)   NET 70 -480 -300  -300   Weight (kg) 93.9 93.9 93.9 93.9 93.9         Readmission Risk Assessment Tool Score Low Risk            9       Total Score        3 Patient Length of Stay > 5    4 More than 1 Admission in calendar year    2 Charlson Comorbidity Score        Criteria that do not apply:    Relationship with PCP    Patient Living Status    Patient Insurance is Medicare, Medicaid or Self Pay       Expected Length of Stay 4d 4h   Actual Length of Stay 7              Melanie Bustillos RN

## 2017-05-26 NOTE — PROGRESS NOTES
[address], [place], [address] [facility]  [phone]      Cardiology Progress Note      5/26/2017 10AM    Admit Date: 5/19/2017    Admit Diagnosis:   Cardiac Arrest  Respiratory failure ([facility] [address]    Subjective:     Liza Ren  is a 56 y.o. male with PMH HTN, HLD, DM, CAD s/p CABG (2014) who was transferred from OSH where he had been admitted s/p cardiac arrest 4/28. Overnight events:  -VSS - intermittent tachycardia  -labs stable  -see plans for CT today to further investigate abd pain  -Mr. Karen Barrett opens his eyes, nods, and follows basic commands. Limited English comprehension and no family at bedside currently. No acute need for  during assessment. Patient endorses abd pain again today LUQ.       Visit Vitals    /64 (BP 1 Location: Right arm, BP Patient Position: At rest)    Pulse 98    Temp 98.3 °F (36.8 °C)    Resp 20    Ht 5' 11\" (1.803 m)    Wt 93.9 kg (207 lb 0.2 oz)    SpO2 97%    BMI 28.87 kg/m2       Current Facility-Administered Medications   Medication Dose Route Frequency    guaiFENesin-dextromethorphan (ROBITUSSIN DM) 100-10 mg/5 mL syrup 10 mL  10 mL Per G Tube QID    pantoprazole (PROTONIX) 40 mg in sodium chloride 0.9 % 10 mL injection  40 mg IntraVENous ACB    fluocinoNIDE (LIDEX) 0.05 % cream   Topical BID    levoFLOXacin (LEVAQUIN) 750 mg in D5W IVPB  750 mg IntraVENous Q24H    nystatin (MYCOSTATIN) 100,000 unit/mL oral suspension 500,000 Units  500,000 Units Oral QID    metFORMIN (GLUCOPHAGE) tablet 500 mg  500 mg Oral BID WITH MEALS    valsartan (DIOVAN) tablet 40 mg  40 mg Oral DAILY    furosemide (LASIX) injection 20 mg  20 mg IntraVENous BID    0.9% sodium chloride infusion 250 mL  250 mL IntraVENous PRN    insulin lispro (HUMALOG) injection   SubCUTAneous Q6H    glucose chewable tablet 16 g  4 Tab Oral PRN    dextrose 10% infusion 125-250 mL  125-250 mL IntraVENous PRN    glucagon (GLUCAGEN) injection 1 mg  1 mg IntraMUSCular PRN    enoxaparin (LOVENOX) injection 40 mg  40 mg SubCUTAneous Q24H    sodium chloride (NS) flush 5-10 mL  5-10 mL IntraVENous Q8H    sodium chloride (NS) flush 5-10 mL  5-10 mL IntraVENous PRN    ondansetron (ZOFRAN) injection 4 mg  4 mg IntraVENous Q6H PRN    famotidine (PEPCID) tablet 20 mg  20 mg Per G Tube BID    albuterol-ipratropium (DUO-NEB) 2.5 MG-0.5 MG/3 ML  3 mL Nebulization Q4H PRN    carvedilol (COREG) tablet 3.125 mg  3.125 mg Per G Tube BID WITH MEALS    prasugrel (EFFIENT) tablet 10 mg  10 mg Per G Tube DAILY    aspirin chewable tablet 81 mg  81 mg Per G Tube DAILY    atorvastatin (LIPITOR) tablet 40 mg  40 mg Per G Tube QHS    oxyCODONE (ROXICODONE INTENSOL) 20 mg/mL concentrated solution 5 mg  5 mg Per G Tube Q4H PRN    acetaminophen (TYLENOL) solution 650 mg  650 mg Per G Tube Q4H PRN    lidocaine (LIDODERM) 5 % patch 1 Patch  1 Patch TransDERmal Q24H       Objective:      Physical Exam:  General: obese, [place] [facility] male, resting in bed in NAD. Alert and interactive. Heart: RRR, distant heart sounds   Lungs: diminished; trach; upper airway secretions  Abdomen: Soft, +BS, NTND   Extremities: LE chan +DP/PT, no edema   Neurologic: trached; Nods head. Moves LUE and BLE to command. Now with slight movement to RUE as well.         Data Review:   Recent Labs      05/26/17 0313  05/24/17   0304   WBC  12.6*  12.1*   HGB  12.0*  12.2   HCT  38.7  38.4   PLT  270  312     Recent Labs      05/26/17   0313  05/24/17   0304   NA  135*  134*   K  3.5  3.7   CL  95*  95*   CO2  30  29   GLU  105*  114*   BUN  31*  22*   CREA  0.92  0.75   CA  9.0  9.6   MG   --   2.2   PHOS   --   3.1   ALB   --   2.4*   TBILI   --   0.7   SGOT   --   28   ALT   --   46       No results for input(s): TROIQ, CPK, CKMB in the last 72 hours.       Intake/Output Summary (Last 24 hours) at 05/26/17 1023  Last data filed at 05/26/17 0610   Gross per 24 hour   Intake              620 ml   Output             1100 ml   Net -480 ml        Telemetry: SR-ST; rare PVC  ECG: NSR; artifact   Echocardiogram: EF 30-35%; moderate diffuse hypokinesis; mild-mod MR; mild-mod TR  CXRAY: no acute process     Assessment:     Principal Problem:    Anoxic brain [facility] ([facility] (5/20/2017)    Active Problems:    Coronary artery disease of bypass graft with stable angina pectoris ([facility] (10/5/2016)      Essential hypertension (10/5/2016)      Dyslipidemia (10/5/2016)      Type 2 diabetes mellitus without complication ([facility] (36/1/0523)      H/O cardiac arrest (5/19/2017)      Overview: 4/28/17:  OSH in [place]      Respiratory failure ([facility] (5/19/2017)      Pneumonia due to Serratia marcescens ([facility] (7/03/6435)      Lice infestation (8/73/7649)      Cardiomyopathy ([facility] (5/22/2017)        Plan:     Cardiomyopathy: EF 30-35% s/p cardiac arrest 4/28 and history of extensive CAD. OSH ECHO had a higher EF, but patient was on pressors at that time. Likely ischemic cardiomyopathy due to history and HPI vs damage from prolonged cardiac arrest.    · Continue ASA, BB, statin for CAD  · Continue BB, ARB, lasix: for cardiomyopathy   · Further ischemic evaluation based on patient progression and determination whether patient could have realistic benefits with further interventions with his complicated CAD history. Dr. Jamie Hinds discussed with Dr. Karlie Thompson and medical management was recommended. No changes in recommendations today. ABD work-up per hospitalist.        Brian Wheeler NP  DNP, RN, AGACNP-BC      Pt seen and examined in details. Agree with NP A&P. D/w family.      Ayaka Nunn MD

## 2017-05-26 NOTE — PROGRESS NOTES
MARZENA (daughter) Sangheet asked CM for assistance with completing SSD forms. DONG is unfamiliar with forms. Contacted [facility]. Form sent to CM for signature to report patient's inability to sign. Completed by Vinod Mccain NP. Copy on chart and copy to family. Cm spoke with son and informed him that Radha Collins contact number is with forms provided to Mrs. Karen Barrett.     Crystal Villanueva RN CM  Ext [phone]

## 2017-05-26 NOTE — PROGRESS NOTES
Problem: Self Care Deficits Care Plan (Adult)  Goal: *Acute Goals and Plan of Care (Insert Text)  Occupational Therapy Goals  Initiated 5/20/2017   1. Patient will perform face washing with moderate assistance seated EOB with right UE and verbal cues within 7 day(s). 2. Patient will follow one step simple commands in native language 100 % of the time within 7 days  3. Patient will be able to tolerate rolling in bed with mod assist of 2 and attempt to assist with right UE, with in 7 days. 4. Patient will be able to be able to sit and complete UB dressing danae techniques with mod A for dressing aspect with in 7 days. Initiated 5/20/2017   1. Patient will perform self-feeding with moderate assistance seated in bed with right UE and verbal cues, when pt is cleared to eat within 7 day(s). - DC Patient currently NPO, change to face washing  2. Patient will follow one step simple commands 50 % of the time, with in 7 days UPGRADE  3. Patient will be able to tolerate rolling in bed with max assist of 2 and attempt to assist with right UE, with in 7 days. UPGRADE  4. Patient will be able to be able to sit on EOb for 1 minute with total assist of 2 to 3 persons with in 7 days. UPGRADE  OCCUPATIONAL THERAPY TREATMENT: WEEKLY REASSESSMENT  Patient: Maxi Andrew (56 y.o. male)  Date: 5/26/2017  Diagnosis: Cardiac Arrest  Respiratory failure ([facility] [address] Anoxic brain injury ([facility] [address]       Precautions: Contact, NPO  Chart, occupational therapy assessment, plan of care, and goals were reviewed. ASSESSMENT:  Patient cleared by nursing. Multiple family members in room, all supportive. Daughter present and hypervigilant. Translated for patient. Educated daughter for basic, one step commands and to allow additional time for processing, she needing cuing through out to decrease from multiple steps. Patient with excellent participation and attention to task with basic cues and additional time. Following all commands.   Max A x 2 for bed mob and sit <> stand. Sat EOB over 20 mins with initial min A and decrease assist to CGA- SBA. Once in standing mod A x 2 for two trials one without RW and one with RW. Facilitation for RUE positioning and R knee placement to maximize NMR. Returned to bed and completed caregiver education with daughter. RUE demonstrates mild AROM for pronation/supination and shoulder retraction when forearm is supported. Educated daughter to continue to encourage RUE AROM with basic commands and light assistance. Educated on weight bearing through RUE role in neuroplasticity (eg sitting EOB and therapist facilitating WB) and positioning on pillow tasha shoulder support when in bed. She would benefit from education on providing AAROM in upcoming sessions. Educated daughter on benefit and safety of bed in chair position. Recommend to complete this 2 x/day as tolerated. Encouraged to ask nurse to ensure patient in good position prior to placing bed into chair position. Patient continues to show good improvement in therapy. All goals have been upgraded. Anticipate continued progress. Demonstrates ability to sit EOB over 20 mins with CGA once facilitated in erect posture. Mild increase in RUE AROM, increase in command following and stable BP with changes in position are encouraging for anticipated additional progress with therapy. Patient working towards ability to tolerate 3 hours of therapy a day. Good family support. Progression toward goals:  [X]          Improving appropriately and progressing toward goals  [ ]          Improving slowly and progressing toward goals  [ ]          Not making progress toward goals and plan of care will be adjusted       PLAN:  Goals have been updated based on progression since last assessment. Patient continues to benefit from skilled intervention to address the above impairments. Continue to follow patient 4 times a week to address goals.   Planned Interventions:  [X]                  Self Care Training                  [X]           Therapeutic Activities  [X]                  Functional Mobility Training    [ ]           Cognitive Retraining  [X]                  Therapeutic Exercises           [X]           Endurance Activities  [X]                  Balance Training                   [X]           Neuromuscular Re-Education  [ ]                  Visual/Perceptual Training     [X]      Home Safety Training  [X]                  Patient Education                 [X]           Family Training/Education  [ ]                  Other (comment):  Discharge Recommendations: Rehab  Further Equipment Recommendations for Discharge: TBD rehab       SUBJECTIVE:   Patient nonverbal, smiling at therapist at end of session with command from daughter      OBJECTIVE DATA SUMMARY:   Cognitive/Behavioral Status:  Neurologic State: Alert  Orientation Level: Unable to verbalize  Cognition: Follows commands           Functional Mobility and Transfers for ADLs:              Bed Mobility:  Rolling: Maximum assistance;Assist x2  Supine to Sit: Maximum assistance;Assist x2  Sit to Supine: Maximum assistance;Assist x2  Scooting: Maximum assistance;Assist x2     Transfers:  Sit to Stand: Maximum assistance;Assist x2                    Balance:  Sitting: Impaired; Without support  Sitting - Static: Good (unsupported)  Sitting - Dynamic: Fair (occasional)  Standing: Impaired; With support  Standing - Static: Constant support;Poor  Standing - Dynamic : Poor              Neuro Re-Education:  Daughter present and provided translation. Educated daughter for basic, one step commands and to allow additional time for processing. Occasional cuing to remind daughter of this as she would provide additional cues. Patient with good command following with additional time. Completed bed mob and sit <> stand with max A x 2. Encouraged use of RUE with functional mobility.   Mild pronation and supination and shoulder retraction noted. Sitting EOB with CGA once in erect position. RUE WB for proprioception and joint integrity while EOB and then in standing. Once in standing mod A x 2 for two trials one without RW and one with RW approx 30 seconds. Facilitation for RUE positioning on walker(able to maintain once placed on walker handle) and R knee placement to maximize erect positioning and integration of affected side. HR elevated from 111 to 130s with standing, returned to baseline in stitting. Caregived Educated daughter to continue to encourage patient to complete RUE AROM while in bed. Educated daughter on benefit and safety of bed in chair position. Recommend to complete this 2 x/day as tolerated to increase tolerance and trunk strength in sitting position. Encouraged to ask nurse to ensure patient in good position prior to placing bed into chair position. Pain:  Pain Scale 1: Adult Nonverbal Pain Scale (wife says patient complaining of left abd pain.)  Pain Intensity 1: 0        Activity Tolerance:    BP stable.   Elevated HR with standing, returned with seated rest.      After treatment:   [ ] Patient left in no apparent distress sitting up in chair  [X] Patient left in no apparent distress in bed in chair position  [X] Call bell left within reach  [X] Nursing notified  [X] Caregiver present  [ ] Bed alarm activated      COMMUNICATION/COLLABORATION:   The patients plan of care was discussed with: Physical Therapist, Registered Nurse and Patient's wife, daughter, nephew and sister(all present for session)     Leonila Gómez OT  Time Calculation: 48 mins

## 2017-05-26 NOTE — PROGRESS NOTES
Hospitalist Progress Note    NAME: Leanne Every   :  1960   MRN:  [id number]       Interim Hospital Summary: 56 y.o. male whom presented on 2017 with      Assessment / Plan:  Anoxic brain injury following cardiac arrest in [place] >1 month ago  - outside MRI brain with abnormalities in basal ganglia and bilateral temporal lobes  - has right>left sided weakness; follow commends when instruction was given his native language [place])  - s/p PEG and trach ; pt reached the tube feeding goal but it has been stopped for the past 24 hours due to emesis contained tube feeding content and c/o abdominal pain (was LUQ pain yesterday but mid upper abdomen pain). Chest and abdomen x-rays were normal but continues to have abdominal discomfort. CT of abd w/ contrast to be done per Dr. Ta Lopez  -  Neurology consult done per pt's request      S/p out of hospital cardiac arrest 17, POA  CAD s/p CABG 2014 x 3v at HD  Hx cardiac stents (twice before CABG and once after)  HTN  Severe Anemia POA   - Hgb 12.0 today; Received 2 units of PRBC on  for hgb 6.8  - hospitalized at [facility] in [place], [place]; transferred to [facility]  and to [facility]   - cardiology following; continue with BB, effient, statin, ARB,and ASA  - Echo (); EF 30-35%; moderate diffuse hypokinesis; mild-mod MR; mild-mod TR      - per Dr. Vandana Olson had stenosis of circumflex anastomosis graft, PTCA -->restenosis -->stent-->restenosis and redo bypass recommended but patient seeked second opinion with Dr. Abhinav Ramirez in [place] and reportedly told redo bypass not needed.     GERD  - was on pepcid, we changed to IV protonix yesterday      Hx aspiration PNA during intubation For cardiac arrest  Chronic respiratory failure, s/p trach  - Pulmonology following;   - treated with invanz in [place].  Today CXR clear   - continue with Levo; Sputum Cx from  grew Serratia Marcescens, acinetobacter, & klebsiella pneumoniae which are sensitive to Levo  - currently on trach collar (vent removed on 5/21)  - trach tube changed on 5/24. #6 distal XLT trach 95mm in length; currently inflated until reflux/coughing/nausea issue have resolved. Continue with routine trach care  - pulmonary toilet      Vomiting during transport to [place] with question of recurrent aspiration  - empiric started on zosyn and vanc at [facility].   - Repeat CXR neg      Diarrhea  - C Diff negative in [place] recently prior to transfer. Repeat C. Diff done at [facility] yesterday  - has rectal tube. May be related to tube feed.       DM 2  - SSI for now      Lice infestation  - S/p treatment on 5/2; still on isolation. At this point, we would prefer epidemiology team get involved before removing the isolation. Pt was treated for lice but his caregivers & family members have not been checked for the lice.      Code: full  DVT prophylaxis: lovenox  Surrogate decision maker: Wife \"Savanna\" Daniella Northern [phone], 2 son & daughter Andrés Ruiz) # [phone]          Body mass index is 28.87 kg/(m^2).     Recommended Disposition: inpatient rehab has been consulted       Subjective:     Chief Complaint / Reason for Physician Visit  Pt makes eye contact, but nonverbal. Family members assist with communication. Discussed with RN events overnight. Review of Systems:  Symptom Y/N Comments  Symptom Y/N Comments   Fever/Chills    Chest Pain     Poor Appetite    Edema     Cough    Abdominal Pain     Sputum    Joint Pain     SOB/HAM    Pruritis/Rash     Nausea/vomit    Tolerating PT/OT     Diarrhea    Tolerating Diet     Constipation    Other       Could NOT obtain due to:      Objective:     VITALS:   Last 24hrs VS reviewed since prior progress note.  Most recent are:  Patient Vitals for the past 24 hrs:   Temp Pulse Resp BP SpO2   05/26/17 1300 - 96 - (!) 132/7 -   05/26/17 1155 98.7 °F (37.1 °C) 89 20 105/58 98 %   05/26/17 0857 98.3 °F (36.8 °C) 98 20 114/64 97 %   05/26/17 0253 98.3 °F (36.8 °C) (!) 106 20 137/84 98 % 05/25/17 2352 99.1 °F (37.3 °C) 92 20 109/64 97 %   05/25/17 1942 98.5 °F (36.9 °C) (!) 104 20 112/83 98 %   05/25/17 1729 - 99 - 109/66 -   05/25/17 1616 98.2 °F (36.8 °C) 97 20 100/63 98 %       Intake/Output Summary (Last 24 hours) at 05/26/17 1448  Last data filed at 05/26/17 0610   Gross per 24 hour   Intake              620 ml   Output              450 ml   Net              170 ml        PHYSICAL EXAM:  General: WD, WN. Alert, cooperative, no acute distress    EENT:  EOMI. Anicteric sclerae. MMM  Resp:  Decreased breath sounds, no wheezing or rales. No accessory muscle use  CV:  Regular  rhythm,  No edema  GI:  Soft, Non distended, mid abdominal tenderness today (vs LUQ tenderness yesterday).  +Bowel sounds  Neurologic:  Alert and follows commends when family provides the direction, non verbal  Psych:   Poor insight. Not anxious nor agitated  Skin:  No rashes. No jaundice    Reviewed most current lab test results and cultures  YES  Reviewed most current radiology test results   YES  Review and summation of old records today    NO  Reviewed patient's current orders and MAR    YES  PMH/SH reviewed - no change compared to H&P  ________________________________________________________________________  Care Plan discussed with:    Comments   Patient y    Family  y    RN y    Care Manager y    Consultant                       y Multidiciplinary team rounds were held today with , nursing, pharmacist and clinical coordinator. Patient's plan of care was discussed; medications were reviewed and discharge planning was addressed.      ________________________________________________________________________  Total NON critical care TIME:  30   Minutes    Total CRITICAL CARE TIME Spent:   Minutes non procedure based      Comments   >50% of visit spent in counseling and coordination of care     ________________________________________________________________________  Ileana Henson NP     Procedures: see electronic medical records for all procedures/Xrays and details which were not copied into this note but were reviewed prior to creation of Plan. LABS:  I reviewed today's most current labs and imaging studies.   Pertinent labs include:  Recent Labs      05/26/17 0313 05/24/17 0304   WBC  12.6*  12.1*   HGB  12.0*  12.2   HCT  38.7  38.4   PLT  270  312     Recent Labs      05/26/17 0313 05/24/17 0304   NA  135*  134*   K  3.5  3.7   CL  95*  95*   CO2  30  29   GLU  105*  114*   BUN  31*  22*   CREA  0.92  0.75   CA  9.0  9.6   MG   --   2.2   PHOS   --   3.1   ALB   --   2.4*   TBILI   --   0.7   SGOT   --   28   ALT   --   46       Signed: )Linnea Szymanski, NP

## 2017-05-26 NOTE — CONSULTS
[id number]   [address] [place] [address] Road       Name:  Danilo Toribio   MR#:  [id number]   :  1960   Account #:  [de-identified]    Date of Consultation:  2017   Date of Adm:  2017       CHIEF COMPLAINT:  Possible stroke. HISTORY OF PRESENT ILLNESS:  We were requested to evaluate   this 61-year-old right-handed [place] ([facility]) for evaluation for a brain injury and   an anoxic ischemic injury to prognosticate recovery at the request of Dr. Nadia Hoffmann. The patient   apparently had an arrest on 2017, in [place] when he   was at a rehearsal wedding for dinner with his cousin's daughter. He   went to a [facility]. He vomited during intubation at that time. An echocardiogram showed an EF of 74%. The patient is   unresponsive and hypothermic. An MRI showed abnormal changes in   the basal ganglia and bilateral temporal lobes compatible with anoxic   ischemia there. He gradually returned to be more responsive. He is   still having difficulty with his swallowing. He had to have a   tracheostomy because he could not swallow. He developed some   bilateral weakness of the right side greater than the left side. His MRI   scan apparently done down in [place] about a month ago did show   some abnormalities in the basal ganglia and the bilateral temporal   lobes, and some ischemic injury. The patient does not speak [place]. He does have a PEG tube in position for feeding and a tracheostomy. He has never had a clear history of stroke before this apparently. PAST MEDICAL HISTORY:  He is a diabetic. He has essential   hypertension and hyperlipidemia. He has had previous coronary artery   bypass grafting in the past.  He has had coronary stents. He has had   catheterizations. He has had a PEG and tracheostomy, as mentioned   above. ALLERGIES:  HE HAS AN ALLERGY TO CYMBALTA.   NO OTHER   KNOWN ALLERGIES ARE NOTED. SOCIAL HISTORY:  He apparently is  and has a large family. Of relatives. He apparently does not smoke and does not drink. MEDICATIONS   His medications now include:    1. Aspirin 81 mg down the PEG tube. 2. Lipitor 40 mg down the PEG tube. 3. Coreg 3.25 mg down the PEG tube twice a day. 4. Lidex cream as needed for the skin lesion I think. 5. Lovenox 40 mg subcutaneous once a day. 6  Pepcid 20 mg twice a day. 7. Robitussin-DM down the tube 4 times a day as needed. 8. Sliding scale Humalog insulin on a sliding scale basis. 9. Levaquin 750 mg once a day. 10. Lidoderm patches 75 mg once a day. 11. Metformin 500 mg twice a day. 12. Nystatin down his mouth. 13. Valsartan 40 mg once a day. 14. Effient also at 10 mg a day. 15. Protonix 40 mg a day. FAMILY HISTORY:  His family history is pertinent that his father    of a myocardial infarction at age 62. He had diabetes and   hypertension. No other history is available other than was gotten from   the chart. The patient is noncommunicative and nonverbal.    REVIEW OF SYSTEMS:  Unobtainable from the patient at this time   because he does not speak [place] and he is nonverbal.    PHYSICAL EXAMINATION   VITAL SIGNS:  His vital signs are temperature 98.7 degrees, with a   pulse of 89. Blood pressure was 105/58. His breathing at rest is about   18 times a minute. NEUROLOGIC:  His pupils are equal and reactive to light. Extraocular   muscles are intact. His pupils seem to react to light. His extraocular   motility is intact. They seem to be equal and reactive. His visual fields   are full to confrontation. He has some mild facial asymmetry on the   right side. He does not speak [place]. He will follow commands for   his family. He has a moderate spastic quadriparesis, with the right   side much worse than the left. He is almost flaccid on the right, with   very little movement.   On the left side, he has mild generalized   weakness on the left side. The deep tendon reflexes are somewhat   increased on the right side compared to the left side. Plantar   responses are downgoing bilaterally. He has no clonus present. His   sensory examination to pin is intact. Double simultaneous stimulation   is a little unreliable. He seems to be normal hearing. His muscle tone   is hypotonic on the right side compared to the left side. He has   somewhat decreased size of his muscles symmetrically. He is unable   to speak because he has a tracheostomy tube in place. He is not   ambulatory. He is neat in appearance. He does have a shaved beard   and head. HEENT:  His head, eyes, ears, nose, and throat examination shows a   beard in place. Hair a little prominent. He has no other lesions noted. NECK:  He has a tracheostomy tube in his neck. His neck is supple,   without bruits. CHEST:  Essentially clear, with some rhonchi from the tracheostomy. CARDIAC:  His cardiac exam shows a regular rhythm at this time,   without significant murmurs or gallops. ABDOMEN:  His abdomen is soft. He has a PEG tube in place. There   are no masses or tenderness. BOWEL AND BLADDER:  He has adult diapers on. MUSCULOSKELETAL:  He has some atrophy and wasting of his   muscles and general poor conditioning. No acute swollen joints. VASCULAR:  He has decreased pulses in both feet. No significant   edema. SKIN:  No new rashes. No new lesions noted. SYSTEMIC:  No fever. No meningismus. PSYCHIATRIC:  He looks depressed. He is not able to give   orientation at this time. He cannot speak [place]. IMPRESSION:  A patient with a hypoxic ischemic brain injury, with   significant deficits. The patient's family wants to get a second opinion   about how he has progressed and what his lesions look like. We will   go ahead and order an MRI scan, carotid Dopplers, and then an EEG   for prognosis.   Further treatment and evaluation are pending results   and clinical course. This is obviously a difficult case. Continue   antiplatelets as you are. The prognosis for a meaningful recovery is   somewhat difficult. I do not think he will ever walk again. I do not think   he will ever be normal again for a long time. I cannot be sure. Recovery is   sometimes three to six months and he has most of his brainstem reflexes back. We will follow closely with you. Obtain an MRI. Get a Doppler of the   carotids and an EEG. Further treatment and evaluation are pending   the results and his clinical course. I will discuss with the family after   this. This is a difficult case. Case reviewed with the patient's daughter, in talking to her,   and getting his chart reviewed and going over all of his x-ray and lab results and discussing his prognosis and his diagnosis.         Gracia Chilel MD      TAS / 1967 [address]   D:  05/26/2017   14:54   T:  05/26/2017   17:57   Job #:  [id number]

## 2017-05-26 NOTE — PROGRESS NOTES
Patients family has requested assistance with SS forms. Request submitted to [employer].     Chris Denton RN CM  Ext [phone]

## 2017-05-26 NOTE — PROGRESS NOTES
..    PCU SHIFT NURSING NOTE      Bedside shift change report given to Oni Sloan RN (oncoming nurse) by Dominique Junior RN (offgoing nurse). Report included the following information SBAR, Kardex, Intake/Output, MAR and Recent Results. Shift Summary:   0800- Patient resting in bed. Wife and daughter at bedside. Tube feeding remains off.  1000- Patient scheduled for abdominal CT scan due to continual left abdominal pain per wife. Lala Poster, NP notified of abdominal pain when in to assess patient. Stated to continue to hold tube feedings. Dr. Nicolas Escalante aware aware of issues. Patient remains on contact isolation for lice. Per Lala Poster, NP on yesterday, will not d/c isolation if no lice seen. As per Dr. Navjot Monroy yesterday, will keep trach cuff inflated to protect airway. 1100- Patient vomited 30-40 mins after receiving medications via peg. Daughter concerned about speech and PT seeing patient. IV not working, line attempted without success. PICC team will come. Patient's daughter has concerns about patient speech and PT not seeing patient for a couple of days. She requested to speak to nurse manager. 1430- Dr. Darius Mathias in to assess patient at daughter's request. PT worked with patient today. Informed patient's wife that speech will not be in due to her her consultation with Dr. Navjot Monroy. Several visitors at bedside. Daughter questions everything about patient's care. Nurse Manager in to talk to her. PIV started in right hand. 1515- Patient off unit for abdominal CT scan.   Admission Date 5/19/2017   Admission Diagnosis Cardiac Arrest  Respiratory failure ([address]   Consults IP CONSULT TO CARDIOLOGY        Consults   [x]PT   [x]OT   [x]Speech   [x]Case Management      [] Palliative      Cardiac Monitoring Order   [x]Yes   []No     IV drips   []Yes    Drip:                            Dose:  Drip:                            Dose:  Drip:                            Dose:   [x]No     GI Prophylaxis   [x]Yes   []No         DVT Prophylaxis   SCDs: Alfred stockings:         [x] Medication   []Contraindicated   []None      Activity Level Activity Level: Bed Rest, Head of bed elevated (degrees)     Activity Assistance: Complete care   Purposeful Rounding every 1-2 hour? [x]Yes   Valencia Score  Total Score: 4   Bed Alarm (If score 3 or >)   [x]Yes   [] Refused (See signed refusal form in chart)   Ulices Score  Ulices Score: 13   Ulices Score (if score 14 or less)   []PMT consult   []Wound Care consult      []Specialty bed   [] Nutrition consult          Needs prior to discharge:   Home O2 required:    [x]Yes   []No    If yes, how much O2 required?     Other:    Last Bowel Movement: Last Bowel Movement Date: 05/25/17      Influenza Vaccine          Pneumonia Vaccine           Diet Active Orders   Diet    DIET NPO      LDAs Feeding Tube (Active)   Site Assessment Clean, dry, & intact 5/26/2017  6:10 AM   Dressing Status Clean, dry, & intact 5/26/2017  6:10 AM   Dressing Type Gauze 5/25/2017  1:15 AM   Tube Status Flushed;Patent 5/26/2017  6:10 AM   Action Taken Placement verified (comment) 5/26/2017  6:10 AM   Drainage Description Clear 5/26/2017  6:10 AM   Gastric Residual (mL) 0 ml 5/25/2017  4:16 PM   Tube Feeding/Formula Options Twocal HN 5/25/2017  1:15 AM   Tube Feeding/Verify Rate (mL/hr) 50 5/25/2017  1:15 AM   Water Flush Volume (mL) 500 mL 5/26/2017  6:10 AM   Intake (ml) 929 ml 5/24/2017  7:44 PM   Medication Volume 100 ml 5/25/2017  4:16 PM   Output (ml) 0 ml 5/24/2017  3:30 AM                Peripheral IV 05/20/17 Left Forearm (Active)   Site Assessment Clean, dry, & intact 5/26/2017  9:02 AM   Phlebitis Assessment 0 5/26/2017  9:02 AM   Infiltration Assessment 0 5/26/2017  9:02 AM   Dressing Status Clean;Dry 5/26/2017  9:02 AM   Dressing Type Transparent 5/26/2017  9:02 AM   Hub Color/Line Status Pink;Capped 5/26/2017  9:02 AM   Action Taken Open ports on tubing capped 5/25/2017 10:23 PM   Alcohol Cap Used No 5/25/2017 10:23 PM          Rectal Tube (Active)   Site Assessment Clean, dry, & intact 5/25/2017 10:23 PM   Drainage Description Carol Walker 5/25/2017 10:23 PM   Drainage Chamber Level (ml) 550 ml 5/23/2017  8:00 PM   Output (ml) 225 ml 5/23/2017  6:55 PM       Condom Catheter (Active)   Criteria for Appropriate Use Strict I/Os 5/25/2017 10:23 PM   Status Draining 5/25/2017 10:23 PM   Site Condition No abnormalities 5/25/2017 10:23 PM   Drainage Tube Clipped to Bed Yes 5/25/2017 10:23 PM   Catheter Secured to Thigh Yes 5/25/2017 10:23 PM   Tamper Seal Intact No 5/25/2017 10:23 PM   Bag Below Bladder/Not on Floor Yes 5/25/2017 10:23 PM   Lack of Dependent Loop in Tubing Yes 5/25/2017 10:23 PM   Drainage Bag Less Than Half Full Yes 5/25/2017 10:23 PM   Sterile Solution Used for  Irrigation No 5/25/2017 10:23 PM   Urine Output (mL) 450 ml 5/26/2017  4:06 AM      Airway - Tracheostomy Tube 05/12/17 (Active)   Site Assessment Drainage (comment) 5/26/2017  9:02 AM   Trach Dressing Changed No 5/26/2017  9:02 AM   Trach Cleaned With 1/2 strength peroxide 5/26/2017  4:00 AM   Trach Tie Changed No 5/26/2017  4:00 AM   Trach Cannula Changed No 5/26/2017  4:00 AM   Inner Cannula #6 Stan 5/26/2017  9:02 AM   Line James Top of the lock 5/23/2017  4:00 PM   Side Secured Centered 5/26/2017  9:02 AM   Spare Trach at Bedside Yes 5/26/2017  4:00 AM   Spare Trach Tube One Size Smaller at Bedside Yes 5/26/2017  4:00 AM   Ambu Bag With Mask at Bedside Yes 5/26/2017  4:00 AM              Urinary Catheter Condom Catheter-Criteria for Appropriate Use: Strict I/Os    Intake & Output   Date 05/25/17 0700 - 05/26/17 0659 05/26/17 0700 - 05/27/17 0659   Shift 5513-5806 7607-5059 24 Hour Total 0700-1859 1900-0659 24 Hour Total   I  N  T  A  K  E   I.V.  (mL/kg/hr)  20  (0) 20  (0)         I.V.  20 20       NG/ 500 600         Water Flush Volume (mL) (Feeding Tube)  500 500         Medication Volume (Feeding Tube) 100  100       Shift Total  (mL/kg) 100  (1.1) 520  (5.5) 620  (6.6) O  U  T  P  U  T   Urine  (mL/kg/hr) 650  (0.6) 450  (0.4) 1100  (0.5)         Urine Output (mL) (Condom Catheter)        Shift Total  (mL/kg) 650  (6.9) 450  (4.8) 1100  (11.7)      NET -550 70 -480      Weight (kg) 93.9 93.9 93.9 93.9 93.9 93.9         Readmission Risk Assessment Tool Score Low Risk            9       Total Score        3 Patient Length of Stay > 5    4 More than 1 Admission in calendar year    2 Charlson Comorbidity Score        Criteria that do not apply:    Relationship with PCP    Patient Living Status    Patient Insurance is Medicare, Medicaid or Self Pay       Expected Length of Stay 4d 4h   Actual Length of Stay 7

## 2017-05-26 NOTE — PROGRESS NOTES
Bedside shift change report received from [facility]. SBAR, MAR, VS and plan of care reviewed. 2000 Assessment as charted. VSS. Daughter at bedside, hyper-vigilant regarding pt care. Addressed all questions and concerns. Suctioned pt's trach for small amount of thick tan secretions. Peg tube intact. Radiology was completing abdominal x-ray as shift changed. Tube feed is on hold per MD notes until the morning. Pt c/o abdominal pain, as translated by daughter. PRN Acetaminophen given. No nausea or vomiting. 2100  Daughter c/o that her father seems lethargic. VSS, bgt checked= 94. Pt is asymptomatic. Appears to be trying to rest as he easily arouses with verbal minimal stimuli. Will recheck again per schedule at midnight. 0000  BGT= 103.  0330  Trach care complete. VSS. Condom cath replaced. Voiding clear yellow urine. Tube feed remains on hold. No nausea or vomiting reported. Daughter remains at bedside. Labs drawn and sent. 5257 IV protonix started. Per orders. Bgt= 104 this morning. 0710  Bedside shift change report given to [employer]. Nights events discussed.

## 2017-05-26 NOTE — PROGRESS NOTES
Nutrition Assessment:    INTERVENTIONS/RECOMMENDATIONS:      Enteral/Parenteral Nutrition: Modify rate, concentration, composition, and schedule:  Restart Tube Feeding, as medically able:   Restart TwoCal HN via PEG @ 10mL/hr, advance rate 10mL/hr Q12h, as tolerated, until goal rate of  50mL/hr plus 150mL H20 Q4h (TwoCal @ goal rate will provide 2400kcals, 100g protein and 1740mL free water)    ASSESSMENT:   Chart reviewed; Pt noted during PCU rounds. Nurse states pt was experiencing emesis last night and this morning. Tube feedings have been held since 7am yesterday morning. Nurse notes 0mL of residuals were pulled but patient was vomiting curdled formula. Per EMR, plans for CT scan of the abdomen today. Once medically able, consider restarting tube feeding rate as stated above, monitoring for tolerance and residuals <250mL. Will continue to monitor tube feeding resumption and plan of care for further nutrition recommendations. Diet Order: NPO  % Eaten:  No data found. Pertinent Medications: [x] Reviewed []Other: lipitor; lovenox; lasix; levaquin; metformin; zofran; protonix; diovan; coreg; lidex; humalog; lidoerm; mycostatin; oxycodone; effient  Pertinent Labs: [x]Reviewed  []Other: BG -589-16; Na 135; BUN 31  Food Allergies: [x]None []Other:     Last BM: 5/25   [x]Active     []Hyperactive  []Hypoactive       [] Absent  BS  Skin:    [x] Intact   [] Incision  [] Breakdown   []Edema   []Other:    Anthropometrics: Height: 5' 11\" (180.3 cm) Weight: 93.9 kg (207 lb 0.2 oz)    IBW (%IBW):   ( ) UBW (%UBW):   (  %)    BMI: Body mass index is 28.87 kg/(m^2).     This BMI is indicative of:  []Underweight   []Normal   [x]Overweight   [] Obesity   [] Extreme Obesity (BMI>40)  Last Weight Metrics:  Weight Loss Metrics 5/19/2017 1/4/2017 10/5/2016   Today's Wt 207 lb 0.2 oz 208 lb 204 lb   BMI 28.87 kg/m2 29.01 kg/m2 28.45 kg/m2       Estimated Nutrition Needs (Based on): 2250 Kcals/day (MSJ: 1725 x 1.3) , 90 g (1 g/kg) Protein  Carbohydrate:  At Least 130 g/day  Fluids: 2250 mL/day or per MD     Pt expected to meet estimated nutrient needs: [x]Yes []No    NUTRITION DIAGNOSES:   Problem:  Altered GI function      Etiology: related to dysphagia     Signs/Symptoms: as evidenced by PEG dependent      Prior Dx: Progressing    NUTRITION INTERVENTIONS:    Enteral/Parenteral Nutrition: Modify rate, concentration, composition, and schedule                GOAL:   TF will be restarted and pt will tolerated feeds until goal with <250mL residuals next 2-4 days    NUTRITION MONITORING AND EVALUATION      Food/Nutrient Intake Outcomes: Enteral/parenteral nutrition intake  Physical Signs/Symptoms Outcomes: Weight/weight change, GI, Electrolyte and renal profile, GI profile, Glucose profile    Previous Goal Met:   [] Met              [x] Progressing Towards Goal              [] Not Progressing Towards Goal   Previous Recommendations:   [x] Implemented          [] Not Implemented          [] Not Applicable    LEARNING NEEDS (Diet, Food/Nutrient-Drug Interaction):    [x] None Identified   [] Identified and Education Provided/Documented   [] Identified and Pt declined/was not appropriate     Cultural, Scientology, OR Ethnic Dietary Needs:    [x] None Identified   [] Identified and Addressed     [x] Interdisciplinary Care Plan Reviewed/Documented    [x] Discharge Planning: TBD based on pt plan of care   [] Participated in Interdisciplinary Rounds    NUTRITION RISK:    [x] High              [] Moderate           []  Low  []  Minimal/Uncompromised      Bryant Ortiz  Pager [phone]  Weekend Pager [phone]

## 2017-05-26 NOTE — PROGRESS NOTES
Problem: Mobility Impaired (Adult and Pediatric)  Goal: *Acute Goals and Plan of Care (Insert Text)  Physical Therapy Goals  Revised 5/26/2017  1. Patient will move from supine to sit and sit to supine , scoot up and down and roll side to side in bed with moderate assistance within 7 day(s). 2. Patient will transfer from bed to chair and chair to bed with maximal assistance using the least restrictive device within 7 day(s). 3. Patient will perform sit to stand with moderate assistance within 7 day(s). 4. Patient will tolerate standing x 1 minute with CGA-min A in midline in preparation for gait training within 7 days. Physical Therapy Goals  Initiated 5/20/2017  1. Patient will roll side to side in bed with maximal assistance x2 within 7 day(s). 2. Patient will follow simple, one-step commands 50% of the time within 7 day(s). 3. Patient and family will demonstrate independence with BLE and BUE AA/PROM HEP within 7 day(s). 4. Patient will tolerate sitting in chair position in bed for 1 hr within 7 day(s). PHYSICAL THERAPY TREATMENT: WEEKLY REASSESSMENT  Patient: Trinity Menezes [de-identified]56 y.o. male)  Date: 5/26/2017  Diagnosis: Cardiac Arrest  Respiratory failure ([address] Anoxic brain injury ([facility] [address]       Precautions: Contact      ASSESSMENT:  Patient received resting in bed with multiple family members present. Patient's daughter approached therapy in hallway requesting PT/OT session. Patient agreeable with therapy and with VSS on trach collar. Patient with less gagging and coughing against trach collar this date. Patient required max A x 2 for bed mobility with constant verbal cues translated via daughter. Patient tolerated sitting EOB x 10 minutes with SBA and occasional CGA. Patient with improved sitting balance this date although with occasional posterior trunk lean, able to correct with tactile and verbal cues.  Patient able to stand with max A x 2 and max cues on R glute and distal quad to facilitate knee and hip extension. Patient tolerated standing x 2 times with mod A once standing for static standing balance for 30 seconds with VSS. Patient returned to supine with max A x 2 and put in chair position at the conclusion of PT treatment session. Patient's daughter is very supportive and active in his care so educated on PROM of UE and positioning in the bed with utilization of chair positioning. Patient continues to be appropriate for IP rehab at discharge as he is very motivated. Patient's progression toward goals since last assessment: patient is progressing well towards goals. Limited by language barrier and anoxic brain injury. PLAN:  Goals have been updated based on progression since last assessment. Patient continues to benefit from skilled intervention to address the above impairments. Continue to follow the patient 4 days per week to address goals.   Planned Interventions:  [X]              Bed Mobility Training             [X]       Neuromuscular Re-Education  [X]              Transfer Training                   [ ]       Orthotic/Prosthetic Training  [ ]              Gait Training                         [ ]       Modalities  [X]              Therapeutic Exercises           [ ]       Edema Management/Control  [X]              Therapeutic Activities            [X]       Patient and Family Training/Education  [ ]              Other (comment):  Discharge Recommendations: Inpatient Rehab  Further Equipment Recommendations for Discharge: TBD       SUBJECTIVE:   Patient nonverbal.      OBJECTIVE DATA SUMMARY:   Critical Behavior:  Neurologic State: Alert  Orientation Level: Unable to verbalize  Cognition: Follows commands        Strength:   Strength: Grossly decreased, non-functional (on RUE and RLE)                       Functional Mobility Training:  Bed Mobility:  Rolling: Maximum assistance;Assist x2  Supine to Sit: Maximum assistance;Assist x2  Sit to Supine: Maximum assistance;Assist x2  Scooting: Maximum assistance;Assist x2        Transfers:  Sit to Stand: Maximum assistance;Assist x2  Stand to Sit: Maximum assistance;Assist x2                             Balance:  Sitting: Impaired; Without support  Sitting - Static: Good (unsupported)  Sitting - Dynamic: Fair (occasional)  Standing: Impaired; With support  Standing - Static: Constant support;Poor  Standing - Dynamic : Poor  Ambulation/Gait Training:                                                                   Stairs:           Neuro Re-Education:  Tolerated sitting EOB x 10 minutes. Patient performed standing with max A x 2 to achieve full standing with max manual cues on R quad and glute to facilitate hip and knee extension. Therapeutic Exercises:      Pain:  Pain Scale 1: Adult Nonverbal Pain Scale (wife says patient complaining of left abd pain.)  Pain Intensity 1: 0              Activity Tolerance:   Improving, VSS throughout session  Please refer to the flowsheet for vital signs taken during this treatment.   After treatment:   [ ]  Patient left in no apparent distress sitting up in chair  [X]  Patient left in no apparent distress in bed in chair position  [X]  Call bell left within reach  [X]  Nursing notified  [X]  Caregiver present  [ ]  Bed alarm activated      COMMUNICATION/COLLABORATION:   The patients plan of care was discussed with: Occupational Therapist, Registered Nurse and      Imelda Alexander, PT, DPT   Time Calculation: 38 mins

## 2017-05-26 NOTE — PROGRESS NOTES
Speech path   We will not be able to treat pt today as he is still vomiting and is at risk to aspirate with the trach cuff down per conversation with Dr. Luis Alfredo Chávez. We will follow up next week.   Chidi Evans, SLP

## 2017-05-27 ENCOUNTER — APPOINTMENT (OUTPATIENT)
Dept: MRI IMAGING | Age: 57
DRG: 091 | End: 2017-05-27
Attending: PSYCHIATRY & NEUROLOGY
Payer: COMMERCIAL

## 2017-05-27 ENCOUNTER — APPOINTMENT (OUTPATIENT)
Dept: GENERAL RADIOLOGY | Age: 57
DRG: 091 | End: 2017-05-27
Attending: INTERNAL MEDICINE
Payer: COMMERCIAL

## 2017-05-27 LAB
ALBUMIN SERPL BCP-MCNC: 2.3 G/DL (ref 3.5–5)
ALBUMIN/GLOB SERPL: 0.4 {RATIO} (ref 1.1–2.2)
ALP SERPL-CCNC: 198 U/L (ref 45–117)
ALT SERPL-CCNC: 42 U/L (ref 12–78)
ANION GAP BLD CALC-SCNC: 9 MMOL/L (ref 5–15)
AST SERPL W P-5'-P-CCNC: 42 U/L (ref 15–37)
BASOPHILS # BLD AUTO: 0 K/UL (ref 0–0.1)
BASOPHILS # BLD: 0 % (ref 0–1)
BILIRUB SERPL-MCNC: 0.7 MG/DL (ref 0.2–1)
BUN SERPL-MCNC: 26 MG/DL (ref 6–20)
BUN/CREAT SERPL: 27 (ref 12–20)
CALCIUM SERPL-MCNC: 8.7 MG/DL (ref 8.5–10.1)
CHLORIDE SERPL-SCNC: 97 MMOL/L (ref 97–108)
CO2 SERPL-SCNC: 31 MMOL/L (ref 21–32)
CREAT SERPL-MCNC: 0.96 MG/DL (ref 0.7–1.3)
EOSINOPHIL # BLD: 0.2 K/UL (ref 0–0.4)
EOSINOPHIL NFR BLD: 2 % (ref 0–7)
ERYTHROCYTE [DISTWIDTH] IN BLOOD BY AUTOMATED COUNT: 16.4 % (ref 11.5–14.5)
GLOBULIN SER CALC-MCNC: 6.2 G/DL (ref 2–4)
GLUCOSE BLD STRIP.AUTO-MCNC: 111 MG/DL (ref 65–100)
GLUCOSE BLD STRIP.AUTO-MCNC: 170 MG/DL (ref 65–100)
GLUCOSE SERPL-MCNC: 113 MG/DL (ref 65–100)
HCT VFR BLD AUTO: 38.5 % (ref 36.6–50.3)
HGB BLD-MCNC: 12.3 G/DL (ref 12.1–17)
LYMPHOCYTES # BLD AUTO: 17 % (ref 12–49)
LYMPHOCYTES # BLD: 1.7 K/UL (ref 0.8–3.5)
MCH RBC QN AUTO: 27.6 PG (ref 26–34)
MCHC RBC AUTO-ENTMCNC: 31.9 G/DL (ref 30–36.5)
MCV RBC AUTO: 86.5 FL (ref 80–99)
MONOCYTES # BLD: 1.1 K/UL (ref 0–1)
MONOCYTES NFR BLD AUTO: 11 % (ref 5–13)
NEUTS SEG # BLD: 6.9 K/UL (ref 1.8–8)
NEUTS SEG NFR BLD AUTO: 70 % (ref 32–75)
PLATELET # BLD AUTO: 223 K/UL (ref 150–400)
POTASSIUM SERPL-SCNC: 3.3 MMOL/L (ref 3.5–5.1)
PROT SERPL-MCNC: 8.5 G/DL (ref 6.4–8.2)
RBC # BLD AUTO: 4.45 M/UL (ref 4.1–5.7)
SERVICE CMNT-IMP: ABNORMAL
SERVICE CMNT-IMP: ABNORMAL
SODIUM SERPL-SCNC: 137 MMOL/L (ref 136–145)
WBC # BLD AUTO: 10 K/UL (ref 4.1–11.1)

## 2017-05-27 PROCEDURE — 65660000000 HC RM CCU STEPDOWN

## 2017-05-27 PROCEDURE — 74011250636 HC RX REV CODE- 250/636: Performed by: INTERNAL MEDICINE

## 2017-05-27 PROCEDURE — 82962 GLUCOSE BLOOD TEST: CPT

## 2017-05-27 PROCEDURE — 74011250637 HC RX REV CODE- 250/637: Performed by: NURSE PRACTITIONER

## 2017-05-27 PROCEDURE — 74011250637 HC RX REV CODE- 250/637: Performed by: INTERNAL MEDICINE

## 2017-05-27 PROCEDURE — 77010033678 HC OXYGEN DAILY

## 2017-05-27 PROCEDURE — 74000 XR ABD PORT  1 V: CPT

## 2017-05-27 PROCEDURE — C9113 INJ PANTOPRAZOLE SODIUM, VIA: HCPCS | Performed by: NURSE PRACTITIONER

## 2017-05-27 PROCEDURE — 74011250637 HC RX REV CODE- 250/637: Performed by: HOSPITALIST

## 2017-05-27 PROCEDURE — 80053 COMPREHEN METABOLIC PANEL: CPT | Performed by: NURSE PRACTITIONER

## 2017-05-27 PROCEDURE — 36415 COLL VENOUS BLD VENIPUNCTURE: CPT | Performed by: NURSE PRACTITIONER

## 2017-05-27 PROCEDURE — 77010026065 HC OXYGEN MINIMUM MEDICAL AIR

## 2017-05-27 PROCEDURE — 85025 COMPLETE CBC W/AUTO DIFF WBC: CPT | Performed by: NURSE PRACTITIONER

## 2017-05-27 PROCEDURE — 74011250636 HC RX REV CODE- 250/636: Performed by: NURSE PRACTITIONER

## 2017-05-27 PROCEDURE — 74011000250 HC RX REV CODE- 250: Performed by: NURSE PRACTITIONER

## 2017-05-27 RX ORDER — PANTOPRAZOLE SODIUM 40 MG/1
40 GRANULE, DELAYED RELEASE ORAL
Status: DISCONTINUED | OUTPATIENT
Start: 2017-05-28 | End: 2017-06-03 | Stop reason: HOSPADM

## 2017-05-27 RX ORDER — POTASSIUM CHLORIDE 7.45 MG/ML
10 INJECTION INTRAVENOUS
Status: COMPLETED | OUTPATIENT
Start: 2017-05-27 | End: 2017-06-01

## 2017-05-27 RX ORDER — FUROSEMIDE 40 MG/1
40 TABLET ORAL
Status: DISCONTINUED | OUTPATIENT
Start: 2017-05-27 | End: 2017-05-30

## 2017-05-27 RX ADMIN — ATORVASTATIN CALCIUM 40 MG: 40 TABLET, FILM COATED ORAL at 21:55

## 2017-05-27 RX ADMIN — ACETAMINOPHEN 650 MG: 325 SOLUTION ORAL at 01:27

## 2017-05-27 RX ADMIN — GUAIFENESIN AND DEXTROMETHORPHAN 10 ML: 100; 10 SYRUP ORAL at 15:17

## 2017-05-27 RX ADMIN — Medication 5 ML: at 21:55

## 2017-05-27 RX ADMIN — CARVEDILOL 3.12 MG: 3.12 TABLET, FILM COATED ORAL at 10:34

## 2017-05-27 RX ADMIN — LEVOFLOXACIN 750 MG: 5 INJECTION, SOLUTION INTRAVENOUS at 12:09

## 2017-05-27 RX ADMIN — ACETAMINOPHEN 650 MG: 325 SOLUTION ORAL at 21:55

## 2017-05-27 RX ADMIN — CARVEDILOL 3.12 MG: 3.12 TABLET, FILM COATED ORAL at 17:00

## 2017-05-27 RX ADMIN — ACETAMINOPHEN 650 MG: 325 SOLUTION ORAL at 15:14

## 2017-05-27 RX ADMIN — ASPIRIN 81 MG CHEWABLE TABLET 81 MG: 81 TABLET CHEWABLE at 10:34

## 2017-05-27 RX ADMIN — FUROSEMIDE 20 MG: 10 INJECTION, SOLUTION INTRAMUSCULAR; INTRAVENOUS at 10:35

## 2017-05-27 RX ADMIN — OXYCODONE HYDROCHLORIDE 5 MG: 100 SOLUTION ORAL at 23:52

## 2017-05-27 RX ADMIN — GUAIFENESIN AND DEXTROMETHORPHAN 10 ML: 100; 10 SYRUP ORAL at 21:17

## 2017-05-27 RX ADMIN — POTASSIUM CHLORIDE 10 MEQ: 10 INJECTION, SOLUTION INTRAVENOUS at 10:33

## 2017-05-27 RX ADMIN — FLUOCINONIDE: 0.5 CREAM TOPICAL at 21:18

## 2017-05-27 RX ADMIN — FUROSEMIDE 40 MG: 40 TABLET ORAL at 21:16

## 2017-05-27 RX ADMIN — Medication 10 ML: at 15:15

## 2017-05-27 RX ADMIN — NYSTATIN 500000 UNITS: 100000 SUSPENSION ORAL at 21:17

## 2017-05-27 RX ADMIN — GUAIFENESIN AND DEXTROMETHORPHAN 10 ML: 100; 10 SYRUP ORAL at 10:34

## 2017-05-27 RX ADMIN — PRASUGREL HYDROCHLORIDE 10 MG: 10 TABLET, FILM COATED ORAL at 10:34

## 2017-05-27 RX ADMIN — POTASSIUM CHLORIDE 10 MEQ: 10 INJECTION, SOLUTION INTRAVENOUS at 15:47

## 2017-05-27 RX ADMIN — ENOXAPARIN SODIUM 40 MG: 40 INJECTION SUBCUTANEOUS at 15:14

## 2017-05-27 RX ADMIN — VALSARTAN 40 MG: 80 TABLET ORAL at 10:34

## 2017-05-27 RX ADMIN — FLUOCINONIDE: 0.5 CREAM TOPICAL at 09:00

## 2017-05-27 RX ADMIN — POTASSIUM CHLORIDE 10 MEQ: 10 INJECTION, SOLUTION INTRAVENOUS at 09:00

## 2017-05-27 RX ADMIN — NYSTATIN 500000 UNITS: 100000 SUSPENSION ORAL at 10:34

## 2017-05-27 RX ADMIN — NYSTATIN 500000 UNITS: 100000 SUSPENSION ORAL at 15:14

## 2017-05-27 RX ADMIN — SODIUM CHLORIDE 40 MG: 9 INJECTION INTRAMUSCULAR; INTRAVENOUS; SUBCUTANEOUS at 10:34

## 2017-05-27 NOTE — PROGRESS NOTES
NEUROLOGY DAILY PROGRESS NOTE     Subjective  Pt is moving the right side more than before. ROS:  A ten system review of constitutional, cardiovascular, respiratory, musculoskeletal, endocrine, skin, SHEENT, genitourinary, psychiatric and neurologic systems was obtained and is unremarkable except as stated in HPI     EXAMINATION:   Patient Vitals for the past 24 hrs:   Temp Pulse Resp BP SpO2   05/27/17 0830 - 80 16 - 99 %   05/27/17 0300 98.1 °F (36.7 °C) - - 137/64 -   05/27/17 0122 98.1 °F (36.7 °C) 86 20 137/76 98 %   05/26/17 2000 98 °F (36.7 °C) 82 20 134/76 95 %   05/26/17 1644 97.8 °F (36.6 °C) 93 18 118/67 98 %   05/26/17 1500 - 90 - - 96 %        General:   General appearance: Pt is in no acute distress   Distal pulses are preserved    Neurological Examination:   Mental Status: AA and Follows some commands commands - wiggle toes and stick out tongue   Cranial Nerves: PERRL, Extraocular movements are full. Facial sensation intact V1- V3. Facial movement decreased on the right. Hearing intact to conversation. Tongue midline. Motor: Strength is 4/5 in the LUE, 1-2/5 in the RUE, 3/5 in LLE and 1-2/5 in RLE    Sensation: Normal to light touch    Coordination/Cerebellar: could not assess    Skin: No significant bruising or lacerations.     LAB DATA REVIEWED:    Results for orders placed or performed during the hospital encounter of 05/19/17   CULTURE, RESPIRATORY/SPUTUM/BRONCH W GRAM STAIN   Result Value Ref Range    Special Requests: NO SPECIAL REQUESTS      GRAM STAIN NO  EPITHELIAL CELLS SEEN       GRAM STAIN NO WBC'S SEEN      GRAM STAIN RARE  GRAM NEGATIVE COCCOBACILLI        Culture result: MODERATE  SERRATIA MARCESCENS   (A)      Culture result: LIGHT  ACINETOBACTER BAUMANNII/HAEMOLYTICUS   (A)      Culture result: SCANT  KLEBSIELLA PNEUMONIAE   (A)      Culture result: NO NORMAL RESPIRATORY BETTE ISOLATED         Susceptibility    Klebsiella pneumoniae - EDUARD     Amikacin ($) <=16 Susceptible ug/mL Ampicillin/sulbactam ($) <=8/4 Susceptible ug/mL     Aztreonam ($$$$) <=4 Susceptible ug/mL     Cefazolin ($) <=8 Susceptible ug/mL     Ceftazidime ($) <=1 Susceptible ug/mL     Ceftriaxone ($) <=1 Susceptible ug/mL     Cefuroxime ($) <=4 Susceptible ug/mL     Cefotaxime <=2 Susceptible ug/mL     Cefepime ($$) <=4 Susceptible ug/mL     Ciprofloxacin ($) <=1 Susceptible ug/mL     Gentamicin ($) <=4 Susceptible ug/mL     Imipenem <=1 Susceptible ug/mL     Levofloxacin ($) <=2 Susceptible ug/mL     Meropenem ($$) <=1 Susceptible ug/mL     Piperacillin/Tazobac ($) <=16 Susceptible ug/mL     Tobramycin ($) <=4 Susceptible ug/mL     Trimeth-Sulfamethoxa <=2/38 Susceptible ug/mL    Serratia marcescens - EDUARD     Amikacin ($) <=16 Susceptible ug/mL     Aztreonam ($$$$) <=4 Susceptible ug/mL     Ceftazidime ($) <=1 Susceptible ug/mL     Ceftriaxone ($) <=1 Susceptible ug/mL     Cefotaxime <=2 Susceptible ug/mL     Cefepime ($$) <=4 Susceptible ug/mL     Ciprofloxacin ($) <=1 Susceptible ug/mL     Gentamicin ($) <=4 Susceptible ug/mL     Imipenem <=1 Susceptible ug/mL     Levofloxacin ($) <=2 Susceptible ug/mL     Meropenem ($$) <=1 Susceptible ug/mL     Piperacillin/Tazobac ($) <=16 Susceptible ug/mL     Tobramycin ($) <=4 Susceptible ug/mL     Trimeth-Sulfamethoxa <=2/38 Susceptible ug/mL    Acinetobacter baumanni/haemolyticus - EDUARD     Amikacin ($) <=16 Susceptible ug/mL     Ampicillin/sulbactam ($) <=8/4 Susceptible ug/mL     Ceftazidime ($) 4 Susceptible ug/mL     Cefotaxime 16 Intermediate ug/mL     Cefepime ($$) <=4 Susceptible ug/mL     Ciprofloxacin ($) <=1 Susceptible ug/mL     Gentamicin ($) <=4 Susceptible ug/mL     Meropenem ($$) <=1 Susceptible ug/mL     Tobramycin ($) <=4 Susceptible ug/mL     Trimeth-Sulfamethoxa <=2/38 Susceptible ug/mL   CBC WITH AUTOMATED DIFF   Result Value Ref Range    WBC 9.8 4.1 - 11.1 K/uL    RBC 2.53 (L) 4.10 - 5.70 M/uL    HGB 6.8 (L) 12.1 - 17.0 g/dL    HCT 21.8 (L) 36.6 - 50.3 % MCV 86.2 80.0 - 99.0 FL    MCH 26.9 26.0 - 34.0 PG    MCHC 31.2 30.0 - 36.5 g/dL    RDW 17.3 (H) 11.5 - 14.5 %    PLATELET 719 328 - 736 K/uL    NEUTROPHILS 73 32 - 75 %    LYMPHOCYTES 17 12 - 49 %    MONOCYTES 9 5 - 13 %    EOSINOPHILS 1 0 - 7 %    BASOPHILS 0 0 - 1 %    ABS. NEUTROPHILS 7.1 1.8 - 8.0 K/UL    ABS. LYMPHOCYTES 1.7 0.8 - 3.5 K/UL    ABS. MONOCYTES 0.9 0.0 - 1.0 K/UL    ABS. EOSINOPHILS 0.1 0.0 - 0.4 K/UL    ABS. BASOPHILS 0.0 0.0 - 0.1 K/UL    DF SMEAR SCANNED      RBC COMMENTS ANISOCYTOSIS  1+       MAGNESIUM   Result Value Ref Range    Magnesium 2.4 1.6 - 2.4 mg/dL   PHOSPHORUS   Result Value Ref Range    Phosphorus 2.3 (L) 2.6 - 4.7 MG/DL   METABOLIC PANEL, COMPREHENSIVE   Result Value Ref Range    Sodium 139 136 - 145 mmol/L    Potassium 3.4 (L) 3.5 - 5.1 mmol/L    Chloride 105 97 - 108 mmol/L    CO2 24 21 - 32 mmol/L    Anion gap 10 5 - 15 mmol/L    Glucose 76 65 - 100 mg/dL    BUN 13 6 - 20 MG/DL    Creatinine 0.50 (L) 0.70 - 1.30 MG/DL    BUN/Creatinine ratio 26 (H) 12 - 20      GFR est AA >60 >60 ml/min/1.73m2    GFR est non-AA >60 >60 ml/min/1.73m2    Calcium 7.4 (L) 8.5 - 10.1 MG/DL    Bilirubin, total 0.6 0.2 - 1.0 MG/DL    ALT (SGPT) 58 12 - 78 U/L    AST (SGOT) 46 (H) 15 - 37 U/L    Alk.  phosphatase 236 (H) 45 - 117 U/L    Protein, total 7.5 6.4 - 8.2 g/dL    Albumin 1.8 (L) 3.5 - 5.0 g/dL    Globulin 5.7 (H) 2.0 - 4.0 g/dL    A-G Ratio 0.3 (L) 1.1 - 2.2     IRON PROFILE   Result Value Ref Range    Iron 34 (L) 35 - 150 ug/dL    TIBC 229 (L) 250 - 450 ug/dL    Iron % saturation 15 (L) 20 - 50 %   FERRITIN   Result Value Ref Range    Ferritin 191 26 - 388 NG/ML   VITAMIN B12   Result Value Ref Range    Vitamin B12 632 211 - 911 pg/mL   PREALBUMIN   Result Value Ref Range    Prealbumin 12.7 (L) 20.0 - 40.0 mg/dL   CBC W/O DIFF   Result Value Ref Range    WBC 12.8 (H) 4.1 - 11.1 K/uL    RBC 4.12 4.10 - 5.70 M/uL    HGB 11.3 (L) 12.1 - 17.0 g/dL    HCT 35.0 (L) 36.6 - 50.3 %    MCV 85.0 80.0 - 99.0 FL    MCH 27.4 26.0 - 34.0 PG    MCHC 32.3 30.0 - 36.5 g/dL    RDW 16.7 (H) 11.5 - 14.5 %    PLATELET 204 (H) 452 - 473 K/uL   METABOLIC PANEL, COMPREHENSIVE   Result Value Ref Range    Sodium 135 (L) 136 - 145 mmol/L    Potassium 3.5 3.5 - 5.1 mmol/L    Chloride 98 97 - 108 mmol/L    CO2 30 21 - 32 mmol/L    Anion gap 7 5 - 15 mmol/L    Glucose 171 (H) 65 - 100 mg/dL    BUN 12 6 - 20 MG/DL    Creatinine 0.72 0.70 - 1.30 MG/DL    BUN/Creatinine ratio 17 12 - 20      GFR est AA >60 >60 ml/min/1.73m2    GFR est non-AA >60 >60 ml/min/1.73m2    Calcium 8.6 8.5 - 10.1 MG/DL    Bilirubin, total 1.3 (H) 0.2 - 1.0 MG/DL    ALT (SGPT) 67 12 - 78 U/L    AST (SGOT) 53 (H) 15 - 37 U/L    Alk. phosphatase 272 (H) 45 - 117 U/L    Protein, total 8.6 (H) 6.4 - 8.2 g/dL    Albumin 2.2 (L) 3.5 - 5.0 g/dL    Globulin 6.4 (H) 2.0 - 4.0 g/dL    A-G Ratio 0.3 (L) 1.1 - 2.2     CBC W/O DIFF   Result Value Ref Range    WBC 11.4 (H) 4.1 - 11.1 K/uL    RBC 4.40 4. 10 - 5.70 M/uL    HGB 11.6 (L) 12.1 - 17.0 g/dL    HCT 37.2 36.6 - 50.3 %    MCV 84.5 80.0 - 99.0 FL    MCH 26.4 26.0 - 34.0 PG    MCHC 31.2 30.0 - 36.5 g/dL    RDW 17.0 (H) 11.5 - 14.5 %    PLATELET 174 417 - 285 K/uL   METABOLIC PANEL, BASIC   Result Value Ref Range    Sodium 138 136 - 145 mmol/L    Potassium 3.6 3.5 - 5.1 mmol/L    Chloride 100 97 - 108 mmol/L    CO2 31 21 - 32 mmol/L    Anion gap 7 5 - 15 mmol/L    Glucose 162 (H) 65 - 100 mg/dL    BUN 13 6 - 20 MG/DL    Creatinine 0.67 (L) 0.70 - 1.30 MG/DL    BUN/Creatinine ratio 19 12 - 20      GFR est AA >60 >60 ml/min/1.73m2    GFR est non-AA >60 >60 ml/min/1.73m2    Calcium 8.7 8.5 - 10.1 MG/DL   CK W/ CKMB & INDEX   Result Value Ref Range    CK 52 39 - 308 U/L    CK - MB 1.5 <3.6 NG/ML    CK-MB Index 2.9 (H) 0 - 2.5     TROPONIN I   Result Value Ref Range    Troponin-I, Qt. <0.04 <0.05 ng/mL   CBC W/O DIFF   Result Value Ref Range    WBC 13.6 (H) 4.1 - 11.1 K/uL    RBC 4.63 4.10 - 5.70 M/uL    HGB 12.6 12.1 - 17.0 g/dL    HCT 39.9 36.6 - 50.3 %    MCV 86.2 80.0 - 99.0 FL    MCH 27.2 26.0 - 34.0 PG    MCHC 31.6 30.0 - 36.5 g/dL    RDW 17.0 (H) 11.5 - 14.5 %    PLATELET 465 (H) 534 - 901 K/uL   METABOLIC PANEL, BASIC   Result Value Ref Range    Sodium 136 136 - 145 mmol/L    Potassium 3.8 3.5 - 5.1 mmol/L    Chloride 97 97 - 108 mmol/L    CO2 31 21 - 32 mmol/L    Anion gap 8 5 - 15 mmol/L    Glucose 195 (H) 65 - 100 mg/dL    BUN 15 6 - 20 MG/DL    Creatinine 0.73 0.70 - 1.30 MG/DL    BUN/Creatinine ratio 21 (H) 12 - 20      GFR est AA >60 >60 ml/min/1.73m2    GFR est non-AA >60 >60 ml/min/1.73m2    Calcium 9.1 8.5 - 10.1 MG/DL   MAGNESIUM   Result Value Ref Range    Magnesium 2.4 1.6 - 2.4 mg/dL   CBC W/O DIFF   Result Value Ref Range    WBC 12.9 (H) 4.1 - 11.1 K/uL    RBC 4.46 4.10 - 5.70 M/uL    HGB 12.2 12.1 - 17.0 g/dL    HCT 38.5 36.6 - 50.3 %    MCV 86.3 80.0 - 99.0 FL    MCH 27.4 26.0 - 34.0 PG    MCHC 31.7 30.0 - 36.5 g/dL    RDW 17.1 (H) 11.5 - 14.5 %    PLATELET 464 141 - 784 K/uL   METABOLIC PANEL, BASIC   Result Value Ref Range    Sodium 136 136 - 145 mmol/L    Potassium 3.9 3.5 - 5.1 mmol/L    Chloride 96 (L) 97 - 108 mmol/L    CO2 32 21 - 32 mmol/L    Anion gap 8 5 - 15 mmol/L    Glucose 204 (H) 65 - 100 mg/dL    BUN 24 (H) 6 - 20 MG/DL    Creatinine 0.80 0.70 - 1.30 MG/DL    BUN/Creatinine ratio 30 (H) 12 - 20      GFR est AA >60 >60 ml/min/1.73m2    GFR est non-AA >60 >60 ml/min/1.73m2    Calcium 8.8 8.5 - 02.3 MG/DL   METABOLIC PANEL, COMPREHENSIVE   Result Value Ref Range    Sodium 134 (L) 136 - 145 mmol/L    Potassium 3.7 3.5 - 5.1 mmol/L    Chloride 95 (L) 97 - 108 mmol/L    CO2 29 21 - 32 mmol/L    Anion gap 10 5 - 15 mmol/L    Glucose 114 (H) 65 - 100 mg/dL    BUN 22 (H) 6 - 20 MG/DL    Creatinine 0.75 0.70 - 1.30 MG/DL    BUN/Creatinine ratio 29 (H) 12 - 20      GFR est AA >60 >60 ml/min/1.73m2    GFR est non-AA >60 >60 ml/min/1.73m2    Calcium 9.6 8.5 - 10.1 MG/DL    Bilirubin, total 0.7 0.2 - 1.0 MG/DL    ALT (SGPT) 46 12 - 78 U/L    AST (SGOT) 28 15 - 37 U/L    Alk. phosphatase 243 (H) 45 - 117 U/L    Protein, total 8.8 (H) 6.4 - 8.2 g/dL    Albumin 2.4 (L) 3.5 - 5.0 g/dL    Globulin 6.4 (H) 2.0 - 4.0 g/dL    A-G Ratio 0.4 (L) 1.1 - 2.2     CBC W/O DIFF   Result Value Ref Range    WBC 12.1 (H) 4.1 - 11.1 K/uL    RBC 4.44 4.10 - 5.70 M/uL    HGB 12.2 12.1 - 17.0 g/dL    HCT 38.4 36.6 - 50.3 %    MCV 86.5 80.0 - 99.0 FL    MCH 27.5 26.0 - 34.0 PG    MCHC 31.8 30.0 - 36.5 g/dL    RDW 16.7 (H) 11.5 - 14.5 %    PLATELET 168 638 - 386 K/uL   MAGNESIUM   Result Value Ref Range    Magnesium 2.2 1.6 - 2.4 mg/dL   PHOSPHORUS   Result Value Ref Range    Phosphorus 3.1 2.6 - 4.7 MG/DL   METABOLIC PANEL, BASIC   Result Value Ref Range    Sodium 135 (L) 136 - 145 mmol/L    Potassium 3.5 3.5 - 5.1 mmol/L    Chloride 95 (L) 97 - 108 mmol/L    CO2 30 21 - 32 mmol/L    Anion gap 10 5 - 15 mmol/L    Glucose 105 (H) 65 - 100 mg/dL    BUN 31 (H) 6 - 20 MG/DL    Creatinine 0.92 0.70 - 1.30 MG/DL    BUN/Creatinine ratio 34 (H) 12 - 20      GFR est AA >60 >60 ml/min/1.73m2    GFR est non-AA >60 >60 ml/min/1.73m2    Calcium 9.0 8.5 - 10.1 MG/DL   CBC WITH AUTOMATED DIFF   Result Value Ref Range    WBC 12.6 (H) 4.1 - 11.1 K/uL    RBC 4.51 4.10 - 5.70 M/uL    HGB 12.0 (L) 12.1 - 17.0 g/dL    HCT 38.7 36.6 - 50.3 %    MCV 85.8 80.0 - 99.0 FL    MCH 26.6 26.0 - 34.0 PG    MCHC 31.0 30.0 - 36.5 g/dL    RDW 16.7 (H) 11.5 - 14.5 %    PLATELET 751 225 - 510 K/uL    NEUTROPHILS 71 32 - 75 %    LYMPHOCYTES 17 12 - 49 %    MONOCYTES 10 5 - 13 %    EOSINOPHILS 2 0 - 7 %    BASOPHILS 0 0 - 1 %    ABS. NEUTROPHILS 8.9 (H) 1.8 - 8.0 K/UL    ABS. LYMPHOCYTES 2.2 0.8 - 3.5 K/UL    ABS. MONOCYTES 1.2 (H) 0.0 - 1.0 K/UL    ABS. EOSINOPHILS 0.2 0.0 - 0.4 K/UL    ABS.  BASOPHILS 0.1 0.0 - 0.1 K/UL   METABOLIC PANEL, COMPREHENSIVE   Result Value Ref Range    Sodium 137 136 - 145 mmol/L    Potassium 3.3 (L) 3.5 - 5.1 mmol/L    Chloride 97 97 - 108 mmol/L CO2 31 21 - 32 mmol/L    Anion gap 9 5 - 15 mmol/L    Glucose 113 (H) 65 - 100 mg/dL    BUN 26 (H) 6 - 20 MG/DL    Creatinine 0.96 0.70 - 1.30 MG/DL    BUN/Creatinine ratio 27 (H) 12 - 20      GFR est AA >60 >60 ml/min/1.73m2    GFR est non-AA >60 >60 ml/min/1.73m2    Calcium 8.7 8.5 - 10.1 MG/DL    Bilirubin, total 0.7 0.2 - 1.0 MG/DL    ALT (SGPT) 42 12 - 78 U/L    AST (SGOT) 42 (H) 15 - 37 U/L    Alk. phosphatase 198 (H) 45 - 117 U/L    Protein, total 8.5 (H) 6.4 - 8.2 g/dL    Albumin 2.3 (L) 3.5 - 5.0 g/dL    Globulin 6.2 (H) 2.0 - 4.0 g/dL    A-G Ratio 0.4 (L) 1.1 - 2.2     CBC WITH AUTOMATED DIFF   Result Value Ref Range    WBC 10.0 4.1 - 11.1 K/uL    RBC 4.45 4.10 - 5.70 M/uL    HGB 12.3 12.1 - 17.0 g/dL    HCT 38.5 36.6 - 50.3 %    MCV 86.5 80.0 - 99.0 FL    MCH 27.6 26.0 - 34.0 PG    MCHC 31.9 30.0 - 36.5 g/dL    RDW 16.4 (H) 11.5 - 14.5 %    PLATELET 086 154 - 596 K/uL    NEUTROPHILS 70 32 - 75 %    LYMPHOCYTES 17 12 - 49 %    MONOCYTES 11 5 - 13 %    EOSINOPHILS 2 0 - 7 %    BASOPHILS 0 0 - 1 %    ABS. NEUTROPHILS 6.9 1.8 - 8.0 K/UL    ABS. LYMPHOCYTES 1.7 0.8 - 3.5 K/UL    ABS. MONOCYTES 1.1 (H) 0.0 - 1.0 K/UL    ABS. EOSINOPHILS 0.2 0.0 - 0.4 K/UL    ABS.  BASOPHILS 0.0 0.0 - 0.1 K/UL   GLUCOSE, POC   Result Value Ref Range    Glucose (POC) 86 65 - 100 mg/dL    Performed by Marline PIERRE    GLUCOSE, POC   Result Value Ref Range    Glucose (POC) 105 (H) 65 - 100 mg/dL    Performed by Marline PIERRE    GLUCOSE, POC   Result Value Ref Range    Glucose (POC) 90 65 - 100 mg/dL    Performed by Zandra Ormond    GLUCOSE, POC   Result Value Ref Range    Glucose (POC) 128 (H) 65 - 100 mg/dL    Performed by Zandra Ormond    GLUCOSE, POC   Result Value Ref Range    Glucose (POC) 168 (H) 65 - 100 mg/dL    Performed by Owen Ninoska    GLUCOSE, POC   Result Value Ref Range    Glucose (POC) 180 (H) 65 - 100 mg/dL    Performed by Owen Ninoska    GLUCOSE, POC   Result Value Ref Range    Glucose (POC) 183 (H) 65 - 100 mg/dL    Performed by Marisa Tovar    GLUCOSE, POC   Result Value Ref Range    Glucose (POC) 200 (H) 65 - 100 mg/dL    Performed by Marisa Tovar    GLUCOSE, POC   Result Value Ref Range    Glucose (POC) 173 (H) 65 - 100 mg/dL    Performed by Heidi Wadsworth    GLUCOSE, POC   Result Value Ref Range    Glucose (POC) 163 (H) 65 - 100 mg/dL    Performed by Heidi Wadsworth    GLUCOSE, POC   Result Value Ref Range    Glucose (POC) 150 (H) 65 - 100 mg/dL    Performed by [address] [place] [address], POC   Result Value Ref Range    Glucose (POC) 212 (H) 65 - 100 mg/dL    Performed by [address] [place] [address], POC   Result Value Ref Range    Glucose (POC) 227 (H) 65 - 100 mg/dL    Performed by Jaquan Barbosa    GLUCOSE, POC   Result Value Ref Range    Glucose (POC) 136 (H) 65 - 100 mg/dL    Performed by Jaquan Barbosa    GLUCOSE, POC   Result Value Ref Range    Glucose (POC) 173 (H) 65 - 100 mg/dL    Performed by Courtney Fernandes    GLUCOSE, POC   Result Value Ref Range    Glucose (POC) 176 (H) 65 - 100 mg/dL    Performed by Courtney Fernandes    GLUCOSE, POC   Result Value Ref Range    Glucose (POC) 131 (H) 65 - 100 mg/dL    Performed by Sebastien Mcdonald    GLUCOSE, POC   Result Value Ref Range    Glucose (POC) 144 (H) 65 - 100 mg/dL    Performed by Sebastien Mcdonald    GLUCOSE, POC   Result Value Ref Range    Glucose (POC) 130 (H) 65 - 100 mg/dL    Performed by Bonita Toledo    GLUCOSE, POC   Result Value Ref Range    Glucose (POC) 155 (H) 65 - 100 mg/dL    Performed by Marlo Saldana    GLUCOSE, POC   Result Value Ref Range    Glucose (POC) 144 (H) 65 - 100 mg/dL    Performed by Juany Donovan    GLUCOSE, POC   Result Value Ref Range    Glucose (POC) 136 (H) 65 - 100 mg/dL    Performed by Dario Gaines    GLUCOSE, POC   Result Value Ref Range    Glucose (POC) 175 (H) 65 - 100 mg/dL    Performed by [address] [place] [address], POC   Result Value Ref Range    Glucose (POC) 147 (H) 65 - 100 mg/dL    Performed by Brandy Fields GLUCOSE, POC   Result Value Ref Range    Glucose (POC) 110 (H) 65 - 100 mg/dL    Performed by [employer]    GLUCOSE, POC   Result Value Ref Range    Glucose (POC) 96 65 - 100 mg/dL    Performed by Kishoreel Risk \"Ruth\"*    GLUCOSE, POC   Result Value Ref Range    Glucose (POC) 97 65 - 100 mg/dL    Performed by Oneda Cely    GLUCOSE, POC   Result Value Ref Range    Glucose (POC) 103 (H) 65 - 100 mg/dL    Performed by Dynex Cely    GLUCOSE, POC   Result Value Ref Range    Glucose (POC) 104 (H) 65 - 100 mg/dL    Performed by Mine NÚÑEZ    GLUCOSE, POC   Result Value Ref Range    Glucose (POC) 96 65 - 100 mg/dL    Performed by Unkown     GLUCOSE, POC   Result Value Ref Range    Glucose (POC) 116 (H) 65 - 100 mg/dL    Performed by Evorn Drape (PCT)    GLUCOSE, POC   Result Value Ref Range    Glucose (POC) 111 (H) 65 - 100 mg/dL    Performed by Darius Deal (PCT)    GLUCOSE, POC   Result Value Ref Range    Glucose (POC) 170 (H) 65 - 100 mg/dL    Performed by Maki Dry    EKG, 12 LEAD, INITIAL   Result Value Ref Range    Ventricular Rate 88 BPM    Atrial Rate 88 BPM    P-R Interval 160 ms    QRS Duration 72 ms    Q-T Interval 366 ms    QTC Calculation (Bezet) 442 ms    Calculated P Axis 37 degrees    Calculated R Axis 57 degrees    Calculated T Axis 57 degrees    Diagnosis       Normal sinus rhythm  Nonspecific ST abnormality  Abnormal ECG    Confirmed by Maria M Child ([id number]) on 5/22/2017 5:27:05 PM     TYPE & CROSSMATCH   Result Value Ref Range    Crossmatch Expiration 05/23/2017     ABO/Rh(D) B POSITIVE     Antibody screen NEG     Unit number [id number]     Blood component type RC LR AS1     Unit division 00     Status of unit TRANSFUSED     Crossmatch result Compatible     Unit number [id number]     Blood component type RC LR AS1     Unit division 00     Status of unit TRANSFUSED     Crossmatch result Compatible        Last Lipid:  No results found for: LDL, DLDL, LDLC, DLDLP  HbA1c: No results found for: HBA1C, [id number]    Imaging Review  None    MEDICATIONS:  Current Facility-Administered Medications   Medication Dose Route Frequency    guaiFENesin-dextromethorphan (ROBITUSSIN DM) 100-10 mg/5 mL syrup 10 mL  10 mL Per G Tube QID    pantoprazole (PROTONIX) 40 mg in sodium chloride 0.9 % 10 mL injection  40 mg IntraVENous ACB    fluocinoNIDE (LIDEX) 0.05 % cream   Topical BID    levoFLOXacin (LEVAQUIN) 750 mg in D5W IVPB  750 mg IntraVENous Q24H    nystatin (MYCOSTATIN) 100,000 unit/mL oral suspension 500,000 Units  500,000 Units Oral QID    metFORMIN (GLUCOPHAGE) tablet 500 mg  500 mg Oral BID WITH MEALS    valsartan (DIOVAN) tablet 40 mg  40 mg Oral DAILY    furosemide (LASIX) injection 20 mg  20 mg IntraVENous BID    insulin lispro (HUMALOG) injection   SubCUTAneous Q6H    enoxaparin (LOVENOX) injection 40 mg  40 mg SubCUTAneous Q24H    sodium chloride (NS) flush 5-10 mL  5-10 mL IntraVENous Q8H    carvedilol (COREG) tablet 3.125 mg  3.125 mg Per G Tube BID WITH MEALS    prasugrel (EFFIENT) tablet 10 mg  10 mg Per G Tube DAILY    aspirin chewable tablet 81 mg  81 mg Per G Tube DAILY    atorvastatin (LIPITOR) tablet 40 mg  40 mg Per G Tube QHS    lidocaine (LIDODERM) 5 % patch 1 Patch  1 Patch TransDERmal Q24H       Assessment/Plan  Sharon Connor is a 56 y.o. male who presented to the hospital for Anoxic brain injury. 1. Anoxic brain injury  2. HTN  3. HLD    - Continue PT/OT/ST  - Continue aspirin 81 mg a day  - Cont Lipitor 40 mg a day  - BP goal is less than 140/90  - MRI brain and EEG are pending    Over 40 minutes was spent with the patient and family of which > 50% of the visit was spent counseling on diagnosis, management, and treatment of the diagnosis     Signed:  Nico Ford MD  Neurologist      This note will not be viewable in [address]

## 2017-05-27 NOTE — PROGRESS NOTES
PCU SHIFT NURSING NOTE      Bedside shift change report given to [address] [place] [address] (oncoming nurse) by Дмитрий Traore (offgoing nurse). Report included the following information SBAR, Kardex, Procedure Summary, Intake/Output, MAR, Recent Results and Cardiac Rhythm NSR. Shift Summary:   0730 Bedside Report received from [facility]. SBAR, Kardex, Intake/Output, MAR, Recent Results or Cardiac Rhythm NSR were discussed. Barbara Tiny assumed care of the pt.  0900 am care complete, silas well. 0 gastric residual, increased TF to 30 ml. Reviewed oral care with family with green swabs and oral suctioning to reduce risk of aspiration. 1230 0 ml residual, continue TF as ordered. Pt is following directions much better, moving his left arm and leg frequently, improved mobility of right arm and leg. 1630 pt transported to MRI with daughter at bedside for translation. 1745 pt returned from MRI, unable to lay flat for test, unclear as to if it pt's concern or daughter that had gone to translate, regardless, pt was unable to have study completed. Admission Date 5/19/2017   Admission Diagnosis Cardiac Arrest  Respiratory failure ([facility] [address]   Consults IP CONSULT TO CARDIOLOGY  IP CONSULT TO NEUROLOGY        Consults   [x]PT   [x]OT   [x]Speech   [x]Case Management      [x] Palliative      Cardiac Monitoring Order   [x]Yes   []No     IV drips   []Yes    Drip:                            Dose:  Drip:                            Dose:  Drip:                            Dose:   [x]No     GI Prophylaxis   [x]Yes   []No         DVT Prophylaxis   SCDs:             Alfred stockings:         [x] Medication   []Contraindicated   []None      Activity Level Activity Level: Bed Rest     Activity Assistance: Complete care   Purposeful Rounding every 1-2 hour?    [x]Yes   Valencia Score  Total Score: 5   Bed Alarm (If score 3 or >)   [x]Yes   [] Refused (See signed refusal form in chart)   Ulices Score  Ulices Score: 13   Ulices Score (if score 14 or less)   [x]PMT consult   []Wound Care consult      [x]Specialty bed   [x] Nutrition consult          Needs prior to discharge:   Home O2 required:    []Yes   [x]No    If yes, how much O2 required?     Other:    Last Bowel Movement: Last Bowel Movement Date: 05/27/17      Influenza Vaccine          Pneumonia Vaccine           Diet Active Orders   Diet    DIET NPO      LDAs Feeding Tube (Active)   Site Assessment Clean, dry, & intact 5/27/2017  8:17 PM   Dressing Status Clean, dry, & intact 5/27/2017  8:17 PM   Dressing Type Gauze 5/27/2017  8:17 PM   Tube Status Infusing 5/27/2017  8:17 PM   Action Taken Placement verified (comment) 5/27/2017  3:38 PM   Drainage Description Clear 5/26/2017  6:10 AM   Gastric Residual (mL) 0 ml 5/27/2017  3:38 PM   Tube Feeding/Formula Options Twocal HN 5/27/2017  8:17 PM   Tube Feeding/Verify Rate (mL/hr) 30 5/27/2017  8:17 PM   Water Flush Volume (mL) 150 mL 5/27/2017  6:41 PM   Intake (ml) 740 ml 5/27/2017  6:41 PM   Medication Volume 100 ml 5/25/2017  4:16 PM   Output (ml) 0 ml 5/24/2017  3:30 AM                Peripheral IV 05/26/17 Right Hand (Active)   Site Assessment Clean, dry, & intact 5/27/2017  8:17 PM   Phlebitis Assessment 0 5/27/2017  8:17 PM   Infiltration Assessment 0 5/27/2017  8:17 PM   Dressing Status Clean, dry, & intact 5/27/2017  8:17 PM   Dressing Type Tape;Transparent 5/27/2017  8:17 PM   Hub Color/Line Status Blue;Capped 5/27/2017  8:17 PM          Rectal Tube (Active)   Site Assessment Clean, dry, & intact 5/26/2017  8:00 PM   Drainage Description Brown 5/26/2017  8:00 PM   Drainage Chamber Level (ml) 600 ml 5/26/2017  8:00 PM   Output (ml) 50 ml 5/26/2017  8:00 PM       Condom Catheter (Active)   Criteria for Appropriate Use Strict I/Os 5/27/2017  8:17 PM   Status Draining;Patent 5/27/2017  8:17 PM   Site Condition No abnormalities 5/27/2017  8:17 PM   Drainage Tube Clipped to Bed Yes 5/26/2017  8:00 PM   Catheter Secured to Thigh No 5/26/2017  8:00 PM   Tamper Seal Intact No 5/26/2017  8:00 PM   Bag Below Bladder/Not on Floor Yes 5/26/2017  8:00 PM   Lack of Dependent Loop in Tubing Yes 5/26/2017  8:00 PM   Drainage Bag Less Than Half Full Yes 5/27/2017  8:17 PM   Sterile Solution Used for  Irrigation N/A 5/27/2017  8:17 PM   Urine Output (mL) 700 ml 5/26/2017  8:00 PM      Airway - Tracheostomy Tube 05/12/17 (Active)   Site Assessment Clean, dry, & intact 5/27/2017  8:17 PM   Trach Dressing Changed Yes 5/27/2017  3:38 PM   Trach Cleaned With Normal saline 5/27/2017  3:38 PM   Trach Tie Changed No 5/27/2017  3:38 PM   Trach Cannula Changed No 5/27/2017  3:38 PM   Inner Cannula #6 Shiley 5/27/2017  8:17 PM   Line [place] of the lock 5/26/2017  8:00 PM   Side Secured Device; Centered 5/26/2017  8:00 PM   Spare Trach at Bedside Yes 5/26/2017  8:00 PM   Spare Trach Tube One Size Smaller at Bedside Yes 5/26/2017  8:00 PM   Ambu Bag With Mask at Bedside Yes 5/26/2017  8:00 PM              Urinary Catheter Condom Catheter-Criteria for Appropriate Use: Strict I/Os    Intake & Output   Date 05/26/17 1900 - 05/27/17 0659 05/27/17 0700 - 05/28/17 0659   Shift 9912-9120 24 Hour Total 2660-0072 4646-0918 24 Hour Total   I  N  T  A  K  E   P. O. 0 0         P. O. 0 0       I.V.  (mL/kg/hr) 200 500         I.V. 200 200         Volume (levoFLOXacin (LEVAQUIN) 750 mg in D5W IVPB)  300       NG/ 126 9238  1040      Water Flush Volume (mL) (Feeding Tube) 225 325 300  300      Intake (ml) (Feeding Tube) 10 120 740  740    Shift Total  (mL/kg) 435  (4.7) 945  (10.1) 1040  (11.1)  1040  (11.1)   O  U  T  P  U  T   Urine  (mL/kg/hr) 700 1000 1150  (1)  1150      Urine Voided   1150  1150      Urine Output (mL) (Condom Catheter) 700 1000       Drains 50 50         Output (ml) (Rectal Tube) 50 50       Stool   50  50      Stool   50  50    Shift Total  (mL/kg) 750  (8) 1050  (11.2) 1200  (12.8)  1200  (12.8)   NET -315 -105 -160  -160   Weight (kg) 93.5 93.5 93.5 93.5 93.5         Readmission Risk Assessment Tool Score Low Risk            9       Total Score        3 Patient Length of Stay > 5    4 More than 1 Admission in calendar year    2 Charlson Comorbidity Score        Criteria that do not apply:    Relationship with PCP    Patient Living Status    Patient Insurance is Medicare, Medicaid or Self Pay       Expected Length of Stay 4d 4h   Actual Length of Stay 8              Jaren Johns RN

## 2017-05-27 NOTE — PROGRESS NOTES
Patient arrived to MRI department, however patient is saying \"No\" to MRI per translating family member. She also did not feel it was safe to lay the patient on his back in the MRI scanner without being able to see and hear patient in case of coughing/ vomiting/ aspiration. Daughter thinks sedation for MRI would help so that he \"won't be coughing as much\". Nurse aware via phone.

## 2017-05-27 NOTE — PROGRESS NOTES
Hospitalist Progress Note    NAME: Ghassan Huffman   :  1960   MRN:  [id number]       Interim Hospital Summary: 56 y.o. male whom presented on 2017 with      Assessment / Plan:  Abdominal pain  - for the three days in the row, patient's family informed me that the pt is c/o abd pain. The location of pain changed each day; started with LUQ. Epigastic/mid upper abd pain, now left upper and lower abd pain. Plain abd x-ray was normal and CT of abdomen w/ contrast was normal.  His PPI has been changed to IV from G-tube. At this point, remind the family there is no abnormalities inside of his abdomen. We need to work on position changes for his comfort. We restarted to tube feeding at 10ml/hour. Advised the nursing staff increase Tube feeding slowly to reach 50ml/hr.  - Pt has no moaning or grimacing during abdominal exam unless the pain gets prompt by the family. - pt's family requested to pt to have mouth care every 10 seconds. Advised the nursing staff to show the family on how to do oral care so they can all participate. Anoxic brain injury following cardiac arrest in [place] >1 month ago  - Neurology consult done per pt's request.  Dr. Cate Vences ordered MRI of brain, carotid doppler study, and EEG; all needs to be done. - s/p PEG and trach ; pt reached the tube feeding goal but it has been stopped for the past 24 hours due to emesis contained tube feeding content and c/o abdominal pain (was LUQ pain yesterday but mid upper abdomen pain). Chest and abdomen x-rays were normal but continues to have abdominal discomfort. CT of abd w/ contrast revealed no abnormalities.     - outside MRI brain with abnormalities in basal ganglia and bilateral temporal lobes  - has right>left sided weakness; follow commends when instruction was given his native language [place])        S/p out of hospital cardiac arrest 17, POA  CAD s/p CABG 2014 x 3v at HD  Hx cardiac stents (twice before CABG and once after)  HTN  Severe Anemia POA   Cardiomyopathy  - Hgb 12.0 today; Received 2 units of PRBC on 5/20 for hgb 6.8  - hospitalized at [facility] in [place], [place]; transferred to [address] [place] [address] 5/18 and to [facility] 5/19  - cardiology following; continue with BB, effient, statin, Lasix, ARB,and ASA  - Echo (5/20); EF 30-35%; moderate diffuse hypokinesis; mild-mod MR; mild-mod TR      - per Dr. Mariana Garcia had stenosis of circumflex anastomosis graft, PTCA -->restenosis -->stent-->restenosis and redo bypass recommended but patient seeked second opinion with Dr. David Beverly in [place] and reportedly told redo bypass not needed.      GERD  - was on pepcid, we changed to IV protonix 5/25      Hx aspiration PNA during intubation For cardiac arrest  Chronic respiratory failure, s/p trach  - Pulmonology following;   - treated with invanz in [place]. Today CXR clear   - continue with Levo; Sputum Cx from 5/20 grew Serratia Marcescens, acinetobacter, & klebsiella pneumoniae which are sensitive to Levo  - currently on trach collar (vent removed on 5/21)  - trach tube changed on 5/24. #6 distal XLT trach 95mm in length; currently inflated until reflux/coughing/nausea issue have resolved. Continue with routine trach care  - pulmonary toilet      Vomiting during transport to [place] with question of recurrent aspiration  - empiric started on zosyn and vanc at [address] [place] [address].   - Repeat CXR neg      Diarrhea  - C Diff negative in [place] recently prior to transfer. Repeat C. Diff done at [address] [place] [address] yesterday  - has rectal tube. May be related to tube feed. Hypokalemia  - repleted. Will follow      DM 2  - SSI for now      Lice infestation  - S/p treatment on 5/2; still on isolation. At this point, we would prefer epidemiology team get involved before removing the isolation. Pt was treated for lice but his caregivers & family members have not been checked for the lice.   Since the patient is not ambulatory, the mode of transmission would be his caregivers/families/visitors. Pt will remain full contact precaution until the patient gets discharged.      Code: full  DVT prophylaxis: lovenox  Surrogate decision maker: Wife \"Savanna\" Kamaljit Davidson [phone], 2 son & daughter Anibal DoonMarycarmen # [phone]          Body mass index is 28.87 kg/(m^2).     Recommended Disposition: inpatient rehab has been consulted    Subjective:     Chief Complaint / Reason for Physician Visit  Pt makes eye contact, but nonverbal. Family members assist with communication. Discussed with RN events overnight    Review of Systems:  Symptom Y/N Comments  Symptom Y/N Comments   Fever/Chills    Chest Pain     Poor Appetite    Edema     Cough    Abdominal Pain     Sputum    Joint Pain     SOB/HAM    Pruritis/Rash     Nausea/vomit    Tolerating PT/OT     Diarrhea    Tolerating Diet     Constipation    Other       Could NOT obtain due to:      Objective:     VITALS:   Last 24hrs VS reviewed since prior progress note. Most recent are:  Patient Vitals for the past 24 hrs:   Temp Pulse Resp BP SpO2   05/27/17 0830 - 80 16 - 99 %   05/27/17 0300 98.1 °F (36.7 °C) - - 137/64 -   05/27/17 0122 98.1 °F (36.7 °C) 86 20 137/76 98 %   05/26/17 2000 98 °F (36.7 °C) 82 20 134/76 95 %   05/26/17 1644 97.8 °F (36.6 °C) 93 18 118/67 98 %   05/26/17 1500 - 90 - - 96 %   05/26/17 1300 - 96 - 132/72 -   05/26/17 1155 98.7 °F (37.1 °C) 89 20 105/58 98 %       Intake/Output Summary (Last 24 hours) at 05/27/17 1139  Last data filed at 05/27/17 0300   Gross per 24 hour   Intake              945 ml   Output             1050 ml   Net             -105 ml        PHYSICAL EXAM:  General: WD, WN. Alert, cooperative, no acute distress    EENT:  EOMI. Anicteric sclerae. MMM  Resp:  CTA bilaterally, no wheezing or rales.   No accessory muscle use  CV:  Regular  rhythm,  No edema  GI:  Soft, Non distended, Non tender.  +Bowel sounds, rectal tube in place, G-tube; no residual  Neurologic:  Alert and follows commends when family provides the direction, non verbal  Psych:   Poor insight. Not anxious nor agitated  Skin:  No rashes. No jaundice    Reviewed most current lab test results and cultures  YES  Reviewed most current radiology test results   YES  Review and summation of old records today    NO  Reviewed patient's current orders and MAR    YES  PMH/SH reviewed - no change compared to H&P  ________________________________________________________________________  Care Plan discussed with:    Comments   Patient y    Family      RN y    Care Manager     Consultant                        Multidiciplinary team rounds were held today with , nursing, pharmacist and clinical coordinator. Patient's plan of care was discussed; medications were reviewed and discharge planning was addressed. ________________________________________________________________________  Total NON critical care TIME:    30 Minutes    Total CRITICAL CARE TIME Spent:   Minutes non procedure based      Comments   >50% of visit spent in counseling and coordination of care     ________________________________________________________________________  Denny Jimenez NP     Procedures: see electronic medical records for all procedures/Xrays and details which were not copied into this note but were reviewed prior to creation of Plan. LABS:  I reviewed today's most current labs and imaging studies.   Pertinent labs include:  Recent Labs      05/27/17 0320 05/26/17 0313   WBC  10.0  12.6*   HGB  12.3  12.0*   HCT  38.5  38.7   PLT  223  270     Recent Labs      05/27/17   0320 05/26/17 0313   NA  137  135*   K  3.3*  3.5   CL  97  95*   CO2  31  30   GLU  113*  105*   BUN  26*  31*   CREA  0.96  0.92   CA  8.7  9.0   ALB  2.3*   --    TBILI  0.7   --    SGOT  42*   --    ALT  42   --        Signed: )Linnea Szymanski, NP

## 2017-05-27 NOTE — ROUTINE PROCESS

## 2017-05-27 NOTE — PROGRESS NOTES
[address] [place] [address]  [phone]      Cardiology Progress Note      5/27/2017 2:56 PM    Admit Date: 5/19/2017    Admit Diagnosis:   Cardiac Arrest;Respiratory failure ([facility] [address]    Subjective:     Jacquie Fabian     Visit Vitals    /64 (BP 1 Location: Left arm, BP Patient Position: At rest)    Pulse 80    Temp 98.1 °F (36.7 °C)    Resp 16    Ht 5' 11\" (1.803 m)    Wt 206 lb 2.1 oz (93.5 kg)    SpO2 99%    BMI 28.75 kg/m2       Current Facility-Administered Medications   Medication Dose Route Frequency    furosemide (LASIX) tablet 40 mg  40 mg Per G Tube ACB&D    [START ON 5/28/2017] pantoprazole (PROTONIX) granules for oral suspension 40 mg  40 mg Per G Tube ACB    guaiFENesin-dextromethorphan (ROBITUSSIN DM) 100-10 mg/5 mL syrup 10 mL  10 mL Per G Tube QID    fluocinoNIDE (LIDEX) 0.05 % cream   Topical BID    levoFLOXacin (LEVAQUIN) 750 mg in D5W IVPB  750 mg IntraVENous Q24H    nystatin (MYCOSTATIN) 100,000 unit/mL oral suspension 500,000 Units  500,000 Units Oral QID    metFORMIN (GLUCOPHAGE) tablet 500 mg  500 mg Oral BID WITH MEALS    valsartan (DIOVAN) tablet 40 mg  40 mg Oral DAILY    0.9% sodium chloride infusion 250 mL  250 mL IntraVENous PRN    insulin lispro (HUMALOG) injection   SubCUTAneous Q6H    glucose chewable tablet 16 g  4 Tab Oral PRN    dextrose 10% infusion 125-250 mL  125-250 mL IntraVENous PRN    glucagon (GLUCAGEN) injection 1 mg  1 mg IntraMUSCular PRN    enoxaparin (LOVENOX) injection 40 mg  40 mg SubCUTAneous Q24H    sodium chloride (NS) flush 5-10 mL  5-10 mL IntraVENous Q8H    sodium chloride (NS) flush 5-10 mL  5-10 mL IntraVENous PRN    ondansetron (ZOFRAN) injection 4 mg  4 mg IntraVENous Q6H PRN    albuterol-ipratropium (DUO-NEB) 2.5 MG-0.5 MG/3 ML  3 mL Nebulization Q4H PRN    carvedilol (COREG) tablet 3.125 mg  3.125 mg Per G Tube BID WITH MEALS    prasugrel (EFFIENT) tablet 10 mg  10 mg Per G Tube DAILY    aspirin chewable tablet 81 mg  81 mg Per G Tube DAILY    atorvastatin (LIPITOR) tablet 40 mg  40 mg Per G Tube QHS    oxyCODONE (ROXICODONE INTENSOL) 20 mg/mL concentrated solution 5 mg  5 mg Per G Tube Q4H PRN    acetaminophen (TYLENOL) solution 650 mg  650 mg Per G Tube Q4H PRN    lidocaine (LIDODERM) 5 % patch 1 Patch  1 Patch TransDERmal Q24H       Objective:      Physical Exam:  General Appearance:    Chest:   Clear  Cardiovascular:  Regular rate and rhythm, no murmur.   Abdomen:   Soft, non-tender, bowel sounds are active.   Extremities:   Skin:  Warm and dry.     Data Review:   Recent Labs      05/27/17   0320  05/26/17 0313   WBC  10.0  12.6*   HGB  12.3  12.0*   HCT  38.5  38.7   PLT  223  270     Recent Labs      05/27/17 0320  05/26/17 0313   NA  137  135*   K  3.3*  3.5   CL  97  95*   CO2  31  30   GLU  113*  105*   BUN  26*  31*   CREA  0.96  0.92   CA  8.7  9.0   ALB  2.3*   --    TBILI  0.7   --    SGOT  42*   --    ALT  42   --        No results for input(s): TROIQ, CPK, CKMB in the last 72 hours. Intake/Output Summary (Last 24 hours) at 05/27/17 1456  Last data filed at 05/27/17 0300   Gross per 24 hour   Intake              945 ml   Output              750 ml   Net              195 ml        Telemetry:   EKG:  Cxray:    Assessment:     Principal Problem:    [facility] (5/20/2017)    Active Problems:    Coronary artery disease of bypass graft with stable angina pectoris ([facility] [address] (10/5/2016)      Essential hypertension (10/5/2016)      Dyslipidemia (10/5/2016)      Type 2 diabetes mellitus without complication ([facility] [address] [address] (63/3/4504)      H/O cardiac arrest (5/19/2017)      Overview: 4/28/17:  OSH in [place]      Respiratory failure ([facility] [address] (5/19/2017)      Pneumonia due to Serratia marcescens ([facility] (0/68/8828)      Lice infestation (2/54/9755)      Cardiomyopathy ([facility] (5/22/2017)        Plan:     I and O balanced yesterday.   Cont Rx

## 2017-05-28 LAB
ANION GAP BLD CALC-SCNC: 7 MMOL/L (ref 5–15)
BUN SERPL-MCNC: 19 MG/DL (ref 6–20)
BUN/CREAT SERPL: 22 (ref 12–20)
CALCIUM SERPL-MCNC: 8.6 MG/DL (ref 8.5–10.1)
CHLORIDE SERPL-SCNC: 97 MMOL/L (ref 97–108)
CO2 SERPL-SCNC: 30 MMOL/L (ref 21–32)
CREAT SERPL-MCNC: 0.85 MG/DL (ref 0.7–1.3)
GLUCOSE BLD STRIP.AUTO-MCNC: 113 MG/DL (ref 65–100)
GLUCOSE BLD STRIP.AUTO-MCNC: 128 MG/DL (ref 65–100)
GLUCOSE BLD STRIP.AUTO-MCNC: 177 MG/DL (ref 65–100)
GLUCOSE SERPL-MCNC: 109 MG/DL (ref 65–100)
POTASSIUM SERPL-SCNC: 3.8 MMOL/L (ref 3.5–5.1)
SERVICE CMNT-IMP: ABNORMAL
SODIUM SERPL-SCNC: 134 MMOL/L (ref 136–145)

## 2017-05-28 PROCEDURE — 74011250637 HC RX REV CODE- 250/637: Performed by: INTERNAL MEDICINE

## 2017-05-28 PROCEDURE — 74011636637 HC RX REV CODE- 636/637: Performed by: INTERNAL MEDICINE

## 2017-05-28 PROCEDURE — 74011250637 HC RX REV CODE- 250/637: Performed by: NURSE PRACTITIONER

## 2017-05-28 PROCEDURE — 77030018861

## 2017-05-28 PROCEDURE — 77010026065 HC OXYGEN MINIMUM MEDICAL AIR

## 2017-05-28 PROCEDURE — 74011250636 HC RX REV CODE- 250/636: Performed by: INTERNAL MEDICINE

## 2017-05-28 PROCEDURE — 36415 COLL VENOUS BLD VENIPUNCTURE: CPT | Performed by: NURSE PRACTITIONER

## 2017-05-28 PROCEDURE — 74011250637 HC RX REV CODE- 250/637: Performed by: HOSPITALIST

## 2017-05-28 PROCEDURE — 80048 BASIC METABOLIC PNL TOTAL CA: CPT | Performed by: NURSE PRACTITIONER

## 2017-05-28 PROCEDURE — 65660000000 HC RM CCU STEPDOWN

## 2017-05-28 RX ADMIN — GUAIFENESIN AND DEXTROMETHORPHAN 10 ML: 100; 10 SYRUP ORAL at 17:50

## 2017-05-28 RX ADMIN — Medication 1 SPRAY: at 21:34

## 2017-05-28 RX ADMIN — NYSTATIN 500000 UNITS: 100000 SUSPENSION ORAL at 12:00

## 2017-05-28 RX ADMIN — GUAIFENESIN AND DEXTROMETHORPHAN 10 ML: 100; 10 SYRUP ORAL at 09:47

## 2017-05-28 RX ADMIN — Medication 5 ML: at 05:32

## 2017-05-28 RX ADMIN — FUROSEMIDE 40 MG: 40 TABLET ORAL at 09:48

## 2017-05-28 RX ADMIN — ASPIRIN 81 MG CHEWABLE TABLET 81 MG: 81 TABLET CHEWABLE at 09:48

## 2017-05-28 RX ADMIN — INSULIN LISPRO 2 UNITS: 100 INJECTION, SOLUTION INTRAVENOUS; SUBCUTANEOUS at 12:00

## 2017-05-28 RX ADMIN — METFORMIN HYDROCHLORIDE 500 MG: 500 TABLET, FILM COATED ORAL at 09:49

## 2017-05-28 RX ADMIN — FLUOCINONIDE: 0.5 CREAM TOPICAL at 09:00

## 2017-05-28 RX ADMIN — ENOXAPARIN SODIUM 40 MG: 40 INJECTION SUBCUTANEOUS at 14:29

## 2017-05-28 RX ADMIN — ATORVASTATIN CALCIUM 40 MG: 40 TABLET, FILM COATED ORAL at 21:34

## 2017-05-28 RX ADMIN — NYSTATIN 500000 UNITS: 100000 SUSPENSION ORAL at 21:34

## 2017-05-28 RX ADMIN — GUAIFENESIN AND DEXTROMETHORPHAN 10 ML: 100; 10 SYRUP ORAL at 12:00

## 2017-05-28 RX ADMIN — GUAIFENESIN AND DEXTROMETHORPHAN 10 ML: 100; 10 SYRUP ORAL at 21:34

## 2017-05-28 RX ADMIN — CARVEDILOL 3.12 MG: 3.12 TABLET, FILM COATED ORAL at 09:47

## 2017-05-28 RX ADMIN — NYSTATIN 500000 UNITS: 100000 SUSPENSION ORAL at 17:50

## 2017-05-28 RX ADMIN — Medication 10 ML: at 17:49

## 2017-05-28 RX ADMIN — OXYCODONE HYDROCHLORIDE 5 MG: 100 SOLUTION ORAL at 21:33

## 2017-05-28 RX ADMIN — Medication 10 ML: at 21:33

## 2017-05-28 RX ADMIN — LEVOFLOXACIN 750 MG: 5 INJECTION, SOLUTION INTRAVENOUS at 09:47

## 2017-05-28 RX ADMIN — NYSTATIN 500000 UNITS: 100000 SUSPENSION ORAL at 09:47

## 2017-05-28 RX ADMIN — OXYCODONE HYDROCHLORIDE 5 MG: 100 SOLUTION ORAL at 14:29

## 2017-05-28 RX ADMIN — FUROSEMIDE 40 MG: 40 TABLET ORAL at 17:49

## 2017-05-28 RX ADMIN — Medication 10 ML: at 14:12

## 2017-05-28 RX ADMIN — PANTOPRAZOLE SODIUM 40 MG: 40 GRANULE, DELAYED RELEASE ORAL at 09:48

## 2017-05-28 RX ADMIN — PRASUGREL HYDROCHLORIDE 10 MG: 10 TABLET, FILM COATED ORAL at 09:00

## 2017-05-28 RX ADMIN — FLUOCINONIDE: 0.5 CREAM TOPICAL at 18:00

## 2017-05-28 RX ADMIN — METFORMIN HYDROCHLORIDE 500 MG: 500 TABLET, FILM COATED ORAL at 17:49

## 2017-05-28 RX ADMIN — VALSARTAN 80 MG: 80 TABLET ORAL at 09:48

## 2017-05-28 NOTE — PROGRESS NOTES
Hospitalist Progress Note    NAME: Jacquie Fabian   :  1960   MRN:  [id number]       Interim Hospital Summary: 56 y.o. male whom presented on 2017 with      Assessment / Plan:  Nursing staff informed that his pinto bag has a little urine. He received Lasix this morning. Bladder scan revealed 650ml. Advised the nursing staff check for the patency of condom cath, if it is patent, stil no urine, then insert the catheter. We will remove the pinto cath within 48 hours to see whether he can void. Abdominal pain  - for four days in the row, patient's family informed me that the pt is c/o abd pain. The location of pain changed each day; started with LUQ, Epigastic/mid upper abd pain, left upper and lower abd pain, and now left lateral side. Plain abd x-ray was normal and CT of abdomen w/ contrast was normal. His PPI has been changed to IV from G-tube. - Reminded the family there is no abnormalities inside of his abdomen. We need to work on position changes for his comfort. We restarted to tube feeding at 10ml/hour yesterday, currently at 30ml/hr. Advised the nursing staff increase Tube feeding slowly to reach 50ml/hr.  - no moaning or grimacing from the patient during abdominal exam unless the pain gets prompt by the family.   - Encourage family to participate in frequent mouth care      Anoxic brain injury following cardiac arrest in [place] >1 month ago  - Neurology following; MRI was not done yesterday, will be done today under sedation. carotid doppler study, and EEG to be done     - s/p PEG and trach ; pt reached the tube feeding goal but it has been stopped for the past 24 hours due to emesis contained tube feeding content and c/o abdominal pain (was LUQ pain yesterday but mid upper abdomen pain). Chest and abdomen x-rays were normal but continues to have abdominal discomfort.  CT of abd w/ contrast revealed no abnormalities.     - outside MRI brain with abnormalities in basal ganglia and bilateral temporal lobes  - has right>left sided weakness; follow commends when instruction was given his native language [place])         S/p out of hospital cardiac arrest 4/28/17, POA  CAD s/p CABG 2014 x 3v at HD  Hx cardiac stents (twice before CABG and once after)  HTN  Severe Anemia POA   Cardiomyopathy  - Hgb 12.0 today; Received 2 units of PRBC on 5/20 for hgb 6.8  - hospitalized at [facility] in [place], [place]; transferred to [facility] 5/18 and to [facility] 5/19  - cardiology following; continue with BB, effient, statin, Lasix, ARB,and ASA  - Echo (5/20); EF 30-35%; moderate diffuse hypokinesis; mild-mod MR; mild-mod TR      - per Dr. Jadyn Rodrigez had stenosis of circumflex anastomosis graft, PTCA -->restenosis -->stent-->restenosis and redo bypass recommended but patient seeked second opinion with Dr. Mary Ledezma in [place] and reportedly told redo bypass not needed.       GERD  - was on pepcid, we changed to IV protonix 5/25      Hx aspiration PNA during intubation For cardiac arrest  Chronic respiratory failure, s/p trach  - Pulmonology following;   - treated with invanz in [place]. Today CXR clear   - continue with Levo; Sputum Cx from 5/20 grew Serratia Marcescens, acinetobacter, & klebsiella pneumoniae which are sensitive to Levo  - currently on trach collar (vent removed on 5/21)  - trach tube changed on 5/24. #6 distal XLT trach 95mm in length; currently inflated until reflux/coughing/nausea issue have resolved. Continue with routine trach care  - pulmonary toilet        Diarrhea  - C Diff negative in [place] recently prior to transfer. Repeat C. Diff done at [facility] yesterday  - has rectal tube. May be related to tube feed.      Hypokalemia  - repleted. Will follow      DM 2  - SSI for now      Lice infestation  - S/p treatment on 5/2; still on isolation. At this point, we would prefer epidemiology team get involved before removing the isolation.  Pt was treated for lice but his caregivers & family members have not been checked for the lice. Since the patient is not ambulatory, the mode of transmission would be his caregivers/families/visitors. Pt will remain full contact precaution until the patient gets discharged.      Code: full  DVT prophylaxis: lovenox  Surrogate decision maker: Wife \"Savanna\" Laura Flynn [phone], 2 son & daughter Bill Frausto) # [phone]          Body mass index is 28.87 kg/(m^2).     Recommended Disposition: inpatient rehab has been consulted  Subjective:     Chief Complaint / Reason for Physician Visit  Pt makes eye contact, but nonverbal. Family members assist with communication. Discussed with RN events overnight    Review of Systems:  Symptom Y/N Comments  Symptom Y/N Comments   Fever/Chills    Chest Pain     Poor Appetite    Edema     Cough    Abdominal Pain     Sputum    Joint Pain     SOB/HAM    Pruritis/Rash     Nausea/vomit    Tolerating PT/OT     Diarrhea    Tolerating Diet     Constipation    Other       Could NOT obtain due to:      Objective:     VITALS:   Last 24hrs VS reviewed since prior progress note. Most recent are:  Patient Vitals for the past 24 hrs:   Temp Pulse Resp BP SpO2   05/28/17 0816 98.5 °F (36.9 °C) (!) 104 19 99/62 99 %   05/28/17 0533 98.5 °F (36.9 °C) 84 18 120/64 97 %   05/27/17 2349 - 90 18 120/65 98 %   05/27/17 1956 - 91 18 123/60 100 %   05/27/17 1538 98.5 °F (36.9 °C) 74 18 128/74 98 %   05/27/17 1215 98.6 °F (37 °C) 92 18 126/68 98 %       Intake/Output Summary (Last 24 hours) at 05/28/17 1050  Last data filed at 05/28/17 0947   Gross per 24 hour   Intake             1670 ml   Output             1200 ml   Net              470 ml        PHYSICAL EXAM:  General: WD, WN. Alert, cooperative, no acute distress    EENT:  EOMI. Anicteric sclerae. MMM  Resp:  Scattered rhonchi, clears with suction, no wheezing or rales.   No accessory muscle use  CV:  Regular  rhythm,  No edema  GI:  Soft, Non distended, Non tender.  +Bowel sounds  Neurologic:   Alert and follows commends when family provides the direction, non verbal  Psych:   Poor insight. Not anxious nor agitated  Skin:  No rashes. No jaundice    Reviewed most current lab test results and cultures  YES  Reviewed most current radiology test results   YES  Review and summation of old records today    NO  Reviewed patient's current orders and MAR    YES  PMH/SH reviewed - no change compared to H&P  ________________________________________________________________________  Care Plan discussed with:    Comments   Patient y    Family  y    RN y    Care Manager     Consultant                        Multidiciplinary team rounds were held today with , nursing, pharmacist and clinical coordinator. Patient's plan of care was discussed; medications were reviewed and discharge planning was addressed. ________________________________________________________________________  Total NON critical care TIME: 30   Minutes    Total CRITICAL CARE TIME Spent:   Minutes non procedure based      Comments   >50% of visit spent in counseling and coordination of care     ________________________________________________________________________  Leopoldo Belton, NP     Procedures: see electronic medical records for all procedures/Xrays and details which were not copied into this note but were reviewed prior to creation of Plan. LABS:  I reviewed today's most current labs and imaging studies.   Pertinent labs include:  Recent Labs      05/27/17   0320  05/26/17 0313   WBC  10.0  12.6*   HGB  12.3  12.0*   HCT  38.5  38.7   PLT  223  270     Recent Labs      05/28/17   0546  05/27/17 0320  05/26/17 0313   NA  134*  137  135*   K  3.8  3.3*  3.5   CL  97  97  95*   CO2  30  31  30   GLU  109*  113*  105*   BUN  19  26*  31*   CREA  0.85  0.96  0.92   CA  8.6  8.7  9.0   ALB   --   2.3*   --    TBILI   --   0.7   --    SGOT   --   42*   --    ALT   --   42   --        Signed: )Linnea Szymanski, NP

## 2017-05-28 NOTE — PROGRESS NOTES
NEUROLOGY DAILY PROGRESS NOTE     Subjective  The patient is admitted to the MRI. Will get it done under sedation    ROS:  A ten system review of constitutional, cardiovascular, respiratory, musculoskeletal, endocrine, skin, SHEENT, genitourinary, psychiatric and neurologic systems was obtained and is unremarkable except as stated in HPI     EXAMINATION:   Patient Vitals for the past 24 hrs:   Temp Pulse Resp BP SpO2   05/28/17 0816 98.5 °F (36.9 °C) (!) 104 19 99/62 99 %   05/28/17 0533 98.5 °F (36.9 °C) 84 18 120/64 97 %   05/27/17 2349 - 90 18 120/65 98 %   05/27/17 1956 - 91 18 123/60 100 %   05/27/17 1538 98.5 °F (36.9 °C) 74 18 128/74 98 %   05/27/17 1215 98.6 °F (37 °C) 92 18 126/68 98 %        General:   General appearance: Pt is in no acute distress   Distal pulses are preserved    Neurological Examination:   Mental Status: AA and Follows some commands commands - wiggle toes and stick out tongue   Cranial Nerves: PERRL, Extraocular movements are full. Facial sensation intact V1- V3. Facial movement decreased on the right. Hearing intact to conversation. Tongue midline. Motor: Strength is 4/5 in the LUE, 1-2/5 in the RUE, 3/5 in LLE and 1-2/5 in RLE    Sensation: Normal to light touch    Coordination/Cerebellar: could not assess    Skin: No significant bruising or lacerations.     LAB DATA REVIEWED:    Results for orders placed or performed during the hospital encounter of 05/19/17   CULTURE, RESPIRATORY/SPUTUM/BRONCH W GRAM STAIN   Result Value Ref Range    Special Requests: NO SPECIAL REQUESTS      GRAM STAIN NO  EPITHELIAL CELLS SEEN       GRAM STAIN NO WBC'S SEEN      GRAM STAIN RARE  GRAM NEGATIVE COCCOBACILLI        Culture result: MODERATE  SERRATIA MARCESCENS   (A)      Culture result: LIGHT  ACINETOBACTER BAUMANNII/HAEMOLYTICUS   (A)      Culture result: SCANT  KLEBSIELLA PNEUMONIAE   (A)      Culture result: NO NORMAL RESPIRATORY BETTE ISOLATED         Susceptibility    Klebsiella pneumoniae - EDUARD     Amikacin ($) <=16 Susceptible ug/mL     Ampicillin/sulbactam ($) <=8/4 Susceptible ug/mL     Aztreonam ($$$$) <=4 Susceptible ug/mL     Cefazolin ($) <=8 Susceptible ug/mL     Ceftazidime ($) <=1 Susceptible ug/mL     Ceftriaxone ($) <=1 Susceptible ug/mL     Cefuroxime ($) <=4 Susceptible ug/mL     Cefotaxime <=2 Susceptible ug/mL     Cefepime ($$) <=4 Susceptible ug/mL     Ciprofloxacin ($) <=1 Susceptible ug/mL     Gentamicin ($) <=4 Susceptible ug/mL     Imipenem <=1 Susceptible ug/mL     Levofloxacin ($) <=2 Susceptible ug/mL     Meropenem ($$) <=1 Susceptible ug/mL     Piperacillin/Tazobac ($) <=16 Susceptible ug/mL     Tobramycin ($) <=4 Susceptible ug/mL     Trimeth-Sulfamethoxa <=2/38 Susceptible ug/mL    Serratia marcescens - EDUARD     Amikacin ($) <=16 Susceptible ug/mL     Aztreonam ($$$$) <=4 Susceptible ug/mL     Ceftazidime ($) <=1 Susceptible ug/mL     Ceftriaxone ($) <=1 Susceptible ug/mL     Cefotaxime <=2 Susceptible ug/mL     Cefepime ($$) <=4 Susceptible ug/mL     Ciprofloxacin ($) <=1 Susceptible ug/mL     Gentamicin ($) <=4 Susceptible ug/mL     Imipenem <=1 Susceptible ug/mL     Levofloxacin ($) <=2 Susceptible ug/mL     Meropenem ($$) <=1 Susceptible ug/mL     Piperacillin/Tazobac ($) <=16 Susceptible ug/mL     Tobramycin ($) <=4 Susceptible ug/mL     Trimeth-Sulfamethoxa <=2/38 Susceptible ug/mL    Acinetobacter baumanni/haemolyticus - EDUARD     Amikacin ($) <=16 Susceptible ug/mL     Ampicillin/sulbactam ($) <=8/4 Susceptible ug/mL     Ceftazidime ($) 4 Susceptible ug/mL     Cefotaxime 16 Intermediate ug/mL     Cefepime ($$) <=4 Susceptible ug/mL     Ciprofloxacin ($) <=1 Susceptible ug/mL     Gentamicin ($) <=4 Susceptible ug/mL     Meropenem ($$) <=1 Susceptible ug/mL     Tobramycin ($) <=4 Susceptible ug/mL     Trimeth-Sulfamethoxa <=2/38 Susceptible ug/mL   CBC WITH AUTOMATED DIFF   Result Value Ref Range    WBC 9.8 4.1 - 11.1 K/uL    RBC 2.53 (L) 4.10 - 5.70 M/uL    HGB 6.8 (L) 12.1 - 17.0 g/dL    HCT 21.8 (L) 36.6 - 50.3 %    MCV 86.2 80.0 - 99.0 FL    MCH 26.9 26.0 - 34.0 PG    MCHC 31.2 30.0 - 36.5 g/dL    RDW 17.3 (H) 11.5 - 14.5 %    PLATELET 293 523 - 242 K/uL    NEUTROPHILS 73 32 - 75 %    LYMPHOCYTES 17 12 - 49 %    MONOCYTES 9 5 - 13 %    EOSINOPHILS 1 0 - 7 %    BASOPHILS 0 0 - 1 %    ABS. NEUTROPHILS 7.1 1.8 - 8.0 K/UL    ABS. LYMPHOCYTES 1.7 0.8 - 3.5 K/UL    ABS. MONOCYTES 0.9 0.0 - 1.0 K/UL    ABS. EOSINOPHILS 0.1 0.0 - 0.4 K/UL    ABS. BASOPHILS 0.0 0.0 - 0.1 K/UL    DF SMEAR SCANNED      RBC COMMENTS ANISOCYTOSIS  1+       MAGNESIUM   Result Value Ref Range    Magnesium 2.4 1.6 - 2.4 mg/dL   PHOSPHORUS   Result Value Ref Range    Phosphorus 2.3 (L) 2.6 - 4.7 MG/DL   METABOLIC PANEL, COMPREHENSIVE   Result Value Ref Range    Sodium 139 136 - 145 mmol/L    Potassium 3.4 (L) 3.5 - 5.1 mmol/L    Chloride 105 97 - 108 mmol/L    CO2 24 21 - 32 mmol/L    Anion gap 10 5 - 15 mmol/L    Glucose 76 65 - 100 mg/dL    BUN 13 6 - 20 MG/DL    Creatinine 0.50 (L) 0.70 - 1.30 MG/DL    BUN/Creatinine ratio 26 (H) 12 - 20      GFR est AA >60 >60 ml/min/1.73m2    GFR est non-AA >60 >60 ml/min/1.73m2    Calcium 7.4 (L) 8.5 - 10.1 MG/DL    Bilirubin, total 0.6 0.2 - 1.0 MG/DL    ALT (SGPT) 58 12 - 78 U/L    AST (SGOT) 46 (H) 15 - 37 U/L    Alk.  phosphatase 236 (H) 45 - 117 U/L    Protein, total 7.5 6.4 - 8.2 g/dL    Albumin 1.8 (L) 3.5 - 5.0 g/dL    Globulin 5.7 (H) 2.0 - 4.0 g/dL    A-G Ratio 0.3 (L) 1.1 - 2.2     IRON PROFILE   Result Value Ref Range    Iron 34 (L) 35 - 150 ug/dL    TIBC 229 (L) 250 - 450 ug/dL    Iron % saturation 15 (L) 20 - 50 %   FERRITIN   Result Value Ref Range    Ferritin 191 26 - 388 NG/ML   VITAMIN B12   Result Value Ref Range    Vitamin B12 632 211 - 911 pg/mL   PREALBUMIN   Result Value Ref Range    Prealbumin 12.7 (L) 20.0 - 40.0 mg/dL   CBC W/O DIFF   Result Value Ref Range    WBC 12.8 (H) 4.1 - 11.1 K/uL    RBC 4.12 4.10 - 5.70 M/uL    HGB 11.3 (L) 12.1 - 17.0 g/dL    HCT 35.0 (L) 36.6 - 50.3 %    MCV 85.0 80.0 - 99.0 FL    MCH 27.4 26.0 - 34.0 PG    MCHC 32.3 30.0 - 36.5 g/dL    RDW 16.7 (H) 11.5 - 14.5 %    PLATELET 573 (H) 742 - 590 K/uL   METABOLIC PANEL, COMPREHENSIVE   Result Value Ref Range    Sodium 135 (L) 136 - 145 mmol/L    Potassium 3.5 3.5 - 5.1 mmol/L    Chloride 98 97 - 108 mmol/L    CO2 30 21 - 32 mmol/L    Anion gap 7 5 - 15 mmol/L    Glucose 171 (H) 65 - 100 mg/dL    BUN 12 6 - 20 MG/DL    Creatinine 0.72 0.70 - 1.30 MG/DL    BUN/Creatinine ratio 17 12 - 20      GFR est AA >60 >60 ml/min/1.73m2    GFR est non-AA >60 >60 ml/min/1.73m2    Calcium 8.6 8.5 - 10.1 MG/DL    Bilirubin, total 1.3 (H) 0.2 - 1.0 MG/DL    ALT (SGPT) 67 12 - 78 U/L    AST (SGOT) 53 (H) 15 - 37 U/L    Alk. phosphatase 272 (H) 45 - 117 U/L    Protein, total 8.6 (H) 6.4 - 8.2 g/dL    Albumin 2.2 (L) 3.5 - 5.0 g/dL    Globulin 6.4 (H) 2.0 - 4.0 g/dL    A-G Ratio 0.3 (L) 1.1 - 2.2     CBC W/O DIFF   Result Value Ref Range    WBC 11.4 (H) 4.1 - 11.1 K/uL    RBC 4.40 4. 10 - 5.70 M/uL    HGB 11.6 (L) 12.1 - 17.0 g/dL    HCT 37.2 36.6 - 50.3 %    MCV 84.5 80.0 - 99.0 FL    MCH 26.4 26.0 - 34.0 PG    MCHC 31.2 30.0 - 36.5 g/dL    RDW 17.0 (H) 11.5 - 14.5 %    PLATELET 188 560 - 611 K/uL   METABOLIC PANEL, BASIC   Result Value Ref Range    Sodium 138 136 - 145 mmol/L    Potassium 3.6 3.5 - 5.1 mmol/L    Chloride 100 97 - 108 mmol/L    CO2 31 21 - 32 mmol/L    Anion gap 7 5 - 15 mmol/L    Glucose 162 (H) 65 - 100 mg/dL    BUN 13 6 - 20 MG/DL    Creatinine 0.67 (L) 0.70 - 1.30 MG/DL    BUN/Creatinine ratio 19 12 - 20      GFR est AA >60 >60 ml/min/1.73m2    GFR est non-AA >60 >60 ml/min/1.73m2    Calcium 8.7 8.5 - 10.1 MG/DL   CK W/ CKMB & INDEX   Result Value Ref Range    CK 52 39 - 308 U/L    CK - MB 1.5 <3.6 NG/ML    CK-MB Index 2.9 (H) 0 - 2.5     TROPONIN I   Result Value Ref Range    Troponin-I, Qt. <0.04 <0.05 ng/mL   CBC W/O DIFF   Result Value Ref Range    WBC 13.6 (H) 4.1 - 11.1 K/uL RBC 4.63 4.10 - 5.70 M/uL    HGB 12.6 12.1 - 17.0 g/dL    HCT 39.9 36.6 - 50.3 %    MCV 86.2 80.0 - 99.0 FL    MCH 27.2 26.0 - 34.0 PG    MCHC 31.6 30.0 - 36.5 g/dL    RDW 17.0 (H) 11.5 - 14.5 %    PLATELET 310 (H) 197 - 415 K/uL   METABOLIC PANEL, BASIC   Result Value Ref Range    Sodium 136 136 - 145 mmol/L    Potassium 3.8 3.5 - 5.1 mmol/L    Chloride 97 97 - 108 mmol/L    CO2 31 21 - 32 mmol/L    Anion gap 8 5 - 15 mmol/L    Glucose 195 (H) 65 - 100 mg/dL    BUN 15 6 - 20 MG/DL    Creatinine 0.73 0.70 - 1.30 MG/DL    BUN/Creatinine ratio 21 (H) 12 - 20      GFR est AA >60 >60 ml/min/1.73m2    GFR est non-AA >60 >60 ml/min/1.73m2    Calcium 9.1 8.5 - 10.1 MG/DL   MAGNESIUM   Result Value Ref Range    Magnesium 2.4 1.6 - 2.4 mg/dL   CBC W/O DIFF   Result Value Ref Range    WBC 12.9 (H) 4.1 - 11.1 K/uL    RBC 4.46 4.10 - 5.70 M/uL    HGB 12.2 12.1 - 17.0 g/dL    HCT 38.5 36.6 - 50.3 %    MCV 86.3 80.0 - 99.0 FL    MCH 27.4 26.0 - 34.0 PG    MCHC 31.7 30.0 - 36.5 g/dL    RDW 17.1 (H) 11.5 - 14.5 %    PLATELET 716 533 - 374 K/uL   METABOLIC PANEL, BASIC   Result Value Ref Range    Sodium 136 136 - 145 mmol/L    Potassium 3.9 3.5 - 5.1 mmol/L    Chloride 96 (L) 97 - 108 mmol/L    CO2 32 21 - 32 mmol/L    Anion gap 8 5 - 15 mmol/L    Glucose 204 (H) 65 - 100 mg/dL    BUN 24 (H) 6 - 20 MG/DL    Creatinine 0.80 0.70 - 1.30 MG/DL    BUN/Creatinine ratio 30 (H) 12 - 20      GFR est AA >60 >60 ml/min/1.73m2    GFR est non-AA >60 >60 ml/min/1.73m2    Calcium 8.8 8.5 - 80.6 MG/DL   METABOLIC PANEL, COMPREHENSIVE   Result Value Ref Range    Sodium 134 (L) 136 - 145 mmol/L    Potassium 3.7 3.5 - 5.1 mmol/L    Chloride 95 (L) 97 - 108 mmol/L    CO2 29 21 - 32 mmol/L    Anion gap 10 5 - 15 mmol/L    Glucose 114 (H) 65 - 100 mg/dL    BUN 22 (H) 6 - 20 MG/DL    Creatinine 0.75 0.70 - 1.30 MG/DL    BUN/Creatinine ratio 29 (H) 12 - 20      GFR est AA >60 >60 ml/min/1.73m2    GFR est non-AA >60 >60 ml/min/1.73m2    Calcium 9.6 8.5 - 10.1 MG/DL    Bilirubin, total 0.7 0.2 - 1.0 MG/DL    ALT (SGPT) 46 12 - 78 U/L    AST (SGOT) 28 15 - 37 U/L    Alk. phosphatase 243 (H) 45 - 117 U/L    Protein, total 8.8 (H) 6.4 - 8.2 g/dL    Albumin 2.4 (L) 3.5 - 5.0 g/dL    Globulin 6.4 (H) 2.0 - 4.0 g/dL    A-G Ratio 0.4 (L) 1.1 - 2.2     CBC W/O DIFF   Result Value Ref Range    WBC 12.1 (H) 4.1 - 11.1 K/uL    RBC 4.44 4.10 - 5.70 M/uL    HGB 12.2 12.1 - 17.0 g/dL    HCT 38.4 36.6 - 50.3 %    MCV 86.5 80.0 - 99.0 FL    MCH 27.5 26.0 - 34.0 PG    MCHC 31.8 30.0 - 36.5 g/dL    RDW 16.7 (H) 11.5 - 14.5 %    PLATELET 369 022 - 514 K/uL   MAGNESIUM   Result Value Ref Range    Magnesium 2.2 1.6 - 2.4 mg/dL   PHOSPHORUS   Result Value Ref Range    Phosphorus 3.1 2.6 - 4.7 MG/DL   METABOLIC PANEL, BASIC   Result Value Ref Range    Sodium 135 (L) 136 - 145 mmol/L    Potassium 3.5 3.5 - 5.1 mmol/L    Chloride 95 (L) 97 - 108 mmol/L    CO2 30 21 - 32 mmol/L    Anion gap 10 5 - 15 mmol/L    Glucose 105 (H) 65 - 100 mg/dL    BUN 31 (H) 6 - 20 MG/DL    Creatinine 0.92 0.70 - 1.30 MG/DL    BUN/Creatinine ratio 34 (H) 12 - 20      GFR est AA >60 >60 ml/min/1.73m2    GFR est non-AA >60 >60 ml/min/1.73m2    Calcium 9.0 8.5 - 10.1 MG/DL   CBC WITH AUTOMATED DIFF   Result Value Ref Range    WBC 12.6 (H) 4.1 - 11.1 K/uL    RBC 4.51 4.10 - 5.70 M/uL    HGB 12.0 (L) 12.1 - 17.0 g/dL    HCT 38.7 36.6 - 50.3 %    MCV 85.8 80.0 - 99.0 FL    MCH 26.6 26.0 - 34.0 PG    MCHC 31.0 30.0 - 36.5 g/dL    RDW 16.7 (H) 11.5 - 14.5 %    PLATELET 148 157 - 220 K/uL    NEUTROPHILS 71 32 - 75 %    LYMPHOCYTES 17 12 - 49 %    MONOCYTES 10 5 - 13 %    EOSINOPHILS 2 0 - 7 %    BASOPHILS 0 0 - 1 %    ABS. NEUTROPHILS 8.9 (H) 1.8 - 8.0 K/UL    ABS. LYMPHOCYTES 2.2 0.8 - 3.5 K/UL    ABS. MONOCYTES 1.2 (H) 0.0 - 1.0 K/UL    ABS. EOSINOPHILS 0.2 0.0 - 0.4 K/UL    ABS.  BASOPHILS 0.1 0.0 - 0.1 K/UL   METABOLIC PANEL, COMPREHENSIVE   Result Value Ref Range    Sodium 137 136 - 145 mmol/L    Potassium 3.3 (L) 3.5 - 5.1 mmol/L    Chloride 97 97 - 108 mmol/L    CO2 31 21 - 32 mmol/L    Anion gap 9 5 - 15 mmol/L    Glucose 113 (H) 65 - 100 mg/dL    BUN 26 (H) 6 - 20 MG/DL    Creatinine 0.96 0.70 - 1.30 MG/DL    BUN/Creatinine ratio 27 (H) 12 - 20      GFR est AA >60 >60 ml/min/1.73m2    GFR est non-AA >60 >60 ml/min/1.73m2    Calcium 8.7 8.5 - 10.1 MG/DL    Bilirubin, total 0.7 0.2 - 1.0 MG/DL    ALT (SGPT) 42 12 - 78 U/L    AST (SGOT) 42 (H) 15 - 37 U/L    Alk. phosphatase 198 (H) 45 - 117 U/L    Protein, total 8.5 (H) 6.4 - 8.2 g/dL    Albumin 2.3 (L) 3.5 - 5.0 g/dL    Globulin 6.2 (H) 2.0 - 4.0 g/dL    A-G Ratio 0.4 (L) 1.1 - 2.2     CBC WITH AUTOMATED DIFF   Result Value Ref Range    WBC 10.0 4.1 - 11.1 K/uL    RBC 4.45 4.10 - 5.70 M/uL    HGB 12.3 12.1 - 17.0 g/dL    HCT 38.5 36.6 - 50.3 %    MCV 86.5 80.0 - 99.0 FL    MCH 27.6 26.0 - 34.0 PG    MCHC 31.9 30.0 - 36.5 g/dL    RDW 16.4 (H) 11.5 - 14.5 %    PLATELET 538 320 - 389 K/uL    NEUTROPHILS 70 32 - 75 %    LYMPHOCYTES 17 12 - 49 %    MONOCYTES 11 5 - 13 %    EOSINOPHILS 2 0 - 7 %    BASOPHILS 0 0 - 1 %    ABS. NEUTROPHILS 6.9 1.8 - 8.0 K/UL    ABS. LYMPHOCYTES 1.7 0.8 - 3.5 K/UL    ABS. MONOCYTES 1.1 (H) 0.0 - 1.0 K/UL    ABS. EOSINOPHILS 0.2 0.0 - 0.4 K/UL    ABS.  BASOPHILS 0.0 0.0 - 0.1 K/UL   METABOLIC PANEL, BASIC   Result Value Ref Range    Sodium 134 (L) 136 - 145 mmol/L    Potassium 3.8 3.5 - 5.1 mmol/L    Chloride 97 97 - 108 mmol/L    CO2 30 21 - 32 mmol/L    Anion gap 7 5 - 15 mmol/L    Glucose 109 (H) 65 - 100 mg/dL    BUN 19 6 - 20 MG/DL    Creatinine 0.85 0.70 - 1.30 MG/DL    BUN/Creatinine ratio 22 (H) 12 - 20      GFR est AA >60 >60 ml/min/1.73m2    GFR est non-AA >60 >60 ml/min/1.73m2    Calcium 8.6 8.5 - 10.1 MG/DL   GLUCOSE, POC   Result Value Ref Range    Glucose (POC) 86 65 - 100 mg/dL    Performed by Bhumi PIERRE    GLUCOSE, POC   Result Value Ref Range    Glucose (POC) 105 (H) 65 - 100 mg/dL    Performed by Bhumi PIERRE    GLUCOSE, POC Result Value Ref Range    Glucose (POC) 90 65 - 100 mg/dL    Performed by [employer] [facility]    GLUCOSE, POC   Result Value Ref Range    Glucose (POC) 128 (H) 65 - 100 mg/dL    Performed by [employer] [place]    GLUCOSE, POC   Result Value Ref Range    Glucose (POC) 168 (H) 65 - 100 mg/dL    Performed by Fabiana Montgomery    GLUCOSE, POC   Result Value Ref Range    Glucose (POC) 180 (H) 65 - 100 mg/dL    Performed by Fabiana Montgomery    GLUCOSE, POC   Result Value Ref Range    Glucose (POC) 183 (H) 65 - 100 mg/dL    Performed by [employer] [facility]    GLUCOSE, POC   Result Value Ref Range    Glucose (POC) 200 (H) 65 - 100 mg/dL    Performed by [employer]    GLUCOSE, POC   Result Value Ref Range    Glucose (POC) 173 (H) 65 - 100 mg/dL    Performed by Fabiana Montgomery    GLUCOSE, POC   Result Value Ref Range    Glucose (POC) 163 (H) 65 - 100 mg/dL    Performed by Fabiana Robertsh    GLUCOSE, POC   Result Value Ref Range    Glucose (POC) 150 (H) 65 - 100 mg/dL    Performed by [address] [place] [address], POC   Result Value Ref Range    Glucose (POC) 212 (H) 65 - 100 mg/dL    Performed by [address] [place] [address], POC   Result Value Ref Range    Glucose (POC) 227 (H) 65 - 100 mg/dL    Performed by Jaquan YAZUO    GLUCOSE, POC   Result Value Ref Range    Glucose (POC) 136 (H) 65 - 100 mg/dL    Performed by Jaquan YAZUO    GLUCOSE, POC   Result Value Ref Range    Glucose (POC) 173 (H) 65 - 100 mg/dL    Performed by Courtney Fernandes    GLUCOSE, POC   Result Value Ref Range    Glucose (POC) 176 (H) 65 - 100 mg/dL    Performed by Courtney Ruzen    GLUCOSE, POC   Result Value Ref Range    Glucose (POC) 131 (H) 65 - 100 mg/dL    Performed by Henrry Stage    GLUCOSE, POC   Result Value Ref Range    Glucose (POC) 144 (H) 65 - 100 mg/dL    Performed by Henrry Stage    GLUCOSE, POC   Result Value Ref Range    Glucose (POC) 130 (H) 65 - 100 mg/dL    Performed by Eli Edward    GLUCOSE, POC   Result Value Ref Range    Glucose (POC) 155 (H) 65 - 100 mg/dL    Performed by Benedict Carpio    GLUCOSE, POC   Result Value Ref Range    Glucose (POC) 144 (H) 65 - 100 mg/dL    Performed by Chapo Montanez    GLUCOSE, POC   Result Value Ref Range    Glucose (POC) 136 (H) 65 - 100 mg/dL    Performed by Tyler Storey    GLUCOSE, POC   Result Value Ref Range    Glucose (POC) 175 (H) 65 - 100 mg/dL    Performed by [address], POC   Result Value Ref Range    Glucose (POC) 147 (H) 65 - 100 mg/dL    Performed by [address], POC   Result Value Ref Range    Glucose (POC) 110 (H) 65 - 100 mg/dL    Performed by Milly Corona    GLUCOSE, POC   Result Value Ref Range    Glucose (POC) 96 65 - 100 mg/dL    Performed by Max Barreto \"Ruth\"*    GLUCOSE, POC   Result Value Ref Range    Glucose (POC) 97 65 - 100 mg/dL    Performed by Belgradetom Mahan    GLUCOSE, POC   Result Value Ref Range    Glucose (POC) 103 (H) 65 - 100 mg/dL    Performed by [place] Sarna    GLUCOSE, POC   Result Value Ref Range    Glucose (POC) 104 (H) 65 - 100 mg/dL    Performed by Christen NÚÑEZ    GLUCOSE, POC   Result Value Ref Range    Glucose (POC) 96 65 - 100 mg/dL    Performed by Char Barragan    GLUCOSE, POC   Result Value Ref Range    Glucose (POC) 116 (H) 65 - 100 mg/dL    Performed by Mary Temple (PCT)    GLUCOSE, POC   Result Value Ref Range    Glucose (POC) 111 (H) 65 - 100 mg/dL    Performed by Fredy Moulton (PCT)    GLUCOSE, POC   Result Value Ref Range    Glucose (POC) 170 (H) 65 - 100 mg/dL    Performed by Brooke Serrato    GLUCOSE, POC   Result Value Ref Range    Glucose (POC) 113 (H) 65 - 100 mg/dL    Performed by Man Carnes*    EKG, 12 LEAD, INITIAL   Result Value Ref Range    Ventricular Rate 88 BPM    Atrial Rate 88 BPM    P-R Interval 160 ms    QRS Duration 72 ms    Q-T Interval 366 ms    QTC Calculation (Bezet) 442 ms    Calculated P Axis 37 degrees    Calculated R Axis 57 degrees    Calculated T Axis 57 degrees    Diagnosis Normal sinus rhythm  Nonspecific ST abnormality  Abnormal ECG    Confirmed by Juany Kumar ([id number]) on 5/22/2017 5:27:05 PM     TYPE & CROSSMATCH   Result Value Ref Range    Crossmatch Expiration 05/23/2017     ABO/Rh(D) B POSITIVE     Antibody screen NEG     Unit number [id number]     Blood component type RC LR AS1     Unit division 00     Status of unit TRANSFUSED     Crossmatch result Compatible     Unit number [id number]     Blood component type RC LR AS1     Unit division 00     Status of unit TRANSFUSED     Crossmatch result Compatible        Last Lipid:  No results found for: LDL, DLDL, LDLC, DLDLP  HbA1c: No results found for: HBA1C, [id number], [id number]    Imaging Review  None    MEDICATIONS:  Current Facility-Administered Medications   Medication Dose Route Frequency    furosemide (LASIX) tablet 40 mg  40 mg Per G Tube ACB&D    pantoprazole (PROTONIX) granules for oral suspension 40 mg  40 mg Per G Tube ACB    guaiFENesin-dextromethorphan (ROBITUSSIN DM) 100-10 mg/5 mL syrup 10 mL  10 mL Per G Tube QID    fluocinoNIDE (LIDEX) 0.05 % cream   Topical BID    levoFLOXacin (LEVAQUIN) 750 mg in D5W IVPB  750 mg IntraVENous Q24H    nystatin (MYCOSTATIN) 100,000 unit/mL oral suspension 500,000 Units  500,000 Units Oral QID    metFORMIN (GLUCOPHAGE) tablet 500 mg  500 mg Oral BID WITH MEALS    valsartan (DIOVAN) tablet 40 mg  40 mg Oral DAILY    insulin lispro (HUMALOG) injection   SubCUTAneous Q6H    enoxaparin (LOVENOX) injection 40 mg  40 mg SubCUTAneous Q24H    sodium chloride (NS) flush 5-10 mL  5-10 mL IntraVENous Q8H    carvedilol (COREG) tablet 3.125 mg  3.125 mg Per G Tube BID WITH MEALS    prasugrel (EFFIENT) tablet 10 mg  10 mg Per G Tube DAILY    aspirin chewable tablet 81 mg  81 mg Per G Tube DAILY    atorvastatin (LIPITOR) tablet 40 mg  40 mg Per G Tube QHS    lidocaine (LIDODERM) 5 % patch 1 Patch  1 Patch TransDERmal Q24H       Assessment/Plan  Cristina Centeno is a 56 y.o. male who presented to the hospital for Anoxic brain injury. 1. Anoxic brain injury  2. HTN  3. HLD    - Continue PT/OT/ST  - Continue aspirin 81 mg a day  - Cont Lipitor 40 mg a day  - BP goal is less than 140/90  - MRI brain and EEG are pending       Signed:  Stephanie Saeed MD  Neurologist      This note will not be viewable in [address]

## 2017-05-29 LAB
ANION GAP BLD CALC-SCNC: 10 MMOL/L (ref 5–15)
BUN SERPL-MCNC: 23 MG/DL (ref 6–20)
BUN/CREAT SERPL: 22 (ref 12–20)
CALCIUM SERPL-MCNC: 8.7 MG/DL (ref 8.5–10.1)
CHLORIDE SERPL-SCNC: 96 MMOL/L (ref 97–108)
CO2 SERPL-SCNC: 29 MMOL/L (ref 21–32)
CREAT SERPL-MCNC: 1.06 MG/DL (ref 0.7–1.3)
GLUCOSE BLD STRIP.AUTO-MCNC: 106 MG/DL (ref 65–100)
GLUCOSE BLD STRIP.AUTO-MCNC: 123 MG/DL (ref 65–100)
GLUCOSE BLD STRIP.AUTO-MCNC: 134 MG/DL (ref 65–100)
GLUCOSE BLD STRIP.AUTO-MCNC: 208 MG/DL (ref 65–100)
GLUCOSE SERPL-MCNC: 178 MG/DL (ref 65–100)
POTASSIUM SERPL-SCNC: 3.8 MMOL/L (ref 3.5–5.1)
SERVICE CMNT-IMP: ABNORMAL
SODIUM SERPL-SCNC: 135 MMOL/L (ref 136–145)

## 2017-05-29 PROCEDURE — 74011250636 HC RX REV CODE- 250/636: Performed by: INTERNAL MEDICINE

## 2017-05-29 PROCEDURE — 74011636637 HC RX REV CODE- 636/637: Performed by: INTERNAL MEDICINE

## 2017-05-29 PROCEDURE — 77030021668 HC NEB PREFIL KT VYRM -A

## 2017-05-29 PROCEDURE — 74011250637 HC RX REV CODE- 250/637: Performed by: HOSPITALIST

## 2017-05-29 PROCEDURE — 36415 COLL VENOUS BLD VENIPUNCTURE: CPT | Performed by: NURSE PRACTITIONER

## 2017-05-29 PROCEDURE — 74011250637 HC RX REV CODE- 250/637: Performed by: INTERNAL MEDICINE

## 2017-05-29 PROCEDURE — 74011250637 HC RX REV CODE- 250/637: Performed by: NURSE PRACTITIONER

## 2017-05-29 PROCEDURE — 77030018798 HC PMP KT ENTRL FED COVD -A

## 2017-05-29 PROCEDURE — 80048 BASIC METABOLIC PNL TOTAL CA: CPT | Performed by: NURSE PRACTITIONER

## 2017-05-29 PROCEDURE — 77010026065 HC OXYGEN MINIMUM MEDICAL AIR

## 2017-05-29 PROCEDURE — 77030018846 HC SOL IRR STRL H20 ICUM -A

## 2017-05-29 PROCEDURE — 65660000000 HC RM CCU STEPDOWN

## 2017-05-29 PROCEDURE — 77030018861

## 2017-05-29 PROCEDURE — 82962 GLUCOSE BLOOD TEST: CPT

## 2017-05-29 RX ORDER — LANOLIN ALCOHOL/MO/W.PET/CERES
3 CREAM (GRAM) TOPICAL
Status: DISCONTINUED | OUTPATIENT
Start: 2017-05-29 | End: 2017-06-03 | Stop reason: HOSPADM

## 2017-05-29 RX ADMIN — MELATONIN 3 MG ORAL TABLET 3 MG: 3 TABLET ORAL at 21:34

## 2017-05-29 RX ADMIN — METFORMIN HYDROCHLORIDE 500 MG: 500 TABLET, FILM COATED ORAL at 08:56

## 2017-05-29 RX ADMIN — INSULIN LISPRO 3 UNITS: 100 INJECTION, SOLUTION INTRAVENOUS; SUBCUTANEOUS at 07:09

## 2017-05-29 RX ADMIN — GUAIFENESIN AND DEXTROMETHORPHAN 10 ML: 100; 10 SYRUP ORAL at 13:41

## 2017-05-29 RX ADMIN — PANTOPRAZOLE SODIUM 40 MG: 40 GRANULE, DELAYED RELEASE ORAL at 08:56

## 2017-05-29 RX ADMIN — Medication 10 ML: at 06:12

## 2017-05-29 RX ADMIN — FUROSEMIDE 40 MG: 40 TABLET ORAL at 17:16

## 2017-05-29 RX ADMIN — GUAIFENESIN AND DEXTROMETHORPHAN 10 ML: 100; 10 SYRUP ORAL at 17:16

## 2017-05-29 RX ADMIN — ENOXAPARIN SODIUM 40 MG: 40 INJECTION SUBCUTANEOUS at 17:16

## 2017-05-29 RX ADMIN — NYSTATIN 500000 UNITS: 100000 SUSPENSION ORAL at 08:55

## 2017-05-29 RX ADMIN — PRASUGREL HYDROCHLORIDE 10 MG: 10 TABLET, FILM COATED ORAL at 08:55

## 2017-05-29 RX ADMIN — Medication 10 ML: at 13:41

## 2017-05-29 RX ADMIN — GUAIFENESIN AND DEXTROMETHORPHAN 10 ML: 100; 10 SYRUP ORAL at 08:55

## 2017-05-29 RX ADMIN — ACETAMINOPHEN 650 MG: 325 SOLUTION ORAL at 21:34

## 2017-05-29 RX ADMIN — NYSTATIN 500000 UNITS: 100000 SUSPENSION ORAL at 13:41

## 2017-05-29 RX ADMIN — ATORVASTATIN CALCIUM 40 MG: 40 TABLET, FILM COATED ORAL at 21:35

## 2017-05-29 RX ADMIN — VALSARTAN 40 MG: 80 TABLET ORAL at 08:56

## 2017-05-29 RX ADMIN — OXYCODONE HYDROCHLORIDE 5 MG: 100 SOLUTION ORAL at 01:24

## 2017-05-29 RX ADMIN — Medication 10 ML: at 21:35

## 2017-05-29 RX ADMIN — ACETAMINOPHEN 650 MG: 325 SOLUTION ORAL at 13:41

## 2017-05-29 RX ADMIN — CARVEDILOL 3.12 MG: 3.12 TABLET, FILM COATED ORAL at 08:56

## 2017-05-29 RX ADMIN — NYSTATIN 500000 UNITS: 100000 SUSPENSION ORAL at 21:34

## 2017-05-29 RX ADMIN — METFORMIN HYDROCHLORIDE 500 MG: 500 TABLET, FILM COATED ORAL at 17:16

## 2017-05-29 RX ADMIN — LEVOFLOXACIN 750 MG: 5 INJECTION, SOLUTION INTRAVENOUS at 08:55

## 2017-05-29 RX ADMIN — NYSTATIN 500000 UNITS: 100000 SUSPENSION ORAL at 17:16

## 2017-05-29 RX ADMIN — FLUOCINONIDE: 0.5 CREAM TOPICAL at 08:56

## 2017-05-29 RX ADMIN — FUROSEMIDE 40 MG: 40 TABLET ORAL at 08:56

## 2017-05-29 RX ADMIN — ASPIRIN 81 MG CHEWABLE TABLET 81 MG: 81 TABLET CHEWABLE at 08:56

## 2017-05-29 RX ADMIN — GUAIFENESIN AND DEXTROMETHORPHAN 10 ML: 100; 10 SYRUP ORAL at 21:34

## 2017-05-29 NOTE — PROGRESS NOTES
Cardiology Progress Note      5/29/2017 10:09 AM    Admit Date: 5/19/2017    Admit Diagnosis: Cardiac Arrest;Respiratory failure [facility])      Subjective:     Ericka Myrick is awake, on trach.      Visit Vitals    /63 (BP 1 Location: Left arm, BP Patient Position: At rest)    Pulse (!) 108    Temp 98.7 °F (37.1 °C)    Resp 22    Ht 5' 11\" (1.803 m)    Wt 181 lb 4.8 oz (82.2 kg)    SpO2 98%    BMI 25.29 kg/m2       Current Facility-Administered Medications   Medication Dose Route Frequency    artificial saliva (MOUTH KOTE) 1 Spray  1 Spray Oral PRN    furosemide (LASIX) tablet 40 mg  40 mg Per G Tube ACB&D    pantoprazole (PROTONIX) granules for oral suspension 40 mg  40 mg Per G Tube ACB    guaiFENesin-dextromethorphan (ROBITUSSIN DM) 100-10 mg/5 mL syrup 10 mL  10 mL Per G Tube QID    fluocinoNIDE (LIDEX) 0.05 % cream   Topical BID    levoFLOXacin (LEVAQUIN) 750 mg in D5W IVPB  750 mg IntraVENous Q24H    nystatin (MYCOSTATIN) 100,000 unit/mL oral suspension 500,000 Units  500,000 Units Oral QID    metFORMIN (GLUCOPHAGE) tablet 500 mg  500 mg Oral BID WITH MEALS    valsartan (DIOVAN) tablet 40 mg  40 mg Oral DAILY    0.9% sodium chloride infusion 250 mL  250 mL IntraVENous PRN    insulin lispro (HUMALOG) injection   SubCUTAneous Q6H    glucose chewable tablet 16 g  4 Tab Oral PRN    dextrose 10% infusion 125-250 mL  125-250 mL IntraVENous PRN    glucagon (GLUCAGEN) injection 1 mg  1 mg IntraMUSCular PRN    enoxaparin (LOVENOX) injection 40 mg  40 mg SubCUTAneous Q24H    sodium chloride (NS) flush 5-10 mL  5-10 mL IntraVENous Q8H    sodium chloride (NS) flush 5-10 mL  5-10 mL IntraVENous PRN    ondansetron (ZOFRAN) injection 4 mg  4 mg IntraVENous Q6H PRN    albuterol-ipratropium (DUO-NEB) 2.5 MG-0.5 MG/3 ML  3 mL Nebulization Q4H PRN    carvedilol (COREG) tablet 3.125 mg  3.125 mg Per G Tube BID WITH MEALS    prasugrel (EFFIENT) tablet 10 mg  10 mg Per G Tube DAILY    aspirin chewable tablet 81 mg  81 mg Per G Tube DAILY    atorvastatin (LIPITOR) tablet 40 mg  40 mg Per G Tube QHS    oxyCODONE (ROXICODONE INTENSOL) 20 mg/mL concentrated solution 5 mg  5 mg Per G Tube Q4H PRN    acetaminophen (TYLENOL) solution 650 mg  650 mg Per G Tube Q4H PRN    lidocaine (LIDODERM) 5 % patch 1 Patch  1 Patch TransDERmal Q24H         Objective:      Physical Exam:  Visit Vitals    /63 (BP 1 Location: Left arm, BP Patient Position: At rest)    Pulse (!) 108    Temp 98.7 °F (37.1 °C)    Resp 22    Ht 5' 11\" (1.803 m)    Wt 181 lb 4.8 oz (82.2 kg)    SpO2 98%    BMI 25.29 kg/m2     General Appearance:  Well developed, well nourished,alert and oriented x o, and individual in no acute distress. Ears/Nose/Mouth/Throat:   Hearing grossly normal.         Neck: Supple. Chest:   Lungs clear to auscultation bilaterally. Cardiovascular:  Regular rate and rhythm, S1, S2 normal, no murmur. Abdomen:   Soft, non-tender, bowel sounds are active. Extremities: No edema bilaterally. Skin: Warm and dry.                Data Review:   Labs:    Recent Results (from the past 24 hour(s))   GLUCOSE, POC    Collection Time: 05/28/17 11:05 AM   Result Value Ref Range    Glucose (POC) 177 (H) 65 - 100 mg/dL    Performed by [address] [place] [address] Drive, POC    Collection Time: 05/28/17  5:45 PM   Result Value Ref Range    Glucose (POC) 128 (H) 65 - 100 mg/dL    Performed by Yanelis Rahman    GLUCOSE, POC    Collection Time: 05/29/17  1:03 AM   Result Value Ref Range    Glucose (POC) 123 (H) 65 - 100 mg/dL    Performed by [facility]    METABOLIC PANEL, BASIC    Collection Time: 05/29/17  4:38 AM   Result Value Ref Range    Sodium 135 (L) 136 - 145 mmol/L    Potassium 3.8 3.5 - 5.1 mmol/L    Chloride 96 (L) 97 - 108 mmol/L    CO2 29 21 - 32 mmol/L    Anion gap 10 5 - 15 mmol/L    Glucose 178 (H) 65 - 100 mg/dL    BUN 23 (H) 6 - 20 MG/DL    Creatinine 1.06 0.70 - 1.30 MG/DL    BUN/Creatinine ratio 22 (H) 12 - 20      GFR est AA >60 >60 ml/min/1.73m2    GFR est non-AA >60 >60 ml/min/1.73m2    Calcium 8.7 8.5 - 10.1 MG/DL   GLUCOSE, POC    Collection Time: 05/29/17  6:23 AM   Result Value Ref Range    Glucose (POC) 208 (H) 65 - 100 mg/dL    Performed by Trevin Metz        Telemetry: normal sinus rhythm      Assessment:     Principal Problem:    Anoxic brain injury ([facility] (5/20/2017)    Active Problems:    Coronary artery disease of bypass graft with stable angina pectoris ([facility] (10/5/2016)      Essential hypertension (10/5/2016)      Dyslipidemia (10/5/2016)      Type 2 diabetes mellitus without complication ([facility] (33/0/4288)      H/O cardiac arrest (5/19/2017)      Overview: 4/28/17:  OSH in [place]      Respiratory failure ([facility] (5/19/2017)      Pneum[facility] Serratia marcescens [facility] (6/84/1696)      Lice infestation (0/15/4226)      Cardiomyopathy ([facility] (5/22/2017)        Plan:     Continue current therapy. BP low to increase coreg. Limited echo in AM to aesess EF. lifevest v/s ICD before d/c. Will discuss with EP. Discussed with family.     Harika Laurent MD

## 2017-05-29 NOTE — PROGRESS NOTES
NEUROLOGY DAILY PROGRESS NOTE     Subjective  Pt is moving the right side more than before. Pt is doing better today    ROS:  A ten system review of constitutional, cardiovascular, respiratory, musculoskeletal, endocrine, skin, SHEENT, genitourinary, psychiatric and neurologic systems was obtained and is unremarkable except as stated in HPI     EXAMINATION:   Patient Vitals for the past 24 hrs:   Temp Pulse Resp BP SpO2   05/29/17 0816 - (!) 108 22 101/63 98 %   05/29/17 0800 - 92 - 94/48 96 %   05/29/17 0400 98.7 °F (37.1 °C) 91 22 99/58 98 %   05/28/17 2032 98.1 °F (36.7 °C) (!) 106 17 (!) 116/93 99 %   05/28/17 1625 98.2 °F (36.8 °C) 89 18 107/55 99 %        General:   General appearance: Pt is in no acute distress   Distal pulses are preserved    Neurological Examination:   Mental Status: AA and Follows some commands commands - wiggle toes and stick out tongue   Cranial Nerves: PERRL, Extraocular movements are full. Facial sensation intact V1- V3. Facial movement decreased on the right. Hearing intact to conversation. Tongue midline. Motor: Strength is 4/5 in the LUE, 1-2/5 in the RUE, 3/5 in LLE and 2/5 in RLE    Sensation: Normal to light touch    Coordination/Cerebellar: could not assess    Skin: No significant bruising or lacerations.     LAB DATA REVIEWED:    Results for orders placed or performed during the hospital encounter of 05/19/17   CULTURE, RESPIRATORY/SPUTUM/BRONCH W GRAM STAIN   Result Value Ref Range    Special Requests: NO SPECIAL REQUESTS      GRAM STAIN NO  EPITHELIAL CELLS SEEN       GRAM STAIN NO WBC'S SEEN      GRAM STAIN RARE  GRAM NEGATIVE COCCOBACILLI        Culture result: MODERATE  SERRATIA MARCESCENS   (A)      Culture result: LIGHT  ACINETOBACTER BAUMANNII/HAEMOLYTICUS   (A)      Culture result: SCANT  KLEBSIELLA PNEUMONIAE   (A)      Culture result: NO NORMAL RESPIRATORY BETTE ISOLATED         Susceptibility    Klebsiella pneumoniae - EDUARD     Amikacin ($) <=16 Susceptible ug/mL     Ampicillin/sulbactam ($) <=8/4 Susceptible ug/mL     Aztreonam ($$$$) <=4 Susceptible ug/mL     Cefazolin ($) <=8 Susceptible ug/mL     Ceftazidime ($) <=1 Susceptible ug/mL     Ceftriaxone ($) <=1 Susceptible ug/mL     Cefuroxime ($) <=4 Susceptible ug/mL     Cefotaxime <=2 Susceptible ug/mL     Cefepime ($$) <=4 Susceptible ug/mL     Ciprofloxacin ($) <=1 Susceptible ug/mL     Gentamicin ($) <=4 Susceptible ug/mL     Imipenem <=1 Susceptible ug/mL     Levofloxacin ($) <=2 Susceptible ug/mL     Meropenem ($$) <=1 Susceptible ug/mL     Piperacillin/Tazobac ($) <=16 Susceptible ug/mL     Tobramycin ($) <=4 Susceptible ug/mL     Trimeth-Sulfamethoxa <=2/38 Susceptible ug/mL    Serratia marcescens - EDUARD     Amikacin ($) <=16 Susceptible ug/mL     Aztreonam ($$$$) <=4 Susceptible ug/mL     Ceftazidime ($) <=1 Susceptible ug/mL     Ceftriaxone ($) <=1 Susceptible ug/mL     Cefotaxime <=2 Susceptible ug/mL     Cefepime ($$) <=4 Susceptible ug/mL     Ciprofloxacin ($) <=1 Susceptible ug/mL     Gentamicin ($) <=4 Susceptible ug/mL     Imipenem <=1 Susceptible ug/mL     Levofloxacin ($) <=2 Susceptible ug/mL     Meropenem ($$) <=1 Susceptible ug/mL     Piperacillin/Tazobac ($) <=16 Susceptible ug/mL     Tobramycin ($) <=4 Susceptible ug/mL     Trimeth-Sulfamethoxa <=2/38 Susceptible ug/mL    Acinetobacter baumanni/haemolyticus - EDUARD     Amikacin ($) <=16 Susceptible ug/mL     Ampicillin/sulbactam ($) <=8/4 Susceptible ug/mL     Ceftazidime ($) 4 Susceptible ug/mL     Cefotaxime 16 Intermediate ug/mL     Cefepime ($$) <=4 Susceptible ug/mL     Ciprofloxacin ($) <=1 Susceptible ug/mL     Gentamicin ($) <=4 Susceptible ug/mL     Meropenem ($$) <=1 Susceptible ug/mL     Tobramycin ($) <=4 Susceptible ug/mL     Trimeth-Sulfamethoxa <=2/38 Susceptible ug/mL   CBC WITH AUTOMATED DIFF   Result Value Ref Range    WBC 9.8 4.1 - 11.1 K/uL    RBC 2.53 (L) 4.10 - 5.70 M/uL    HGB 6.8 (L) 12.1 - 17.0 g/dL    HCT 21.8 (L) 36.6 - 50.3 %    MCV 86.2 80.0 - 99.0 FL    MCH 26.9 26.0 - 34.0 PG    MCHC 31.2 30.0 - 36.5 g/dL    RDW 17.3 (H) 11.5 - 14.5 %    PLATELET 849 290 - 166 K/uL    NEUTROPHILS 73 32 - 75 %    LYMPHOCYTES 17 12 - 49 %    MONOCYTES 9 5 - 13 %    EOSINOPHILS 1 0 - 7 %    BASOPHILS 0 0 - 1 %    ABS. NEUTROPHILS 7.1 1.8 - 8.0 K/UL    ABS. LYMPHOCYTES 1.7 0.8 - 3.5 K/UL    ABS. MONOCYTES 0.9 0.0 - 1.0 K/UL    ABS. EOSINOPHILS 0.1 0.0 - 0.4 K/UL    ABS. BASOPHILS 0.0 0.0 - 0.1 K/UL    DF SMEAR SCANNED      RBC COMMENTS ANISOCYTOSIS  1+       MAGNESIUM   Result Value Ref Range    Magnesium 2.4 1.6 - 2.4 mg/dL   PHOSPHORUS   Result Value Ref Range    Phosphorus 2.3 (L) 2.6 - 4.7 MG/DL   METABOLIC PANEL, COMPREHENSIVE   Result Value Ref Range    Sodium 139 136 - 145 mmol/L    Potassium 3.4 (L) 3.5 - 5.1 mmol/L    Chloride 105 97 - 108 mmol/L    CO2 24 21 - 32 mmol/L    Anion gap 10 5 - 15 mmol/L    Glucose 76 65 - 100 mg/dL    BUN 13 6 - 20 MG/DL    Creatinine 0.50 (L) 0.70 - 1.30 MG/DL    BUN/Creatinine ratio 26 (H) 12 - 20      GFR est AA >60 >60 ml/min/1.73m2    GFR est non-AA >60 >60 ml/min/1.73m2    Calcium 7.4 (L) 8.5 - 10.1 MG/DL    Bilirubin, total 0.6 0.2 - 1.0 MG/DL    ALT (SGPT) 58 12 - 78 U/L    AST (SGOT) 46 (H) 15 - 37 U/L    Alk.  phosphatase 236 (H) 45 - 117 U/L    Protein, total 7.5 6.4 - 8.2 g/dL    Albumin 1.8 (L) 3.5 - 5.0 g/dL    Globulin 5.7 (H) 2.0 - 4.0 g/dL    A-G Ratio 0.3 (L) 1.1 - 2.2     IRON PROFILE   Result Value Ref Range    Iron 34 (L) 35 - 150 ug/dL    TIBC 229 (L) 250 - 450 ug/dL    Iron % saturation 15 (L) 20 - 50 %   FERRITIN   Result Value Ref Range    Ferritin 191 26 - 388 NG/ML   VITAMIN B12   Result Value Ref Range    Vitamin B12 632 211 - 911 pg/mL   PREALBUMIN   Result Value Ref Range    Prealbumin 12.7 (L) 20.0 - 40.0 mg/dL   CBC W/O DIFF   Result Value Ref Range    WBC 12.8 (H) 4.1 - 11.1 K/uL    RBC 4.12 4.10 - 5.70 M/uL    HGB 11.3 (L) 12.1 - 17.0 g/dL    HCT 35.0 (L) 36.6 - 50.3 %    MCV 85.0 80.0 - 99.0 FL    MCH 27.4 26.0 - 34.0 PG    MCHC 32.3 30.0 - 36.5 g/dL    RDW 16.7 (H) 11.5 - 14.5 %    PLATELET 172 (H) 326 - 509 K/uL   METABOLIC PANEL, COMPREHENSIVE   Result Value Ref Range    Sodium 135 (L) 136 - 145 mmol/L    Potassium 3.5 3.5 - 5.1 mmol/L    Chloride 98 97 - 108 mmol/L    CO2 30 21 - 32 mmol/L    Anion gap 7 5 - 15 mmol/L    Glucose 171 (H) 65 - 100 mg/dL    BUN 12 6 - 20 MG/DL    Creatinine 0.72 0.70 - 1.30 MG/DL    BUN/Creatinine ratio 17 12 - 20      GFR est AA >60 >60 ml/min/1.73m2    GFR est non-AA >60 >60 ml/min/1.73m2    Calcium 8.6 8.5 - 10.1 MG/DL    Bilirubin, total 1.3 (H) 0.2 - 1.0 MG/DL    ALT (SGPT) 67 12 - 78 U/L    AST (SGOT) 53 (H) 15 - 37 U/L    Alk. phosphatase 272 (H) 45 - 117 U/L    Protein, total 8.6 (H) 6.4 - 8.2 g/dL    Albumin 2.2 (L) 3.5 - 5.0 g/dL    Globulin 6.4 (H) 2.0 - 4.0 g/dL    A-G Ratio 0.3 (L) 1.1 - 2.2     CBC W/O DIFF   Result Value Ref Range    WBC 11.4 (H) 4.1 - 11.1 K/uL    RBC 4.40 4. 10 - 5.70 M/uL    HGB 11.6 (L) 12.1 - 17.0 g/dL    HCT 37.2 36.6 - 50.3 %    MCV 84.5 80.0 - 99.0 FL    MCH 26.4 26.0 - 34.0 PG    MCHC 31.2 30.0 - 36.5 g/dL    RDW 17.0 (H) 11.5 - 14.5 %    PLATELET 880 644 - 354 K/uL   METABOLIC PANEL, BASIC   Result Value Ref Range    Sodium 138 136 - 145 mmol/L    Potassium 3.6 3.5 - 5.1 mmol/L    Chloride 100 97 - 108 mmol/L    CO2 31 21 - 32 mmol/L    Anion gap 7 5 - 15 mmol/L    Glucose 162 (H) 65 - 100 mg/dL    BUN 13 6 - 20 MG/DL    Creatinine 0.67 (L) 0.70 - 1.30 MG/DL    BUN/Creatinine ratio 19 12 - 20      GFR est AA >60 >60 ml/min/1.73m2    GFR est non-AA >60 >60 ml/min/1.73m2    Calcium 8.7 8.5 - 10.1 MG/DL   CK W/ CKMB & INDEX   Result Value Ref Range    CK 52 39 - 308 U/L    CK - MB 1.5 <3.6 NG/ML    CK-MB Index 2.9 (H) 0 - 2.5     TROPONIN I   Result Value Ref Range    Troponin-I, Qt. <0.04 <0.05 ng/mL   CBC W/O DIFF   Result Value Ref Range    WBC 13.6 (H) 4.1 - 11.1 K/uL    RBC 4.63 4.10 - 5.70 M/uL    HGB 12.6 12.1 - 17.0 g/dL    HCT 39.9 36.6 - 50.3 %    MCV 86.2 80.0 - 99.0 FL    MCH 27.2 26.0 - 34.0 PG    MCHC 31.6 30.0 - 36.5 g/dL    RDW 17.0 (H) 11.5 - 14.5 %    PLATELET 944 (H) 358 - 751 K/uL   METABOLIC PANEL, BASIC   Result Value Ref Range    Sodium 136 136 - 145 mmol/L    Potassium 3.8 3.5 - 5.1 mmol/L    Chloride 97 97 - 108 mmol/L    CO2 31 21 - 32 mmol/L    Anion gap 8 5 - 15 mmol/L    Glucose 195 (H) 65 - 100 mg/dL    BUN 15 6 - 20 MG/DL    Creatinine 0.73 0.70 - 1.30 MG/DL    BUN/Creatinine ratio 21 (H) 12 - 20      GFR est AA >60 >60 ml/min/1.73m2    GFR est non-AA >60 >60 ml/min/1.73m2    Calcium 9.1 8.5 - 10.1 MG/DL   MAGNESIUM   Result Value Ref Range    Magnesium 2.4 1.6 - 2.4 mg/dL   CBC W/O DIFF   Result Value Ref Range    WBC 12.9 (H) 4.1 - 11.1 K/uL    RBC 4.46 4.10 - 5.70 M/uL    HGB 12.2 12.1 - 17.0 g/dL    HCT 38.5 36.6 - 50.3 %    MCV 86.3 80.0 - 99.0 FL    MCH 27.4 26.0 - 34.0 PG    MCHC 31.7 30.0 - 36.5 g/dL    RDW 17.1 (H) 11.5 - 14.5 %    PLATELET 653 265 - 859 K/uL   METABOLIC PANEL, BASIC   Result Value Ref Range    Sodium 136 136 - 145 mmol/L    Potassium 3.9 3.5 - 5.1 mmol/L    Chloride 96 (L) 97 - 108 mmol/L    CO2 32 21 - 32 mmol/L    Anion gap 8 5 - 15 mmol/L    Glucose 204 (H) 65 - 100 mg/dL    BUN 24 (H) 6 - 20 MG/DL    Creatinine 0.80 0.70 - 1.30 MG/DL    BUN/Creatinine ratio 30 (H) 12 - 20      GFR est AA >60 >60 ml/min/1.73m2    GFR est non-AA >60 >60 ml/min/1.73m2    Calcium 8.8 8.5 - 60.3 MG/DL   METABOLIC PANEL, COMPREHENSIVE   Result Value Ref Range    Sodium 134 (L) 136 - 145 mmol/L    Potassium 3.7 3.5 - 5.1 mmol/L    Chloride 95 (L) 97 - 108 mmol/L    CO2 29 21 - 32 mmol/L    Anion gap 10 5 - 15 mmol/L    Glucose 114 (H) 65 - 100 mg/dL    BUN 22 (H) 6 - 20 MG/DL    Creatinine 0.75 0.70 - 1.30 MG/DL    BUN/Creatinine ratio 29 (H) 12 - 20      GFR est AA >60 >60 ml/min/1.73m2    GFR est non-AA >60 >60 ml/min/1.73m2    Calcium 9.6 8.5 - 10.1 MG/DL    Bilirubin, total 0.7 0.2 - 1.0 MG/DL    ALT (SGPT) 46 12 - 78 U/L    AST (SGOT) 28 15 - 37 U/L    Alk. phosphatase 243 (H) 45 - 117 U/L    Protein, total 8.8 (H) 6.4 - 8.2 g/dL    Albumin 2.4 (L) 3.5 - 5.0 g/dL    Globulin 6.4 (H) 2.0 - 4.0 g/dL    A-G Ratio 0.4 (L) 1.1 - 2.2     CBC W/O DIFF   Result Value Ref Range    WBC 12.1 (H) 4.1 - 11.1 K/uL    RBC 4.44 4.10 - 5.70 M/uL    HGB 12.2 12.1 - 17.0 g/dL    HCT 38.4 36.6 - 50.3 %    MCV 86.5 80.0 - 99.0 FL    MCH 27.5 26.0 - 34.0 PG    MCHC 31.8 30.0 - 36.5 g/dL    RDW 16.7 (H) 11.5 - 14.5 %    PLATELET 690 972 - 754 K/uL   MAGNESIUM   Result Value Ref Range    Magnesium 2.2 1.6 - 2.4 mg/dL   PHOSPHORUS   Result Value Ref Range    Phosphorus 3.1 2.6 - 4.7 MG/DL   METABOLIC PANEL, BASIC   Result Value Ref Range    Sodium 135 (L) 136 - 145 mmol/L    Potassium 3.5 3.5 - 5.1 mmol/L    Chloride 95 (L) 97 - 108 mmol/L    CO2 30 21 - 32 mmol/L    Anion gap 10 5 - 15 mmol/L    Glucose 105 (H) 65 - 100 mg/dL    BUN 31 (H) 6 - 20 MG/DL    Creatinine 0.92 0.70 - 1.30 MG/DL    BUN/Creatinine ratio 34 (H) 12 - 20      GFR est AA >60 >60 ml/min/1.73m2    GFR est non-AA >60 >60 ml/min/1.73m2    Calcium 9.0 8.5 - 10.1 MG/DL   CBC WITH AUTOMATED DIFF   Result Value Ref Range    WBC 12.6 (H) 4.1 - 11.1 K/uL    RBC 4.51 4.10 - 5.70 M/uL    HGB 12.0 (L) 12.1 - 17.0 g/dL    HCT 38.7 36.6 - 50.3 %    MCV 85.8 80.0 - 99.0 FL    MCH 26.6 26.0 - 34.0 PG    MCHC 31.0 30.0 - 36.5 g/dL    RDW 16.7 (H) 11.5 - 14.5 %    PLATELET 241 191 - 437 K/uL    NEUTROPHILS 71 32 - 75 %    LYMPHOCYTES 17 12 - 49 %    MONOCYTES 10 5 - 13 %    EOSINOPHILS 2 0 - 7 %    BASOPHILS 0 0 - 1 %    ABS. NEUTROPHILS 8.9 (H) 1.8 - 8.0 K/UL    ABS. LYMPHOCYTES 2.2 0.8 - 3.5 K/UL    ABS. MONOCYTES 1.2 (H) 0.0 - 1.0 K/UL    ABS. EOSINOPHILS 0.2 0.0 - 0.4 K/UL    ABS.  BASOPHILS 0.1 0.0 - 0.1 K/UL   METABOLIC PANEL, COMPREHENSIVE   Result Value Ref Range    Sodium 137 136 - 145 mmol/L    Potassium 3.3 (L) 3.5 - 5.1 mmol/L    Chloride 97 97 - 108 mmol/L    CO2 31 21 - 32 mmol/L    Anion gap 9 5 - 15 mmol/L    Glucose 113 (H) 65 - 100 mg/dL    BUN 26 (H) 6 - 20 MG/DL    Creatinine 0.96 0.70 - 1.30 MG/DL    BUN/Creatinine ratio 27 (H) 12 - 20      GFR est AA >60 >60 ml/min/1.73m2    GFR est non-AA >60 >60 ml/min/1.73m2    Calcium 8.7 8.5 - 10.1 MG/DL    Bilirubin, total 0.7 0.2 - 1.0 MG/DL    ALT (SGPT) 42 12 - 78 U/L    AST (SGOT) 42 (H) 15 - 37 U/L    Alk. phosphatase 198 (H) 45 - 117 U/L    Protein, total 8.5 (H) 6.4 - 8.2 g/dL    Albumin 2.3 (L) 3.5 - 5.0 g/dL    Globulin 6.2 (H) 2.0 - 4.0 g/dL    A-G Ratio 0.4 (L) 1.1 - 2.2     CBC WITH AUTOMATED DIFF   Result Value Ref Range    WBC 10.0 4.1 - 11.1 K/uL    RBC 4.45 4.10 - 5.70 M/uL    HGB 12.3 12.1 - 17.0 g/dL    HCT 38.5 36.6 - 50.3 %    MCV 86.5 80.0 - 99.0 FL    MCH 27.6 26.0 - 34.0 PG    MCHC 31.9 30.0 - 36.5 g/dL    RDW 16.4 (H) 11.5 - 14.5 %    PLATELET 017 911 - 968 K/uL    NEUTROPHILS 70 32 - 75 %    LYMPHOCYTES 17 12 - 49 %    MONOCYTES 11 5 - 13 %    EOSINOPHILS 2 0 - 7 %    BASOPHILS 0 0 - 1 %    ABS. NEUTROPHILS 6.9 1.8 - 8.0 K/UL    ABS. LYMPHOCYTES 1.7 0.8 - 3.5 K/UL    ABS. MONOCYTES 1.1 (H) 0.0 - 1.0 K/UL    ABS. EOSINOPHILS 0.2 0.0 - 0.4 K/UL    ABS.  BASOPHILS 0.0 0.0 - 0.1 K/UL   METABOLIC PANEL, BASIC   Result Value Ref Range    Sodium 134 (L) 136 - 145 mmol/L    Potassium 3.8 3.5 - 5.1 mmol/L    Chloride 97 97 - 108 mmol/L    CO2 30 21 - 32 mmol/L    Anion gap 7 5 - 15 mmol/L    Glucose 109 (H) 65 - 100 mg/dL    BUN 19 6 - 20 MG/DL    Creatinine 0.85 0.70 - 1.30 MG/DL    BUN/Creatinine ratio 22 (H) 12 - 20      GFR est AA >60 >60 ml/min/1.73m2    GFR est non-AA >60 >60 ml/min/1.73m2    Calcium 8.6 8.5 - 11.6 MG/DL   METABOLIC PANEL, BASIC   Result Value Ref Range    Sodium 135 (L) 136 - 145 mmol/L    Potassium 3.8 3.5 - 5.1 mmol/L    Chloride 96 (L) 97 - 108 mmol/L    CO2 29 21 - 32 mmol/L    Anion gap 10 5 - 15 mmol/L    Glucose 178 (H) 65 - 100 mg/dL    BUN 23 (H) 6 - 20 MG/DL Creatinine 1.06 0.70 - 1.30 MG/DL    BUN/Creatinine ratio 22 (H) 12 - 20      GFR est AA >60 >60 ml/min/1.73m2    GFR est non-AA >60 >60 ml/min/1.73m2    Calcium 8.7 8.5 - 10.1 MG/DL   GLUCOSE, POC   Result Value Ref Range    Glucose (POC) 86 65 - 100 mg/dL    Performed by Alexander PIERRE    GLUCOSE, POC   Result Value Ref Range    Glucose (POC) 105 (H) 65 - 100 mg/dL    Performed by Alexander PIERRE    GLUCOSE, POC   Result Value Ref Range    Glucose (POC) 90 65 - 100 mg/dL    Performed by Pina Charisse    GLUCOSE, POC   Result Value Ref Range    Glucose (POC) 128 (H) 65 - 100 mg/dL    Performed by Pina Charisse    GLUCOSE, POC   Result Value Ref Range    Glucose (POC) 168 (H) 65 - 100 mg/dL    Performed by Cathy Montes De Oca    GLUCOSE, POC   Result Value Ref Range    Glucose (POC) 180 (H) 65 - 100 mg/dL    Performed by Cathy Montes De Oca    GLUCOSE, POC   Result Value Ref Range    Glucose (POC) 183 (H) 65 - 100 mg/dL    Performed by Pina Charisse    GLUCOSE, POC   Result Value Ref Range    Glucose (POC) 200 (H) 65 - 100 mg/dL    Performed by Pina Meetmeals    GLUCOSE, POC   Result Value Ref Range    Glucose (POC) 173 (H) 65 - 100 mg/dL    Performed by Cathy Montes De Oca    GLUCOSE, POC   Result Value Ref Range    Glucose (POC) 163 (H) 65 - 100 mg/dL    Performed by Cathy Montes De Oca    GLUCOSE, POC   Result Value Ref Range    Glucose (POC) 150 (H) 65 - 100 mg/dL    Performed by Mikhail Peter    GLUCOSE, POC   Result Value Ref Range    Glucose (POC) 212 (H) 65 - 100 mg/dL    Performed by Mikhail Peter    GLUCOSE, POC   Result Value Ref Range    Glucose (POC) 227 (H) 65 - 100 mg/dL    Performed by Jaquan Stamplay    GLUCOSE, POC   Result Value Ref Range    Glucose (POC) 136 (H) 65 - 100 mg/dL    Performed by Jaquan Stamplay    GLUCOSE, POC   Result Value Ref Range    Glucose (POC) 173 (H) 65 - 100 mg/dL    Performed by Courtney Fernandes    GLUCOSE, POC   Result Value Ref Range    Glucose (POC) 176 (H) 65 - 100 mg/dL Performed by Carmenza Hernandez    GLUCOSE, POC   Result Value Ref Range    Glucose (POC) 131 (H) 65 - 100 mg/dL    Performed by [place]    GLUCOSE, POC   Result Value Ref Range    Glucose (POC) 144 (H) 65 - 100 mg/dL    Performed by [facility]    GLUCOSE, POC   Result Value Ref Range    Glucose (POC) 130 (H) 65 - 100 mg/dL    Performed by [address], POC   Result Value Ref Range    Glucose (POC) 155 (H) 65 - 100 mg/dL    Performed by Lory Wasserman    GLUCOSE, POC   Result Value Ref Range    Glucose (POC) 144 (H) 65 - 100 mg/dL    Performed by Sofi Valencia    GLUCOSE, POC   Result Value Ref Range    Glucose (POC) 136 (H) 65 - 100 mg/dL    Performed by Echo Diaz    GLUCOSE, POC   Result Value Ref Range    Glucose (POC) 175 (H) 65 - 100 mg/dL    Performed by [address] [place] [address], POC   Result Value Ref Range    Glucose (POC) 147 (H) 65 - 100 mg/dL    Performed by [address] [place] [address], POC   Result Value Ref Range    Glucose (POC) 110 (H) 65 - 100 mg/dL    Performed by Barrie Vargas    GLUCOSE, POC   Result Value Ref Range    Glucose (POC) 96 65 - 100 mg/dL    Performed by [place] \"Ruth\"*    GLUCOSE, POC   Result Value Ref Range    Glucose (POC) 97 65 - 100 mg/dL    Performed by Janey Biggs    GLUCOSE, POC   Result Value Ref Range    Glucose (POC) 103 (H) 65 - 100 mg/dL    Performed by Janey Biggs    GLUCOSE, POC   Result Value Ref Range    Glucose (POC) 104 (H) 65 - 100 mg/dL    Performed by Hans NÚÑEZ    GLUCOSE, POC   Result Value Ref Range    Glucose (POC) 96 65 - 100 mg/dL    Performed by Char Barragan    GLUCOSE, POC   Result Value Ref Range    Glucose (POC) 116 (H) 65 - 100 mg/dL    Performed by Estephania Rodriguez (PCT)    GLUCOSE, POC   Result Value Ref Range    Glucose (POC) 111 (H) 65 - 100 mg/dL    Performed by Amairani Medeiros (PCT)    GLUCOSE, POC   Result Value Ref Range    Glucose (POC) 170 (H) 65 - 100 mg/dL    Performed by Margarita Montano, POC Result Value Ref Range    Glucose (POC) 113 (H) 65 - 100 mg/dL    Performed by Stanton Crane*    GLUCOSE, POC   Result Value Ref Range    Glucose (POC) 177 (H) 65 - 100 mg/dL    Performed by Iban RANDALL    GLUCOSE, POC   Result Value Ref Range    Glucose (POC) 128 (H) 65 - 100 mg/dL    Performed by Toni Mathis    GLUCOSE, POC   Result Value Ref Range    Glucose (POC) 123 (H) 65 - 100 mg/dL    Performed by Joey Mace    GLUCOSE, POC   Result Value Ref Range    Glucose (POC) 208 (H) 65 - 100 mg/dL    Performed by Joey Mace    EKG, 12 LEAD, INITIAL   Result Value Ref Range    Ventricular Rate 88 BPM    Atrial Rate 88 BPM    P-R Interval 160 ms    QRS Duration 72 ms    Q-T Interval 366 ms    QTC Calculation (Bezet) 442 ms    Calculated P Axis 37 degrees    Calculated R Axis 57 degrees    Calculated T Axis 57 degrees    Diagnosis       Normal sinus rhythm  Nonspecific ST abnormality  Abnormal ECG    Confirmed by Heaven Everett ([id number]) on 5/22/2017 5:27:05 PM     TYPE & CROSSMATCH   Result Value Ref Range    Crossmatch Expiration 05/23/2017     ABO/Rh(D) B POSITIVE     Antibody screen NEG     Unit number [id number]     Blood component type RC LR AS1     Unit division 00     Status of unit TRANSFUSED     Crossmatch result Compatible     Unit number [id number]     Blood component type RC LR AS1     Unit division 00     Status of unit TRANSFUSED     Crossmatch result Compatible        Last Lipid:  No results found for: LDL, DLDL, LDLC, DLDLP  HbA1c: No results found for: HBA1C, [id number], [id number]    Imaging Review  None    MEDICATIONS:  Current Facility-Administered Medications   Medication Dose Route Frequency    melatonin tablet 3 mg  3 mg Oral QHS    furosemide (LASIX) tablet 40 mg  40 mg Per G Tube ACB&D    pantoprazole (PROTONIX) granules for oral suspension 40 mg  40 mg Per G Tube ACB    guaiFENesin-dextromethorphan (ROBITUSSIN DM) 100-10 mg/5 mL syrup 10 mL  10 mL Per G Tube QID    fluocinoNIDE (LIDEX) 0.05 % cream   Topical BID    levoFLOXacin (LEVAQUIN) 750 mg in D5W IVPB  750 mg IntraVENous Q24H    nystatin (MYCOSTATIN) 100,000 unit/mL oral suspension 500,000 Units  500,000 Units Oral QID    metFORMIN (GLUCOPHAGE) tablet 500 mg  500 mg Oral BID WITH MEALS    valsartan (DIOVAN) tablet 40 mg  40 mg Oral DAILY    insulin lispro (HUMALOG) injection   SubCUTAneous Q6H    enoxaparin (LOVENOX) injection 40 mg  40 mg SubCUTAneous Q24H    sodium chloride (NS) flush 5-10 mL  5-10 mL IntraVENous Q8H    carvedilol (COREG) tablet 3.125 mg  3.125 mg Per G Tube BID WITH MEALS    prasugrel (EFFIENT) tablet 10 mg  10 mg Per G Tube DAILY    aspirin chewable tablet 81 mg  81 mg Per G Tube DAILY    atorvastatin (LIPITOR) tablet 40 mg  40 mg Per G Tube QHS    lidocaine (LIDODERM) 5 % patch 1 Patch  1 Patch TransDERmal Q24H       Assessment/Plan  Jacquie Fabian is a 56 y.o. male who presented to the hospital for Anoxic brain injury. 1. Anoxic brain injury  2. HTN  3. HLD    - Continue PT/OT/ST  - Continue aspirin 81 mg a day  - Cont Lipitor 40 mg a day  - BP goal is less than 140/90  - MRI brain to be repeated with sedation. Can give ativan 1-2 mg 15 mins prior to the procedure if OK with primary team  - EEG to be done today       Signed:  Martinez Doll MD  Neurologist      This note will not be viewable in [address]

## 2017-05-29 NOTE — PROGRESS NOTES
Hospitalist Progress Note    NAME: Jeremy Michelle   :  1960   MRN:  [id number]       Interim Hospital Summary: 56 y.o. male whom presented on 2017 with      Assessment / Plan:  Abdominal pain  -no further abdominal pain complaints today  - patient's family has been reporting that the pt is c/o abd pain. The location of pain changed each day; started with LUQ, Epigastic/mid upper abd pain, left upper and lower abd pain, and now left lateral side. Plain abd x-ray was normal and CT of abdomen w/ contrast was normal. His PPI has been changed to IV from G-tube. - Reminded the family there is no abnormalities inside of his abdomen. We need to work on position changes for his comfort. We restarted to tube feeding at 10ml/hour yesterday, currently at 30ml/hr. Advised the nursing staff increase Tube feeding slowly to reach 50ml/hr.  - no moaning or grimacing from the patient during abdominal exam unless the pain gets prompt by the family. - rectal tube removed  - Encourage family to participate in frequent mouth care, may spray mouth coat spray as need      Anoxic brain injury following cardiac arrest in [place] >1 month ago  - Neurology following; MRI was not done yesterday, will be done today under sedation. carotid doppler study, and EEG to be done      - s/p PEG and trach ; pt reached the tube feeding goal but it has been stopped for the past 24 hours due to emesis contained tube feeding content and c/o abdominal pain (was LUQ pain yesterday but mid upper abdomen pain). Chest and abdomen x-rays were normal but continues to have abdominal discomfort.  CT of abd w/ contrast revealed no abnormalities.      - outside MRI brain with abnormalities in basal ganglia and bilateral temporal lobes  - has right>left sided weakness; follow commends when instruction was given his native language [place])  Danne Epley      S/p out of hospital cardiac arrest 17, POA  CAD s/p CABG  x 3v at HD  Hx cardiac stents (twice before CABG and once after)  HTN  Severe Anemia POA   Cardiomyopathy  - Hgb 12.0; Received 2 units of PRBC on 5/20 for hgb 6.8  - hospitalized at [facility] in [place], [place]; transferred to [facility] 5/18 and to [address] [place] [address] 5/19  - cardiology following; continue with BB, effient, statin, Lasix, ARB,and ASA  - Echo (5/20); EF 30-35%; moderate diffuse hypokinesis; mild-mod MR; mild-mod TR  - cardiology is considering lifevest vs ICD placement prior to discharge. The arrangement will be done by the cardiologist      - per Dr. Ashley Fabian had stenosis of circumflex anastomosis graft, PTCA -->restenosis -->stent-->restenosis and redo bypass recommended but patient seeked second opinion with Dr. Emmie Carson in [place] and reportedly told redo bypass not needed.       GERD  - was on pepcid, we changed to IV protonix 5/25      Hx aspiration PNA during intubation For cardiac arrest  Chronic respiratory failure, s/p trach  - Pulmonology following;   - treated with invanz in [place]. Today CXR clear   - continue with Levo; Sputum Cx from 5/20 grew Serratia Marcescens, acinetobacter, & klebsiella pneumoniae which are sensitive to Levo  - currently on trach collar (vent removed on 5/21)  - trach tube changed on 5/24. #6 distal XLT trach 95mm in length; currently inflated until reflux/coughing/nausea issue have resolved. Continue with routine trach care  - pulmonary toilet      Diarrhea  - C Diff negative in [place] recently prior to transfer. Repeat C. Diff done at [facility] yesterday  - has rectal tube. May be related to tube feed.       Hypokalemia  - repleted. Will follow      DM 2  - SSI for now      Lice infestation  - S/p treatment on 5/2; still on isolation. At this point, we would prefer epidemiology team get involved before removing the isolation. Pt was treated for lice but his caregivers & family members have not been checked for the lice.  Since the patient is not ambulatory, the mode of transmission would be his caregivers/families/visitors. Pt will remain full contact precaution until the patient gets discharged.      Code: full  DVT prophylaxis: lovenox  Surrogate decision maker: Wife \"Savanna\" Dorothy Burns [phone], 2 son & daughter Melissa Sanford) # [phone]          Body mass index is 28.87 kg/(m^2).     Recommended Disposition: inpatient rehab has been consulted  Subjective:     Chief Complaint / Reason for Physician Visit  Pt makes eye contact, but nonverbal. Family members assist with communication. Discussed with RN events overnight. Discussed with RN events overnight. Review of Systems:  Symptom Y/N Comments  Symptom Y/N Comments   Fever/Chills    Chest Pain     Poor Appetite    Edema     Cough    Abdominal Pain     Sputum    Joint Pain     SOB/HAM    Pruritis/Rash     Nausea/vomit    Tolerating PT/OT     Diarrhea    Tolerating Diet     Constipation    Other       Could NOT obtain due to:      Objective:     VITALS:   Last 24hrs VS reviewed since prior progress note. Most recent are:  Patient Vitals for the past 24 hrs:   Temp Pulse Resp BP SpO2   05/29/17 0816 - (!) 108 22 101/63 98 %   05/29/17 0800 - 92 - 94/48 96 %   05/29/17 0400 98.7 °F (37.1 °C) 91 22 99/58 98 %   05/28/17 2032 98.1 °F (36.7 °C) (!) 106 17 (!) 116/93 99 %   05/28/17 1625 98.2 °F (36.8 °C) 89 18 107/55 99 %       Intake/Output Summary (Last 24 hours) at 05/29/17 1415  Last data filed at 05/29/17 0630   Gross per 24 hour   Intake             1220 ml   Output             1075 ml   Net              145 ml        PHYSICAL EXAM:  General: WD, WN. Alert, cooperative, no acute distress    EENT:  EOMI. Anicteric sclerae. Dry mucous membrane; mouth breather  Resp:  A few rhonchi, no wheezing or rales.   No accessory muscle use  CV:  Regular  rhythm,  No edema  GI:  Soft, Non distended, Non tender.  +Bowel sounds  Neurologic:  Alert and follows commends when family provides the direction, non verbal  Psych:   Poor insight. Not anxious nor agitated  Skin:  No marck. No jaundice    Reviewed most current lab test results and cultures  YES  Reviewed most current radiology test results   YES  Review and summation of old records today    NO  Reviewed patient's current orders and MAR    YES  PMH/SH reviewed - no change compared to H&P  ________________________________________________________________________  Care Plan discussed with:    Comments   Patient y    Family  y    RN y    Care Manager     Consultant                        Multidiciplinary team rounds were held today with , nursing, pharmacist and clinical coordinator. Patient's plan of care was discussed; medications were reviewed and discharge planning was addressed. ________________________________________________________________________  Total NON critical care TIME:  30   Minutes    Total CRITICAL CARE TIME Spent:   Minutes non procedure based      Comments   >50% of visit spent in counseling and coordination of care     ________________________________________________________________________  Moris Sauceda NP     Procedures: see electronic medical records for all procedures/Xrays and details which were not copied into this note but were reviewed prior to creation of Plan. LABS:  I reviewed today's most current labs and imaging studies.   Pertinent labs include:  Recent Labs      05/27/17   0320   WBC  10.0   HGB  12.3   HCT  38.5   PLT  223     Recent Labs      05/29/17   0438  05/28/17   0546  05/27/17   0320   NA  135*  134*  137   K  3.8  3.8  3.3*   CL  96*  97  97   CO2  29  30  31   GLU  178*  109*  113*   BUN  23*  19  26*   CREA  1.06  0.85  0.96   CA  8.7  8.6  8.7   ALB   --    --   2.3*   TBILI   --    --   0.7   SGOT   --    --   42*   ALT   --    --   42       Signed: )Linnea Szymanski, NP

## 2017-05-30 ENCOUNTER — APPOINTMENT (OUTPATIENT)
Dept: MRI IMAGING | Age: 57
DRG: 091 | End: 2017-05-30
Attending: PSYCHIATRY & NEUROLOGY
Payer: COMMERCIAL

## 2017-05-30 LAB
ANION GAP BLD CALC-SCNC: 10 MMOL/L (ref 5–15)
BASOPHILS # BLD AUTO: 0.1 K/UL (ref 0–0.1)
BASOPHILS # BLD: 0 % (ref 0–1)
BUN SERPL-MCNC: 30 MG/DL (ref 6–20)
BUN/CREAT SERPL: 25 (ref 12–20)
CALCIUM SERPL-MCNC: 9.1 MG/DL (ref 8.5–10.1)
CHLORIDE SERPL-SCNC: 97 MMOL/L (ref 97–108)
CO2 SERPL-SCNC: 30 MMOL/L (ref 21–32)
CREAT SERPL-MCNC: 1.21 MG/DL (ref 0.7–1.3)
EOSINOPHIL # BLD: 0.2 K/UL (ref 0–0.4)
EOSINOPHIL NFR BLD: 2 % (ref 0–7)
ERYTHROCYTE [DISTWIDTH] IN BLOOD BY AUTOMATED COUNT: 16.2 % (ref 11.5–14.5)
GLUCOSE BLD STRIP.AUTO-MCNC: 144 MG/DL (ref 65–100)
GLUCOSE BLD STRIP.AUTO-MCNC: 155 MG/DL (ref 65–100)
GLUCOSE BLD STRIP.AUTO-MCNC: 165 MG/DL (ref 65–100)
GLUCOSE BLD STRIP.AUTO-MCNC: 190 MG/DL (ref 65–100)
GLUCOSE SERPL-MCNC: 165 MG/DL (ref 65–100)
HCT VFR BLD AUTO: 37.6 % (ref 36.6–50.3)
HGB BLD-MCNC: 11.5 G/DL (ref 12.1–17)
LYMPHOCYTES # BLD AUTO: 18 % (ref 12–49)
LYMPHOCYTES # BLD: 2.1 K/UL (ref 0.8–3.5)
MCH RBC QN AUTO: 26.4 PG (ref 26–34)
MCHC RBC AUTO-ENTMCNC: 30.6 G/DL (ref 30–36.5)
MCV RBC AUTO: 86.4 FL (ref 80–99)
MONOCYTES # BLD: 0.9 K/UL (ref 0–1)
MONOCYTES NFR BLD AUTO: 8 % (ref 5–13)
NEUTS SEG # BLD: 8.4 K/UL (ref 1.8–8)
NEUTS SEG NFR BLD AUTO: 72 % (ref 32–75)
PLATELET # BLD AUTO: 208 K/UL (ref 150–400)
POTASSIUM SERPL-SCNC: 3.4 MMOL/L (ref 3.5–5.1)
RBC # BLD AUTO: 4.35 M/UL (ref 4.1–5.7)
SERVICE CMNT-IMP: ABNORMAL
SODIUM SERPL-SCNC: 137 MMOL/L (ref 136–145)
WBC # BLD AUTO: 11.6 K/UL (ref 4.1–11.1)

## 2017-05-30 PROCEDURE — 97112 NEUROMUSCULAR REEDUCATION: CPT

## 2017-05-30 PROCEDURE — 76450000000

## 2017-05-30 PROCEDURE — 74011250637 HC RX REV CODE- 250/637: Performed by: NURSE PRACTITIONER

## 2017-05-30 PROCEDURE — 51798 US URINE CAPACITY MEASURE: CPT

## 2017-05-30 PROCEDURE — 97530 THERAPEUTIC ACTIVITIES: CPT

## 2017-05-30 PROCEDURE — 74011250637 HC RX REV CODE- 250/637: Performed by: HOSPITALIST

## 2017-05-30 PROCEDURE — 77030018798 HC PMP KT ENTRL FED COVD -A

## 2017-05-30 PROCEDURE — 74011250637 HC RX REV CODE- 250/637: Performed by: INTERNAL MEDICINE

## 2017-05-30 PROCEDURE — 74011250636 HC RX REV CODE- 250/636: Performed by: INTERNAL MEDICINE

## 2017-05-30 PROCEDURE — 85025 COMPLETE CBC W/AUTO DIFF WBC: CPT | Performed by: NURSE PRACTITIONER

## 2017-05-30 PROCEDURE — 77010026065 HC OXYGEN MINIMUM MEDICAL AIR

## 2017-05-30 PROCEDURE — 74011636637 HC RX REV CODE- 636/637: Performed by: INTERNAL MEDICINE

## 2017-05-30 PROCEDURE — 74011250636 HC RX REV CODE- 250/636: Performed by: HOSPITALIST

## 2017-05-30 PROCEDURE — 82962 GLUCOSE BLOOD TEST: CPT

## 2017-05-30 PROCEDURE — 36415 COLL VENOUS BLD VENIPUNCTURE: CPT | Performed by: NURSE PRACTITIONER

## 2017-05-30 PROCEDURE — 65660000000 HC RM CCU STEPDOWN

## 2017-05-30 PROCEDURE — 80048 BASIC METABOLIC PNL TOTAL CA: CPT | Performed by: NURSE PRACTITIONER

## 2017-05-30 RX ORDER — LORAZEPAM 2 MG/ML
1 INJECTION, SOLUTION INTRAMUSCULAR; INTRAVENOUS
Status: DISCONTINUED | OUTPATIENT
Start: 2017-05-30 | End: 2017-06-03 | Stop reason: HOSPADM

## 2017-05-30 RX ORDER — POTASSIUM CHLORIDE 1.5 G/1.77G
40 POWDER, FOR SOLUTION ORAL
Status: COMPLETED | OUTPATIENT
Start: 2017-05-30 | End: 2017-05-30

## 2017-05-30 RX ORDER — SODIUM CHLORIDE 9 MG/ML
75 INJECTION, SOLUTION INTRAVENOUS CONTINUOUS
Status: DISPENSED | OUTPATIENT
Start: 2017-05-30 | End: 2017-05-31

## 2017-05-30 RX ORDER — POTASSIUM CHLORIDE 7.45 MG/ML
10 INJECTION INTRAVENOUS
Status: DISCONTINUED | OUTPATIENT
Start: 2017-05-30 | End: 2017-05-30

## 2017-05-30 RX ADMIN — MELATONIN 3 MG ORAL TABLET 3 MG: 3 TABLET ORAL at 21:19

## 2017-05-30 RX ADMIN — GUAIFENESIN AND DEXTROMETHORPHAN 10 ML: 100; 10 SYRUP ORAL at 09:02

## 2017-05-30 RX ADMIN — SODIUM CHLORIDE 500 ML: 900 INJECTION, SOLUTION INTRAVENOUS at 09:00

## 2017-05-30 RX ADMIN — PRASUGREL HYDROCHLORIDE 10 MG: 10 TABLET, FILM COATED ORAL at 09:02

## 2017-05-30 RX ADMIN — Medication 10 ML: at 14:02

## 2017-05-30 RX ADMIN — ACETAMINOPHEN 650 MG: 325 SOLUTION ORAL at 22:18

## 2017-05-30 RX ADMIN — Medication 10 ML: at 06:00

## 2017-05-30 RX ADMIN — ENOXAPARIN SODIUM 40 MG: 40 INJECTION SUBCUTANEOUS at 16:48

## 2017-05-30 RX ADMIN — NYSTATIN 500000 UNITS: 100000 SUSPENSION ORAL at 18:14

## 2017-05-30 RX ADMIN — POTASSIUM CHLORIDE 40 MEQ: 1.5 POWDER, FOR SOLUTION ORAL at 14:02

## 2017-05-30 RX ADMIN — ASPIRIN 81 MG CHEWABLE TABLET 81 MG: 81 TABLET CHEWABLE at 09:02

## 2017-05-30 RX ADMIN — NYSTATIN 500000 UNITS: 100000 SUSPENSION ORAL at 21:19

## 2017-05-30 RX ADMIN — PANTOPRAZOLE SODIUM 40 MG: 40 GRANULE, DELAYED RELEASE ORAL at 09:02

## 2017-05-30 RX ADMIN — LEVOFLOXACIN 750 MG: 5 INJECTION, SOLUTION INTRAVENOUS at 09:02

## 2017-05-30 RX ADMIN — OXYCODONE HYDROCHLORIDE 5 MG: 100 SOLUTION ORAL at 16:48

## 2017-05-30 RX ADMIN — Medication 10 ML: at 21:21

## 2017-05-30 RX ADMIN — NYSTATIN 500000 UNITS: 100000 SUSPENSION ORAL at 14:04

## 2017-05-30 RX ADMIN — INSULIN LISPRO 2 UNITS: 100 INJECTION, SOLUTION INTRAVENOUS; SUBCUTANEOUS at 18:10

## 2017-05-30 RX ADMIN — SODIUM CHLORIDE 75 ML/HR: 900 INJECTION, SOLUTION INTRAVENOUS at 13:55

## 2017-05-30 RX ADMIN — INSULIN LISPRO 2 UNITS: 100 INJECTION, SOLUTION INTRAVENOUS; SUBCUTANEOUS at 14:00

## 2017-05-30 RX ADMIN — ATORVASTATIN CALCIUM 40 MG: 40 TABLET, FILM COATED ORAL at 21:19

## 2017-05-30 RX ADMIN — FLUOCINONIDE: 0.5 CREAM TOPICAL at 18:12

## 2017-05-30 RX ADMIN — NYSTATIN 500000 UNITS: 100000 SUSPENSION ORAL at 09:02

## 2017-05-30 NOTE — PROGRESS NOTES
0115 Pt blood pressure 75/40, HR 83, spO2 96%, 14 bpm. Patient not in distress. Pt alert and following commands. Paged Dr. Amanda Gilman and was told to continue to monitor patient, page back if there is a decline in patient status and to hold any blood pressure medications for the rest of the night. Pt asleep with family in the room. Will continue to monitor.

## 2017-05-30 NOTE — PROGRESS NOTES
[facility] Consult Service Progress NOTE  Pulmonary, Critical Care, and Sleep Medicine    Name: Sharon Connor MRN: [id number]   : 1960 Hospital: [facility]   Date: 2017  Admission Date: 2017       IMPRESSION:   · Acute respiratory failure with hypoxia- has tolerated trach collar and does not really need O2  · Polymicrobial GN tracheobronchitis- Moderate serratia, light Acinetobacter and scant Klebsiella; all sensitive to Levaquin- new trach placed 17  · S/p tracheostomy for prolonged vent dependence, looking at his trach on CXR,am concerned that a #6 trach will be too short!!! Pt needs extra long trach and explained to daughter at bedside that more downsizing is not likely unless custom long trach is used. Hopefully he will continue to progress and meet his milestones as listed below  · GERD with heartburn  · Dysphagia s/p PEG, appreciate speech path assistance. · Anoxic brain injury- some cognitive recovery but significant motor deficits; CT and MRI done at [address]-  Injury to basal ganglia and temporal lobes  · S/p cardiac arrest, s/p hypothermia protocol  · ASCVD  · H/o CABG [facility] 2014 and in  had another cath showing complex anatomy; has successful laser atherectomy PTCCA and stenting of the distal anastomotic site of the vein graft with Promus ROBERT; chest pain persisted; pt offered cath 2016 and possbile referral for another CABg at [facility] but evidently declined  · HTN  · Diabetes Mellitus  · Left ear deafness POA  · Language barrier- limited English but family available, complicated with trach and recent DORA  · Never smoker  · Anemia  · Discussed with nurse and pts Daughter. Discussed with speech Therapy.        RECOMMENDATIONS/PLAN:   · PCU monitoring  · Antitussive  · IV abx  · Trach collar as tolerated for humidification  · Issac Acosta should be changed monthly  · Keep trach inflated until reflux resolves( and cough) this will minimize risk of accidental aspiration  · spoke with daughter again at bedside; he must meet many milestones before trach can be removed safely: secretions, oxygenation, speech, swallow, cough and assess possibility of sleep apnea- His beard was trimmed but may pose a problem with keeping risk of recurrent tracheobronchitits down and may impair his ability to use CPAP mask  · Continue with routine trach care. · Blood transfusion prn   · Continue tube feedings as tolerated, continuous vs bolus feedings? ?  · Glucose control   · Pulmonary toilet  · PT, OT consults  · DVT prophylaxis  · Will check back  friday     Code: Full Code          Vital Signs: Intake/Output: Intake/Output:   Visit Vitals    BP 97/52 (BP 1 Location: Right arm, BP Patient Position: At rest)    Pulse (!) 106    Temp 98.2 °F (36.8 °C)    Resp 16    Ht 5' 11\" (1.803 m)    Wt 82.2 kg (181 lb 4.8 oz)    SpO2 99%    BMI 25.29 kg/m2      O2 Device: Tracheal collar  O2 Flow Rate (L/min): 5 l/min  Temp (24hrs), Av.7 °F (36.5 °C), Min:97.4 °F (36.3 °C), Max:98.2 °F (36.8 °C)     Intake/Output Summary (Last 24 hours) at 17 0820  Last data filed at 17 0606   Gross per 24 hour   Intake             2075 ml   Output              950 ml   Net             1125 ml    Last shift:         Last 3 shifts: 1901 -  0700  In:  [I.V.:450]  Out:  [Urine:1825]         Telemetry:    normal sinus rhythm    Physical Exam:    General: large, obese [place] male well developed and well nourished, in no respiratory distress and acyanotic   HEENT: NCAT, bearded; no thrush? limited mouth opening,moist   Neck: No abnormally enlarged lymph nodes.  Gui Right #8   Chest: normal   Lungs: decreased air exchange bilaterally   Heart: Regular rate and rhythm or S1S2 present   Abdomen: soft, non-tender, without masses or organomegaly, PEG   :    Extremity: negative, cyanosis, clubbing;    Neuro: brighter; opens eyes and EOMI; tongue midline; will follow simple commands and now moving his left side more    Psych: restless   Skin: dry        DATA:    MAR reviewed and pertinent medications noted or modified as needed    MEDS:   Current Facility-Administered Medications   Medication    sodium chloride 0.9 % bolus infusion 500 mL    melatonin tablet 3 mg    artificial saliva (MOUTH KOTE) 1 Spray    pantoprazole (PROTONIX) granules for oral suspension 40 mg    guaiFENesin-dextromethorphan (ROBITUSSIN DM) 100-10 mg/5 mL syrup 10 mL    fluocinoNIDE (LIDEX) 0.05 % cream    levoFLOXacin (LEVAQUIN) 750 mg in D5W IVPB    nystatin (MYCOSTATIN) 100,000 unit/mL oral suspension 500,000 Units    0.9% sodium chloride infusion 250 mL    insulin lispro (HUMALOG) injection    glucose chewable tablet 16 g    dextrose 10% infusion 125-250 mL    glucagon (GLUCAGEN) injection 1 mg    enoxaparin (LOVENOX) injection 40 mg    sodium chloride (NS) flush 5-10 mL    sodium chloride (NS) flush 5-10 mL    ondansetron (ZOFRAN) injection 4 mg    albuterol-ipratropium (DUO-NEB) 2.5 MG-0.5 MG/3 ML    prasugrel (EFFIENT) tablet 10 mg    aspirin chewable tablet 81 mg    atorvastatin (LIPITOR) tablet 40 mg    oxyCODONE (ROXICODONE INTENSOL) 20 mg/mL concentrated solution 5 mg    acetaminophen (TYLENOL) solution 650 mg    lidocaine (LIDODERM) 5 % patch 1 Patch          Labs:  Recent Labs      05/30/17   0337   WBC  11.6*   HGB  11.5*   PLT  208     Recent Labs      05/30/17   0337  05/29/17   0438  05/28/17   0546   NA  137  135*  134*   K  3.4*  3.8  3.8   CL  97  96*  97   CO2  30  29  30   GLU  165*  178*  109*   BUN  30*  23*  19   CREA  1.21  1.06  0.85   CA  9.1  8.7  8.6     No results for input(s): PH, PCO2, PO2, HCO3, FIO2 in the last 72 hours. No results for input(s): PH, PCO2, PO2, HCO3, FIO2 in the last 72 hours. No results for input(s): CPK, CKNDX, TROIQ in the last 72 hours.     No lab exists for component: CPKMB    Lab Results   Component Value Date/Time    Iron 34 05/20/2017 01:14 AM    TIBC 229 05/20/2017 01:14 AM    Iron % saturation 15 05/20/2017 01:14 AM    Ferritin 191 05/20/2017 01:14 AM       No results found for: SR, CRP, THIEN, ANAIGG, RA, RPR, RPRT, VDRLT, VDRLS, TSH, TSHEXT, TSHEXT     Lab Results   Component Value Date/Time    CK 52 05/21/2017 07:10 PM    CK-MB Index 2.9 05/21/2017 07:10 PM    Troponin-I, Qt. <0.04 05/21/2017 07:10 PM        Lab Results   Component Value Date/Time    Culture result: MODERATE  SERRATIA MARCESCENS   05/20/2017 03:43 AM    Culture result: LIGHT  ACINETOBACTER BAUMANNII/HAEMOLYTICUS   05/20/2017 03:43 AM    Culture result: SCANT  KLEBSIELLA PNEUMONIAE   05/20/2017 03:43 AM    Culture result: NO NORMAL RESPIRATORY BETTE ISOLATED 05/20/2017 03:43 AM       No results found for: TOXA1, RPR, HBCM, HBSAG, HAAB, HCAB, HCAB1, HAAT, G6PD, CRYAC, HIVGT, HIVR, HIV1, HIV12, HIVPC, HIVRPI    Lab Results   Component Value Date/Time    CK 52 05/21/2017 07:10 PM       No results found for: COLOR, APPRN, SPGRU, JOHANNE, PROTU, GLUCU, KETU, BILU, BLDU, UROU, DILAN, LEUKU, WBCU, RBCU, UEPI, BACTU, CASTS, UCRY    Imaging:  []                        personally reviewed the patients radiographs and reports:aga Trinidad MD

## 2017-05-30 NOTE — PROGRESS NOTES
Hospitalist Progress Note    NAME: Jermaine Ortega   :  1960   MRN:  [id number]       Interim Hospital Summary: 56 y.o. male whom presented on 2017 with      Assessment / Plan:  Abdominal pain  - once again, pt's wife stating that pt's having abdominal pain & making a lot noise from his stomach. Explained that he has some gas which is normal. The location of pain changed each day; started with LUQ, Epigastic/mid upper abd pain, left upper and lower abd pain, was on left lateral side, and now back to midepigastic. Plain abd x-ray was normal and CT of abdomen w/ contrast was normal. His PPI has been changed to IV from G-tube. - Reminded the family there is no abnormalities inside of his abdomen. We need to work on position changes for his comfort. We restarted to tube feeding at 10ml/hour yesterday, currently at 30ml/hr. Advised the nursing staff increase Tube feeding slowly to reach 50ml/hr.  - no moaning or grimacing from the patient during abdominal exam unless the pain gets prompt by the family.   - Encourage family to participate in frequent mouth care, may spray mouth coat spray as need & may work participate working with ROM      Anoxic brain injury following cardiac arrest in [place] >1 month ago  - Neurology following; MRI was not done yesterday, will be done today under sedation. carotid doppler study, and EEG to be done      - s/p PEG and trach ; pt reached the tube feeding goal but it has been stopped for the past 24 hours due to emesis contained tube feeding content and c/o abdominal pain (was LUQ pain yesterday but mid upper abdomen pain). Chest and abdomen x-rays were normal but continues to have abdominal discomfort.  CT of abd w/ contrast revealed no abnormalities.      - outside MRI brain with abnormalities in basal ganglia and bilateral temporal lobes  - has right>left sided weakness; follow commends when instruction was given his native language [place])          S/p out of hospital cardiac arrest 4/28/17, POA  CAD s/p CABG 2014 x 3v at HD  Hx cardiac stents (twice before CABG and once after)  HTN  Severe Anemia POA   Cardiomyopathy  - Hgb 11.5; Received 2 units of PRBC on 5/20 for hgb 6.8  - hospitalized at [facility] in [place], [place]; transferred to [facility] 5/18 and to [facility] 5/19  - cardiology following; continue with BB, effient, statin, Lasix, ARB,and ASA  - Echo (5/20); EF 30-35%; moderate diffuse hypokinesis; mild-mod MR; mild-mod TR  - cardiology is considering lifevest vs ICD placement prior to discharge. The arrangement will be done by the cardiologist      - per Dr. George Schilling had stenosis of circumflex anastomosis graft, PTCA -->restenosis -->stent-->restenosis and redo bypass recommended but patient seeked second opinion with Dr. Nicole Reyes in [place] and reportedly told redo bypass not needed.       GERD  - was on pepcid, we changed to IV protonix 5/25      Hx aspiration PNA during intubation For cardiac arrest  Chronic respiratory failure, s/p trach  - Pulmonology following;   - treated with invanz in [place]. Today CXR clear   - continue with Levo; Sputum Cx from 5/20 grew Serratia Marcescens, acinetobacter, & klebsiella pneumoniae which are sensitive to Levo  - currently on trach collar (vent removed on 5/21)  - trach tube changed on 5/24. #6 distal XLT trach 95mm in length; currently inflated until reflux/coughing/nausea issue have resolved. Continue with routine trach care. Trach inflated at this point. Dr. Everett Boas will address when would be appropriate to deflate the trach w/ pulmonologist  - pulmonary toilet      Diarrhea  - C Diff negative in [place] recently prior to transfer. Repeat C-diff (-)  - still having some loose bm.  - May be related to tube feed.       Hypokalemia  - repleted. Will follow      DM 2  - SSI for now      Lice infestation  - S/p treatment on 5/2; still on isolation.  At this point, we would prefer epidemiology team get involved before removing the isolation. Pt was treated for lice but his caregivers & family members have not been checked for the lice. Since the patient is not ambulatory, the mode of transmission would be his caregivers/families/visitors. Pt will remain full contact precaution until the patient gets discharged.      Code: full  DVT prophylaxis: lovenox  Surrogate decision maker: Wife \"Savanna\" Daniella Mauricio [phone], 2 son & daughter Andrés Ruiz) # [phone]          Body mass index is 28.87 kg/(m^2).     Recommended Disposition: inpatient rehab has been consulted  Subjective:     Chief Complaint / Reason for Physician Visit  Pt makes eye contact, but nonverbal. Family members assist with communication. Discussed with RN events overnight. Discussed with RN events overnight. Review of Systems:  Symptom Y/N Comments  Symptom Y/N Comments   Fever/Chills    Chest Pain     Poor Appetite    Edema     Cough    Abdominal Pain     Sputum    Joint Pain     SOB/HAM    Pruritis/Rash     Nausea/vomit    Tolerating PT/OT     Diarrhea    Tolerating Diet     Constipation    Other       Could NOT obtain due to:      Objective:     VITALS:   Last 24hrs VS reviewed since prior progress note.  Most recent are:  Patient Vitals for the past 24 hrs:   Temp Pulse Resp BP SpO2   05/30/17 1017 - (!) 106 - 95/59 100 %   05/30/17 1001 - 99 - 95/57 -   05/30/17 0942 - (!) 103 - 110/61 97 %   05/30/17 0936 - (!) 104 - 100/58 -   05/30/17 0926 - (!) 102 - 105/66 -   05/30/17 0919 - (!) 104 - 110/61 -   05/30/17 0915 - (!) 106 - 115/66 100 %   05/30/17 0745 98.2 °F (36.8 °C) (!) 106 16 97/52 99 %   05/30/17 0400 97.4 °F (36.3 °C) 99 18 (!) 87/40 99 %   05/30/17 0215 - 85 - (!) 87/56 -   05/30/17 0132 97.6 °F (36.4 °C) 86 16 (!) 84/41 99 %   05/30/17 0115 - 83 - (!) 75/40 -   05/29/17 2011 97.4 °F (36.3 °C) (!) 107 18 111/55 98 %   05/29/17 2000 - (!) 104 - 102/55 99 %   05/29/17 1829 - (!) 105 - 99/54 99 %   05/29/17 1800 - 93 - (!) 85/48 98 %   05/29/17 1600 - (!) 113 - (!) 88/58 99 % 05/29/17 1512 - (!) 105 - 93/51 99 %       Intake/Output Summary (Last 24 hours) at 05/30/17 1212  Last data filed at 05/30/17 0606   Gross per 24 hour   Intake             1625 ml   Output              950 ml   Net              675 ml        PHYSICAL EXAM:  General: WD, WN. Alert, cooperative, no acute distress    EENT:  EOMI. Anicteric sclerae. Dry mucous membrame  Resp:  Coarse breath with decreased breath sound in bases, a few rhonchi. no wheezing or rales. No accessory muscle use  CV:  Regular  rhythm,  No edema  GI:  Soft, Non distended, Non tender.  +Bowel sounds  Neurologic:  Alert, follows commends when the family translates. Following simple commends. Psych:   Poor insight. Not anxious nor agitated  Skin:  No rashes. No jaundice    Reviewed most current lab test results and cultures  YES  Reviewed most current radiology test results   YES  Review and summation of old records today    NO  Reviewed patient's current orders and MAR    YES  PMH/ reviewed - no change compared to H&P  ________________________________________________________________________  Care Plan discussed with:    Comments   Patient y    Family  y    RN y    Care Manager y    Consultant                        Multidiciplinary team rounds were held today with , nursing, pharmacist and clinical coordinator. Patient's plan of care was discussed; medications were reviewed and discharge planning was addressed. ________________________________________________________________________  Total NON critical care TIME:  30  Minutes    Total CRITICAL CARE TIME Spent:   Minutes non procedure based      Comments   >50% of visit spent in counseling and coordination of care     ________________________________________________________________________  Karyn Childs NP     Procedures: see electronic medical records for all procedures/Xrays and details which were not copied into this note but were reviewed prior to creation of Plan. LABS:  I reviewed today's most current labs and imaging studies.   Pertinent labs include:  Recent Labs      05/30/17 0337   WBC  11.6*   HGB  11.5*   HCT  37.6   PLT  208     Recent Labs      05/30/17 0337  05/29/17   0438  05/28/17   0546   NA  137  135*  134*   K  3.4*  3.8  3.8   CL  97  96*  97   CO2  30  29  30   GLU  165*  178*  109*   BUN  30*  23*  19   CREA  1.21  1.06  0.85   CA  9.1  8.7  8.6       Signed: )Linnea Szymanski, NP

## 2017-05-30 NOTE — PROGRESS NOTES
NEUROLOGY DAILY PROGRESS NOTE     Subjective  Pt is moving the right side more than before.    Pt is doing better today  Discussed case with therapist.    ROS:  A ten system review of constitutional, cardiovascular, respiratory, musculoskeletal, endocrine, skin, SHEENT, genitourinary, psychiatric and neurologic systems was obtained and is unremarkable except as stated in HPI     EXAMINATION:   Patient Vitals for the past 24 hrs:   Temp Pulse Resp BP SpO2   05/30/17 0745 98.2 °F (36.8 °C) (!) 106 16 97/52 99 %   05/30/17 0400 97.4 °F (36.3 °C) 99 18 (!) 87/40 99 %   05/30/17 0215 - 85 - (!) 87/56 -   05/30/17 0132 97.6 °F (36.4 °C) 86 16 (!) 84/41 99 %   05/30/17 0115 - 83 - (!) 75/40 -   05/29/17 2011 97.4 °F (36.3 °C) (!) 107 18 111/55 98 %   05/29/17 2000 - (!) 104 - 102/55 99 %   05/29/17 1829 - (!) 105 - 99/54 99 %   05/29/17 1800 - 93 - (!) 85/48 98 %   05/29/17 1600 - (!) 113 - (!) 88/58 99 %   05/29/17 1512 - (!) 105 - 93/51 99 %   05/29/17 1122 - 96 - - 99 %   05/29/17 1055 - (!) 112 - - 98 %   05/29/17 1000 - (!) 120 - 110/73 99 %        General:   General appearance: Pt is in no acute distress   Distal pulses are preserved    Neurological Examination:   Mental Status: AA and Follows some commands     LAB DATA REVIEWED:    Results for orders placed or performed during the hospital encounter of 05/19/17   CULTURE, RESPIRATORY/SPUTUM/BRONCH W GRAM STAIN   Result Value Ref Range    Special Requests: NO SPECIAL REQUESTS      GRAM STAIN NO  EPITHELIAL CELLS SEEN       GRAM STAIN NO WBC'S SEEN      GRAM STAIN RARE  GRAM NEGATIVE COCCOBACILLI        Culture result: MODERATE  SERRATIA MARCESCENS   (A)      Culture result: LIGHT  ACINETOBACTER BAUMANNII/HAEMOLYTICUS   (A)      Culture result: SCANT  KLEBSIELLA PNEUMONIAE   (A)      Culture result: NO NORMAL RESPIRATORY BETTE ISOLATED         Susceptibility    Klebsiella pneumoniae - EDUARD     Amikacin ($) <=16 Susceptible ug/mL     Ampicillin/sulbactam ($) <=8/4 Susceptible ug/mL     Aztreonam ($$$$) <=4 Susceptible ug/mL     Cefazolin ($) <=8 Susceptible ug/mL     Ceftazidime ($) <=1 Susceptible ug/mL     Ceftriaxone ($) <=1 Susceptible ug/mL     Cefuroxime ($) <=4 Susceptible ug/mL     Cefotaxime <=2 Susceptible ug/mL     Cefepime ($$) <=4 Susceptible ug/mL     Ciprofloxacin ($) <=1 Susceptible ug/mL     Gentamicin ($) <=4 Susceptible ug/mL     Imipenem <=1 Susceptible ug/mL     Levofloxacin ($) <=2 Susceptible ug/mL     Meropenem ($$) <=1 Susceptible ug/mL     Piperacillin/Tazobac ($) <=16 Susceptible ug/mL     Tobramycin ($) <=4 Susceptible ug/mL     Trimeth-Sulfamethoxa <=2/38 Susceptible ug/mL    Serratia marcescens - EDUARD     Amikacin ($) <=16 Susceptible ug/mL     Aztreonam ($$$$) <=4 Susceptible ug/mL     Ceftazidime ($) <=1 Susceptible ug/mL     Ceftriaxone ($) <=1 Susceptible ug/mL     Cefotaxime <=2 Susceptible ug/mL     Cefepime ($$) <=4 Susceptible ug/mL     Ciprofloxacin ($) <=1 Susceptible ug/mL     Gentamicin ($) <=4 Susceptible ug/mL     Imipenem <=1 Susceptible ug/mL     Levofloxacin ($) <=2 Susceptible ug/mL     Meropenem ($$) <=1 Susceptible ug/mL     Piperacillin/Tazobac ($) <=16 Susceptible ug/mL     Tobramycin ($) <=4 Susceptible ug/mL     Trimeth-Sulfamethoxa <=2/38 Susceptible ug/mL    Acinetobacter baumanni/haemolyticus - EDUARD     Amikacin ($) <=16 Susceptible ug/mL     Ampicillin/sulbactam ($) <=8/4 Susceptible ug/mL     Ceftazidime ($) 4 Susceptible ug/mL     Cefotaxime 16 Intermediate ug/mL     Cefepime ($$) <=4 Susceptible ug/mL     Ciprofloxacin ($) <=1 Susceptible ug/mL     Gentamicin ($) <=4 Susceptible ug/mL     Meropenem ($$) <=1 Susceptible ug/mL     Tobramycin ($) <=4 Susceptible ug/mL     Trimeth-Sulfamethoxa <=2/38 Susceptible ug/mL   CBC WITH AUTOMATED DIFF   Result Value Ref Range    WBC 9.8 4.1 - 11.1 K/uL    RBC 2.53 (L) 4.10 - 5.70 M/uL    HGB 6.8 (L) 12.1 - 17.0 g/dL    HCT 21.8 (L) 36.6 - 50.3 %    MCV 86.2 80.0 - 99.0 FL    MCH 26.9 26.0 - 34.0 PG    MCHC 31.2 30.0 - 36.5 g/dL    RDW 17.3 (H) 11.5 - 14.5 %    PLATELET 168 640 - 676 K/uL    NEUTROPHILS 73 32 - 75 %    LYMPHOCYTES 17 12 - 49 %    MONOCYTES 9 5 - 13 %    EOSINOPHILS 1 0 - 7 %    BASOPHILS 0 0 - 1 %    ABS. NEUTROPHILS 7.1 1.8 - 8.0 K/UL    ABS. LYMPHOCYTES 1.7 0.8 - 3.5 K/UL    ABS. MONOCYTES 0.9 0.0 - 1.0 K/UL    ABS. EOSINOPHILS 0.1 0.0 - 0.4 K/UL    ABS. BASOPHILS 0.0 0.0 - 0.1 K/UL    DF SMEAR SCANNED      RBC COMMENTS ANISOCYTOSIS  1+       MAGNESIUM   Result Value Ref Range    Magnesium 2.4 1.6 - 2.4 mg/dL   PHOSPHORUS   Result Value Ref Range    Phosphorus 2.3 (L) 2.6 - 4.7 MG/DL   METABOLIC PANEL, COMPREHENSIVE   Result Value Ref Range    Sodium 139 136 - 145 mmol/L    Potassium 3.4 (L) 3.5 - 5.1 mmol/L    Chloride 105 97 - 108 mmol/L    CO2 24 21 - 32 mmol/L    Anion gap 10 5 - 15 mmol/L    Glucose 76 65 - 100 mg/dL    BUN 13 6 - 20 MG/DL    Creatinine 0.50 (L) 0.70 - 1.30 MG/DL    BUN/Creatinine ratio 26 (H) 12 - 20      GFR est AA >60 >60 ml/min/1.73m2    GFR est non-AA >60 >60 ml/min/1.73m2    Calcium 7.4 (L) 8.5 - 10.1 MG/DL    Bilirubin, total 0.6 0.2 - 1.0 MG/DL    ALT (SGPT) 58 12 - 78 U/L    AST (SGOT) 46 (H) 15 - 37 U/L    Alk.  phosphatase 236 (H) 45 - 117 U/L    Protein, total 7.5 6.4 - 8.2 g/dL    Albumin 1.8 (L) 3.5 - 5.0 g/dL    Globulin 5.7 (H) 2.0 - 4.0 g/dL    A-G Ratio 0.3 (L) 1.1 - 2.2     IRON PROFILE   Result Value Ref Range    Iron 34 (L) 35 - 150 ug/dL    TIBC 229 (L) 250 - 450 ug/dL    Iron % saturation 15 (L) 20 - 50 %   FERRITIN   Result Value Ref Range    Ferritin 191 26 - 388 NG/ML   VITAMIN B12   Result Value Ref Range    Vitamin B12 632 211 - 911 pg/mL   PREALBUMIN   Result Value Ref Range    Prealbumin 12.7 (L) 20.0 - 40.0 mg/dL   CBC W/O DIFF   Result Value Ref Range    WBC 12.8 (H) 4.1 - 11.1 K/uL    RBC 4.12 4.10 - 5.70 M/uL    HGB 11.3 (L) 12.1 - 17.0 g/dL    HCT 35.0 (L) 36.6 - 50.3 %    MCV 85.0 80.0 - 99.0 FL    MCH 27.4 26.0 - 34.0 PG    MCHC 32.3 30.0 - 36.5 g/dL    RDW 16.7 (H) 11.5 - 14.5 %    PLATELET 608 (H) 729 - 743 K/uL   METABOLIC PANEL, COMPREHENSIVE   Result Value Ref Range    Sodium 135 (L) 136 - 145 mmol/L    Potassium 3.5 3.5 - 5.1 mmol/L    Chloride 98 97 - 108 mmol/L    CO2 30 21 - 32 mmol/L    Anion gap 7 5 - 15 mmol/L    Glucose 171 (H) 65 - 100 mg/dL    BUN 12 6 - 20 MG/DL    Creatinine 0.72 0.70 - 1.30 MG/DL    BUN/Creatinine ratio 17 12 - 20      GFR est AA >60 >60 ml/min/1.73m2    GFR est non-AA >60 >60 ml/min/1.73m2    Calcium 8.6 8.5 - 10.1 MG/DL    Bilirubin, total 1.3 (H) 0.2 - 1.0 MG/DL    ALT (SGPT) 67 12 - 78 U/L    AST (SGOT) 53 (H) 15 - 37 U/L    Alk. phosphatase 272 (H) 45 - 117 U/L    Protein, total 8.6 (H) 6.4 - 8.2 g/dL    Albumin 2.2 (L) 3.5 - 5.0 g/dL    Globulin 6.4 (H) 2.0 - 4.0 g/dL    A-G Ratio 0.3 (L) 1.1 - 2.2     CBC W/O DIFF   Result Value Ref Range    WBC 11.4 (H) 4.1 - 11.1 K/uL    RBC 4.40 4. 10 - 5.70 M/uL    HGB 11.6 (L) 12.1 - 17.0 g/dL    HCT 37.2 36.6 - 50.3 %    MCV 84.5 80.0 - 99.0 FL    MCH 26.4 26.0 - 34.0 PG    MCHC 31.2 30.0 - 36.5 g/dL    RDW 17.0 (H) 11.5 - 14.5 %    PLATELET 270 343 - 392 K/uL   METABOLIC PANEL, BASIC   Result Value Ref Range    Sodium 138 136 - 145 mmol/L    Potassium 3.6 3.5 - 5.1 mmol/L    Chloride 100 97 - 108 mmol/L    CO2 31 21 - 32 mmol/L    Anion gap 7 5 - 15 mmol/L    Glucose 162 (H) 65 - 100 mg/dL    BUN 13 6 - 20 MG/DL    Creatinine 0.67 (L) 0.70 - 1.30 MG/DL    BUN/Creatinine ratio 19 12 - 20      GFR est AA >60 >60 ml/min/1.73m2    GFR est non-AA >60 >60 ml/min/1.73m2    Calcium 8.7 8.5 - 10.1 MG/DL   CK W/ CKMB & INDEX   Result Value Ref Range    CK 52 39 - 308 U/L    CK - MB 1.5 <3.6 NG/ML    CK-MB Index 2.9 (H) 0 - 2.5     TROPONIN I   Result Value Ref Range    Troponin-I, Qt. <0.04 <0.05 ng/mL   CBC W/O DIFF   Result Value Ref Range    WBC 13.6 (H) 4.1 - 11.1 K/uL    RBC 4.63 4.10 - 5.70 M/uL    HGB 12.6 12.1 - 17.0 g/dL    HCT 39.9 36.6 - 50.3 %    MCV 86.2 80.0 - 99.0 FL    MCH 27.2 26.0 - 34.0 PG    MCHC 31.6 30.0 - 36.5 g/dL    RDW 17.0 (H) 11.5 - 14.5 %    PLATELET 760 (H) 304 - 383 K/uL   METABOLIC PANEL, BASIC   Result Value Ref Range    Sodium 136 136 - 145 mmol/L    Potassium 3.8 3.5 - 5.1 mmol/L    Chloride 97 97 - 108 mmol/L    CO2 31 21 - 32 mmol/L    Anion gap 8 5 - 15 mmol/L    Glucose 195 (H) 65 - 100 mg/dL    BUN 15 6 - 20 MG/DL    Creatinine 0.73 0.70 - 1.30 MG/DL    BUN/Creatinine ratio 21 (H) 12 - 20      GFR est AA >60 >60 ml/min/1.73m2    GFR est non-AA >60 >60 ml/min/1.73m2    Calcium 9.1 8.5 - 10.1 MG/DL   MAGNESIUM   Result Value Ref Range    Magnesium 2.4 1.6 - 2.4 mg/dL   CBC W/O DIFF   Result Value Ref Range    WBC 12.9 (H) 4.1 - 11.1 K/uL    RBC 4.46 4.10 - 5.70 M/uL    HGB 12.2 12.1 - 17.0 g/dL    HCT 38.5 36.6 - 50.3 %    MCV 86.3 80.0 - 99.0 FL    MCH 27.4 26.0 - 34.0 PG    MCHC 31.7 30.0 - 36.5 g/dL    RDW 17.1 (H) 11.5 - 14.5 %    PLATELET 415 965 - 107 K/uL   METABOLIC PANEL, BASIC   Result Value Ref Range    Sodium 136 136 - 145 mmol/L    Potassium 3.9 3.5 - 5.1 mmol/L    Chloride 96 (L) 97 - 108 mmol/L    CO2 32 21 - 32 mmol/L    Anion gap 8 5 - 15 mmol/L    Glucose 204 (H) 65 - 100 mg/dL    BUN 24 (H) 6 - 20 MG/DL    Creatinine 0.80 0.70 - 1.30 MG/DL    BUN/Creatinine ratio 30 (H) 12 - 20      GFR est AA >60 >60 ml/min/1.73m2    GFR est non-AA >60 >60 ml/min/1.73m2    Calcium 8.8 8.5 - 81.5 MG/DL   METABOLIC PANEL, COMPREHENSIVE   Result Value Ref Range    Sodium 134 (L) 136 - 145 mmol/L    Potassium 3.7 3.5 - 5.1 mmol/L    Chloride 95 (L) 97 - 108 mmol/L    CO2 29 21 - 32 mmol/L    Anion gap 10 5 - 15 mmol/L    Glucose 114 (H) 65 - 100 mg/dL    BUN 22 (H) 6 - 20 MG/DL    Creatinine 0.75 0.70 - 1.30 MG/DL    BUN/Creatinine ratio 29 (H) 12 - 20      GFR est AA >60 >60 ml/min/1.73m2    GFR est non-AA >60 >60 ml/min/1.73m2    Calcium 9.6 8.5 - 10.1 MG/DL    Bilirubin, total 0.7 0.2 - 1.0 MG/DL    ALT (SGPT) 46 12 - 78 U/L AST (SGOT) 28 15 - 37 U/L    Alk. phosphatase 243 (H) 45 - 117 U/L    Protein, total 8.8 (H) 6.4 - 8.2 g/dL    Albumin 2.4 (L) 3.5 - 5.0 g/dL    Globulin 6.4 (H) 2.0 - 4.0 g/dL    A-G Ratio 0.4 (L) 1.1 - 2.2     CBC W/O DIFF   Result Value Ref Range    WBC 12.1 (H) 4.1 - 11.1 K/uL    RBC 4.44 4.10 - 5.70 M/uL    HGB 12.2 12.1 - 17.0 g/dL    HCT 38.4 36.6 - 50.3 %    MCV 86.5 80.0 - 99.0 FL    MCH 27.5 26.0 - 34.0 PG    MCHC 31.8 30.0 - 36.5 g/dL    RDW 16.7 (H) 11.5 - 14.5 %    PLATELET 663 009 - 553 K/uL   MAGNESIUM   Result Value Ref Range    Magnesium 2.2 1.6 - 2.4 mg/dL   PHOSPHORUS   Result Value Ref Range    Phosphorus 3.1 2.6 - 4.7 MG/DL   METABOLIC PANEL, BASIC   Result Value Ref Range    Sodium 135 (L) 136 - 145 mmol/L    Potassium 3.5 3.5 - 5.1 mmol/L    Chloride 95 (L) 97 - 108 mmol/L    CO2 30 21 - 32 mmol/L    Anion gap 10 5 - 15 mmol/L    Glucose 105 (H) 65 - 100 mg/dL    BUN 31 (H) 6 - 20 MG/DL    Creatinine 0.92 0.70 - 1.30 MG/DL    BUN/Creatinine ratio 34 (H) 12 - 20      GFR est AA >60 >60 ml/min/1.73m2    GFR est non-AA >60 >60 ml/min/1.73m2    Calcium 9.0 8.5 - 10.1 MG/DL   CBC WITH AUTOMATED DIFF   Result Value Ref Range    WBC 12.6 (H) 4.1 - 11.1 K/uL    RBC 4.51 4.10 - 5.70 M/uL    HGB 12.0 (L) 12.1 - 17.0 g/dL    HCT 38.7 36.6 - 50.3 %    MCV 85.8 80.0 - 99.0 FL    MCH 26.6 26.0 - 34.0 PG    MCHC 31.0 30.0 - 36.5 g/dL    RDW 16.7 (H) 11.5 - 14.5 %    PLATELET 411 477 - 883 K/uL    NEUTROPHILS 71 32 - 75 %    LYMPHOCYTES 17 12 - 49 %    MONOCYTES 10 5 - 13 %    EOSINOPHILS 2 0 - 7 %    BASOPHILS 0 0 - 1 %    ABS. NEUTROPHILS 8.9 (H) 1.8 - 8.0 K/UL    ABS. LYMPHOCYTES 2.2 0.8 - 3.5 K/UL    ABS. MONOCYTES 1.2 (H) 0.0 - 1.0 K/UL    ABS. EOSINOPHILS 0.2 0.0 - 0.4 K/UL    ABS.  BASOPHILS 0.1 0.0 - 0.1 K/UL   METABOLIC PANEL, COMPREHENSIVE   Result Value Ref Range    Sodium 137 136 - 145 mmol/L    Potassium 3.3 (L) 3.5 - 5.1 mmol/L    Chloride 97 97 - 108 mmol/L    CO2 31 21 - 32 mmol/L    Anion gap 9 5 - 15 mmol/L    Glucose 113 (H) 65 - 100 mg/dL    BUN 26 (H) 6 - 20 MG/DL    Creatinine 0.96 0.70 - 1.30 MG/DL    BUN/Creatinine ratio 27 (H) 12 - 20      GFR est AA >60 >60 ml/min/1.73m2    GFR est non-AA >60 >60 ml/min/1.73m2    Calcium 8.7 8.5 - 10.1 MG/DL    Bilirubin, total 0.7 0.2 - 1.0 MG/DL    ALT (SGPT) 42 12 - 78 U/L    AST (SGOT) 42 (H) 15 - 37 U/L    Alk. phosphatase 198 (H) 45 - 117 U/L    Protein, total 8.5 (H) 6.4 - 8.2 g/dL    Albumin 2.3 (L) 3.5 - 5.0 g/dL    Globulin 6.2 (H) 2.0 - 4.0 g/dL    A-G Ratio 0.4 (L) 1.1 - 2.2     CBC WITH AUTOMATED DIFF   Result Value Ref Range    WBC 10.0 4.1 - 11.1 K/uL    RBC 4.45 4.10 - 5.70 M/uL    HGB 12.3 12.1 - 17.0 g/dL    HCT 38.5 36.6 - 50.3 %    MCV 86.5 80.0 - 99.0 FL    MCH 27.6 26.0 - 34.0 PG    MCHC 31.9 30.0 - 36.5 g/dL    RDW 16.4 (H) 11.5 - 14.5 %    PLATELET 352 738 - 318 K/uL    NEUTROPHILS 70 32 - 75 %    LYMPHOCYTES 17 12 - 49 %    MONOCYTES 11 5 - 13 %    EOSINOPHILS 2 0 - 7 %    BASOPHILS 0 0 - 1 %    ABS. NEUTROPHILS 6.9 1.8 - 8.0 K/UL    ABS. LYMPHOCYTES 1.7 0.8 - 3.5 K/UL    ABS. MONOCYTES 1.1 (H) 0.0 - 1.0 K/UL    ABS. EOSINOPHILS 0.2 0.0 - 0.4 K/UL    ABS.  BASOPHILS 0.0 0.0 - 0.1 K/UL   METABOLIC PANEL, BASIC   Result Value Ref Range    Sodium 134 (L) 136 - 145 mmol/L    Potassium 3.8 3.5 - 5.1 mmol/L    Chloride 97 97 - 108 mmol/L    CO2 30 21 - 32 mmol/L    Anion gap 7 5 - 15 mmol/L    Glucose 109 (H) 65 - 100 mg/dL    BUN 19 6 - 20 MG/DL    Creatinine 0.85 0.70 - 1.30 MG/DL    BUN/Creatinine ratio 22 (H) 12 - 20      GFR est AA >60 >60 ml/min/1.73m2    GFR est non-AA >60 >60 ml/min/1.73m2    Calcium 8.6 8.5 - 68.1 MG/DL   METABOLIC PANEL, BASIC   Result Value Ref Range    Sodium 135 (L) 136 - 145 mmol/L    Potassium 3.8 3.5 - 5.1 mmol/L    Chloride 96 (L) 97 - 108 mmol/L    CO2 29 21 - 32 mmol/L    Anion gap 10 5 - 15 mmol/L    Glucose 178 (H) 65 - 100 mg/dL    BUN 23 (H) 6 - 20 MG/DL    Creatinine 1.06 0.70 - 1.30 MG/DL BUN/Creatinine ratio 22 (H) 12 - 20      GFR est AA >60 >60 ml/min/1.73m2    GFR est non-AA >60 >60 ml/min/1.73m2    Calcium 8.7 8.5 - 10.1 MG/DL   CBC WITH AUTOMATED DIFF   Result Value Ref Range    WBC 11.6 (H) 4.1 - 11.1 K/uL    RBC 4.35 4.10 - 5.70 M/uL    HGB 11.5 (L) 12.1 - 17.0 g/dL    HCT 37.6 36.6 - 50.3 %    MCV 86.4 80.0 - 99.0 FL    MCH 26.4 26.0 - 34.0 PG    MCHC 30.6 30.0 - 36.5 g/dL    RDW 16.2 (H) 11.5 - 14.5 %    PLATELET 169 587 - 330 K/uL    NEUTROPHILS 72 32 - 75 %    LYMPHOCYTES 18 12 - 49 %    MONOCYTES 8 5 - 13 %    EOSINOPHILS 2 0 - 7 %    BASOPHILS 0 0 - 1 %    ABS. NEUTROPHILS 8.4 (H) 1.8 - 8.0 K/UL    ABS. LYMPHOCYTES 2.1 0.8 - 3.5 K/UL    ABS. MONOCYTES 0.9 0.0 - 1.0 K/UL    ABS. EOSINOPHILS 0.2 0.0 - 0.4 K/UL    ABS.  BASOPHILS 0.1 0.0 - 0.1 K/UL   METABOLIC PANEL, BASIC   Result Value Ref Range    Sodium 137 136 - 145 mmol/L    Potassium 3.4 (L) 3.5 - 5.1 mmol/L    Chloride 97 97 - 108 mmol/L    CO2 30 21 - 32 mmol/L    Anion gap 10 5 - 15 mmol/L    Glucose 165 (H) 65 - 100 mg/dL    BUN 30 (H) 6 - 20 MG/DL    Creatinine 1.21 0.70 - 1.30 MG/DL    BUN/Creatinine ratio 25 (H) 12 - 20      GFR est AA >60 >60 ml/min/1.73m2    GFR est non-AA >60 >60 ml/min/1.73m2    Calcium 9.1 8.5 - 10.1 MG/DL   GLUCOSE, POC   Result Value Ref Range    Glucose (POC) 86 65 - 100 mg/dL    Performed by Debabdirizak Starkey [facility]NA[facility]    GLUCOSE, POC   Result Value Ref Range    Glucose (POC) 105 (H) 65 - 100 mg/dL    Performed by Debbe Orn [facility]    GLUCOSE, POC   Result Value Ref Range    Glucose (POC) 90 65 - 100 mg/dL    Performed by Loly Grant    GLUCOSE, POC   Result Value Ref Range    Glucose (POC) 128 (H) 65 - 100 mg/dL    Performed by Loly Grant    GLUCOSE, POC   Result Value Ref Range    Glucose (POC) 168 (H) 65 - 100 mg/dL    Performed by Tioice Fruit    GLUCOSE, POC   Result Value Ref Range    Glucose (POC) 180 (H) 65 - 100 mg/dL    Performed by Johnice Fruit    GLUCOSE, POC   Result Value Ref Range    Glucose (POC) 183 (H) 65 - 100 mg/dL    Performed by Yany Bateman    GLUCOSE, POC   Result Value Ref Range    Glucose (POC) 200 (H) 65 - 100 mg/dL    Performed by Yany Bateman    GLUCOSE, POC   Result Value Ref Range    Glucose (POC) 173 (H) 65 - 100 mg/dL    Performed by Matt Adair    GLUCOSE, POC   Result Value Ref Range    Glucose (POC) 163 (H) 65 - 100 mg/dL    Performed by Matt Adair    GLUCOSE, POC   Result Value Ref Range    Glucose (POC) 150 (H) 65 - 100 mg/dL    Performed by [address] [place] [address], POC   Result Value Ref Range    Glucose (POC) 212 (H) 65 - 100 mg/dL    Performed by [address] [place] [address], POC   Result Value Ref Range    Glucose (POC) 227 (H) 65 - 100 mg/dL    Performed by Jaquan Barbosa    GLUCOSE, POC   Result Value Ref Range    Glucose (POC) 136 (H) 65 - 100 mg/dL    Performed by Jaquan Barbosa    GLUCOSE, POC   Result Value Ref Range    Glucose (POC) 173 (H) 65 - 100 mg/dL    Performed by Courtney Fernandes    GLUCOSE, POC   Result Value Ref Range    Glucose (POC) 176 (H) 65 - 100 mg/dL    Performed by Courtney Fernandes    GLUCOSE, POC   Result Value Ref Range    Glucose (POC) 131 (H) 65 - 100 mg/dL    Performed by Divya Boothe    GLUCOSE, POC   Result Value Ref Range    Glucose (POC) 144 (H) 65 - 100 mg/dL    Performed by Divya Boothe    GLUCOSE, POC   Result Value Ref Range    Glucose (POC) 130 (H) 65 - 100 mg/dL    Performed by Michael Cabezas    GLUCOSE, POC   Result Value Ref Range    Glucose (POC) 155 (H) 65 - 100 mg/dL    Performed by Harman Poole    GLUCOSE, POC   Result Value Ref Range    Glucose (POC) 144 (H) 65 - 100 mg/dL    Performed by Mikhail Biggs    GLUCOSE, POC   Result Value Ref Range    Glucose (POC) 136 (H) 65 - 100 mg/dL    Performed by Sury Santoyo    GLUCOSE, POC   Result Value Ref Range    Glucose (POC) 175 (H) 65 - 100 mg/dL    Performed by Elza Spivey    GLUCOSE, POC   Result Value Ref Range    Glucose (POC) 147 (H) 65 - 100 mg/dL    Performed by Bret Mosley    GLUCOSE, POC   Result Value Ref Range    Glucose (POC) 110 (H) 65 - 100 mg/dL    Performed by Arthur Marroquin.    GLUCOSE, POC   Result Value Ref Range    Glucose (POC) 96 65 - 100 mg/dL    Performed by Mebla Pham \"Ruth\"*    GLUCOSE, POC   Result Value Ref Range    Glucose (POC) 97 65 - 100 mg/dL    Performed by Zula Pap    GLUCOSE, POC   Result Value Ref Range    Glucose (POC) 103 (H) 65 - 100 mg/dL    Performed by Michelle Pap    GLUCOSE, POC   Result Value Ref Range    Glucose (POC) 104 (H) 65 - 100 mg/dL    Performed by Valentin NÚÑEZ    GLUCOSE, POC   Result Value Ref Range    Glucose (POC) 96 65 - 100 mg/dL    Performed by Chra Barragan    GLUCOSE, POC   Result Value Ref Range    Glucose (POC) 116 (H) 65 - 100 mg/dL    Performed by Kike Hunt (PCT)    GLUCOSE, POC   Result Value Ref Range    Glucose (POC) 111 (H) 65 - 100 mg/dL    Performed by Gee Sawyer (PCT)    GLUCOSE, POC   Result Value Ref Range    Glucose (POC) 170 (H) 65 - 100 mg/dL    Performed by Yumiko Wheeler    GLUCOSE, POC   Result Value Ref Range    Glucose (POC) 113 (H) 65 - 100 mg/dL    Performed by Rajeev Castillo*    GLUCOSE, POC   Result Value Ref Range    Glucose (POC) 177 (H) 65 - 100 mg/dL    Performed by Iban RANDALL    GLUCOSE, POC   Result Value Ref Range    Glucose (POC) 128 (H) 65 - 100 mg/dL    Performed by Toni Mathis    GLUCOSE, POC   Result Value Ref Range    Glucose (POC) 123 (H) 65 - 100 mg/dL    Performed by Joey Mace    GLUCOSE, POC   Result Value Ref Range    Glucose (POC) 208 (H) 65 - 100 mg/dL    Performed by Joey Mace    GLUCOSE, POC   Result Value Ref Range    Glucose (POC) 106 (H) 65 - 100 mg/dL    Performed by Ciera Patel    GLUCOSE, POC   Result Value Ref Range    Glucose (POC) 134 (H) 65 - 100 mg/dL    Performed by Candace Calloway    GLUCOSE, POC   Result Value Ref Range    Glucose (POC) 155 (H) 65 - 100 mg/dL    Performed by Annabelle Canas, POC Result Value Ref Range    Glucose (POC) 190 (H) 65 - 100 mg/dL    Performed by Lalitha Newman    EKG, 12 LEAD, INITIAL   Result Value Ref Range    Ventricular Rate 88 BPM    Atrial Rate 88 BPM    P-R Interval 160 ms    QRS Duration 72 ms    Q-T Interval 366 ms    QTC Calculation (Bezet) 442 ms    Calculated P Axis 37 degrees    Calculated R Axis 57 degrees    Calculated T Axis 57 degrees    Diagnosis       Normal sinus rhythm  Nonspecific ST abnormality  Abnormal ECG    Confirmed by Issa Lui ([id number]) on 5/22/2017 5:27:05 PM     TYPE & CROSSMATCH   Result Value Ref Range    Crossmatch Expiration 05/23/2017     ABO/Rh(D) B POSITIVE     Antibody screen NEG     Unit number [id number]     Blood component type RC LR AS1     Unit division 00     Status of unit TRANSFUSED     Crossmatch result Compatible     Unit number [id number]     Blood component type RC LR AS1     Unit division 00     Status of unit TRANSFUSED     Crossmatch result Compatible        Last Lipid:  No results found for: LDL, DLDL, LDLC, DLDLP  HbA1c: No results found for: HBA1C, NBE2JIAF, [id number]    Imaging Review  None    MEDICATIONS:  Current Facility-Administered Medications   Medication Dose Route Frequency    melatonin tablet 3 mg  3 mg Oral QHS    pantoprazole (PROTONIX) granules for oral suspension 40 mg  40 mg Per G Tube ACB    guaiFENesin-dextromethorphan (ROBITUSSIN DM) 100-10 mg/5 mL syrup 10 mL  10 mL Per G Tube QID    fluocinoNIDE (LIDEX) 0.05 % cream   Topical BID    levoFLOXacin (LEVAQUIN) 750 mg in D5W IVPB  750 mg IntraVENous Q24H    nystatin (MYCOSTATIN) 100,000 unit/mL oral suspension 500,000 Units  500,000 Units Oral QID    insulin lispro (HUMALOG) injection   SubCUTAneous Q6H    enoxaparin (LOVENOX) injection 40 mg  40 mg SubCUTAneous Q24H    sodium chloride (NS) flush 5-10 mL  5-10 mL IntraVENous Q8H    prasugrel (EFFIENT) tablet 10 mg  10 mg Per G Tube DAILY    aspirin chewable tablet 81 mg  81 mg Per G Tube DAILY    atorvastatin (LIPITOR) tablet 40 mg  40 mg Per G Tube QHS    lidocaine (LIDODERM) 5 % patch 1 Patch  1 Patch TransDERmal Q24H       Assessment/Plan  Cameron Moran is a 56 y.o. male who presented to the hospital for Anoxic brain injury. 1. Anoxic brain injury - stable  2. HTN - Stable  3. HLD - Stable    - Continue PT/OT/ST  - Continue aspirin 81 mg a day  - Cont Lipitor 40 mg a day  - BP goal is less than 140/90  - MRI brain to be repeated with sedation. Can give ativan 1-2 mg 15 mins prior to the procedure if OK with primary team. MRI will not change the treatment plan but the family insists. - EEG showed mild slowing. NO seizures. Signed:  Octavio Fagan MD  Neurologist      This note will not be viewable in [address]

## 2017-05-30 NOTE — PROGRESS NOTES
Problem: Self Care Deficits Care Plan (Adult)  Goal: *Acute Goals and Plan of Care (Insert Text)  Occupational Therapy Goals  Initiated 5/20/2017   1. Patient will perform face washing with moderate assistance seated EOB with right UE and verbal cues within 7 day(s). 2. Patient will follow one step simple commands in native language 100 % of the time within 7 days  3. Patient will be able to tolerate rolling in bed with mod assist of 2 and attempt to assist with right UE, with in 7 days. 4. Patient will be able to be able to sit and complete UB dressing danae techniques with mod A for dressing aspect with in 7 days. Initiated 5/20/2017   1. Patient will perform self-feeding with moderate assistance seated in bed with right UE and verbal cues, when pt is cleared to eat within 7 day(s). - DC Patient currently NPO, change to face washing  2. Patient will follow one step simple commands 50 % of the time, with in 7 days UPGRADE  3. Patient will be able to tolerate rolling in bed with max assist of 2 and attempt to assist with right UE, with in 7 days. UPGRADE  4. Patient will be able to be able to sit on EOb for 1 minute with total assist of 2 to 3 persons with in 7 days. UPGRADE   OCCUPATIONAL THERAPY TREATMENT  Patient: Nyasia Irvin [de-identified]56 y.o. male)  Date: 5/30/2017  Diagnosis: Cardiac Arrest  Respiratory failure ([facility] [address] Anoxic brain injury ([facility] [address]       Precautions: Contact      ASSESSMENT:  Pt was supine in bed and cleared to see by nursing. Pts BP had been low this am and was being given a bolus at the time of tx. Per nursing pts systolic BP to be >74 and the map to be 65 or  70. Pt was mod of 2 for supine to sit and scooted to the EOB with min assist of 1. Pt was mod assist of 2 to come to stand and transfer to the recliner. Pt did well and his BP remained stable. Pt has shoulder elevation on the right UE, and trace of shoulder retraction. No other movement noted in right UE.   Pt was more attentive today and balance in sitting was better. Family was present during the tx and assisted with translation. Recommend that pt have further therapy at discharge at in pt rehab. Progression toward goals:  [X]       Improving appropriately and progressing toward goals  [ ]       Improving slowly and progressing toward goals  [ ]       Not making progress toward goals and plan of care will be adjusted       PLAN:  Patient continues to benefit from skilled intervention to address the above impairments. Continue treatment per established plan of care. Discharge Recommendations: In pt rehab  Further Equipment Recommendations for Discharge: tbd        SUBJECTIVE:   Patient stated -pt not able to speak      OBJECTIVE DATA SUMMARY:   Cognitive/Behavioral Status:  Neurologic State: Alert  Orientation Level: Unable to verbalize (appears oriented to self)  Cognition: Follows commands  Perception: Cues to attend right visual field;Cues to attend to right side of body     Safety/Judgement: Fall prevention     Functional Mobility and Transfers for ADLs:  Bed Mobility:  Supine to Sit: Modified independent;Assist x2  Scooting: Moderate assistance;Assist x1     Transfers:  Sit to Stand: Modified independent;Assist x2        Balance:  Sitting: Impaired; Without support  Sitting - Static: Good (unsupported)  Sitting - Dynamic: Good (unsupported)  Standing: Impaired; Without support  Standing - Static: Fair;Constant support  Standing - Dynamic : Fair     ADL Intervention:      max assist for ADLs     Toileting  Toileting Assistance: Total assistance(dependent)     Cognitive Retraining  Safety/Judgement: Fall prevention     Pain:  Pain Scale 1: Adult Nonverbal Pain Scale  Pain Intensity 1: 0              Activity Tolerance:   vss  Please refer to the flowsheet for vital signs taken during this treatment.   After treatment:   [X] Patient left in no apparent distress sitting up in chair  [ ] Patient left in no apparent distress in bed  [ ] Call Anamaria Osborne left within reach  [X] Nursing notified  [X] Caregiver present  [ ] Bed alarm activated      COMMUNICATION/COLLABORATION:   The patients plan of care was discussed with:      Madeleine Aviles OT  Time Calculation: 68 mins

## 2017-05-30 NOTE — PROGRESS NOTES
Bedside shift change report given to Araceli Gregg RN (oncoming nurse). Report included pertinent events from today's shift as well as the following information SBAR, Kardex, Intake/Output, MAR and Recent Results. No immediate concerns. Pt resting in position of comfort with call bell within reach.

## 2017-05-30 NOTE — PROGRESS NOTES
Problem: Mobility Impaired (Adult and Pediatric)  Goal: *Acute Goals and Plan of Care (Insert Text)  Physical Therapy Goals  Revised 5/26/2017  1. Patient will move from supine to sit and sit to supine , scoot up and down and roll side to side in bed with moderate assistance within 7 day(s). 2. Patient will transfer from bed to chair and chair to bed with maximal assistance using the least restrictive device within 7 day(s). 3. Patient will perform sit to stand with moderate assistance within 7 day(s). 4. Patient will tolerate standing x 1 minute with CGA-min A in midline in preparation for gait training within 7 days. Physical Therapy Goals  Initiated 5/20/2017  1. Patient will roll side to side in bed with maximal assistance x2 within 7 day(s). 2. Patient will follow simple, one-step commands 50% of the time within 7 day(s). 3. Patient and family will demonstrate independence with BLE and BUE AA/PROM HEP within 7 day(s). 4. Patient will tolerate sitting in chair position in bed for 1 hr within 7 day(s). PHYSICAL THERAPY TREATMENT  Patient: Domingo Camp (82 y.o. male)  Date: 5/30/2017  Diagnosis: Cardiac Arrest  Respiratory failure ([facility] [address] Anoxic brain injury ([facility] [address]       Precautions: Contact      ASSESSMENT:  Patient received resting in bed, agreeable and motivated for therapy. Patient following more simple commands and responding more each session. Patient required mod A x 2 for bed mobility. Patient with improved sitting balance requiring supervision and no external support while sitting EOB. Patient required mod-max A x 2 for sit <> stand with mod cues on glute to facilitate trunk and hip extension. Patient able to take a few steps to the chair with mod A x 2. Patient required mod manual cues on glute and for weight shifting to advance LLE. Noted no buckling of RLE and fair quad control. Once sitting in the chair, patient with SBP in the 90's and required LEs elevated.  Patient left sitting in the chair with the bed alarm and lalo pad under him for ease of transfer back to bed with nursing staff. Patient continues to make progress and has very supportive family, always at the bedside and active in his care. Progression toward goals:  [X]    Improving appropriately and progressing toward goals  [ ]    Improving slowly and progressing toward goals  [ ]    Not making progress toward goals and plan of care will be adjusted       PLAN:  Patient continues to benefit from skilled intervention to address the above impairments. Continue treatment per established plan of care. Discharge Recommendations:  Inpatient Rehab  Further Equipment Recommendations for Discharge:  TBD       SUBJECTIVE:   Patient aphasic. Daughter translates in [place]. OBJECTIVE DATA SUMMARY:   Critical Behavior:  Neurologic State: Alert  Orientation Level: Unable to verbalize (appears oriented to self)  Cognition: Follows commands  Safety/Judgement: Fall prevention  Functional Mobility Training:  Bed Mobility:     Supine to Sit: Modified independent;Assist x2     Scooting: Moderate assistance;Assist x1        Transfers:  Sit to Stand: Moderate assistance;Assist x2  Stand to Sit: Moderate assistance;Assist x2  Stand Pivot Transfers: Moderate assistance     Bed to Chair: Moderate assistance;Assist x2                    Balance:  Sitting: Impaired; Without support  Sitting - Static: Good (unsupported)  Sitting - Dynamic: Good (unsupported)  Standing: Impaired; Without support  Standing - Static: Fair;Constant support  Standing - Dynamic : Fair  Ambulation/Gait Training:                                            Neuro Re-Education:  Provided mod manual cues on glute to facilitate hip and trunk extension for upright standing posture in midline. Patient required mod manual cues to facilitate weight shifting to advance contralateral LE to step to the chair. Therapeutic Exercises:    Ankle pumps and LAQ, with 2-3/5 MMT on RLE quad  Pain:  Pain Scale 1: Adult Nonverbal Pain Scale  Pain Intensity 1: 0              Activity Tolerance:   Improving, hypotension although stable on RA via trach collar. Please refer to the flowsheet for vital signs taken during this treatment.   After treatment:   [X]    Patient left in no apparent distress sitting up in chair with lalo pad  [ ]    Patient left in no apparent distress in bed  [X]    Call bell left within reach  [X]    Nursing notified  [X]    Caregiver present  [X]    Bed alarm activated      COMMUNICATION/COLLABORATION:   The patients plan of care was discussed with: Occupational Therapist, Registered Nurse and      Imelda Bedolla, PT, DPT   Time Calculation: 61 mins

## 2017-05-30 NOTE — PROGRESS NOTES
Spiritual Care Assessment/Progress Notes    Ericka [facility] [id number]  [id number]    1960  56 y.o.  male    Patient Telephone Number: [phone] (home)   Islam Affiliation: No Judaism   Language: English   Extended Emergency Contact Information  Primary Emergency Contact: Mg Solano  Address: [address], [facility] [employer] Phone: [phone]  Mobile Phone: [phone]  Relation: Spouse  Secondary Emergency Contact: ESE Cordero   [address] Phone: [phone]  Relation: Child   Patient Active Problem List    Diagnosis Date Noted    Pneumonia due to Serratia marcescens ([facility] [address] 99/95/4800    Lice infestation 67/85/6733    Cardiomyopathy ([facility] [address] 05/22/2017    Anoxic brain injury ([facility] [address] 05/20/2017    H/O cardiac arrest 05/19/2017    Respiratory failure ([facility] [address] 05/19/2017    Coronary artery disease of bypass graft with stable angina pectoris ([facility] [address] 10/05/2016    S/P coronary artery stent placement 10/05/2016    Essential hypertension 10/05/2016    Dyslipidemia 10/05/2016    Type 2 diabetes mellitus without complication ([facility] [address] 14/24/0402    Chest pain 10/05/2016        Date: 5/30/2017       Level of Islam/Spiritual Activity:  []         Involved in rossi tradition/spiritual practice    []         Not involved in rossi tradition/spiritual practice  []         Spiritually oriented    []         Claims no spiritual orientation    []         seeking spiritual identity  []         Feels alienated from Shinto practice/tradition  []         Feels angry about Shinto practice/tradition  [x]         Spirituality/Shinto tradition does not seem to be a resource for coping at this time.   [x]         Not able to assess due to medical condition    Services Provided Today:  []         crisis intervention    []         reading Scriptures  [x]         spiritual assessment    []         prayer  []         empathic listening/emotional support []         rites and rituals (cite in comments)  []         life review     []         Alevism support  []         theological development   []         advocacy  []         ethical dialog     []         blessing  []         bereavement support    []         support to family  []         anticipatory grief support   []         help with AMD  []         spiritual guidance    []         meditation      Spiritual Care Needs  []         Emotional Support  []         Spiritual/Adventism Care  []         Loss/Adjustment  []         Advocacy/Referral                /Ethics  [x]         No needs expressed at               this time  []         Other: (note in               comments)  Spiritual Care Plan  []         Follow up visits with               pt/family  []         Provide materials  []         Schedule sacraments  []         Contact Community               Clergy  [x]         Follow up as needed  []         Other: (note in               comments)     Comments:  Initial visit on PCU for spiritual assessment. Patient was seated in a recliner with his wife standing at his side holding his hand. Patient's daughter was in doorway and prevented entering room to visit with patient. Explained role of  to daughter being one of support to patient and family. Advised her of  availability as needed.   KATHERINE Brink, [facility], [address] [place] [address] [address]     Paging Service  [phone])

## 2017-05-30 NOTE — PROGRESS NOTES
[address] [place] [address]  [phone]      Cardiology Progress Note      5/30/2017 2PM    Admit Date: 5/19/2017    Admit Diagnosis:   Cardiac Arrest  Respiratory failure [facility])    Subjective:     Abdulkadir Anne  is a 56 y.o. male with PMH HTN, HLD, DM, CAD s/p CABG (2014) who was transferred from OSH where he had been admitted s/p cardiac arrest 4/28. Overnight events:  -VSS - intermittent tachycardia; hypotensive early this morning   -K slightly low, creatinine slight increase to 1.21   -Mr. Joann Ortiz opens his eyes, nods, and follows basic commands. Limited English comprehension and no family at bedside currently. No acute need for  during assessment. Patient endorses abd pain again today LUQ.       Visit Vitals    /51    Pulse 85    Temp 98.2 °F (36.8 °C)    Resp 16    Ht 5' 11\" (1.803 m)    Wt 82.2 kg (181 lb 4.8 oz)    SpO2 100%    BMI 25.29 kg/m2       Current Facility-Administered Medications   Medication Dose Route Frequency    LORazepam (ATIVAN) 2 mg/mL injection 1 mg  1 mg IntraVENous ONCE PRN    0.9% sodium chloride infusion  75 mL/hr IntraVENous CONTINUOUS    melatonin tablet 3 mg  3 mg Oral QHS    artificial saliva (MOUTH KOTE) 1 Spray  1 Spray Oral PRN    pantoprazole (PROTONIX) granules for oral suspension 40 mg  40 mg Per G Tube ACB    fluocinoNIDE (LIDEX) 0.05 % cream   Topical BID    levoFLOXacin (LEVAQUIN) 750 mg in D5W IVPB  750 mg IntraVENous Q24H    nystatin (MYCOSTATIN) 100,000 unit/mL oral suspension 500,000 Units  500,000 Units Oral QID    0.9% sodium chloride infusion 250 mL  250 mL IntraVENous PRN    insulin lispro (HUMALOG) injection   SubCUTAneous Q6H    glucose chewable tablet 16 g  4 Tab Oral PRN    dextrose 10% infusion 125-250 mL  125-250 mL IntraVENous PRN    glucagon (GLUCAGEN) injection 1 mg  1 mg IntraMUSCular PRN    enoxaparin (LOVENOX) injection 40 mg  40 mg SubCUTAneous Q24H    sodium chloride (NS) flush 5-10 mL  5-10 mL IntraVENous Q8H    sodium chloride (NS) flush 5-10 mL  5-10 mL IntraVENous PRN    ondansetron (ZOFRAN) injection 4 mg  4 mg IntraVENous Q6H PRN    albuterol-ipratropium (DUO-NEB) 2.5 MG-0.5 MG/3 ML  3 mL Nebulization Q4H PRN    prasugrel (EFFIENT) tablet 10 mg  10 mg Per G Tube DAILY    aspirin chewable tablet 81 mg  81 mg Per G Tube DAILY    atorvastatin (LIPITOR) tablet 40 mg  40 mg Per G Tube QHS    oxyCODONE (ROXICODONE INTENSOL) 20 mg/mL concentrated solution 5 mg  5 mg Per G Tube Q4H PRN    acetaminophen (TYLENOL) solution 650 mg  650 mg Per G Tube Q4H PRN    lidocaine (LIDODERM) 5 % patch 1 Patch  1 Patch TransDERmal Q24H       Objective:      Physical Exam:  General: obese, [facility] male, resting in bed in NAD. Alert and interactive. Heart: RRR, distant heart sounds   Lungs: diminished; trach; clear  Abdomen: Soft, +BS, NTND   Extremities: LE chan +DP/PT, no edema   Neurologic: trached; Nods head. Weaker on R side        Data Review:   Recent Labs      05/30/17   0337   WBC  11.6*   HGB  11.5*   HCT  37.6   PLT  208     Recent Labs      05/30/17   0337  05/29/17   0438  05/28/17   0546   NA  137  135*  134*   K  3.4*  3.8  3.8   CL  97  96*  97   CO2  30  29  30   GLU  165*  178*  109*   BUN  30*  23*  19   CREA  1.21  1.06  0.85   CA  9.1  8.7  8.6       No results for input(s): TROIQ, CPK, CKMB in the last 72 hours.       Intake/Output Summary (Last 24 hours) at 05/30/17 1606  Last data filed at 05/30/17 0606   Gross per 24 hour   Intake              805 ml   Output              500 ml   Net              305 ml        Telemetry: SR-ST; rare PVC  ECG: NSR; artifact   Echocardiogram: EF 30-35%; moderate diffuse hypokinesis; mild-mod MR; mild-mod TR                    Repeat limited: ordered   CXRAY: no acute process     Assessment:     Principal Problem:    Anoxic brain injury ([address] (5/20/2017)    Active Problems:    Coronary artery disease of bypass graft with stable angina pectoris [facility] (10/5/2016)      Essential hypertension (10/5/2016)      Dyslipidemia (10/5/2016)      Type 2 diabetes mellitus without complication ([facility] (20/2/2547)      H/O cardiac arrest (5/19/2017)      Overview: 4/28/17:  OSH in [place]      Respiratory failure ([facility] [address] (5/19/2017)      Pneumonia due to Serratia marcescens ([facility] (8/67/9740)      Lice infestation (2/94/2966)      Cardiomyopathy ([facility] (5/22/2017)        Plan:     Cardiomyopathy: EF 30-35% s/p cardiac arrest 4/28 and history of extensive CAD. OSH ECHO had a higher EF, but patient was on pressors at that time. Likely ischemic cardiomyopathy due to history and HPI vs damage from prolonged cardiac arrest.    · Continue ASA, BB, statin for CAD  · Continue BB, ARB: for cardiomyopathy. Diuretic held with hypotension this morning. · Awaiting repeat ECHO to see if EF has improved. · Palliative care family meeting pending to assess goals of care. · Consider further ischemic evaluation and/ or life vest ICD but high infectious risk and would need to be able to assure strict medication compliance. Kashif Casarez, HITESH  DNP, RN, [facility]    Patient seen and examined with nurse practitioner Kashif Casarez. I agree with the assessment and plan as noted.

## 2017-05-30 NOTE — PROGRESS NOTES
Dietician notified pt daughter and wife have concerns that tube feeding rate is too fast for pt as he is c/o ABD pain. Residual checked x 2 this shift and 10 cc or less both times. Also discussed that MRI was reordered for pt with sedation. Daughter states that family is refusing as they are concerned he will not be able to breath r/t secretions. Educated that this could be managed, continues to refuse. MRI updated.

## 2017-05-30 NOTE — PROGRESS NOTES
PCU SHIFT NURSING NOTE      Bedside shift change report given to [facility] [place] (oncoming nurse) by Davon Nguyen (offgoing nurse). Report included the following information SBAR, Kardex, Procedure Summary, Intake/Output, MAR and Recent Results. Shift Summary:   0730 - Pt's daughter at bedside. Many questions regarding pt's current status, when pinto can be removed, desired change of continuous tube feeding to boluses, pain management. Directed my questions to physician once they round. Pt's daughter is very attentive, but is in constant interaction with the patient. Language barrier seems to be a slight issue, but the patient nods appropriately with some delay to nurse. 7868 - Pt's daughter insists that pt state she is in pain despite is calm appearance displaying no signs of pain at this time. Dr. Ruel Reesndez in to see family. It was explained at t his time why pinto needs to be in place, and that narcotic pain medication was not to used for pt at this time. Orders obtained for fecal management system to be removed and  heat pad for pain. 1025 - Tylenol given along with heat pad application to abdomen. Pt's daughter states that the heat pad was not warm enough and was too heavy for pt's abdomen. Famaily frequently requesting suctioning despite being advised to it's potential negative effects of infection and trauma. Encouraged pt to cough and was able to clear sputum through trach. Trach sponge replaced and area cleaned. Pt repositioned in bed per family request despite just being turned. 1235 - Pt resting quietly. Wife and son at bedside. Does not appear to be in any distress. 1435 - Attempted to untangle pt's hair. Daughter brought in opild and conditioner from outside hospital to apply. Unsuccessful at untangling pt's hair. 1800 - Again made another attempt to detangle pt's hair with conditioner.   Per NP, all guests and staff must continue with precautions including bouffant caps until hair and can be confirmed clear of previous infestation. Admission Date 5/19/2017   Admission Diagnosis Cardiac Arrest  Respiratory failure ([facility] [address]   Consults IP CONSULT TO CARDIOLOGY  IP CONSULT TO NEUROLOGY        Consults   []PT   []OT   []Speech   []Case Management      [] Palliative      Cardiac Monitoring Order   []Yes   []No     IV drips   []Yes    Drip:                            Dose:  Drip:                            Dose:  Drip:                            Dose:   []No     GI Prophylaxis   []Yes   []No         DVT Prophylaxis   SCDs:             Alfred stockings:         [] Medication   []Contraindicated   []None      Activity Level Activity Level: Bed Rest     Activity Assistance: Complete care   Purposeful Rounding every 1-2 hour? []Yes   Valencia Score  Total Score: 5   Bed Alarm (If score 3 or >)   []Yes   [] Refused (See signed refusal form in chart)   Ulices Score  Ulices Score: 11   Ulices Score (if score 14 or less)   []PMT consult   []Wound Care consult      []Specialty bed   [] Nutrition consult          Needs prior to discharge:   Home O2 required:    []Yes   []No    If yes, how much O2 required?     Other:    Last Bowel Movement: Last Bowel Movement Date: 05/27/17      Influenza Vaccine          Pneumonia Vaccine           Diet Active Orders   Diet    DIET NPO      LDAs Feeding Tube (Active)   Site Assessment Clean, dry, & intact 5/29/2017  4:00 PM   Dressing Status Old drainage 5/29/2017  4:00 PM   Dressing Type Gauze 5/29/2017  4:00 PM   Tube Status Infusing 5/29/2017  4:00 PM   Action Taken Placement verified (comment) 5/28/2017  9:47 AM   Drainage Description Clear 5/29/2017  4:00 PM   Gastric Residual (mL) 5 ml 5/29/2017  4:00 PM   Tube Feeding/Formula Options Twocal HN 5/29/2017  4:00 PM   Tube Feeding/Verify Rate (mL/hr) 50 5/29/2017  4:00 PM   Water Flush Volume (mL) 150 mL 5/29/2017  4:00 PM   Intake (ml) 600 ml 5/29/2017  4:00 PM   Medication Volume 70 ml 5/29/2017  4:00 PM   Output (ml) 0 ml 5/24/2017  3:30 AM                Peripheral IV 05/26/17 Right Hand (Active)   Site Assessment Clean, dry, & intact 5/28/2017 10:34 AM   Phlebitis Assessment 0 5/28/2017 10:34 AM   Infiltration Assessment 0 5/28/2017 10:34 AM   Dressing Status Clean, dry, & intact 5/28/2017 10:34 AM   Dressing Type Transparent 5/28/2017 10:34 AM   Hub Color/Line Status Blue;Capped 5/28/2017 10:34 AM          Condom Catheter (Active)   Criteria for Appropriate Use Strict I/Os 5/28/2017  9:47 AM   Status Draining 5/28/2017  9:47 AM   Site Condition No abnormalities 5/28/2017  9:47 AM   Drainage Tube Clipped to Bed Yes 5/28/2017  9:47 AM   Catheter Secured to Thigh No 5/28/2017  9:47 AM   Tamper Seal Intact No 5/28/2017  9:47 AM   Bag Below Bladder/Not on Floor Yes 5/28/2017  9:47 AM   Lack of Dependent Loop in Tubing Yes 5/28/2017  9:47 AM   Drainage Bag Less Than Half Full Yes 5/28/2017  9:47 AM   Sterile Solution Used for  Irrigation N/A 5/28/2017  9:47 AM   Urine Output (mL) 700 ml 5/26/2017  8:00 PM      Airway - Tracheostomy Tube 05/12/17 (Active)   Site Assessment Clean, dry, & intact 5/29/2017  6:00 PM   Trach Dressing Changed Yes 5/29/2017  6:00 PM   Trach Cleaned With Normal saline 5/29/2017  6:00 PM   Trach Tie Changed No 5/29/2017  6:00 PM   Trach Cannula Changed Yes 5/29/2017  6:00 PM   Inner Cannula #6 Stan 5/29/2017  6:00 PM   Line [employer] of the lock 5/29/2017  6:00 PM   Side Secured Device 5/29/2017  6:00 PM   Spare Trach at Bedside Yes 5/29/2017  6:00 PM   Spare Trach Tube One Size Smaller at Bedside Yes 5/29/2017  6:00 PM   Ambu Bag With Mask at Bedside Yes 5/29/2017  6:00 PM              Urinary Catheter Condom Catheter-Criteria for Appropriate Use: Strict I/Os  Urinary Catheter 05/28/17 3- way; Romero-Criteria for Appropriate Use: Obstruction/retention    Intake & Output   Date 05/28/17 1900 - 05/29/17 0659 05/29/17 0700 - 05/30/17 0659   Shift 3944-0606 24 Hour Total 0751-5853 1868-4864 24 Hour Total   SATNAM MEYERS  E I.V.  (mL/kg/hr)   450  (0.5)  450      Volume (levoFLOXacin (LEVAQUIN) 750 mg in D5W IVPB)   450  450    NG/GT  1520 820  820      Water Flush Volume (mL) (Feeding Tube)  750 150  150      Medication Volume (Feeding Tube)  450 70  70      Intake (ml) (Feeding Tube)  320 600  600    Shift Total  (mL/kg)  1520  (18.5) 1270  (15.4)  1270  (15.4)   O  U  T  P  U  T   Urine  (mL/kg/hr) 875 2275 450  (0.5)  450      Urine Output (mL) (Urinary Catheter 05/28/17 3- way; Romero) 875 2275 450  450    Drains  50         Output (ml) ([REMOVED] Rectal Tube)  50       Shift Total  (mL/kg) 875  (10.6) 2325  (28.3) 450  (5.5)  450  (5.5)   NET -875 -805 820  820   Weight (kg) 82.2 82.2 82.2 82.2 82.2         Readmission Risk Assessment Tool Score Low Risk            9       Total Score        3 Patient Length of Stay > 5    4 More than 1 Admission in calendar year    2 Charlson Comorbidity Score        Criteria that do not apply:    Relationship with PCP    Patient Living Status    Patient Insurance is Medicare, Medicaid or Self Pay       Expected Length of Stay 4d 4h   Actual Length of Stay 10

## 2017-05-30 NOTE — PROGRESS NOTES
Nutrition Assessment:    INTERVENTIONS/RECOMMENDATIONS:   Continue TwoCal @ 50 ml/hr + 150 ml h2o q 4 hrs  Could consider GI consult    ASSESSMENT:   Chart reviewed. RN paged RD d/t pt daughter concerned that pt not tolerating TF rate due to emesis looking like TF formula and abdominal pain. Pt had emesis and diarrhea PTA and not necessarily related to TF. Per daughter and RN no emesis in past couple of days, daughter attributes emesis due to excessive coughing on secretions. Current ordered rate of 50 ml/hr is very low and gastric residuals have been minimal. Daughter asked about switching to bolus feeding, however if she is concerned that pt is not tolerating continuous TF he will definitely not tolerate bolus feeds. She is also concerned about abdominal pain, RD informed her this was out of dietitians scope of practice (altough low residuals indicative of gastric emptying). Per documented intake, pt received ~75% of ordered nutrition in the last 72 hrs. Will continue to monitor TF tolerance. Diet Order: NPO  % Eaten:  No data found. Pertinent Medications: [x] Reviewed []Other: (insulin, levaquin, oxycodone, PPI)   Pertinent Labs: [x]Reviewed  []Other: (K+3.4)  Food Allergies: [x]None []Other:     Skin:    [x] Intact   [] Incision  [] Breakdown   []Edema   []Other:    Anthropometrics: Height: 5' 11\" (180.3 cm) Weight: 82.2 kg (181 lb 4.8 oz)    IBW (%IBW):   ( ) UBW (%UBW):   (  %)    BMI: Body mass index is 25.29 kg/(m^2). This BMI is indicative of:  []Underweight   [x]Normal   []Overweight   [] Obesity   [] Extreme Obesity (BMI>40)  Last Weight Metrics:  Weight Loss Metrics 5/29/2017 1/4/2017 10/5/2016   Today's Wt 181 lb 4.8 oz 208 lb 204 lb   BMI 25.29 kg/m2 29.01 kg/m2 28.45 kg/m2       Estimated Nutrition Needs (Based on): 2250 Kcals/day (MSJ: 1725 x 1.3) , 90 g (1 g/kg) Protein  Carbohydrate:  At Least 130 g/day  Fluids: 2250 mL/day or per primary team    NUTRITION DIAGNOSES:   Problem:  Altered GI function      Etiology: related to dysphagia     Signs/Symptoms: as evidenced by PEG dependent      NUTRITION INTERVENTIONS:    Enteral/Parenteral Nutrition: Modify rate, concentration, composition, and schedule                GOAL:   meet >80% of order TF in 2-4 days    NUTRITION MONITORING AND EVALUATION      Food/Nutrient Intake Outcomes: Enteral/parenteral nutrition intake  Physical Signs/Symptoms Outcomes: Weight/weight change, Electrolyte and renal profile, GI profile, Glucose profile, GI    Previous Goal Met:   [] Met              [x] Progressing Towards Goal              [] Not Progressing Towards Goal   Previous Recommendations:   [x] Implemented          [] Not Implemented          [] Not Applicable    LEARNING NEEDS (Diet, Food/Nutrient-Drug Interaction):    [x] None Identified   [] Identified and Education Provided/Documented   [] Identified and Pt declined/was not appropriate     Cultural, Catholic, OR Ethnic Dietary Needs:    [x] None Identified   [] Identified and Addressed     [x] Interdisciplinary Care Plan Reviewed/Documented    [x] Discharge Planning: TBD   [] Participated in Interdisciplinary Rounds    NUTRITION RISK:    [] High              [x] Moderate           []  Low  []  Minimal/Uncompromised      Bonne Holter, RDN  Pager [phone]  Weekend Pager [phone]

## 2017-05-30 NOTE — CARDIO/PULMONARY
C/P Rehab note- chart reviewed. Adm with Cardiac arrest,resp failure. Anoxic brain injury. S/P trach for prolonged vent dependence. HX includes CABG,HTN,Dyslipidemia,DM,pneumonia,lice infestation,cardiomyopathy, cardiac stents,HTN. Nonsmoker. LVEF 30-35%. Was evaluated with EEG-no seizures noted per neurologist note,MRI of brain to be repeated. Per notes pt unable to verbalize. Attempt to meet with family for CHF teaching-pt on isolation,family member in room on phone,staff member attending to patient. Need family for teaching-handed CHF folder to female family member for review and will follow for questions. Included in folder is Low sodium diet and [employer] handout.

## 2017-05-30 NOTE — PROGRESS NOTES
Discussed family concerns and plan of care with Dr. Traci Lara. Ordered to request resp to deflate trach cuff x 24 hours then change to cuff-less trach. Ordered to remove Romero. Ordered to start IV fluids at 75cc/hr.

## 2017-05-30 NOTE — PROGRESS NOTES
Bedside report received from  [facility] (offgoing nurse). Assumed care of patient. No immediate concerns, patient resting with call bell within reach. Patient family asking for  and unit manager. Both notified.

## 2017-05-30 NOTE — PROGRESS NOTES
Problem: Pressure Injury - Risk of  Goal: *Prevention of pressure ulcer  Outcome: Progressing Towards Goal  Pressure ulcer prevention measures in place as documented on Ulices Flow sheet.

## 2017-05-30 NOTE — PROCEDURES
Patient Name: Jermaine Ortega  : 1960  Age: 56 y.o. Ordering physician: No ref. provider found  Date of EE2017  EEG procedure number: [id number]  Diagnosis: Altered mental status  Interpreting physician: Kirti Tran MD    ELECTROENCEPHALOGRAM REPORT     PROCEDURE: EEG. CLINICAL INDICATION: The patient is a 56 y.o. male with a history of possible seizures. EEG to rule out seizures, rule out stroke, rule out cortical abnormality. EEG CLASSIFICATION: Abnormal     DESCRIPTION OF THE RECORD:   The background of this recording contains a posteriorly-located occipital alpha rhythm of 7 Hz that attenuates with eye opening. Throughout the recording, there were no clear areas of focal slowing nor spike or spike-and-wave discharges seen. Hyperventilation was not performed/. Photic stimulation produced no response. During the recording the patient did not achieve stage II sleep    INTERPRETATION:   Abnormal. Mild diffuse cerebral dysfunction which is non specific for etiology but can be seen in toxic/metabolic states. No seizures. Clinical correlation recommended.       Kirti Tran MD  Neurologist

## 2017-05-30 NOTE — CONSULTS
Palliative Medicine Consult  Zee: [phone] ([phone])    Patient Name: Jai Nguyen  YOB: 1960    Date of Initial Consult: 5/30/17  Reason for Consult: Care Decisions  Requesting Provider: Rosalba Gunter  Primary Care Physician: Og Sr MD      SUMMARY:   Jai Nguyen is a 56 y.o. with a past history of CAD, s/p CABG 2014, stents, DM, hyperlipidemia, out of hospital cardiac arrest on 4/28/17,  who was transferred on 5/19/2017 from [facility] due to insurance reasons, with a diagnosis of cardiac arrest. Current medical issues leading to Palliative Medicine involvement include: cardiac arrest, MRI showed abnormal changes in basal ganglia and bilateral temporal lobes, c/w anoxic brain injury, treated for aspiration PNA, Peg/trached on 5/12/17. PALLIATIVE DIAGNOSES:   1. Anoxic brain injury following cardiac arrest  2. S/p out of hospital cardiac arrest 4/28/17  3. Diarrhea   4. Vomit   5. [place] right ear is \"the better\" ear  6. Dysphagia   7. S/p trach on 28% TC  8. S/p peg        PLAN:   1. Met with pt and wife; both speak very little [place]. Will ask our Palliative SW to arrange a family meeting with an , probably Thursday at the soonest.   2. Initial consult note routed to primary continuity provider  3. Communicated plan of care with: Palliative IDT RN     GOALS OF CARE / TREATMENT PREFERENCES:   [====Goals of Care====]  GOALS OF CARE:  Patient / health care proxy stated goals:     Pending family meeting       TREATMENT PREFERENCES:   Code Status: Full Code    Advance Care Planning:  No flowsheet data found.     Other:    The palliative care team has discussed with patient / health care proxy about goals of care / treatment preferences for patient.  [====Goals of Care====]         HISTORY:     History obtained from: chart    CHIEF COMPLAINT: anoxic brain injury    HPI/SUBJECTIVE:    The patient is:   [] Verbal and participatory  [x] Non-participatory due to: anoxic brain injury    Transferred from [place] [address]: on 4/28/17, pt was in [place] at a wedding, during which he experienced nausea and CP. While in a car, he became unresponsive and EMS was activated. EMS found pt pulseless on the side of the road, resuscitation started by EMS, ROSC and taken to [facility], where CPR was resumed for asystole, pt vomited during intubation and was reintubated, after another 10 min of CPR, ROSC and placed in hypothermia protocol. During CCU stay, required pressors, abx. Followed by cardiology and neurology: felt not to have ACS, LVEF 75% on pressors with minimal enzymes. Head CT showed anoxic injury. 5/14/17 he was Peg and trached. On 5/17/17 he was transferred to [facility] where he had had a CABG x4 in 2014 by Dr. Jitendra Lloyd, who noted complex coronary anatomy and stenosis of one of his grafts. It was suggested pt have another CABG, however, family declined and he has since seen a cardiologist in [place]. It became apparent that pt's insurance would only provide coverage in a [facility].     Clinical Pain Assessment (nonverbal scale for severity on nonverbal patients): none  [++++ Clinical Pain Assessment++++]  [++++Pain Severity++++]: Pain: 0  [++++Pain Character++++]:   [++++Pain Duration++++]:   [++++Pain Effect++++]:   [++++Pain Factors++++]:   [++++Pain Frequency++++]:   [++++Pain Location++++]:   [++++ Clinical Pain Assessment++++]    Adult Non-Verbal Pain Assessment    Face  [x] 0   No particular expression or smile  [] 1   Occasional grimace, tearing, frowning, wrinkled forehead  [] 2   Frequent grimace, tearing, frowning, wrinkled forehead    Activity (movement)  [x] 0   Lying quietly, normal position  [] 1   Seeking attention through movement or slow, cautious movement  [] 2   Restless, excessive activity and/or withdrawal reflexes    Guarding  [x] 0   Lying quietly, no positioning of hands over areas of body  [] 1   Splinting areas of the body, tense  [] 2   Rigid, stiff    Physiology (vital signs)  [x] 0   Stable vital signs  [] 1   Change in any of the following: SBP > 20mm Hg; HR > 20/minute  [] 2   Change in any of the following: SBP > 30mm Hg; HR > 25/minute    Respiratory  [x] 0   Baseline RR/SpO2, compliant with ventilator  [] 1   RR > 10 above baseline, or 5% drop SpO2, mild asynchrony with ventilator  [] 2   RR > 20 above baseline, or 10% drop SpO2, asynchrony with ventilator    Total Non-Verbal Pain Score: 0     FUNCTIONAL ASSESSMENT:     Palliative Performance Scale (PPS):  PPS: 20       PSYCHOSOCIAL/SPIRITUAL SCREENING:     Advance Care Planning:  No flowsheet data found. Any spiritual / Jain concerns:  [] Yes /  [x] No    Caregiver Burnout:  [] Yes /  [x] No /  [] No Caregiver Present      Anticipatory grief assessment:   [] Normal  / [] Maladaptive       ESAS Anxiety: Anxiety: 0    ESAS Depression:          REVIEW OF SYSTEMS:     Positive and pertinent negative findings in ROS are noted above in HPI. The following systems were [] reviewed / [x] unable to be reviewed as noted in HPI  Other findings are noted below. Systems: constitutional, ears/nose/mouth/throat, respiratory, gastrointestinal, genitourinary, musculoskeletal, integumentary, neurologic, psychiatric, endocrine. Positive findings noted below. Modified ESAS Completed by: provider     Drowsiness: 0     Pain: 0   Anxiety: 0       Dyspnea: 0     Constipation: No              PHYSICAL EXAM:     From RN flowsheet:  Wt Readings from Last 3 Encounters:   05/29/17 181 lb 4.8 oz (82.2 kg)   05/27/17 206 lb (93.4 kg)   01/04/17 208 lb (94.3 kg)     Blood pressure 101/51, pulse (!) 117, temperature 98.6 °F (37 °C), resp. rate 17, height 5' 11\" (1.803 m), weight 181 lb 4.8 oz (82.2 kg), SpO2 99 %. Pain Scale 1: Adult Nonverbal Pain Scale  Pain Intensity 1: 0     Pain Location 1: Abdomen  Pain Orientation 1: Other (comment) (Family insists that pt is having pain.  Language barriers)  Pain Description 1: Aching  Pain Intervention(s) 1: Medication (see MAR)  Last bowel movement, if known:     Constitutional: WD, WN, NAD, awake, alert, nonverbal  Eyes: pupils equal, anicteric  ENMT: no nasal discharge, moist mucous membranes, trach in place  Cardiovascular: regular rhythm, distal pulses intact  Respiratory: breathing not labored, symmetric  Gastrointestinal: soft non-tender, +bowel sounds, PEG  Musculoskeletal: no deformity, no tenderness to palpation  Skin: warm, dry  Neurologic: not following commands, not moving all extremities  Psychiatric: unable to assess due to pt factors     HISTORY:     Principal Problem:    Anoxic brain injury ([address] (5/20/2017)    Active Problems:    Coronary artery disease of bypass graft with stable angina pectoris ([address] (10/5/2016)      Essential hypertension (10/5/2016)      Dyslipidemia (10/5/2016)      Type 2 diabetes mellitus without complication ([address] (61/6/6487)      H/O cardiac arrest (5/19/2017)      Overview: 4/28/17:  OSH in [place]      Respiratory failure ([address] (5/19/2017)      Pneumonia due to Serratia marcescens ([address] (1/34/3500)      Lice infestation (9/11/1612)      Cardiomyopathy ([address] (5/22/2017)      Past Medical History:   Diagnosis Date    Anoxic brain injury ([address]     following cardiac arrest; MRI shows involvement BG and temporal lobes    Aspiration pneumonia (HCC)     during cardiac arrest intubation    Bradycardia     CAD (coronary artery disease)     Cardiac arrest ([address]     no intervention by [place] cardiology. [facility] [facility]    Diabetes [facility])     Essential hypertension     Hyperlipidemia       Past Surgical History:   Procedure Laterality Date    HX CORONARY ARTERY BYPASS GRAFT      HX CORONARY STENT PLACEMENT      HX HEART CATHETERIZATION      HX OTHER SURGICAL  05/12/2017    PEG and trach    [facility] TUBE CHANGE  5/25/2017           Family History   Problem Relation Age of Onset    Family history unknown:  Yes History reviewed, no pertinent family history.   Social History   Substance Use Topics    Smoking status: Never Smoker    Smokeless tobacco: Not on file    Alcohol use Not on file     Allergies   Allergen Reactions    Cymbalta [Duloxetine] Rash      Current Facility-Administered Medications   Medication Dose Route Frequency    LORazepam (ATIVAN) 2 mg/mL injection 1 mg  1 mg IntraVENous ONCE PRN    0.9% sodium chloride infusion  75 mL/hr IntraVENous CONTINUOUS    melatonin tablet 3 mg  3 mg Oral QHS    artificial saliva (MOUTH KOTE) 1 Spray  1 Spray Oral PRN    pantoprazole (PROTONIX) granules for oral suspension 40 mg  40 mg Per G Tube ACB    fluocinoNIDE (LIDEX) 0.05 % cream   Topical BID    levoFLOXacin (LEVAQUIN) 750 mg in D5W IVPB  750 mg IntraVENous Q24H    nystatin (MYCOSTATIN) 100,000 unit/mL oral suspension 500,000 Units  500,000 Units Oral QID    0.9% sodium chloride infusion 250 mL  250 mL IntraVENous PRN    insulin lispro (HUMALOG) injection   SubCUTAneous Q6H    glucose chewable tablet 16 g  4 Tab Oral PRN    dextrose 10% infusion 125-250 mL  125-250 mL IntraVENous PRN    glucagon (GLUCAGEN) injection 1 mg  1 mg IntraMUSCular PRN    enoxaparin (LOVENOX) injection 40 mg  40 mg SubCUTAneous Q24H    sodium chloride (NS) flush 5-10 mL  5-10 mL IntraVENous Q8H    sodium chloride (NS) flush 5-10 mL  5-10 mL IntraVENous PRN    ondansetron (ZOFRAN) injection 4 mg  4 mg IntraVENous Q6H PRN    albuterol-ipratropium (DUO-NEB) 2.5 MG-0.5 MG/3 ML  3 mL Nebulization Q4H PRN    prasugrel (EFFIENT) tablet 10 mg  10 mg Per G Tube DAILY    aspirin chewable tablet 81 mg  81 mg Per G Tube DAILY    atorvastatin (LIPITOR) tablet 40 mg  40 mg Per G Tube QHS    oxyCODONE (ROXICODONE INTENSOL) 20 mg/mL concentrated solution 5 mg  5 mg Per G Tube Q4H PRN    acetaminophen (TYLENOL) solution 650 mg  650 mg Per G Tube Q4H PRN    lidocaine (LIDODERM) 5 % patch 1 Patch  1 Patch TransDERmal Q24H LAB AND IMAGING FINDINGS:     Lab Results   Component Value Date/Time    WBC 11.6 05/30/2017 03:37 AM    HGB 11.5 05/30/2017 03:37 AM    PLATELET 658 52/57/7385 03:37 AM     Lab Results   Component Value Date/Time    Sodium 137 05/30/2017 03:37 AM    Potassium 3.4 05/30/2017 03:37 AM    Chloride 97 05/30/2017 03:37 AM    CO2 30 05/30/2017 03:37 AM    BUN 30 05/30/2017 03:37 AM    Creatinine 1.21 05/30/2017 03:37 AM    Calcium 9.1 05/30/2017 03:37 AM    Magnesium 2.2 05/24/2017 03:04 AM    Phosphorus 3.1 05/24/2017 03:04 AM      Lab Results   Component Value Date/Time    AST (SGOT) 42 05/27/2017 03:20 AM    Alk. phosphatase 198 05/27/2017 03:20 AM    Protein, total 8.5 05/27/2017 03:20 AM    Albumin 2.3 05/27/2017 03:20 AM    Globulin 6.2 05/27/2017 03:20 AM     No results found for: INR, PTMR, PTP, PT1, PT2, APTT   Lab Results   Component Value Date/Time    Iron 34 05/20/2017 01:14 AM    TIBC 229 05/20/2017 01:14 AM    Iron % saturation 15 05/20/2017 01:14 AM    Ferritin 191 05/20/2017 01:14 AM      No results found for: PH, PCO2, PO2  No components found for: [place]   Lab Results   Component Value Date/Time    CK 52 05/21/2017 07:10 PM    CK - MB 1.5 05/21/2017 07:10 PM                Total time: 75 min  Counseling / coordination time, spent as noted above: 65 min  > 50% counseling / coordination?: no    Prolonged service was provided for  []30 min   []75 min in face to face time in the presence of the patient, spent as noted above. Time Start:   Time End:   Note: this can only be billed with 90519 (initial) or 06046 (follow up). If multiple start / stop times, list each separately.

## 2017-05-30 NOTE — PROGRESS NOTES
CM ackowledged consult regarding SAH status. Communicaiton sent via [id number] for update. 10:58am - last PT/OT note 5/26/2017. SAH will need updated notes from 5/30/2017 to process for authorization. RN informed. CM will continue to follow.     Altagracia Ortega RN CM  Ext [phone]

## 2017-05-30 NOTE — PROGRESS NOTES
PT note:    Patient seen and full note to follow. Patient continue to require mod A x 2. Patient processing well and able to stand with mod A x 2 and stand pivot to the chair with mod A x 2. Patient with hypotensive, although stable throughout session. Patient left sitting in the chair with bed alarm and lalo lift for ease of transfer back to bed with nursing staff. PT continues to recommend IP rehab at discharge. Full note to follow.      Imelda Rose, PT, DPT   Total time spent: 61 minutes

## 2017-05-30 NOTE — PROGRESS NOTES
Speech pathology note  Reviewed chart and per pulmonologist note, \"Keep trach inflated until reflux resolves (and cough) this will minimize risk of accidental aspiration. \" RN confirms that trach cuff is still inflated. Patient unable to participate in [employer] treatment until trach cuff can be deflated. SLP will continue to follow peripherally until that time. Thank you.     Alyson Selby, Kell Richard., CCC-SLP

## 2017-05-30 NOTE — PROGRESS NOTES
Dong met with Rahat to discuss SSDisability forms. Reinforced that I am not able to assist with the forms. We reviewed the information provided to her from [employer]. Rahat has the checklist and contact information for [employer]. DONG also provided contact information for [employer]. Rahat had questions regarding the trach which DONG recommended she ask the nurse or MD.    Danilo Severino verbalized understanding. SAH pending. Waiting on updated PT/OT notes to submit for auth. DONG will continue to follow.     Ritika Galloway RN CM  Ext [phone]

## 2017-05-30 NOTE — PROGRESS NOTES
2000: Answered call bell and confronted by daughter with a myriad of questions about pinto catheter removal, pain medication and heating pad. Explain I was not patient's primary nurse but I would direct questions toward primary nurse and find a K Pad machine for them. 2030: At the bedside setting up K Pad. Daughter is asking for patient to be repositioned. Advised we would return when patient's nurse was available to turn and reposition patient. 2045-2119: Daughter in the hallway asking for patient to be suctioned. Suctioned patient per request. Daughter states patient is having sharp pain at his peg tube site. Patient does appear uncomfortable and is rubbing his side. Discussed with daughter that this could simply be gas. Daughter states multiple xrays have been done and a CT scan but everything was negative. Daughter is asking that I call the on call physician to request more testing be done. Advised that this was something better discussed with patient's physician as patient is in no acute distress at this time, is tolerating tube feeds well, and seems to be resting. I also advised that I was happy to help with anything she or the patient needed but that all future questions regarding changs in patient care should be deferred to the patient's primary nurse as she has a better understanding of patient's history and current situation.

## 2017-05-31 ENCOUNTER — APPOINTMENT (OUTPATIENT)
Dept: CT IMAGING | Age: 57
DRG: 091 | End: 2017-05-31
Attending: SPECIALIST
Payer: COMMERCIAL

## 2017-05-31 PROBLEM — I63.312 THROMBOTIC STROKE INVOLVING LEFT MIDDLE CEREBRAL ARTERY (HCC): Status: ACTIVE | Noted: 2017-05-31

## 2017-05-31 PROBLEM — I67.82 MODERATE ANOXIC-ISCHEMIC ENCEPHALOPATHY (HCC): Status: ACTIVE | Noted: 2017-05-31

## 2017-05-31 PROBLEM — G93.1 MODERATE ANOXIC-ISCHEMIC ENCEPHALOPATHY (HCC): Status: ACTIVE | Noted: 2017-05-31

## 2017-05-31 PROBLEM — I65.23 STENOSIS OF BOTH INTERNAL CAROTID ARTERIES: Status: ACTIVE | Noted: 2017-05-31

## 2017-05-31 LAB
ANION GAP BLD CALC-SCNC: 7 MMOL/L (ref 5–15)
BUN SERPL-MCNC: 19 MG/DL (ref 6–20)
BUN/CREAT SERPL: 28 (ref 12–20)
CALCIUM SERPL-MCNC: 8.7 MG/DL (ref 8.5–10.1)
CHLORIDE SERPL-SCNC: 100 MMOL/L (ref 97–108)
CO2 SERPL-SCNC: 29 MMOL/L (ref 21–32)
CREAT SERPL-MCNC: 0.67 MG/DL (ref 0.7–1.3)
GLUCOSE BLD STRIP.AUTO-MCNC: 147 MG/DL (ref 65–100)
GLUCOSE BLD STRIP.AUTO-MCNC: 191 MG/DL (ref 65–100)
GLUCOSE BLD STRIP.AUTO-MCNC: 78 MG/DL (ref 65–100)
GLUCOSE BLD STRIP.AUTO-MCNC: 81 MG/DL (ref 65–100)
GLUCOSE BLD STRIP.AUTO-MCNC: 96 MG/DL (ref 65–100)
GLUCOSE SERPL-MCNC: 119 MG/DL (ref 65–100)
POTASSIUM SERPL-SCNC: 3.7 MMOL/L (ref 3.5–5.1)
SERVICE CMNT-IMP: ABNORMAL
SERVICE CMNT-IMP: ABNORMAL
SERVICE CMNT-IMP: NORMAL
SODIUM SERPL-SCNC: 136 MMOL/L (ref 136–145)

## 2017-05-31 PROCEDURE — 92507 TX SP LANG VOICE COMM INDIV: CPT

## 2017-05-31 PROCEDURE — 74011636320 HC RX REV CODE- 636/320: Performed by: HOSPITALIST

## 2017-05-31 PROCEDURE — 77010026065 HC OXYGEN MINIMUM MEDICAL AIR

## 2017-05-31 PROCEDURE — 74011250636 HC RX REV CODE- 250/636: Performed by: INTERNAL MEDICINE

## 2017-05-31 PROCEDURE — 77030034849

## 2017-05-31 PROCEDURE — 65660000000 HC RM CCU STEPDOWN

## 2017-05-31 PROCEDURE — 80048 BASIC METABOLIC PNL TOTAL CA: CPT | Performed by: NURSE PRACTITIONER

## 2017-05-31 PROCEDURE — 74011250637 HC RX REV CODE- 250/637: Performed by: INTERNAL MEDICINE

## 2017-05-31 PROCEDURE — 74011250636 HC RX REV CODE- 250/636: Performed by: HOSPITALIST

## 2017-05-31 PROCEDURE — 74177 CT ABD & PELVIS W/CONTRAST: CPT

## 2017-05-31 PROCEDURE — 74011636637 HC RX REV CODE- 636/637: Performed by: INTERNAL MEDICINE

## 2017-05-31 PROCEDURE — 36415 COLL VENOUS BLD VENIPUNCTURE: CPT | Performed by: NURSE PRACTITIONER

## 2017-05-31 PROCEDURE — 97112 NEUROMUSCULAR REEDUCATION: CPT

## 2017-05-31 PROCEDURE — 74011250637 HC RX REV CODE- 250/637: Performed by: NURSE PRACTITIONER

## 2017-05-31 PROCEDURE — 97530 THERAPEUTIC ACTIVITIES: CPT

## 2017-05-31 PROCEDURE — 93308 TTE F-UP OR LMTD: CPT

## 2017-05-31 PROCEDURE — 77030018861

## 2017-05-31 PROCEDURE — 74011250637 HC RX REV CODE- 250/637: Performed by: HOSPITALIST

## 2017-05-31 PROCEDURE — 77030011943

## 2017-05-31 PROCEDURE — 51798 US URINE CAPACITY MEASURE: CPT

## 2017-05-31 PROCEDURE — 82962 GLUCOSE BLOOD TEST: CPT

## 2017-05-31 PROCEDURE — 77030018798 HC PMP KT ENTRL FED COVD -A

## 2017-05-31 RX ORDER — SODIUM CHLORIDE 9 MG/ML
50 INJECTION, SOLUTION INTRAVENOUS
Status: COMPLETED | OUTPATIENT
Start: 2017-05-31 | End: 2017-06-01

## 2017-05-31 RX ORDER — BARIUM SULFATE 20 MG/ML
900 SUSPENSION ORAL
Status: DISCONTINUED | OUTPATIENT
Start: 2017-05-31 | End: 2017-05-31

## 2017-05-31 RX ORDER — SODIUM CHLORIDE 0.9 % (FLUSH) 0.9 %
10 SYRINGE (ML) INJECTION
Status: COMPLETED | OUTPATIENT
Start: 2017-05-31 | End: 2017-05-31

## 2017-05-31 RX ADMIN — PRASUGREL HYDROCHLORIDE 10 MG: 10 TABLET, FILM COATED ORAL at 09:30

## 2017-05-31 RX ADMIN — SODIUM CHLORIDE 50 ML/HR: 900 INJECTION, SOLUTION INTRAVENOUS at 22:34

## 2017-05-31 RX ADMIN — ACETAMINOPHEN 650 MG: 325 SOLUTION ORAL at 05:50

## 2017-05-31 RX ADMIN — INSULIN LISPRO 2 UNITS: 100 INJECTION, SOLUTION INTRAVENOUS; SUBCUTANEOUS at 11:53

## 2017-05-31 RX ADMIN — ASPIRIN 81 MG CHEWABLE TABLET 81 MG: 81 TABLET CHEWABLE at 09:30

## 2017-05-31 RX ADMIN — ENOXAPARIN SODIUM 40 MG: 40 INJECTION SUBCUTANEOUS at 15:08

## 2017-05-31 RX ADMIN — DIATRIZOATE MEGLUMINE AND DIATRIZOATE SODIUM 30 ML: 600; 100 SOLUTION ORAL; RECTAL at 20:44

## 2017-05-31 RX ADMIN — NYSTATIN 500000 UNITS: 100000 SUSPENSION ORAL at 19:03

## 2017-05-31 RX ADMIN — PANTOPRAZOLE SODIUM 40 MG: 40 GRANULE, DELAYED RELEASE ORAL at 09:30

## 2017-05-31 RX ADMIN — Medication 10 ML: at 23:03

## 2017-05-31 RX ADMIN — FLUOCINONIDE: 0.5 CREAM TOPICAL at 19:02

## 2017-05-31 RX ADMIN — Medication 10 ML: at 06:00

## 2017-05-31 RX ADMIN — ACETAMINOPHEN 650 MG: 325 SOLUTION ORAL at 15:08

## 2017-05-31 RX ADMIN — Medication 10 ML: at 22:34

## 2017-05-31 RX ADMIN — Medication 5 ML: at 15:09

## 2017-05-31 RX ADMIN — IOPAMIDOL 100 ML: 755 INJECTION, SOLUTION INTRAVENOUS at 22:34

## 2017-05-31 RX ADMIN — LEVOFLOXACIN 750 MG: 5 INJECTION, SOLUTION INTRAVENOUS at 09:30

## 2017-05-31 RX ADMIN — NYSTATIN 500000 UNITS: 100000 SUSPENSION ORAL at 22:54

## 2017-05-31 RX ADMIN — INSULIN LISPRO 2 UNITS: 100 INJECTION, SOLUTION INTRAVENOUS; SUBCUTANEOUS at 00:31

## 2017-05-31 RX ADMIN — NYSTATIN 500000 UNITS: 100000 SUSPENSION ORAL at 09:30

## 2017-05-31 NOTE — PROGRESS NOTES
Hospitalist Progress Note    NAME: Pablo Wolf   :  1960   MRN:  [id number]       Interim Hospital Summary: 56 y.o. male whom presented on 2017 with      Assessment / Plan:  Abdominal pain  - GI consulted; to be seen by Dr. Hailey Louis. Tube feeding on hold. Nursing and the family reported leaking around the G-tube during tube feeding.    - once again, pt's wife stating that pt's having abdominal pain & making a lot noise from his stomach. Explained that he has some gas which is normal. The location of pain changed each day; started with LUQ, Epigastic/mid upper abd pain, left upper and lower abd pain, was on left lateral side, and now back to midepigastic. Plain abd x-ray was normal and CT of abdomen w/ contrast was normal. His PPI has been changed to IV from G-tube. - Reminded the family there is no abnormalities inside of his abdomen. We need to work on position changes for his comfort. We restarted to tube feeding at 10ml/hour yesterday, currently at 30ml/hr. Advised the nursing staff increase Tube feeding slowly to reach 50ml/hr.  - no moaning or grimacing from the patient during abdominal exam unless the pain gets prompt by the family.   - Encourage family to participate in frequent mouth care, may spray mouth coat spray as need & may work participate working with ROM      Anoxic brain injury following cardiac arrest in [place] >1 month ago  - Neurology following; MRI was not done yesterday, will be done today under sedation. carotid doppler study, and EEG to be done      - s/p PEG and trach ; pt reached the tube feeding goal but it has been stopped for the past 24 hours due to emesis contained tube feeding content and c/o abdominal pain (was LUQ pain yesterday but mid upper abdomen pain). Chest and abdomen x-rays were normal but continues to have abdominal discomfort.  CT of abd w/ contrast revealed no abnormalities.      - outside MRI brain with abnormalities in basal ganglia and bilateral temporal lobes  - has right>left sided weakness; follow commends when instruction was given his native language [place])      Bladder retention  - unable to void after removal; pinto inserted back in for second time. Pt should be follow up with  as outpatient for bladder training    S/p out of hospital cardiac arrest 4/28/17, POA  CAD s/p CABG 2014 x 3v at HD  Hx cardiac stents (twice before CABG and once after)  HTN  Severe Anemia POA   Cardiomyopathy  - Hgb 11.5; Received 2 units of PRBC on 5/20 for hgb 6.8  - hospitalized at [facility] in [place], [place]; transferred to [facility] 5/18 and to [facility] 5/19  - cardiology following; continue with BB, effient, statin, Lasix, ARB,and ASA  - Echo (5/20); EF 30-35%; moderate diffuse hypokinesis; mild-mod MR; mild-mod TR. Repeat Echo done today; result pending  - cardiology is considering lifevest vs ICD placement prior to discharge. The arrangement will be done by the cardiologist.  Cardiology team aware that we are working on sending him to rehab soon.      - per Dr. Charu Lomeli had stenosis of circumflex anastomosis graft, PTCA -->restenosis -->stent-->restenosis and redo bypass recommended but patient seeked second opinion with Dr. Rock Zepeda in [place] and reportedly told redo bypass not needed.       GERD  - was on pepcid, we changed to IV protonix 5/25      Hx aspiration PNA during intubation For cardiac arrest  Chronic respiratory failure, s/p trach  - Pulmonology following; trach deflated per RT/pulmonologist  - treated with invanz in [place]. Today CXR clear   - continue with Levo; Sputum Cx from 5/20 grew Serratia Marcescens, acinetobacter, & klebsiella pneumoniae which are sensitive to Levo  - currently on trach collar (vent removed on 5/21)  - trach tube changed on 5/24. #6 distal XLT trach 95mm in length; currently inflated until reflux/coughing/nausea issue have resolved. Continue with routine trach care.   - pulmonary toilet  - was able to work with speech therapist today; considering PMV if pulmonologist agrees      Diarrhea  - C Diff negative in [place] recently prior to transfer. Repeat C-diff (-)  - rectal tube removed 3 days ago.       Hypokalemia  - resolved      DM 2  - SSI for now      Lice infestation  - S/p treatment on 5/2; still on isolation. At this point, we would prefer epidemiology team get involved before removing the isolation. Pt was treated for lice but his caregivers & family members have not been checked for the lice. Since the patient is not ambulatory, the mode of transmission would be his caregivers/families/visitors. Pt will remain full contact precaution until the patient gets discharged.      Code: full  DVT prophylaxis: lovenox  Surrogate decision maker: Wife \"Savanna\" Viviane Johnson [phone], 2 son & daughter Keily Crane) # [phone]          Body mass index is 28.87 kg/(m^2).     Recommended Disposition: inpatient rehab has been consulted      Subjective:     Chief Complaint / Reason for Physician Visit  Nonverbal, follows direction through translation by the family member. Discussed with RN events overnight. Review of Systems:  Symptom Y/N Comments  Symptom Y/N Comments   Fever/Chills    Chest Pain     Poor Appetite    Edema     Cough    Abdominal Pain     Sputum    Joint Pain     SOB/HAM    Pruritis/Rash     Nausea/vomit    Tolerating PT/OT     Diarrhea    Tolerating Diet     Constipation    Other       Could NOT obtain due to:      Objective:     VITALS:   Last 24hrs VS reviewed since prior progress note.  Most recent are:  Patient Vitals for the past 24 hrs:   Temp Pulse Resp BP SpO2   05/31/17 1156 98.4 °F (36.9 °C) 95 15 124/69 100 %   05/31/17 0852 - 80 - - 99 %   05/31/17 0713 97.6 °F (36.4 °C) 84 15 101/63 98 %   05/31/17 0400 98.5 °F (36.9 °C) 96 16 102/66 99 %   05/30/17 2313 98.7 °F (37.1 °C) 93 18 102/56 98 %   05/30/17 1927 98.4 °F (36.9 °C) (!) 103 15 124/60 100 %   05/30/17 1637 98.6 °F (37 °C) (!) 117 17 101/51 99 %       Intake/Output Summary (Last 24 hours) at 05/31/17 1521  Last data filed at 05/31/17 0713   Gross per 24 hour   Intake             1030 ml   Output              310 ml   Net              720 ml        PHYSICAL EXAM:  General: WD, WN. Alert, cooperative, no acute distress    EENT:  EOMI. Anicteric sclerae. MMM  Resp:  CTA bilaterally, no wheezing or rales. No accessory muscle use  CV:  Regular  rhythm,  No edema  GI:  Soft, non distended, Non tender.  +Bowel sounds, G-tube site covered with 4x4's. TF on hold due to leaking around the tube during tube feeding  Neurologic:  Alert, non verbal  Psych:   Poor insight. Not anxious nor agitated  Skin:  No rashes. No jaundice    Reviewed most current lab test results and cultures  YES  Reviewed most current radiology test results   YES  Review and summation of old records today    NO  Reviewed patient's current orders and MAR    YES  PMH/SH reviewed - no change compared to H&P  ________________________________________________________________________  Care Plan discussed with:    Comments   Patient y    Family  y    RN y    Care Manager y    Consultant  y Cardiology service, Ms. Bret Calix NP                     Multidiciplinary team rounds were held today with , nursing, pharmacist and clinical coordinator. Patient's plan of care was discussed; medications were reviewed and discharge planning was addressed. ________________________________________________________________________  Total NON critical care TIME:  30   Minutes    Total CRITICAL CARE TIME Spent:   Minutes non procedure based      Comments   >50% of visit spent in counseling and coordination of care     ________________________________________________________________________  Katie Setting, NP     Procedures: see electronic medical records for all procedures/Xrays and details which were not copied into this note but were reviewed prior to creation of Plan.       LABS:  I reviewed today's most current labs and imaging studies.   Pertinent labs include:  Recent Labs      05/30/17 0337   WBC  11.6*   HGB  11.5*   HCT  37.6   PLT  208     Recent Labs      05/31/17 0326  05/30/17 0337 05/29/17   0438   NA  136  137  135*   K  3.7  3.4*  3.8   CL  100  97  96*   CO2  29  30  29   GLU  119*  165*  178*   BUN  19  30*  23*   CREA  0.67*  1.21  1.06   CA  8.7  9.1  8.7       Signed: )Linnea Szymanski, NP

## 2017-05-31 NOTE — PROGRESS NOTES
Bedside shift change report given to Ap Waller RN (oncoming nurse). Report included pertinent events from today's shift as well as the following information SBAR, Kardex, Intake/Output, MAR and Recent Results. No immediate concerns. Pt resting in position of comfort with call bell within reach.

## 2017-05-31 NOTE — CONSULTS
[address]   [address]       Name:  Haydee Flores   MR#:  [id number]   :  1960   Account #:  [de-identified]    Date of Consultation:  2017   Date of Adm:  2017       REFERRING PHYSICIANS: Alon Phillips MD and BEBO Winston. GASTROENTEROLOGIST: [facility] indicates that   Dr. Víctor Meadows has seen him in the past.     CHIEF COMPLAINT: I am asked to see for abdominal pain, problems   with PEG tube, complex patient. HISTORY OF PRESENT ILLNESS: History is obtained from the son   and the wife and supplemented from the medical record. He attended a   wedding in [place]. He felt ill after the wedding, rested in the   car and then felt more ill. On the way to the hospital, he had a cardiac   arrest, was given bystander CPR in a neighborhood by an RN and a   . He was hospitalized at [facility] in   [place], [place] and subsequently transferred to [facility]   about a month later. He was in a coma for several days, then began   waking up, then began moving the left side of his body, then right side   of the body. His current mental status per family is that he can   understand \"everything\" and he can respond with finger points and eye   contact. During his hospitalization in [place], he had a percutaneous   gastrostomy tube placed at the same time as a tracheostomy. While here, he was doing well with PEG tube feedings. In the last 3 or   so days, he began pointing to his abdomen, saying yes when asked if   he had abdominal pain. During this time, he has had no residual in   his PEG, but he has developed some redness and purulent drainage   from around the tube. I am asked to see. I interviewed the patient at the bedside. When asked if he has   abdominal pain in English, he says yes and points to his upper   abdomen.     Nurses report the PEG working, but leaking around the PEG tube,   which is new. PAST MEDICAL/SURGICAL HISTORY: Aspiration pneumonia, anoxic   brain injury, diabetes mellitus, history of coronary bypass. SOCIAL HISTORY: Nonsmoker, nondrinker, retired maintenance. FAMILY HISTORY: Positive for heart disease. ALLERGIES: CYMBALTA. PRIOR TO ADMISSION MEDICATIONS   1. Effient. 2. Coreg. 3. Vancomycin IV. 4. Oxycodone. 5. Lidoderm. 6. Piperacillin. 7. Albuterol. REVIEW OF SYSTEMS: I cannot obtain due to patient factors. PHYSICAL EXAMINATION   VITAL SIGNS: Temperature 99.1, pulse 91, respirations 18, blood   pressure 123/76. GENERAL: Using a trach, coughing with production, alert, but I am   unable to interview. CHEST: Coarse rhonchi bilaterally. No use of accessory muscles when   not coughing. HEART: Regular rate and rhythm, no abnormal sounds. ABDOMEN: Bowel sounds present, but decreased. There is fullness in   the upper abdomen. There are occasional high-pitched sounds. The   dressing over the PEG site is removed. There is pus present and the   PEG cannot be moved back and forth within the lumen of the PEG site. There is some exposure of the soft tissue between the gastric wall and   the abdominal wall and I see a portion of the bumper of the PEG. EXTREMITIES: Cannot assess movement. He moves his left upper   extremity well. LYMPHATIC: No anterior or posterior cervical, supraclavicular or   inguinal lymphadenopathy. RECTAL: Tone present. No stool on glove. No impaction. LABORATORY DATA: White count 11.6, slightly elevated; hemoglobin   11.5. Sodium and potassium normal. CT scan of the abdomen and   pelvis 05/26/2017: Gastrostomy tube, no acute abdominal or pelvic   abnormality, rectal tube. No obstruction. IMPRESSION/REPORT/PLAN   1. Purulent discharge around percutaneous endoscopic gastrostomy   tube. I suspect there is a stacy-PEG infection.    2. Leakage around percutaneous endoscopic gastrostomy tube. 3. I am concerned about some technical aspects of the percutaneous   endoscopic gastrostomy tube and want to be certain that it is in the   stomach. In addition, I am concerned about ileus versus obstruction. RECOMMEND   1. CT abdomen and pelvis with oral contrast through the PEG tube. 2. Hold PEG feedings for now, supplement glucose intravenously to   avoid hypoglycemia. 3. Dr. Melany Coles to  tomorrow. 4. I discussed these recommendations with the nurse at the bedside   and with the family. 5.  levaquin should be adequate for infection at this time. He is already on it. 6. I thank Dr. Margoth Engle and Ms. Elver Palomo for this interesting consultation.             MD Jez Steiner / Rosy Valdez   D:  05/31/2017   17:23   T:  05/31/2017   18:40   Job #:  [id number]

## 2017-05-31 NOTE — PROGRESS NOTES
Residual checked on PEG this morning as gown was saturated with tube feeding and what appeared to be clear mucous. Residual less than 10 ml. Pt cleaned and gown changed. Less than an hour later, more clear mucous mixed with tube feed. Noticed when pt coughs is when it is expelled. Tube feeding stopped. Dr. Mario Arechiga paged to notify.

## 2017-05-31 NOTE — PROGRESS NOTES
NEUROLOGY DAILY PROGRESS NOTE     Subjective  Pt is moving the right side more than before. Pt is doing better today  Discussed case with wife. ROS:  Unable to obtain    EXAMINATION:   Patient Vitals for the past 24 hrs:   Temp Pulse Resp BP SpO2   05/31/17 1600 99.1 °F (37.3 °C) 91 18 123/76 100 %   05/31/17 1156 98.4 °F (36.9 °C) 95 15 124/69 100 %   05/31/17 0852 - 80 - - 99 %   05/31/17 0713 97.6 °F (36.4 °C) 84 15 101/63 98 %   05/31/17 0400 98.5 °F (36.9 °C) 96 16 102/66 99 %   05/30/17 2313 98.7 °F (37.1 °C) 93 18 102/56 98 %   05/30/17 1927 98.4 °F (36.9 °C) (!) 103 15 124/60 100 %        General:   General appearance: Pt is in no acute distress   Distal pulses are preserved     Neurological Examination:   Mental Status: AA and Follows some commands commands - wiggle toes and stick out tongue   Cranial Nerves: PERRL, Extraocular movements are full. Facial sensation intact V1- V3. Facial movement decreased on the right. Hearing intact to conversation. Tongue midline.      Motor: Strength is 4/5 in the LUE, 1-2/5 in the RUE, 3/5 in LLE and 2/5 in RLE     Sensation: Normal to light touch     Coordination/Cerebellar: could not assess     Skin: No significant bruising or lacerations.     LAB DATA REVIEWED:    Results for orders placed or performed during the hospital encounter of 05/19/17   CULTURE, RESPIRATORY/SPUTUM/BRONCH W GRAM STAIN   Result Value Ref Range    Special Requests: NO SPECIAL REQUESTS      GRAM STAIN NO  EPITHELIAL CELLS SEEN       GRAM STAIN NO WBC'S SEEN      GRAM STAIN RARE  GRAM NEGATIVE COCCOBACILLI        Culture result: MODERATE  SERRATIA MARCESCENS   (A)      Culture result: LIGHT  ACINETOBACTER BAUMANNII/HAEMOLYTICUS   (A)      Culture result: SCANT  KLEBSIELLA PNEUMONIAE   (A)      Culture result: NO NORMAL RESPIRATORY BETTE ISOLATED         Susceptibility    Klebsiella pneumoniae - EDUARD     Amikacin ($) <=16 Susceptible ug/mL     Ampicillin/sulbactam ($) <=8/4 Susceptible ug/mL Aztreonam ($$$$) <=4 Susceptible ug/mL     Cefazolin ($) <=8 Susceptible ug/mL     Ceftazidime ($) <=1 Susceptible ug/mL     Ceftriaxone ($) <=1 Susceptible ug/mL     Cefuroxime ($) <=4 Susceptible ug/mL     Cefotaxime <=2 Susceptible ug/mL     Cefepime ($$) <=4 Susceptible ug/mL     Ciprofloxacin ($) <=1 Susceptible ug/mL     Gentamicin ($) <=4 Susceptible ug/mL     Imipenem <=1 Susceptible ug/mL     Levofloxacin ($) <=2 Susceptible ug/mL     Meropenem ($$) <=1 Susceptible ug/mL     Piperacillin/Tazobac ($) <=16 Susceptible ug/mL     Tobramycin ($) <=4 Susceptible ug/mL     Trimeth-Sulfamethoxa <=2/38 Susceptible ug/mL    Serratia marcescens - EDUARD     Amikacin ($) <=16 Susceptible ug/mL     Aztreonam ($$$$) <=4 Susceptible ug/mL     Ceftazidime ($) <=1 Susceptible ug/mL     Ceftriaxone ($) <=1 Susceptible ug/mL     Cefotaxime <=2 Susceptible ug/mL     Cefepime ($$) <=4 Susceptible ug/mL     Ciprofloxacin ($) <=1 Susceptible ug/mL     Gentamicin ($) <=4 Susceptible ug/mL     Imipenem <=1 Susceptible ug/mL     Levofloxacin ($) <=2 Susceptible ug/mL     Meropenem ($$) <=1 Susceptible ug/mL     Piperacillin/Tazobac ($) <=16 Susceptible ug/mL     Tobramycin ($) <=4 Susceptible ug/mL     Trimeth-Sulfamethoxa <=2/38 Susceptible ug/mL    Acinetobacter baumanni/haemolyticus - EDUARD     Amikacin ($) <=16 Susceptible ug/mL     Ampicillin/sulbactam ($) <=8/4 Susceptible ug/mL     Ceftazidime ($) 4 Susceptible ug/mL     Cefotaxime 16 Intermediate ug/mL     Cefepime ($$) <=4 Susceptible ug/mL     Ciprofloxacin ($) <=1 Susceptible ug/mL     Gentamicin ($) <=4 Susceptible ug/mL     Meropenem ($$) <=1 Susceptible ug/mL     Tobramycin ($) <=4 Susceptible ug/mL     Trimeth-Sulfamethoxa <=2/38 Susceptible ug/mL   CBC WITH AUTOMATED DIFF   Result Value Ref Range    WBC 9.8 4.1 - 11.1 K/uL    RBC 2.53 (L) 4.10 - 5.70 M/uL    HGB 6.8 (L) 12.1 - 17.0 g/dL    HCT 21.8 (L) 36.6 - 50.3 %    MCV 86.2 80.0 - 99.0 FL    MCH 26.9 26.0 - 34.0 PG MCHC 31.2 30.0 - 36.5 g/dL    RDW 17.3 (H) 11.5 - 14.5 %    PLATELET 615 919 - 127 K/uL    NEUTROPHILS 73 32 - 75 %    LYMPHOCYTES 17 12 - 49 %    MONOCYTES 9 5 - 13 %    EOSINOPHILS 1 0 - 7 %    BASOPHILS 0 0 - 1 %    ABS. NEUTROPHILS 7.1 1.8 - 8.0 K/UL    ABS. LYMPHOCYTES 1.7 0.8 - 3.5 K/UL    ABS. MONOCYTES 0.9 0.0 - 1.0 K/UL    ABS. EOSINOPHILS 0.1 0.0 - 0.4 K/UL    ABS. BASOPHILS 0.0 0.0 - 0.1 K/UL    DF SMEAR SCANNED      RBC COMMENTS ANISOCYTOSIS  1+       MAGNESIUM   Result Value Ref Range    Magnesium 2.4 1.6 - 2.4 mg/dL   PHOSPHORUS   Result Value Ref Range    Phosphorus 2.3 (L) 2.6 - 4.7 MG/DL   METABOLIC PANEL, COMPREHENSIVE   Result Value Ref Range    Sodium 139 136 - 145 mmol/L    Potassium 3.4 (L) 3.5 - 5.1 mmol/L    Chloride 105 97 - 108 mmol/L    CO2 24 21 - 32 mmol/L    Anion gap 10 5 - 15 mmol/L    Glucose 76 65 - 100 mg/dL    BUN 13 6 - 20 MG/DL    Creatinine 0.50 (L) 0.70 - 1.30 MG/DL    BUN/Creatinine ratio 26 (H) 12 - 20      GFR est AA >60 >60 ml/min/1.73m2    GFR est non-AA >60 >60 ml/min/1.73m2    Calcium 7.4 (L) 8.5 - 10.1 MG/DL    Bilirubin, total 0.6 0.2 - 1.0 MG/DL    ALT (SGPT) 58 12 - 78 U/L    AST (SGOT) 46 (H) 15 - 37 U/L    Alk.  phosphatase 236 (H) 45 - 117 U/L    Protein, total 7.5 6.4 - 8.2 g/dL    Albumin 1.8 (L) 3.5 - 5.0 g/dL    Globulin 5.7 (H) 2.0 - 4.0 g/dL    A-G Ratio 0.3 (L) 1.1 - 2.2     IRON PROFILE   Result Value Ref Range    Iron 34 (L) 35 - 150 ug/dL    TIBC 229 (L) 250 - 450 ug/dL    Iron % saturation 15 (L) 20 - 50 %   FERRITIN   Result Value Ref Range    Ferritin 191 26 - 388 NG/ML   VITAMIN B12   Result Value Ref Range    Vitamin B12 632 211 - 911 pg/mL   PREALBUMIN   Result Value Ref Range    Prealbumin 12.7 (L) 20.0 - 40.0 mg/dL   CBC W/O DIFF   Result Value Ref Range    WBC 12.8 (H) 4.1 - 11.1 K/uL    RBC 4.12 4.10 - 5.70 M/uL    HGB 11.3 (L) 12.1 - 17.0 g/dL    HCT 35.0 (L) 36.6 - 50.3 %    MCV 85.0 80.0 - 99.0 FL    MCH 27.4 26.0 - 34.0 PG    MCHC 32.3 30.0 - 36.5 g/dL    RDW 16.7 (H) 11.5 - 14.5 %    PLATELET 769 (H) 120 - 439 K/uL   METABOLIC PANEL, COMPREHENSIVE   Result Value Ref Range    Sodium 135 (L) 136 - 145 mmol/L    Potassium 3.5 3.5 - 5.1 mmol/L    Chloride 98 97 - 108 mmol/L    CO2 30 21 - 32 mmol/L    Anion gap 7 5 - 15 mmol/L    Glucose 171 (H) 65 - 100 mg/dL    BUN 12 6 - 20 MG/DL    Creatinine 0.72 0.70 - 1.30 MG/DL    BUN/Creatinine ratio 17 12 - 20      GFR est AA >60 >60 ml/min/1.73m2    GFR est non-AA >60 >60 ml/min/1.73m2    Calcium 8.6 8.5 - 10.1 MG/DL    Bilirubin, total 1.3 (H) 0.2 - 1.0 MG/DL    ALT (SGPT) 67 12 - 78 U/L    AST (SGOT) 53 (H) 15 - 37 U/L    Alk. phosphatase 272 (H) 45 - 117 U/L    Protein, total 8.6 (H) 6.4 - 8.2 g/dL    Albumin 2.2 (L) 3.5 - 5.0 g/dL    Globulin 6.4 (H) 2.0 - 4.0 g/dL    A-G Ratio 0.3 (L) 1.1 - 2.2     CBC W/O DIFF   Result Value Ref Range    WBC 11.4 (H) 4.1 - 11.1 K/uL    RBC 4.40 4. 10 - 5.70 M/uL    HGB 11.6 (L) 12.1 - 17.0 g/dL    HCT 37.2 36.6 - 50.3 %    MCV 84.5 80.0 - 99.0 FL    MCH 26.4 26.0 - 34.0 PG    MCHC 31.2 30.0 - 36.5 g/dL    RDW 17.0 (H) 11.5 - 14.5 %    PLATELET 463 489 - 452 K/uL   METABOLIC PANEL, BASIC   Result Value Ref Range    Sodium 138 136 - 145 mmol/L    Potassium 3.6 3.5 - 5.1 mmol/L    Chloride 100 97 - 108 mmol/L    CO2 31 21 - 32 mmol/L    Anion gap 7 5 - 15 mmol/L    Glucose 162 (H) 65 - 100 mg/dL    BUN 13 6 - 20 MG/DL    Creatinine 0.67 (L) 0.70 - 1.30 MG/DL    BUN/Creatinine ratio 19 12 - 20      GFR est AA >60 >60 ml/min/1.73m2    GFR est non-AA >60 >60 ml/min/1.73m2    Calcium 8.7 8.5 - 10.1 MG/DL   CK W/ CKMB & INDEX   Result Value Ref Range    CK 52 39 - 308 U/L    CK - MB 1.5 <3.6 NG/ML    CK-MB Index 2.9 (H) 0 - 2.5     TROPONIN I   Result Value Ref Range    Troponin-I, Qt. <0.04 <0.05 ng/mL   CBC W/O DIFF   Result Value Ref Range    WBC 13.6 (H) 4.1 - 11.1 K/uL    RBC 4.63 4.10 - 5.70 M/uL    HGB 12.6 12.1 - 17.0 g/dL    HCT 39.9 36.6 - 50.3 %    MCV 86.2 80.0 - 99.0 FL    MCH 27.2 26.0 - 34.0 PG    MCHC 31.6 30.0 - 36.5 g/dL    RDW 17.0 (H) 11.5 - 14.5 %    PLATELET 916 (H) 952 - 606 K/uL   METABOLIC PANEL, BASIC   Result Value Ref Range    Sodium 136 136 - 145 mmol/L    Potassium 3.8 3.5 - 5.1 mmol/L    Chloride 97 97 - 108 mmol/L    CO2 31 21 - 32 mmol/L    Anion gap 8 5 - 15 mmol/L    Glucose 195 (H) 65 - 100 mg/dL    BUN 15 6 - 20 MG/DL    Creatinine 0.73 0.70 - 1.30 MG/DL    BUN/Creatinine ratio 21 (H) 12 - 20      GFR est AA >60 >60 ml/min/1.73m2    GFR est non-AA >60 >60 ml/min/1.73m2    Calcium 9.1 8.5 - 10.1 MG/DL   MAGNESIUM   Result Value Ref Range    Magnesium 2.4 1.6 - 2.4 mg/dL   CBC W/O DIFF   Result Value Ref Range    WBC 12.9 (H) 4.1 - 11.1 K/uL    RBC 4.46 4.10 - 5.70 M/uL    HGB 12.2 12.1 - 17.0 g/dL    HCT 38.5 36.6 - 50.3 %    MCV 86.3 80.0 - 99.0 FL    MCH 27.4 26.0 - 34.0 PG    MCHC 31.7 30.0 - 36.5 g/dL    RDW 17.1 (H) 11.5 - 14.5 %    PLATELET 046 963 - 666 K/uL   METABOLIC PANEL, BASIC   Result Value Ref Range    Sodium 136 136 - 145 mmol/L    Potassium 3.9 3.5 - 5.1 mmol/L    Chloride 96 (L) 97 - 108 mmol/L    CO2 32 21 - 32 mmol/L    Anion gap 8 5 - 15 mmol/L    Glucose 204 (H) 65 - 100 mg/dL    BUN 24 (H) 6 - 20 MG/DL    Creatinine 0.80 0.70 - 1.30 MG/DL    BUN/Creatinine ratio 30 (H) 12 - 20      GFR est AA >60 >60 ml/min/1.73m2    GFR est non-AA >60 >60 ml/min/1.73m2    Calcium 8.8 8.5 - 82.3 MG/DL   METABOLIC PANEL, COMPREHENSIVE   Result Value Ref Range    Sodium 134 (L) 136 - 145 mmol/L    Potassium 3.7 3.5 - 5.1 mmol/L    Chloride 95 (L) 97 - 108 mmol/L    CO2 29 21 - 32 mmol/L    Anion gap 10 5 - 15 mmol/L    Glucose 114 (H) 65 - 100 mg/dL    BUN 22 (H) 6 - 20 MG/DL    Creatinine 0.75 0.70 - 1.30 MG/DL    BUN/Creatinine ratio 29 (H) 12 - 20      GFR est AA >60 >60 ml/min/1.73m2    GFR est non-AA >60 >60 ml/min/1.73m2    Calcium 9.6 8.5 - 10.1 MG/DL    Bilirubin, total 0.7 0.2 - 1.0 MG/DL    ALT (SGPT) 46 12 - 78 U/L    AST (SGOT) 28 15 - 37 U/L    Alk. phosphatase 243 (H) 45 - 117 U/L    Protein, total 8.8 (H) 6.4 - 8.2 g/dL    Albumin 2.4 (L) 3.5 - 5.0 g/dL    Globulin 6.4 (H) 2.0 - 4.0 g/dL    A-G Ratio 0.4 (L) 1.1 - 2.2     CBC W/O DIFF   Result Value Ref Range    WBC 12.1 (H) 4.1 - 11.1 K/uL    RBC 4.44 4.10 - 5.70 M/uL    HGB 12.2 12.1 - 17.0 g/dL    HCT 38.4 36.6 - 50.3 %    MCV 86.5 80.0 - 99.0 FL    MCH 27.5 26.0 - 34.0 PG    MCHC 31.8 30.0 - 36.5 g/dL    RDW 16.7 (H) 11.5 - 14.5 %    PLATELET 110 169 - 536 K/uL   MAGNESIUM   Result Value Ref Range    Magnesium 2.2 1.6 - 2.4 mg/dL   PHOSPHORUS   Result Value Ref Range    Phosphorus 3.1 2.6 - 4.7 MG/DL   METABOLIC PANEL, BASIC   Result Value Ref Range    Sodium 135 (L) 136 - 145 mmol/L    Potassium 3.5 3.5 - 5.1 mmol/L    Chloride 95 (L) 97 - 108 mmol/L    CO2 30 21 - 32 mmol/L    Anion gap 10 5 - 15 mmol/L    Glucose 105 (H) 65 - 100 mg/dL    BUN 31 (H) 6 - 20 MG/DL    Creatinine 0.92 0.70 - 1.30 MG/DL    BUN/Creatinine ratio 34 (H) 12 - 20      GFR est AA >60 >60 ml/min/1.73m2    GFR est non-AA >60 >60 ml/min/1.73m2    Calcium 9.0 8.5 - 10.1 MG/DL   CBC WITH AUTOMATED DIFF   Result Value Ref Range    WBC 12.6 (H) 4.1 - 11.1 K/uL    RBC 4.51 4.10 - 5.70 M/uL    HGB 12.0 (L) 12.1 - 17.0 g/dL    HCT 38.7 36.6 - 50.3 %    MCV 85.8 80.0 - 99.0 FL    MCH 26.6 26.0 - 34.0 PG    MCHC 31.0 30.0 - 36.5 g/dL    RDW 16.7 (H) 11.5 - 14.5 %    PLATELET 400 527 - 364 K/uL    NEUTROPHILS 71 32 - 75 %    LYMPHOCYTES 17 12 - 49 %    MONOCYTES 10 5 - 13 %    EOSINOPHILS 2 0 - 7 %    BASOPHILS 0 0 - 1 %    ABS. NEUTROPHILS 8.9 (H) 1.8 - 8.0 K/UL    ABS. LYMPHOCYTES 2.2 0.8 - 3.5 K/UL    ABS. MONOCYTES 1.2 (H) 0.0 - 1.0 K/UL    ABS. EOSINOPHILS 0.2 0.0 - 0.4 K/UL    ABS.  BASOPHILS 0.1 0.0 - 0.1 K/UL   METABOLIC PANEL, COMPREHENSIVE   Result Value Ref Range    Sodium 137 136 - 145 mmol/L    Potassium 3.3 (L) 3.5 - 5.1 mmol/L    Chloride 97 97 - 108 mmol/L    CO2 31 21 - 32 mmol/L    Anion gap 9 5 - 15 mmol/L Glucose 113 (H) 65 - 100 mg/dL    BUN 26 (H) 6 - 20 MG/DL    Creatinine 0.96 0.70 - 1.30 MG/DL    BUN/Creatinine ratio 27 (H) 12 - 20      GFR est AA >60 >60 ml/min/1.73m2    GFR est non-AA >60 >60 ml/min/1.73m2    Calcium 8.7 8.5 - 10.1 MG/DL    Bilirubin, total 0.7 0.2 - 1.0 MG/DL    ALT (SGPT) 42 12 - 78 U/L    AST (SGOT) 42 (H) 15 - 37 U/L    Alk. phosphatase 198 (H) 45 - 117 U/L    Protein, total 8.5 (H) 6.4 - 8.2 g/dL    Albumin 2.3 (L) 3.5 - 5.0 g/dL    Globulin 6.2 (H) 2.0 - 4.0 g/dL    A-G Ratio 0.4 (L) 1.1 - 2.2     CBC WITH AUTOMATED DIFF   Result Value Ref Range    WBC 10.0 4.1 - 11.1 K/uL    RBC 4.45 4.10 - 5.70 M/uL    HGB 12.3 12.1 - 17.0 g/dL    HCT 38.5 36.6 - 50.3 %    MCV 86.5 80.0 - 99.0 FL    MCH 27.6 26.0 - 34.0 PG    MCHC 31.9 30.0 - 36.5 g/dL    RDW 16.4 (H) 11.5 - 14.5 %    PLATELET 894 603 - 952 K/uL    NEUTROPHILS 70 32 - 75 %    LYMPHOCYTES 17 12 - 49 %    MONOCYTES 11 5 - 13 %    EOSINOPHILS 2 0 - 7 %    BASOPHILS 0 0 - 1 %    ABS. NEUTROPHILS 6.9 1.8 - 8.0 K/UL    ABS. LYMPHOCYTES 1.7 0.8 - 3.5 K/UL    ABS. MONOCYTES 1.1 (H) 0.0 - 1.0 K/UL    ABS. EOSINOPHILS 0.2 0.0 - 0.4 K/UL    ABS.  BASOPHILS 0.0 0.0 - 0.1 K/UL   METABOLIC PANEL, BASIC   Result Value Ref Range    Sodium 134 (L) 136 - 145 mmol/L    Potassium 3.8 3.5 - 5.1 mmol/L    Chloride 97 97 - 108 mmol/L    CO2 30 21 - 32 mmol/L    Anion gap 7 5 - 15 mmol/L    Glucose 109 (H) 65 - 100 mg/dL    BUN 19 6 - 20 MG/DL    Creatinine 0.85 0.70 - 1.30 MG/DL    BUN/Creatinine ratio 22 (H) 12 - 20      GFR est AA >60 >60 ml/min/1.73m2    GFR est non-AA >60 >60 ml/min/1.73m2    Calcium 8.6 8.5 - 20.0 MG/DL   METABOLIC PANEL, BASIC   Result Value Ref Range    Sodium 135 (L) 136 - 145 mmol/L    Potassium 3.8 3.5 - 5.1 mmol/L    Chloride 96 (L) 97 - 108 mmol/L    CO2 29 21 - 32 mmol/L    Anion gap 10 5 - 15 mmol/L    Glucose 178 (H) 65 - 100 mg/dL    BUN 23 (H) 6 - 20 MG/DL    Creatinine 1.06 0.70 - 1.30 MG/DL    BUN/Creatinine ratio 22 (H) 12 - 20      GFR est AA >60 >60 ml/min/1.73m2    GFR est non-AA >60 >60 ml/min/1.73m2    Calcium 8.7 8.5 - 10.1 MG/DL   CBC WITH AUTOMATED DIFF   Result Value Ref Range    WBC 11.6 (H) 4.1 - 11.1 K/uL    RBC 4.35 4.10 - 5.70 M/uL    HGB 11.5 (L) 12.1 - 17.0 g/dL    HCT 37.6 36.6 - 50.3 %    MCV 86.4 80.0 - 99.0 FL    MCH 26.4 26.0 - 34.0 PG    MCHC 30.6 30.0 - 36.5 g/dL    RDW 16.2 (H) 11.5 - 14.5 %    PLATELET 730 676 - 179 K/uL    NEUTROPHILS 72 32 - 75 %    LYMPHOCYTES 18 12 - 49 %    MONOCYTES 8 5 - 13 %    EOSINOPHILS 2 0 - 7 %    BASOPHILS 0 0 - 1 %    ABS. NEUTROPHILS 8.4 (H) 1.8 - 8.0 K/UL    ABS. LYMPHOCYTES 2.1 0.8 - 3.5 K/UL    ABS. MONOCYTES 0.9 0.0 - 1.0 K/UL    ABS. EOSINOPHILS 0.2 0.0 - 0.4 K/UL    ABS.  BASOPHILS 0.1 0.0 - 0.1 K/UL   METABOLIC PANEL, BASIC   Result Value Ref Range    Sodium 137 136 - 145 mmol/L    Potassium 3.4 (L) 3.5 - 5.1 mmol/L    Chloride 97 97 - 108 mmol/L    CO2 30 21 - 32 mmol/L    Anion gap 10 5 - 15 mmol/L    Glucose 165 (H) 65 - 100 mg/dL    BUN 30 (H) 6 - 20 MG/DL    Creatinine 1.21 0.70 - 1.30 MG/DL    BUN/Creatinine ratio 25 (H) 12 - 20      GFR est AA >60 >60 ml/min/1.73m2    GFR est non-AA >60 >60 ml/min/1.73m2    Calcium 9.1 8.5 - 83.7 MG/DL   METABOLIC PANEL, BASIC   Result Value Ref Range    Sodium 136 136 - 145 mmol/L    Potassium 3.7 3.5 - 5.1 mmol/L    Chloride 100 97 - 108 mmol/L    CO2 29 21 - 32 mmol/L    Anion gap 7 5 - 15 mmol/L    Glucose 119 (H) 65 - 100 mg/dL    BUN 19 6 - 20 MG/DL    Creatinine 0.67 (L) 0.70 - 1.30 MG/DL    BUN/Creatinine ratio 28 (H) 12 - 20      GFR est AA >60 >60 ml/min/1.73m2    GFR est non-AA >60 >60 ml/min/1.73m2    Calcium 8.7 8.5 - 10.1 MG/DL   GLUCOSE, POC   Result Value Ref Range    Glucose (POC) 86 65 - 100 mg/dL    Performed by DebbieClue Appet [facility]    GLUCOSE, POC   Result Value Ref Range    Glucose (POC) 105 (H) 65 - 100 mg/dL    Performed by Debbie Prophet [facility]    GLUCOSE, POC   Result Value Ref Range    Glucose (POC) 90 65 - 100 mg/dL Performed by Pablo Norris    GLUCOSE, POC   Result Value Ref Range    Glucose (POC) 128 (H) 65 - 100 mg/dL    Performed by Pablo Norris    GLUCOSE, POC   Result Value Ref Range    Glucose (POC) 168 (H) 65 - 100 mg/dL    Performed by [facility]    GLUCOSE, POC   Result Value Ref Range    Glucose (POC) 180 (H) 65 - 100 mg/dL    Performed by Teresa Suh    GLUCOSE, POC   Result Value Ref Range    Glucose (POC) 183 (H) 65 - 100 mg/dL    Performed by Pablo Norris    GLUCOSE, POC   Result Value Ref Range    Glucose (POC) 200 (H) 65 - 100 mg/dL    Performed by Pablo Norris    GLUCOSE, POC   Result Value Ref Range    Glucose (POC) 173 (H) 65 - 100 mg/dL    Performed by [facility]    GLUCOSE, POC   Result Value Ref Range    Glucose (POC) 163 (H) 65 - 100 mg/dL    Performed by [facility]    GLUCOSE, POC   Result Value Ref Range    Glucose (POC) 150 (H) 65 - 100 mg/dL    Performed by [address] [place] [address], POC   Result Value Ref Range    Glucose (POC) 212 (H) 65 - 100 mg/dL    Performed by [address] [place] [address], POC   Result Value Ref Range    Glucose (POC) 227 (H) 65 - 100 mg/dL    Performed by [employer]    GLUCOSE, POC   Result Value Ref Range    Glucose (POC) 136 (H) 65 - 100 mg/dL    Performed by [employer]    GLUCOSE, POC   Result Value Ref Range    Glucose (POC) 173 (H) 65 - 100 mg/dL    Performed by [employer]    GLUCOSE, POC   Result Value Ref Range    Glucose (POC) 176 (H) 65 - 100 mg/dL    Performed by Courtney [employer]    GLUCOSE, POC   Result Value Ref Range    Glucose (POC) 131 (H) 65 - 100 mg/dL    Performed by Marcel Nam    GLUCOSE, POC   Result Value Ref Range    Glucose (POC) 144 (H) 65 - 100 mg/dL    Performed by Marcel Nam    GLUCOSE, POC   Result Value Ref Range    Glucose (POC) 130 (H) 65 - 100 mg/dL    Performed by Sunday Nguyễn    GLUCOSE, POC   Result Value Ref Range    Glucose (POC) 155 (H) 65 - 100 mg/dL    Performed by Bety Morgan, POC   Result Value Ref Range    Glucose (POC) 144 (H) 65 - 100 mg/dL    Performed by Juany Donovan    GLUCOSE, POC   Result Value Ref Range    Glucose (POC) 136 (H) 65 - 100 mg/dL    Performed by Dario Gaines    GLUCOSE, POC   Result Value Ref Range    Glucose (POC) 175 (H) 65 - 100 mg/dL    Performed by [address] [place] [address], POC   Result Value Ref Range    Glucose (POC) 147 (H) 65 - 100 mg/dL    Performed by [address] [place] [address], POC   Result Value Ref Range    Glucose (POC) 110 (H) 65 - 100 mg/dL    Performed by Clarisse Wills    GLUCOSE, POC   Result Value Ref Range    Glucose (POC) 96 65 - 100 mg/dL    Performed by Richardson Breaux \"Ruth\"*    GLUCOSE, POC   Result Value Ref Range    Glucose (POC) 97 65 - 100 mg/dL    Performed by Millie Barbosa    GLUCOSE, POC   Result Value Ref Range    Glucose (POC) 103 (H) 65 - 100 mg/dL    Performed by Millie Barbosa    GLUCOSE, POC   Result Value Ref Range    Glucose (POC) 104 (H) 65 - 100 mg/dL    Performed by Jimena NÚÑEZ    GLUCOSE, POC   Result Value Ref Range    Glucose (POC) 96 65 - 100 mg/dL    Performed by Char Barragan    GLUCOSE, POC   Result Value Ref Range    Glucose (POC) 116 (H) 65 - 100 mg/dL    Performed by Herve Russell (PCT)    GLUCOSE, POC   Result Value Ref Range    Glucose (POC) 111 (H) 65 - 100 mg/dL    Performed by Raul Aviles (PCT)    GLUCOSE, POC   Result Value Ref Range    Glucose (POC) 170 (H) 65 - 100 mg/dL    Performed by Hemalatha Chicas    GLUCOSE, POC   Result Value Ref Range    Glucose (POC) 113 (H) 65 - 100 mg/dL    Performed by Mayi Castillo*    GLUCOSE, POC   Result Value Ref Range    Glucose (POC) 177 (H) 65 - 100 mg/dL    Performed by Iban RANDALL    GLUCOSE, POC   Result Value Ref Range    Glucose (POC) 128 (H) 65 - 100 mg/dL    Performed by Dylan Arcos    GLUCOSE, POC   Result Value Ref Range    Glucose (POC) 123 (H) 65 - 100 mg/dL    Performed by Darius Blackburn    GLUCOSE, POC   Result Value Ref Range    Glucose (POC) 208 (H) 65 - 100 mg/dL    Performed by Darius Blackburn    GLUCOSE, POC   Result Value Ref Range    Glucose (POC) 106 (H) 65 - 100 mg/dL    Performed by Philip Locke    GLUCOSE, POC   Result Value Ref Range    Glucose (POC) 134 (H) 65 - 100 mg/dL    Performed by Juany Donovan    GLUCOSE, POC   Result Value Ref Range    Glucose (POC) 155 (H) 65 - 100 mg/dL    Performed by Johana Moseley    GLUCOSE, POC   Result Value Ref Range    Glucose (POC) 190 (H) 65 - 100 mg/dL    Performed by Johana Moseley    GLUCOSE, POC   Result Value Ref Range    Glucose (POC) 144 (H) 65 - 100 mg/dL    Performed by Malini Celeste (PCT)    GLUCOSE, POC   Result Value Ref Range    Glucose (POC) 165 (H) 65 - 100 mg/dL    Performed by Malini Celeste (PCT)    GLUCOSE, POC   Result Value Ref Range    Glucose (POC) 191 (H) 65 - 100 mg/dL    Performed by Johana Moseley    GLUCOSE, POC   Result Value Ref Range    Glucose (POC) 147 (H) 65 - 100 mg/dL    Performed by ELIOT MAURO ([facility]) CON    EKG, 12 LEAD, INITIAL   Result Value Ref Range    Ventricular Rate 88 BPM    Atrial Rate 88 BPM    P-R Interval 160 ms    QRS Duration 72 ms    Q-T Interval 366 ms    QTC Calculation (Bezet) 442 ms    Calculated P Axis 37 degrees    Calculated R Axis 57 degrees    Calculated T Axis 57 degrees    Diagnosis       Normal sinus rhythm  Nonspecific ST abnormality  Abnormal ECG    Confirmed by Damián Mayer ([id number]) on 5/22/2017 5:27:05 PM     TYPE & CROSSMATCH   Result Value Ref Range    Crossmatch Expiration 05/23/2017     ABO/Rh(D) B POSITIVE     Antibody screen NEG     Unit number [id number]     Blood component type RC LR AS1     Unit division 00     Status of unit TRANSFUSED     Crossmatch result Compatible     Unit number [id number]     Blood component type RC LR AS1     Unit division 00     Status of unit TRANSFUSED     Crossmatch result Compatible        Last Lipid:  No results found for: LDL, DLDL, LDLC, DLDLP  HbA1c: No results found for: HBA1C, [id number], [id number]    Imaging Review  None    MEDICATIONS:  Current Facility-Administered Medications   Medication Dose Route Frequency    melatonin tablet 3 mg  3 mg Oral QHS    pantoprazole (PROTONIX) granules for oral suspension 40 mg  40 mg Per G Tube ACB    fluocinoNIDE (LIDEX) 0.05 % cream   Topical BID    levoFLOXacin (LEVAQUIN) 750 mg in D5W IVPB  750 mg IntraVENous Q24H    nystatin (MYCOSTATIN) 100,000 unit/mL oral suspension 500,000 Units  500,000 Units Oral QID    insulin lispro (HUMALOG) injection   SubCUTAneous Q6H    enoxaparin (LOVENOX) injection 40 mg  40 mg SubCUTAneous Q24H    sodium chloride (NS) flush 5-10 mL  5-10 mL IntraVENous Q8H    prasugrel (EFFIENT) tablet 10 mg  10 mg Per G Tube DAILY    aspirin chewable tablet 81 mg  81 mg Per G Tube DAILY    atorvastatin (LIPITOR) tablet 40 mg  40 mg Per G Tube QHS    lidocaine (LIDODERM) 5 % patch 1 Patch  1 Patch TransDERmal Q24H       Assessment/Plan  Jai Nguyen is a 56 y.o. male who presented to the hospital for Anoxic brain injury which was more than a month ago. Pt improving. Family requesting MRI of the brain. 1. Anoxic brain injury - stable  2. HTN - Stable  3. HLD - Stable    - Continue PT/OT/ST  - Continue aspirin 81 mg a day  - Cont Lipitor 40 mg a day  - BP goal is less than 140/90  - MRI brain to be repeated with sedation. Can give ativan 1-2 mg 15 mins prior to the procedure if OK with primary team. MRI will not change the treatment plan but the family insists. - EEG showed mild slowing. NO seizures. Please call whenever the MRI of the brain is performed. Signed:  Wale Pérez MD  Neurologist      This note will not be viewable in [address]

## 2017-05-31 NOTE — PROGRESS NOTES
Gi   I have received consult  Attempted to call nurse to let her know that I or a partner will see later today.     Mae Silva MD  9:05 AM  5/31/2017

## 2017-05-31 NOTE — PROGRESS NOTES
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

## 2017-05-31 NOTE — PROGRESS NOTES
Bedside report received from  [facility] (offgoing nurse). Assumed care of patient. No immediate concerns, patient resting with call bell within reach.

## 2017-05-31 NOTE — PROGRESS NOTES
[address]  [phone]      Cardiology Progress Note      5/31/2017 1215PM    Admit Date: 5/19/2017    Admit Diagnosis:   Cardiac Arrest  Respiratory failure ([facility] [address]    Subjective:     Timmy Cummings  is a 56 y.o. male with PMH HTN, HLD, DM, CAD s/p CABG (2014) who was transferred from OSH where he had been admitted s/p cardiac arrest 4/28. Overnight events:  -VSS - intermittent tachycardia;   -labs stable   -Mr. Dorothy Burns opens his eyes, nods, and follows basic commands. Limited English comprehension and no family at bedside currently. No acute need for  during assessment. Patient endorses abd pain again today LUQ. Working with physical therapy.      Visit Vitals    /69 (BP 1 Location: Right arm, BP Patient Position: At rest)    Pulse 95    Temp 98.4 °F (36.9 °C)    Resp 15    Ht 5' 11\" (1.803 m)    Wt 82.2 kg (181 lb 4.8 oz)    SpO2 100%    BMI 25.29 kg/m2       Current Facility-Administered Medications   Medication Dose Route Frequency    LORazepam (ATIVAN) 2 mg/mL injection 1 mg  1 mg IntraVENous ONCE PRN    melatonin tablet 3 mg  3 mg Oral QHS    artificial saliva (MOUTH KOTE) 1 Spray  1 Spray Oral PRN    pantoprazole (PROTONIX) granules for oral suspension 40 mg  40 mg Per G Tube ACB    fluocinoNIDE (LIDEX) 0.05 % cream   Topical BID    levoFLOXacin (LEVAQUIN) 750 mg in D5W IVPB  750 mg IntraVENous Q24H    nystatin (MYCOSTATIN) 100,000 unit/mL oral suspension 500,000 Units  500,000 Units Oral QID    0.9% sodium chloride infusion 250 mL  250 mL IntraVENous PRN    insulin lispro (HUMALOG) injection   SubCUTAneous Q6H    glucose chewable tablet 16 g  4 Tab Oral PRN    dextrose 10% infusion 125-250 mL  125-250 mL IntraVENous PRN    glucagon (GLUCAGEN) injection 1 mg  1 mg IntraMUSCular PRN    enoxaparin (LOVENOX) injection 40 mg  40 mg SubCUTAneous Q24H    sodium chloride (NS) flush 5-10 mL  5-10 mL IntraVENous Q8H    sodium chloride (NS) flush 5-10 mL  5-10 mL IntraVENous PRN    ondansetron (ZOFRAN) injection 4 mg  4 mg IntraVENous Q6H PRN    albuterol-ipratropium (DUO-NEB) 2.5 MG-0.5 MG/3 ML  3 mL Nebulization Q4H PRN    prasugrel (EFFIENT) tablet 10 mg  10 mg Per G Tube DAILY    aspirin chewable tablet 81 mg  81 mg Per G Tube DAILY    atorvastatin (LIPITOR) tablet 40 mg  40 mg Per G Tube QHS    oxyCODONE (ROXICODONE INTENSOL) 20 mg/mL concentrated solution 5 mg  5 mg Per G Tube Q4H PRN    acetaminophen (TYLENOL) solution 650 mg  650 mg Per G Tube Q4H PRN    lidocaine (LIDODERM) 5 % patch 1 Patch  1 Patch TransDERmal Q24H       Objective:      Physical Exam:  General: obese, [place] [facility] male, resting in bed in NAD. Alert and interactive. Heart: RRR, distant heart sounds   Lungs: diminished; trach; clear  Abdomen: Soft, +BS, NTND   Extremities: LE chan +DP/PT, no edema   Neurologic: trached; Nods head. Weaker on R side        Data Review:   Recent Labs      05/30/17   0337   WBC  11.6*   HGB  11.5*   HCT  37.6   PLT  208     Recent Labs      05/31/17   0326  05/30/17   0337  05/29/17   0438   NA  136  137  135*   K  3.7  3.4*  3.8   CL  100  97  96*   CO2  29  30  29   GLU  119*  165*  178*   BUN  19  30*  23*   CREA  0.67*  1.21  1.06   CA  8.7  9.1  8.7       No results for input(s): TROIQ, CPK, CKMB in the last 72 hours.       Intake/Output Summary (Last 24 hours) at 05/31/17 1422  Last data filed at 05/31/17 8897   Gross per 24 hour   Intake             1030 ml   Output              310 ml   Net              720 ml        Telemetry: SR-ST; rare PVC  ECG: NSR; artifact   Echocardiogram: EF 30-35%; moderate diffuse hypokinesis; mild-mod MR; mild-mod TR                    Repeat limited: ordered   CXRAY: no acute process     Assessment:     Principal Problem:    Anoxic brain injury ([facility] [address] (5/20/2017)    Active Problems:    Coronary artery disease of bypass graft with stable angina pectoris ([facility] (10/5/2016)      Essential hypertension (10/5/2016)      Dyslipidemia (10/5/2016)      Type 2 diabetes mellitus without complication ([facility] [address] (94/7/7447)      H/O cardiac arrest (5/19/2017)      Overview: 4/28/17:  OSH in [place]      Respiratory failure ([address] (5/19/2017)      Pneumonia due to Serratia marcescens ([facility] [address] (0/95/3277)      Lice infestation (8/62/3148)      Cardiomyopathy ([address] (5/22/2017)      Stenosis of both internal carotid arteries (5/31/2017)      Thrombotic stroke involving left middle cerebral artery ([facility] [address] (5/31/2017)      Moderate anoxic-ischemic encephalopathy ([facility] [address] (5/31/2017)        Plan:     Cardiomyopathy: EF 30-35% s/p cardiac arrest 4/28 and history of extensive CAD. OSH ECHO had a higher EF, but patient was on pressors at that time. Likely ischemic cardiomyopathy due to history and HPI vs damage from prolonged cardiac arrest.    · Continue ASA, statin for CAD. · BB, ARB, and diuretic all on hold for hypotension: BP still borderline   · Awaiting repeat ECHO to see if EF has improved. · Palliative care family meeting pending to assess goals of care. · Considering further ischemic evaluation and/or life vest/ICD, but high infectious risk and would need to be able to assure strict medication compliance. Further discussion needed with family due to inability to fully assess patient comprehension.         Shiv Joya, HITESH  DNP, RN, AGACNP-BC

## 2017-05-31 NOTE — PROGRESS NOTES
Problem: Self Care Deficits Care Plan (Adult)  Goal: *Acute Goals and Plan of Care (Insert Text)  Occupational Therapy Goals  Initiated 5/20/2017   1. Patient will perform face washing with moderate assistance seated EOB with right UE and verbal cues within 7 day(s). 2. Patient will follow one step simple commands in native language 100 % of the time within 7 days  3. Patient will be able to tolerate rolling in bed with mod assist of 2 and attempt to assist with right UE, with in 7 days. 4. Patient will be able to be able to sit and complete UB dressing danae techniques with mod A for dressing aspect with in 7 days. Initiated 5/20/2017   1. Patient will perform self-feeding with moderate assistance seated in bed with right UE and verbal cues, when pt is cleared to eat within 7 day(s). - DC Patient currently NPO, change to face washing  2. Patient will follow one step simple commands 50 % of the time, with in 7 days UPGRADE  3. Patient will be able to tolerate rolling in bed with max assist of 2 and attempt to assist with right UE, with in 7 days. UPGRADE  4. Patient will be able to be able to sit on EOb for 1 minute with total assist of 2 to 3 persons with in 7 days. UPGRADE   OCCUPATIONAL THERAPY TREATMENT  Patient: Evangelist Petit [de-identified]56 y.o. male)  Date: 5/31/2017  Diagnosis: Cardiac Arrest  Respiratory failure ([facility] [address] Anoxic brain injury ([facility] [address]       Precautions: Contact      ASSESSMENT:  Pt was supine in bed and cleared to see by nursing and more alert today. Pt remains on medical air only and daughter was in the room to assist with translation. Pt was mod to min assist of 2 for bed mobility, and mod assist of 2 to stand, min assist of 2 for transfer, and then had pt rest and stand again. He was mod assist of 2 for stand and had a posterior lean and he was able to correct this after extra time and explain ation. In right UE more vol movement is noted, in flexor synergy.   Pt was able to perform shoulder retraction but with min shoulder elevation. Demonstrated  Horizontal ab and ad duction, weak but progress. Pt is able to pronate forearm but uses substitution and is not able to use the muscles for the movement at this time. No voluntary movement noted in his hand , wrist or fingers. Pt was left in chair with lalo pad and bed alarm under pt. Pt is making good progress and recommend in pt rehab at discharge. Progression toward goals:  [X]       Improving appropriately and progressing toward goals  [ ]       Improving slowly and progressing toward goals  [ ]       Not making progress toward goals and plan of care will be adjusted       PLAN:  Patient continues to benefit from skilled intervention to address the above impairments. Continue treatment per established plan of care. Discharge Recommendations:  Inpatient Rehab  Further Equipment Recommendations for Discharge:  tbd       SUBJECTIVE:   Patient stated -pt is non verbal .      OBJECTIVE DATA SUMMARY:   Cognitive/Behavioral Status:  Neurologic State: Alert  Orientation Level: Oriented to person  Cognition: Follows commands     Perseveration: No perseveration noted  Safety/Judgement: Fall prevention     Functional Mobility and Transfers for ADLs:  Bed Mobility:  Supine to Sit: Minimum assistance; Moderate assistance;Assist x2  Scooting: Minimum assistance; Moderate assistance;Assist x2     Transfers:  Sit to Stand: Moderate assistance;Assist x2        Balance:  Sitting: Impaired; Without support  Sitting - Static: Good (unsupported)  Sitting - Dynamic: Good (unsupported)  Standing: Impaired  Standing - Static: Constant support; Fair  Standing - Dynamic : Fair     ADL Intervention:      max assist for ADLs. Toileting  Toileting Assistance:  Total assistance(dependent)     Cognitive Retraining  Safety/Judgement: Fall prevention     Pain:  Pain Scale 1: Adult Nonverbal Pain Scale  Pain Intensity 1: 0              Activity Tolerance:   vss  Please refer to the flowsheet for vital signs taken during this treatment.   After treatment:   [X] Patient left in no apparent distress sitting up in chair  [ ] Patient left in no apparent distress in bed  [X] Call bell left within reach  [X] Nursing notified  [X] Caregiver present  [X] Bed alarm activated      COMMUNICATION/COLLABORATION:   The patients plan of care was discussed with: Physical Therapist and Registered Nurse     Peyton Walsh OT  Time Calculation: 42 mins

## 2017-05-31 NOTE — PROGRESS NOTES
Problem: Mobility Impaired (Adult and Pediatric)  Goal: *Acute Goals and Plan of Care (Insert Text)  Physical Therapy Goals  Revised 5/26/2017  1. Patient will move from supine to sit and sit to supine , scoot up and down and roll side to side in bed with moderate assistance within 7 day(s). 2. Patient will transfer from bed to chair and chair to bed with maximal assistance using the least restrictive device within 7 day(s). 3. Patient will perform sit to stand with moderate assistance within 7 day(s). 4. Patient will tolerate standing x 1 minute with CGA-min A in midline in preparation for gait training within 7 days. Physical Therapy Goals  Initiated 5/20/2017  1. Patient will roll side to side in bed with maximal assistance x2 within 7 day(s). 2. Patient will follow simple, one-step commands 50% of the time within 7 day(s). 3. Patient and family will demonstrate independence with BLE and BUE AA/PROM HEP within 7 day(s). 4. Patient will tolerate sitting in chair position in bed for 1 hr within 7 day(s). PHYSICAL THERAPY TREATMENT  Patient: Barbara Solorzano (65 y.o. male)  Date: 5/31/2017  Diagnosis: Cardiac Arrest  Respiratory failure ([facility] [address] Anoxic brain injury ([facility] [address]       Precautions: Contact      ASSESSMENT:  Patient received resting in bed, agreeable to PT. Patient continues to be motivated for PT session and family always present for support. Patient required mod manual cues on R glute and distal quad to facilitate hip extension and posterior pelvic tilt to bridge in supine. Patient required min-mod A x 1-2 for bed mobility. Patient responds well to tactile cues for mobility. Patient with intact sitting balance on EOB this date. Patient able to stand with mod A x 2. Patient with severe posterior trunk lean in standing, leaning against bed, requiring verbal and tactile cues for anterior shift as well as min A. Patient tolerated standing x 2 times with CGA once standing for 1 minute each.  Patient able to ambulate 3 feet to the chair with min-mod A x 2. Patient required mod manual cues for weight shifting to advance contralateral LE for stepping. Patient with good R quad control and no knee buckling noted with ambulation. Patient with improved standing balance and tolerance this date. Patient left sitting in the chair with the bed alarm and lalo pad under him at the conclusion of PT treatment session. Patient continues to be very motivated and hard working. VSS throughout session with trach collar. Patient continues to be an excellent candidate for IP rehab at discharge to maximize his rehab potential.   Progression toward goals:  [X]    Improving appropriately and progressing toward goals  [ ]    Improving slowly and progressing toward goals  [ ]    Not making progress toward goals and plan of care will be adjusted       PLAN:  Patient continues to benefit from skilled intervention to address the above impairments. Continue treatment per established plan of care. Discharge Recommendations:  Inpatient Rehab  Further Equipment Recommendations for Discharge:  TBD       SUBJECTIVE:   Patient stated \"okay\" in [address]:   Critical Behavior:  Neurologic State: Alert  Orientation Level: Oriented to person  Cognition: Follows commands  Safety/Judgement: Fall prevention  Functional Mobility Training:  Bed Mobility:     Supine to Sit: Minimum assistance; Moderate assistance;Assist x2     Scooting: Minimum assistance; Moderate assistance;Assist x2        Transfers:  Sit to Stand: Moderate assistance;Assist x2  Stand to Sit: Moderate assistance;Assist x2        Bed to Chair: Minimum assistance;Assist x2                    Balance:  Sitting: Impaired; Without support  Sitting - Static: Good (unsupported)  Sitting - Dynamic: Good (unsupported)  Standing: Impaired  Standing - Static: Constant support; Fair  Standing - Dynamic : Fair  Ambulation/Gait Training:  Distance (ft): 3 Feet (ft)  Assistive Device: Gait belt  Ambulation - Level of Assistance: Minimal assistance;Assist x2 (mod A x 1 for weight shifting to advance LE for stepping)     Gait Description (WDL): Exceptions to WDL  Gait Abnormalities: Decreased step clearance;Trunk sway increased; Step to gait        Base of Support: Widened;Center of gravity altered     Speed/Melinda: Slow;Shuffled  Step Length: Right shortened;Left shortened                               Stairs:                       Neuro Re-Education:   Patient required mod manual cues on R glute and distal quad to facilitate hip extension and posterior pelvic tilt to bridge in supine. Performed x 10 bridges in supine with min A with increased fatigue. Patient required mod manual cues for weight shifting to advance contralateral LE for stepping x 3 feet to the chair with min A x 2 for stability. Therapeutic Exercises:      Pain:  Pain Scale 1: Adult Nonverbal Pain Scale  Pain Intensity 1: 0              Activity Tolerance:   Improving, VSS throughout session. Please refer to the flowsheet for vital signs taken during this treatment.   After treatment:   [X]    Patient left in no apparent distress sitting up in chair with lalo pad  [ ]    Patient left in no apparent distress in bed  [X]    Call bell left within reach  [X]    Nursing notified  [X]    Caregiver present  [X]    Bed alarm activated      COMMUNICATION/COLLABORATION:   The patients plan of care was discussed with: Occupational Therapist, Registered Nurse and      Imelda Quinones, PT, DPT   Time Calculation: 30 mins

## 2017-05-31 NOTE — PROGRESS NOTES
Problem: Pressure Injury - Risk of  Goal: *Prevention of pressure ulcer  Outcome: Progressing Towards Goal  Pressure ulcer prevention measures in place as documented on [employer] Flow sheet. Problem: Falls - Risk of  Goal: *Absence of falls  Outcome: Progressing Towards Goal  Patient specific fall prevention measures in place as documented.

## 2017-05-31 NOTE — PROGRESS NOTES
Palliative Medicine     Spoke with Palliative NP. LCSW reached out to [facility] to inquire about in person [facility]  as Palliative team would like to have family meeting. Addendum 16:00: [facility] Dept does not have an  and referred SW to 2 vendors. Msg left with [employer]. Other vendor does not have current [employer] . Will likely utilize [employer] phone for meeting on Friday, time: tbd. Thank you for including Palliative Medicine in Mr. Amena wallace.       Linnea Rankin, [address] ([id number])  Work cell: [phone]

## 2017-05-31 NOTE — PROGRESS NOTES
No further leaking from PEG site since tube feeding stopped. Echo to be performed at bedside in 30 min. Pt back to bed using Eleonora lift. Attempted to have pt try but he was too weak to stand.

## 2017-05-31 NOTE — PROGRESS NOTES
Called to see patient    I checked with [facility] and they have seen in the past.      See dictated consult

## 2017-06-01 ENCOUNTER — APPOINTMENT (OUTPATIENT)
Dept: INTERVENTIONAL RADIOLOGY/VASCULAR | Age: 57
DRG: 091 | End: 2017-06-01
Attending: SURGERY
Payer: COMMERCIAL

## 2017-06-01 LAB
GLUCOSE BLD STRIP.AUTO-MCNC: 102 MG/DL (ref 65–100)
GLUCOSE BLD STRIP.AUTO-MCNC: 104 MG/DL (ref 65–100)
GLUCOSE BLD STRIP.AUTO-MCNC: 42 MG/DL (ref 65–100)
GLUCOSE BLD STRIP.AUTO-MCNC: 70 MG/DL (ref 65–100)
GLUCOSE BLD STRIP.AUTO-MCNC: 73 MG/DL (ref 65–100)
GLUCOSE BLD STRIP.AUTO-MCNC: 77 MG/DL (ref 65–100)
GLUCOSE BLD STRIP.AUTO-MCNC: 80 MG/DL (ref 65–100)
GLUCOSE BLD STRIP.AUTO-MCNC: 83 MG/DL (ref 65–100)
GLUCOSE BLD STRIP.AUTO-MCNC: 85 MG/DL (ref 65–100)
GLUCOSE BLD STRIP.AUTO-MCNC: 91 MG/DL (ref 65–100)
GLUCOSE BLD STRIP.AUTO-MCNC: 99 MG/DL (ref 65–100)
SERVICE CMNT-IMP: ABNORMAL
SERVICE CMNT-IMP: NORMAL

## 2017-06-01 PROCEDURE — 65660000000 HC RM CCU STEPDOWN

## 2017-06-01 PROCEDURE — C1769 GUIDE WIRE: HCPCS

## 2017-06-01 PROCEDURE — 74011250636 HC RX REV CODE- 250/636: Performed by: RADIOLOGY

## 2017-06-01 PROCEDURE — 74011250636 HC RX REV CODE- 250/636: Performed by: HOSPITALIST

## 2017-06-01 PROCEDURE — 74011250637 HC RX REV CODE- 250/637: Performed by: INTERNAL MEDICINE

## 2017-06-01 PROCEDURE — 74011250637 HC RX REV CODE- 250/637: Performed by: NURSE PRACTITIONER

## 2017-06-01 PROCEDURE — 77030005103 HC CATH GASTMY BLN HALY -B

## 2017-06-01 PROCEDURE — 49450 REPLACE G/C TUBE PERC: CPT

## 2017-06-01 PROCEDURE — C1887 CATHETER, GUIDING: HCPCS

## 2017-06-01 PROCEDURE — 82962 GLUCOSE BLOOD TEST: CPT

## 2017-06-01 PROCEDURE — 74011250636 HC RX REV CODE- 250/636: Performed by: INTERNAL MEDICINE

## 2017-06-01 PROCEDURE — 74011000250 HC RX REV CODE- 250: Performed by: RADIOLOGY

## 2017-06-01 PROCEDURE — 93880 EXTRACRANIAL BILAT STUDY: CPT

## 2017-06-01 PROCEDURE — 74011000258 HC RX REV CODE- 258: Performed by: INTERNAL MEDICINE

## 2017-06-01 PROCEDURE — 74011636320 HC RX REV CODE- 636/320: Performed by: RADIOLOGY

## 2017-06-01 PROCEDURE — 77030008806 HC TU TRACH UNCUF COVD -B

## 2017-06-01 PROCEDURE — 0D20XUZ CHANGE FEEDING DEVICE IN UPPER INTESTINAL TRACT, EXTERNAL APPROACH: ICD-10-PCS | Performed by: RADIOLOGY

## 2017-06-01 RX ORDER — MIDAZOLAM HYDROCHLORIDE 1 MG/ML
5 INJECTION, SOLUTION INTRAMUSCULAR; INTRAVENOUS
Status: DISCONTINUED | OUTPATIENT
Start: 2017-06-01 | End: 2017-06-03 | Stop reason: HOSPADM

## 2017-06-01 RX ORDER — SODIUM CHLORIDE 9 MG/ML
25 INJECTION, SOLUTION INTRAVENOUS CONTINUOUS
Status: DISCONTINUED | OUTPATIENT
Start: 2017-06-01 | End: 2017-06-03 | Stop reason: HOSPADM

## 2017-06-01 RX ORDER — FENTANYL CITRATE 50 UG/ML
100 INJECTION, SOLUTION INTRAMUSCULAR; INTRAVENOUS
Status: DISCONTINUED | OUTPATIENT
Start: 2017-06-01 | End: 2017-06-03 | Stop reason: HOSPADM

## 2017-06-01 RX ORDER — SODIUM CHLORIDE 9 MG/ML
125 INJECTION, SOLUTION INTRAVENOUS CONTINUOUS
Status: DISCONTINUED | OUTPATIENT
Start: 2017-06-01 | End: 2017-06-01

## 2017-06-01 RX ORDER — MORPHINE SULFATE 2 MG/ML
1 INJECTION, SOLUTION INTRAMUSCULAR; INTRAVENOUS
Status: DISCONTINUED | OUTPATIENT
Start: 2017-06-01 | End: 2017-06-03 | Stop reason: HOSPADM

## 2017-06-01 RX ORDER — LIDOCAINE HYDROCHLORIDE 20 MG/ML
JELLY TOPICAL ONCE
Status: COMPLETED | OUTPATIENT
Start: 2017-06-01 | End: 2017-06-01

## 2017-06-01 RX ORDER — LIDOCAINE HYDROCHLORIDE 20 MG/ML
20 INJECTION, SOLUTION INFILTRATION; PERINEURAL ONCE
Status: DISPENSED | OUTPATIENT
Start: 2017-06-01 | End: 2017-06-02

## 2017-06-01 RX ORDER — SODIUM CHLORIDE 9 MG/ML
75 INJECTION, SOLUTION INTRAVENOUS CONTINUOUS
Status: DISCONTINUED | OUTPATIENT
Start: 2017-06-01 | End: 2017-06-03 | Stop reason: HOSPADM

## 2017-06-01 RX ORDER — KETOROLAC TROMETHAMINE 30 MG/ML
15 INJECTION, SOLUTION INTRAMUSCULAR; INTRAVENOUS
Status: COMPLETED | OUTPATIENT
Start: 2017-06-01 | End: 2017-06-01

## 2017-06-01 RX ADMIN — DEXTROSE MONOHYDRATE 125 ML: 10 INJECTION, SOLUTION INTRAVENOUS at 18:13

## 2017-06-01 RX ADMIN — KETOROLAC TROMETHAMINE 15 MG: 30 INJECTION, SOLUTION INTRAMUSCULAR at 03:42

## 2017-06-01 RX ADMIN — FLUOCINONIDE: 0.5 CREAM TOPICAL at 09:17

## 2017-06-01 RX ADMIN — ACETAMINOPHEN 650 MG: 325 SOLUTION ORAL at 23:29

## 2017-06-01 RX ADMIN — FENTANYL CITRATE 25 MCG: 50 INJECTION, SOLUTION INTRAMUSCULAR; INTRAVENOUS at 17:21

## 2017-06-01 RX ADMIN — FENTANYL CITRATE 25 MCG: 50 INJECTION, SOLUTION INTRAMUSCULAR; INTRAVENOUS at 17:26

## 2017-06-01 RX ADMIN — KETOROLAC TROMETHAMINE 15 MG: 30 INJECTION, SOLUTION INTRAMUSCULAR at 14:33

## 2017-06-01 RX ADMIN — IOPAMIDOL 40 ML: 755 INJECTION, SOLUTION INTRAVENOUS at 17:37

## 2017-06-01 RX ADMIN — MIDAZOLAM HYDROCHLORIDE 1 MG: 1 INJECTION INTRAMUSCULAR; INTRAVENOUS at 17:26

## 2017-06-01 RX ADMIN — SODIUM CHLORIDE 75 ML/HR: 900 INJECTION, SOLUTION INTRAVENOUS at 11:50

## 2017-06-01 RX ADMIN — ENOXAPARIN SODIUM 40 MG: 40 INJECTION SUBCUTANEOUS at 18:56

## 2017-06-01 RX ADMIN — Medication 10 ML: at 06:00

## 2017-06-01 RX ADMIN — ATORVASTATIN CALCIUM 40 MG: 40 TABLET, FILM COATED ORAL at 22:40

## 2017-06-01 RX ADMIN — DEXTROSE MONOHYDRATE 125 ML: 10 INJECTION, SOLUTION INTRAVENOUS at 03:40

## 2017-06-01 RX ADMIN — MIDAZOLAM HYDROCHLORIDE 1 MG: 1 INJECTION INTRAMUSCULAR; INTRAVENOUS at 17:21

## 2017-06-01 RX ADMIN — MORPHINE SULFATE 1 MG: 2 INJECTION, SOLUTION INTRAMUSCULAR; INTRAVENOUS at 18:54

## 2017-06-01 RX ADMIN — NYSTATIN 500000 UNITS: 100000 SUSPENSION ORAL at 22:41

## 2017-06-01 RX ADMIN — Medication 10 ML: at 18:56

## 2017-06-01 RX ADMIN — FLUOCINONIDE: 0.5 CREAM TOPICAL at 18:59

## 2017-06-01 RX ADMIN — SODIUM CHLORIDE 25 ML/HR: 900 INJECTION, SOLUTION INTRAVENOUS at 17:18

## 2017-06-01 RX ADMIN — LIDOCAINE HYDROCHLORIDE 10 ML: 20 JELLY TOPICAL at 17:37

## 2017-06-01 RX ADMIN — Medication 10 ML: at 23:31

## 2017-06-01 RX ADMIN — MELATONIN 3 MG ORAL TABLET 3 MG: 3 TABLET ORAL at 22:40

## 2017-06-01 RX ADMIN — LEVOFLOXACIN 750 MG: 5 INJECTION, SOLUTION INTRAVENOUS at 09:18

## 2017-06-01 RX ADMIN — NYSTATIN 500000 UNITS: 100000 SUSPENSION ORAL at 19:03

## 2017-06-01 NOTE — PROGRESS NOTES
Discussed with IR. Given CT, should be able to get a wire across and change out the tube. Will be available for OR if needed.

## 2017-06-01 NOTE — PROGRESS NOTES
GI Progress Note Cesar conley IrinaMarycarmen  Jose R Petit VUR:[birth year] VCP:[id number]   ATTG: Dr. Gayathri Woodward  PCP: Yudy Ceballos MD  Date/Time:  6/1/2017 9:45 AM   Assessment:   · PEG tube site infection, dislodged into subcutaneous space  · Cardiac arrest and resulting anoxic brain injury      Plan:   · Need to involve surgical colleagues, this tube was placed ~5/14 (two weeks ago), so the PEG tube tract is likely not fully matured  · I have consulted Dr. Charity Arevalo to evaluate and hopefully aid in removal/replacement of feeding tube  · SLP     Subjective:   Discussed with patient and his daughter, at bedside. Seems to have worsened over the last week. More abd pain. I reviewed the CT, which shows the PEG tube dislodged form the stomach and surrounding inflammation. This is a recent tube, placed in mid May. Review of Systems:  Symptom Y/N Comments  Symptom Y/N Comments   Fever/Chills n   Chest Pain n    Cough y   Headaches n    Sputum y   Joint Pain n    SOB/HAM n   Pruritis/Rash n    Tolerating Diet n  TF held  Other       Could NOT obtain due to:      Objective:   VITALS:   Last 24hrs VS reviewed since prior progress note. Most recent are:  Visit Vitals    /64 (BP 1 Location: Left arm, BP Patient Position: At rest)    Pulse 75    Temp 97.7 °F (36.5 °C)    Resp 20    Ht 5' 11\" (1.803 m)    Wt 82.2 kg (181 lb 4.8 oz)    SpO2 100%    BMI 25.29 kg/m2       Intake/Output Summary (Last 24 hours) at 06/01/17 0945  Last data filed at 06/01/17 0303   Gross per 24 hour   Intake             2000 ml   Output              900 ml   Net             1100 ml     PHYSICAL EXAM:  General: WD, WN. Trache'd, lying in bed.  Cooperative somewhat    HEENT: Tracheostomy with ATC  Lungs:  Coarse bilaterally  Heart:  Regular  rhythm,  No murmur (), No Rubs, No Gallops  Abdomen: Soft, Non distended.  +Bowel sounds, no HSM    PEG tube site, surgical PEG with erythema, some pus-like discharge, pale    Tender around the site.  Neurologic:  Awakens to voice, responds to questions but I can't understand him - daughter says with PM valve he speaks  Psych:   Poor insight. Not anxious nor agitated. Lab and Radiology Data Reviewed: (see below)    Medications Reviewed: (see below)  PMH/SH reviewed - no change compared to H&P  ________________________________________________________________________  Total time spent with patient: 30 minutes ________________________________________________________________________  Care Plan discussed with:  Patient y   Family  y   RN               Consultant:  zaria Peterson MD     Procedures: see electronic medical records for all procedures/Xrays and details which were not copied into this note but were reviewed prior to creation of Plan. LABS:  Recent Labs      05/30/17   0337   WBC  11.6*   HGB  11.5*   HCT  37.6   PLT  208     Recent Labs      05/31/17   0326  05/30/17   0337   NA  136  137   K  3.7  3.4*   CL  100  97   CO2  29  30   BUN  19  30*   CREA  0.67*  1.21   GLU  119*  165*   CA  8.7  9.1     No results for input(s): SGOT, GPT, AP, TBIL, TP, ALB, GLOB, GGT, AML, LPSE in the last 72 hours. No lab exists for component: AMYP, HLPSE  No results for input(s): INR, PTP, APTT in the last 72 hours. No lab exists for component: INREXT   No results for input(s): FE, TIBC, PSAT, FERR in the last 72 hours. No results found for: FOL, RBCF  No results for input(s): PH, PCO2, PO2 in the last 72 hours. No results for input(s): CPK, CKMB in the last 72 hours.     No lab exists for component: TROPONINI  No results found for: COLOR, APPRN, SPGRU, REFSG, JOHANNE, PROTU, GLUCU, KETU, BILU, UROU, DILAN, LEUKU, GLUKE, EPSU, BACTU, WBCU, RBCU, CASTS, UCRY    MEDICATIONS:  Current Facility-Administered Medications   Medication Dose Route Frequency    ketorolac (TORADOL) injection 15 mg  15 mg IntraVENous Q4H PRN    0.9% sodium chloride infusion  125 mL/hr IntraVENous CONTINUOUS    LORazepam (ATIVAN) 2 mg/mL injection 1 mg  1 mg IntraVENous ONCE PRN    melatonin tablet 3 mg  3 mg Oral QHS    artificial saliva (MOUTH KOTE) 1 Spray  1 Spray Oral PRN    pantoprazole (PROTONIX) granules for oral suspension 40 mg  40 mg Per G Tube ACB    fluocinoNIDE (LIDEX) 0.05 % cream   Topical BID    levoFLOXacin (LEVAQUIN) 750 mg in D5W IVPB  750 mg IntraVENous Q24H    nystatin (MYCOSTATIN) 100,000 unit/mL oral suspension 500,000 Units  500,000 Units Oral QID    0.9% sodium chloride infusion 250 mL  250 mL IntraVENous PRN    insulin lispro (HUMALOG) injection   SubCUTAneous Q6H    glucose chewable tablet 16 g  4 Tab Oral PRN    dextrose 10% infusion 125-250 mL  125-250 mL IntraVENous PRN    glucagon (GLUCAGEN) injection 1 mg  1 mg IntraMUSCular PRN    enoxaparin (LOVENOX) injection 40 mg  40 mg SubCUTAneous Q24H    sodium chloride (NS) flush 5-10 mL  5-10 mL IntraVENous Q8H    sodium chloride (NS) flush 5-10 mL  5-10 mL IntraVENous PRN    ondansetron (ZOFRAN) injection 4 mg  4 mg IntraVENous Q6H PRN    albuterol-ipratropium (DUO-NEB) 2.5 MG-0.5 MG/3 ML  3 mL Nebulization Q4H PRN    prasugrel (EFFIENT) tablet 10 mg  10 mg Per G Tube DAILY    aspirin chewable tablet 81 mg  81 mg Per G Tube DAILY    atorvastatin (LIPITOR) tablet 40 mg  40 mg Per G Tube QHS    oxyCODONE (ROXICODONE INTENSOL) 20 mg/mL concentrated solution 5 mg  5 mg Per G Tube Q4H PRN    acetaminophen (TYLENOL) solution 650 mg  650 mg Per G Tube Q4H PRN    lidocaine (LIDODERM) 5 % patch 1 Patch  1 Patch TransDERmal Q24H

## 2017-06-01 NOTE — PROGRESS NOTES
Palliative Medicine     Using  via phone, Palliative LCSW introduced self to Pt's wife/NOK medical surrogate. Pt is alert but no currently able to make medical decisions for himself. Cmed did not have available [place]  and wife said she also speaks [place], therefore  of Luisa utilized. Pt's wife does not want to use  service because she prefers to use her daughter/or children to interpret. LCSW explained she has that right but we require her to sign waiver of  service. Explained  would be via a blue phone and not in-person; wife does not want to utilize a phone . Waiver form explained with [place]  and wife/nok medical surrogate signed; placed on chart. Using , explained wife/on behalf of Pt can revoke waiver in the future by speaking with main doctor. LCSW spoke with Pt's wife and dtr to schedule palliative family meeting with Lacy Krabbe, Palliative NP on Friday (tomorrow) during day. Dtr did not interpret and wife said she understood SW; Wife would like to talk with son so he can be present too. Directed them to call Palliative Med Dept number with time of Ishmael Adonis, [phone]. Dtr asked if would be similar to Palliative Medicine team meeting they had in [place]. Dtr said Pt was at a different (worse) condition in [place] so they were faced with \"pulling tubes off\" or continuing interventions. Dtr stated clearly that Pt is in a better situation now. LCSW explained Palliative team focuses on communication so family has necessary information to make decisions and know Pt's goals of care which can be ever going conversation. Plan:  Lacy Krabbe, Palliative NP to meet with Pt's wife/NOK and children on Friday and time to be determined. Reached out to [facility] Dept to request Cultural Assessment (addendum: rep will visit on Fri.  Afternoon)      Thank you for including Palliative Medicine in [place] care.       Heidy Busch, [address] ([id number])  Work cell: [phone]

## 2017-06-01 NOTE — PROGRESS NOTES
Hospitalist Progress Note    NAME: Cristina Centeno   :  1960   MRN:  [id number]       Interim Hospital Summary: 56 y.o. male whom presented on 2017 with cardiomyopathy, s/p trach, S/p PEG at [facility] transferred to [facility] for 1 day then transferred to ED [facility] due to insurance reasons. Systolic chf ef 70% with extensive CAD ( CABG) and cardiac arrest   Anoxic brain injury  S/p cardiac arrest  Diabetes  Hypertension  Anemia  Lice infestation treated. Plan for changing to cuffless trach, as tolerates deflation of trach for 24 hours  -- needs PEG replaced,  Seen by gi. Levo covering stacy peg infection  Coreg, valsartan, lasix and metformin on hold for lorie, need to resume as appropriate  Urinary retention- failed voiding trials, need to discharged with pinto         Assessment / Plan:    Anoxic brain injury following cardiac arrest in [place]  >1 month ago  --outside MRI brain with abnormalities in basal ganglia and bilateral temporal lobes  --has right>left sided weakness  Following commands given in his native [place] language from family members. --s/p PEG and trach   Seen by neurology here-- egg slow  MRI ordered by neurology initally as family insisting, and it would not , now family leaning not having it     S/p out of hospital cardiac arrest 17, POA  CAD s/p CABG  x 3v at HD  Hx cardiac stents (twice before CABG and once after)  HTN  Severe Anemia POA - Hb improved to 11.6 s/p 2 units PRBCs  --hospitalized at [facility] in [place], [place]; transferred to [facility]  and to [facility]   --per Dr. Yaritza Ray had stenosis of circumflex anastomosis graft, PTCA -->restenosis -->stent-->restenosis and redo bypass recommended but patient seeked second opinion with Dr. Naeem Bridges in [place] and reportedly told redo bypass not needed.     Cardiology consulted & following-   Echo showing diffuse Hypokinesis with EF 30-35% (new?)  S/p Transfuse 2 units PRBC  Coreg, valsartan anmd lasix on hold with hypotension      Per discussions with cardiology-- very difficult situation with high risk of recurrent CAD, very high risk of recurrent infections, placing a stent or defibrillator could actually hasten the demise if either were to get infected. Palliative medicine to meet with the family as well  Needs [facility] discussion      Hx aspiration PNA during intubation  For cardiac  arrest  Chronic respiratory failure, s/p trach  -- treated with invanz in [place]. CXR clear here. Sputum Cx at [facility] growing Pan sensitive Serratia Marcescens     Plan to change to cuffless trach  On empiric levo, also covering stacy peg infection   Sputum/secretion & OSH sputum Cx growing Serratia sp & Sputum Cx here growing Gram neg Rods       DM 2  --SSI for now    Lice infestation  S/p treatment on 5/21      Urinary retention: will be discharging with pinto  Urology as out patient      Abdominal pain  CT abd with contrast via PEG -- needs peg to be replaced  Gi following     Code: full  DVT prophylaxis: lovenox  Surrogate decision maker: Wife \"Savanna\" Ariel Matsonse [phone], 2 son & daughter (rina) # [phone]      Body mass index is 25.29 kg/(m^2). Recommended Disposition: LTAC versus SNF- to be determined     Subjective:     Chief Complaint / Reason for Physician Visit F/U Anoxic brain injury, CHF   Discussed with RN events overnight. For replacement of the peg  Review of Systems:  Symptom Y/N Comments  Symptom Y/N Comments   Fever/Chills    Chest Pain     Poor Appetite    Edema     Cough    Abdominal Pain     Sputum    Joint Pain     SOB/HAM    Pruritis/Rash     Nausea/vomit    Tolerating PT/OT     Diarrhea    Tolerating Diet     Constipation    Other       Could NOT obtain due to: Non verbal     Objective:     VITALS:   Last 24hrs VS reviewed since prior progress note.  Most recent are:  Patient Vitals for the past 24 hrs:   Temp Pulse Resp BP SpO2   06/01/17 0916 - - - - 100 %   06/01/17 0914 97.7 °F (36.5 °C) 75 20 124/64 100 %   06/01/17 0303 98.6 °F (37 °C) 82 18 98/56 99 %   05/31/17 2249 99.3 °F (37.4 °C) 90 20 128/69 100 %   05/31/17 1910 99.4 °F (37.4 °C) 89 20 136/72 100 %   05/31/17 1600 99.1 °F (37.3 °C) 91 18 123/76 100 %   05/31/17 1156 98.4 °F (36.9 °C) 95 15 124/69 100 %       Intake/Output Summary (Last 24 hours) at 06/01/17 1147  Last data filed at 06/01/17 0303   Gross per 24 hour   Intake             2000 ml   Output              900 ml   Net             1100 ml        PHYSICAL EXAM:  General: WD, WN. Alert, cooperative, no acute distress     EENT:  EOMI. Anicteric sclerae. MMM,trach collar  Resp:  B/l ar. No accessory muscle use  CV:  Regular  rhythm,  No edema  GI:  Soft, Non distended, Non tender.  +Bowel sounds,peg tube +  Neurologic:  Alert and oriented X 2,   Psych:    Not anxious nor agitated  Skin:  No rashes. No jaundice    Reviewed most current lab test results and cultures  YES  Reviewed most current radiology test results   YES  Review and summation of old records today    NO  Reviewed patient's current orders and MAR    YES  PMH/ reviewed - no change compared to H&P  ________________________________________________________________________  Care Plan discussed with:    Comments   Patient     Family  y    RN y    Care Manager     Consultant  y Cardiology, neurology, gi                     Multidiciplinary team rounds were held today with , nursing, pharmacist and clinical coordinator. Patient's plan of care was discussed; medications were reviewed and discharge planning was addressed.      ________________________________________________________________________  Total NON critical care TIME:  30 Minutes    Total CRITICAL CARE TIME Spent:   Minutes non procedure based      Comments   >50% of visit spent in counseling and coordination of care     ________________________________________________________________________  Quincy Daigle MD     Procedures: see electronic medical records for all procedures/Xrays and details which were not copied into this note but were reviewed prior to creation of Plan. LABS:  I reviewed today's most current labs and imaging studies.   Pertinent labs include:  Recent Labs      05/30/17 0337   WBC  11.6*   HGB  11.5*   HCT  37.6   PLT  208     Recent Labs      05/31/17 0326  05/30/17 0337   NA  136  137   K  3.7  3.4*   CL  100  97   CO2  29  30   GLU  119*  165*   BUN  19  30*   CREA  0.67*  1.21   CA  8.7  9.1       Signed: Mark Rasmussen MD

## 2017-06-01 NOTE — ROUTINE PROCESS
1712- Pt in for peg change. Wife and daughter at bedside. Pt unable to participate in [place] assessment. Does not follow commands. Alert but non  Responsive. VSS. Sats 100% RA. Dr Kristy Smith wants pt to be lightly sedated for pain control. Consent obtained per wife. Daughter and wife aware of risks and benefits and wish to proceed. 1735- Report called to [facility] primary nurse on PCU. Medications used reported to nurse. Pt to room per transport.

## 2017-06-01 NOTE — PROGRESS NOTES
[facility]  Preliminary Report:  Carotid Duplex Scan    Right:  No plaque noted in the right carotid system. Right ICA velocities suggest 0% diameter reduction. Right vertebral artery flow is antegrade. Left:  No plaque noted in the left carotid system. Left ICA velocities suggest 0% diameter reduction. Left vertebral artery flow is antegrade. Final report to follow.

## 2017-06-01 NOTE — PROGRESS NOTES
Ishmael Fagan Eagle, [address] [facility]  [phone]      Cardiology Progress Note      6/1/2017 1215PM    Admit Date: 5/19/2017    Admit Diagnosis:   Cardiac Arrest  Respiratory failure ([address]    Subjective:     Jai Nguyen  is a 56 y.o. male with PMH HTN, HLD, DM, CAD s/p CABG (2014) who was transferred from OSH where he had been admitted s/p cardiac arrest 4/28. Overnight events:  -VSS   -labs stable except for low blood glucose   -Mr. Manuela Crespo opens his eyes, nods, and follows basic commands. Limited English comprehension and no family at bedside currently. No acute need for  during assessment. Patient endorses abd pain again today LUQ. Less responsive today and not smiling like usual - family states his pain is bothering him more today.       Visit Vitals    /69 (BP 1 Location: Left arm, BP Patient Position: At rest)    Pulse 91    Temp 98 °F (36.7 °C)    Resp 20    Ht 5' 11\" (1.803 m)    Wt 82.2 kg (181 lb 4.8 oz)    SpO2 99%    BMI 25.29 kg/m2       Current Facility-Administered Medications   Medication Dose Route Frequency    ketorolac (TORADOL) injection 15 mg  15 mg IntraVENous Q4H PRN    0.9% sodium chloride infusion  75 mL/hr IntraVENous CONTINUOUS    LORazepam (ATIVAN) 2 mg/mL injection 1 mg  1 mg IntraVENous ONCE PRN    melatonin tablet 3 mg  3 mg Oral QHS    artificial saliva (MOUTH KOTE) 1 Spray  1 Spray Oral PRN    pantoprazole (PROTONIX) granules for oral suspension 40 mg  40 mg Per G Tube ACB    fluocinoNIDE (LIDEX) 0.05 % cream   Topical BID    levoFLOXacin (LEVAQUIN) 750 mg in D5W IVPB  750 mg IntraVENous Q24H    nystatin (MYCOSTATIN) 100,000 unit/mL oral suspension 500,000 Units  500,000 Units Oral QID    0.9% sodium chloride infusion 250 mL  250 mL IntraVENous PRN    insulin lispro (HUMALOG) injection   SubCUTAneous Q6H    glucose chewable tablet 16 g  4 Tab Oral PRN    dextrose 10% infusion 125-250 mL  125-250 mL IntraVENous PRN    glucagon (GLUCAGEN) injection 1 mg  1 mg IntraMUSCular PRN    enoxaparin (LOVENOX) injection 40 mg  40 mg SubCUTAneous Q24H    sodium chloride (NS) flush 5-10 mL  5-10 mL IntraVENous Q8H    sodium chloride (NS) flush 5-10 mL  5-10 mL IntraVENous PRN    ondansetron (ZOFRAN) injection 4 mg  4 mg IntraVENous Q6H PRN    albuterol-ipratropium (DUO-NEB) 2.5 MG-0.5 MG/3 ML  3 mL Nebulization Q4H PRN    prasugrel (EFFIENT) tablet 10 mg  10 mg Per G Tube DAILY    aspirin chewable tablet 81 mg  81 mg Per G Tube DAILY    atorvastatin (LIPITOR) tablet 40 mg  40 mg Per G Tube QHS    oxyCODONE (ROXICODONE INTENSOL) 20 mg/mL concentrated solution 5 mg  5 mg Per G Tube Q4H PRN    acetaminophen (TYLENOL) solution 650 mg  650 mg Per G Tube Q4H PRN    lidocaine (LIDODERM) 5 % patch 1 Patch  1 Patch TransDERmal Q24H       Objective:      Physical Exam:  General: obese, [place] [facility] male, resting in bed in NAD. Alert and interactive. Heart: RRR, distant heart sounds   Lungs: diminished; trach; clear  Abdomen: Soft, +BS, NTND   Extremities: LE chan +DP/PT, no edema   Neurologic: trached; Nods head. Weaker on R side        Data Review:   Recent Labs      05/30/17   0337   WBC  11.6*   HGB  11.5*   HCT  37.6   PLT  208     Recent Labs      05/31/17   0326  05/30/17   0337   NA  136  137   K  3.7  3.4*   CL  100  97   CO2  29  30   GLU  119*  165*   BUN  19  30*   CREA  0.67*  1.21   CA  8.7  9.1       No results for input(s): TROIQ, CPK, CKMB in the last 72 hours.       Intake/Output Summary (Last 24 hours) at 06/01/17 1340  Last data filed at 06/01/17 0303   Gross per 24 hour   Intake             2000 ml   Output              900 ml   Net             1100 ml        Telemetry: SR; rare PVC  ECG: NSR; artifact   Echocardiogram: EF 30-35%; moderate diffuse hypokinesis; mild-mod MR; mild-mod TR                    Repeat limited: EF 50-55%; NWMA; trivial MR;   CXRAY: no acute process     Assessment:     Principal Problem:    Anoxic brain injury ([facility] (5/20/2017)    Active Problems:    Coronary artery disease of bypass graft with stable angina pectoris ([address] (10/5/2016)      Essential hypertension (10/5/2016)      Dyslipidemia (10/5/2016)      Type 2 diabetes mellitus without complication ([address] (64/4/5536)      H/O cardiac arrest (5/19/2017)      Overview: 4/28/17:  OSH in [place]      Respiratory failure ([address] (5/19/2017)      Pneumonia due to Serratia marcescens ([address] (6/08/7419)      Lice infestation (1/44/3661)      Cardiomyopathy ([address] (5/22/2017)      Stenosis of both internal carotid arteries (5/31/2017)      Thrombotic stroke involving left middle cerebral artery ([address] (5/31/2017)      Moderate anoxic-ischemic encephalopathy ([address] (5/31/2017)        Plan:     Cardiomyopathy: EF 30-35% now improved to 50-55% s/p cardiac arrest 4/28 and history of extensive CAD. Likely ischemic cardiomyopathy due to history and HPI vs damage from prolonged cardiac arrest.  Despite some improvements with rehab and speech, seems his quality of life is currently very poor and I am not sure how much better it is likely to get. · Continue ASA, statin, effient for CAD. · BB, ARB, and diuretic have been on  hold 2 daysfor hypotension: BP improving slightly, but currently NPO awaiting GI procedure, so will hold on restarting anything today. · Palliative care family meeting pending to assess goals of care. Refractory, recurrent restenosis in the past and declined more definitive treatment such as possible intravascular brachytherapy at a tertiary center such as [facility] (OUTPATIENT CAMPUS). Very difficult situation with high risk of recurrent CAD, very high risk of recurrent infections. Placing a stent or a defibrillator could actually hasten his demise if either were to get infected. Currently tolerating rehab efforts with stable rhythm and vital signs. Continue medical management. Palliative care discussion pending.     Alise Cuello, HITESH  DNP, RN, AGACNP-BC    Patient seen and examined with nurse practitioner Rosalba Antunez. I agree with the assessment and plan as noted.

## 2017-06-01 NOTE — DIABETES MGMT
DTC Progress Note    Recommendations/ Comments: Please consider supporting BG with dextrose containing IVF until TF resumed. Pt with multiple BG overnight in the 70s. Chart reviewed on [facility]. A1c:   No results found for: HBA1C, HGBE8, UJG9KUDK, CRS2ZIVP        Recent Glucose Results:   Lab Results   Component Value Date/Time    GLUCPOC 102 (H) 06/01/2017 05:22 AM    GLUCPOC 104 (H) 06/01/2017 04:08 AM    GLUCPOC 77 06/01/2017 03:32 AM        Lab Results   Component Value Date/Time    Creatinine 0.67 05/31/2017 03:26 AM     Estimated Creatinine Clearance: 132.7 mL/min (based on Cr of 0.67). Active Orders   Diet    DIET NPO        PO intake: No data found. Will continue to follow as needed. Thank you.   PURNIMA Chin, RN, [address]

## 2017-06-01 NOTE — PROGRESS NOTES
Hairdresser at bedside trimming pt hair and shampooing the hair. Pt's daughter requesting contact isolation be removed.

## 2017-06-01 NOTE — PROGRESS NOTES
Pt trach was changed to #6 XLT Distal cuffless Shiley trach today. Pt tolerated well.  Sats 99% on Room Air

## 2017-06-01 NOTE — PROGRESS NOTES
PT note:    Chart reviewed and cleared by nursing. Attempted PT session and noted patient very drowsy, difficult to arouse. Patient slow to follow commands this date which is different from last session. Aborted PT session due to safety and patient is not appropriate this date. Will follow up for PT session.      Imelda Rose, PT, DPT   Total time spent: 18 minutes

## 2017-06-01 NOTE — PROCEDURES
[facility]  *** FINAL REPORT ***    Name: Shreya Macdonald  MRN: [id number]    Inpatient  : 1960  HIS Order #: [id number]  [address] [facility] Visit #: [id number]  Date: 2017    TYPE OF TEST: Cerebrovascular Duplex    REASON FOR TEST  CVA    Right Carotid:-             Proximal               Mid                 Distal  cm/s  Systolic  Diastolic  Systolic  Diastolic  Systolic  Diastolic  CCA:     33.3                                      76.0  Bulb:  ECA:     90.0  ICA:     55.0                 81.0                105.0  ICA/CCA:  0.7    ICA Stenosis: Normal    Right Vertebral:-  Finding: Antegrade  Sys:       43.0  Lissette:    Right Subclavian: Normal    Left Carotid:-            Proximal                Mid                 Distal  cm/s  Systolic  Diastolic  Systolic  Diastolic  Systolic  Diastolic  CCA:     82.2                                      76.0  Bulb:  ECA:    105.0  ICA:     69.0                 90.0                 88.0  ICA/CCA:  0.9    ICA Stenosis: Normal    Left Vertebral:-  Finding: Antegrade  Sys:       49.0  Lissette:    Left Subclavian: Normal    INTERPRETATION/FINDINGS  PROCEDURE:  Carotid Duplex Examination using B-mode, color and  spectral Doppler of the extracranial cerebrovascular arteries. 1. No evidence of significant arterial occlusive disease in the  internal carotid arteries. 2. No significant stenosis in the external carotid arteries  bilaterally. 3. Antegrade flow in both vertebral arteries. 4. Normal flow in both subclavian arteries. ADDITIONAL COMMENTS    I have personally reviewed the data relevant to the interpretation of  this  study. TECHNOLOGIST: Rocío Moran. JULIA Grady, ALEIDAMS  Signed: 2017 02:40 PM    PHYSICIAN: Gracie Harris MD  Signed: 2017 04:14 PM

## 2017-06-01 NOTE — PROGRESS NOTES
Speech Pathology:  Chart reviewed and discussed with RN. RN reports trach changed to #6 cuffless this afternoon. Order for PMV not yet placed. Patient's wife and daughter at bedside. Patient's daughter declined SLP treatment this date reporting patient in pain and tired this date. She states \"He is not himself. \" and \"We are waiting for the surgeon. \"  Daughter with many questions regarding trach, trach change, speaking valve and eating/swallowing. All questions answered and daughter verbalized understanding. SLP treatment deferred this date. SLP to continue to follow for speech treatment once PMV ordered and for swallow evaluation as medically appropriate. Thank you.     Nay Aggarwal, SLP

## 2017-06-01 NOTE — PROGRESS NOTES
PCU SHIFT NURSING NOTE      Bedside and Verbal shift change report given to Michael Durand RN (oncoming nurse) by Robles Hatfield RN (offgoing nurse). Report included the following information SBAR, Kardex, Intake/Output, MAR, Accordion, Recent Results, Med Rec Status and Cardiac Rhythm NSR. Shift Summary:     36 Dr. Lupe Manriquez at bedside for consult. Drainage from PEG site continues. New order for abd CT with contrast through PEG tube. Deep suction to trach, small amount thick white/clear secretions removed. 2030 Spoke with CT, discussed barium vs. gastroview as contrast for scan. Due to concern for PEG tube dislodgement CT states to use water soluble gastroview rather than the barium. Gastroview to be tubed for administration by RN. 2044 Gastroview given, diluted in 32oz water as per administration instructions. Gauze dressing to PEG site changed. Deep suction to trach per pt indication/request, small amt thick white/clear sputum. Pt communicating with eyes and hand gestures. 2215 Accompanied pt to CT.    2245 Returned from CT with pt.    2315 Bedside and Verbal shift change report given to Thanh Gamez (oncoming nurse) by Michael Durand RN (offgoing nurse). Report included the following information SBAR, Kardex, Intake/Output, MAR, Accordion, Recent Results, Med Rec Status and Cardiac Rhythm NSR.        Admission Date 5/19/2017   Admission Diagnosis Cardiac Arrest  Respiratory failure ([facility] [address]   Consults IP CONSULT TO CARDIOLOGY  IP CONSULT TO NEUROLOGY  IP CONSULT TO PALLIATIVE CARE - PROVIDER  IP CONSULT TO GASTROENTEROLOGY        Consults   [x]PT   [x]OT   [x]Speech   [x]Case Management      [] Palliative      Cardiac Monitoring Order   [x]Yes   []No     IV drips   []Yes    Drip:                            Dose:  Drip:                            Dose:  Drip:                            Dose:   [x]No     GI Prophylaxis   [x]Yes   []No         DVT Prophylaxis   SCDs:             Alfred stockings:         [x] Medication []Contraindicated   []None      Activity Level Activity Level: Up with Assistance     Activity Assistance: Partial (two people)   Purposeful Rounding every 1-2 hour? [x]Yes   Valencia Score  Total Score: 5   Bed Alarm (If score 3 or >)   []Yes   [] Refused (See signed refusal form in chart)   Ulices Score  Ulices Score: 15   Ulices Score (if score 14 or less)   [x]PMT consult   []Wound Care consult      [x]Specialty bed   [x] Nutrition consult          Needs prior to discharge:   Home O2 required:    []Yes   [x]No    If yes, how much O2 required? Other:    Last Bowel Movement: Last Bowel Movement Date: 05/31/17      Influenza Vaccine          Pneumonia Vaccine           Diet Active Orders   Diet    DIET NPO      LDAs Feeding Tube (Active)   Site Assessment Drainage (comment); Red 5/31/2017  4:00 PM   Dressing Status New drainage 5/31/2017  4:00 PM   Dressing Type Gauze 5/31/2017  4:00 PM   Tube Status Capped;Leakage around tube 5/31/2017  4:00 PM   Action Taken Placement verified (comment) 5/31/2017  7:13 AM   Drainage Description Other (Comment) 5/31/2017  4:00 PM   Gastric Residual (mL) 3 ml 5/31/2017  7:13 AM   Tube Feeding/Formula Options Twocal HN 5/31/2017  7:13 AM   Tube Feeding/Verify Rate (mL/hr) 50 5/31/2017  7:13 AM   Water Flush Volume (mL) 150 mL 5/30/2017  4:35 PM   Intake (ml) 200 ml 5/30/2017  4:35 PM   Medication Volume 150 ml 5/30/2017 12:00 PM   Output (ml) 0 ml 5/31/2017  7:13 AM                Peripheral IV 05/26/17 Right Hand (Active)   Site Assessment Clean, dry, & intact 5/31/2017  4:00 PM   Phlebitis Assessment 0 5/31/2017  4:00 PM   Infiltration Assessment 0 5/31/2017  4:00 PM   Dressing Status Clean, dry, & intact 5/31/2017  4:00 PM   Dressing Type Transparent;Tape 5/31/2017  4:00 PM   Hub Color/Line Status Blue;Capped 5/31/2017  4:00 PM            Airway - Tracheostomy Tube 05/12/17 (Active)   Site Assessment Clean, dry, & intact 5/31/2017  4:00 PM   Trach Dressing Changed No 5/31/2017  4:00 PM   Trach Cleaned With Normal saline 5/29/2017  6:00 PM   Trach Tie Changed No 5/31/2017  4:00 PM   Trach Cannula Changed No 5/31/2017  4:00 PM   Inner Cannula #6 Stan; Cuffed, cuff deflated 5/31/2017  4:00 PM   Line James Top of the lock 5/29/2017  8:11 PM   Side Secured Centered;Device 5/31/2017  4:00 PM   Spare Trach at Bedside Yes 5/31/2017  4:00 PM   Spare Trach Tube One Size Smaller at Bedside Yes 5/31/2017  7:13 AM   Ambu Bag With Mask at Bedside Yes 5/31/2017  4:00 PM              Urinary Catheter Urinary Catheter 05/31/17 Romero-Criteria for Appropriate Use: Obstruction/retention  [REMOVED] Condom Catheter-Criteria for Appropriate Use: Strict I/Os  [REMOVED] Urinary Catheter 05/28/17 3- way; Romero-Criteria for Appropriate Use: Obstruction/retention    Intake & Output   Date 05/30/17 1900 - 05/31/17 0659 05/31/17 0700 - 06/01/17 0659   Shift 0816-7019 24 Hour Total 6008-1242 4160-7652 24 Hour Total   I  N  T  A  K  E   P. O.  0         P. O.  0       I.V.  (mL/kg/hr) 680 846. 3         Volume (0.9% sodium chloride infusion) 680 696.3         Volume (levoFLOXacin (LEVAQUIN) 750 mg in D5W IVPB)  150       NG/GT  950         Water Flush Volume (mL) (Feeding Tube)  300         Medication Volume (Feeding Tube)  150         Intake (ml) (Feeding Tube)  500       Shift Total  (mL/kg) 680  (8.3) 1796.3  (21.8)      O  U  T  P  U  T   Urine  (mL/kg/hr)  600         Urine Output (mL) ([REMOVED] Urinary Catheter 05/28/17 3- way; Romero)  600       Emesis/NG output  0 0  0      Output (ml) (Feeding Tube)  0 0  0    Other 310 310         Other Output 310 310       Shift Total  (mL/kg) 310  (3.8) 910  (11.1) 0  (0)  0  (0)    886. 3 0  0   Weight (kg) 82.2 82.2 82.2 82.2 82.2         Readmission Risk Assessment Tool Score Low Risk            11       Total Score        3 Patient Length of Stay > 5    4 More than 1 Admission in calendar year    4 Charlson Comorbidity Score        Criteria that do not apply: Relationship with PCP    Patient Living Status    Patient Insurance is Medicare, Medicaid or Self Pay       Expected Length of Stay 4d 4h   Actual Length of Stay 12

## 2017-06-01 NOTE — H&P
Radiology History and Physical    Patient: Jacquie Fabian 56 y.o. male       Chief Complaint: No chief complaint on file. History of Present Illness: feeding tube out    History:    Past Medical History:   Diagnosis Date    Anoxic brain injury ([facility] [address]     following cardiac arrest; MRI shows involvement BG and temporal lobes    Aspiration pneumonia (HCC)     during cardiac arrest intubation    Bradycardia     CAD (coronary artery disease)     Cardiac arrest ([facility] [address]     no intervention by [address] [place] [address] cardiology. [address] [facility]    Diabetes [facility])     Essential hypertension     Hyperlipidemia      Family History   Problem Relation Age of Onset    Family history unknown: Yes     Social History     Social History    Marital status:      Spouse name: N/A    Number of children: N/A    Years of education: N/A     Occupational History    Not on file. Social History Main Topics    Smoking status: Never Smoker    Smokeless tobacco: Not on file    Alcohol use Not on file    Drug use: Not on file    Sexual activity: Not on file     Other Topics Concern    Not on file     Social History Narrative       Allergies:    Allergies   Allergen Reactions    Cymbalta [Duloxetine] Rash       Current Medications:  Current Facility-Administered Medications   Medication Dose Route Frequency    0.9% sodium chloride infusion  75 mL/hr IntraVENous CONTINUOUS    morphine injection 1 mg  1 mg IntraVENous Q4H PRN    lidocaine (XYLOCAINE) 20 mg/mL (2 %) injection 400 mg  20 mL SubCUTAneous ONCE    sodium bicarbonate (NEUT) injection 1 mL  1 mL SubCUTAneous ONCE    lidocaine (XYLOCAINE) 2 % jelly   Topical ONCE    iopamidol (ISOVUE-370) 76 % injection 50 mL  50 mL IntraVENous ONCE    0.9% sodium chloride infusion  25 mL/hr IntraVENous CONTINUOUS    fentaNYL citrate (PF) injection 100 mcg  100 mcg IntraVENous Multiple    midazolam (VERSED) injection 5 mg  5 mg IntraVENous Multiple    LORazepam (ATIVAN) 2 mg/mL injection 1 mg  1 mg IntraVENous ONCE PRN    melatonin tablet 3 mg  3 mg Oral QHS    artificial saliva (MOUTH KOTE) 1 Spray  1 Spray Oral PRN    pantoprazole (PROTONIX) granules for oral suspension 40 mg  40 mg Per G Tube ACB    fluocinoNIDE (LIDEX) 0.05 % cream   Topical BID    levoFLOXacin (LEVAQUIN) 750 mg in D5W IVPB  750 mg IntraVENous Q24H    nystatin (MYCOSTATIN) 100,000 unit/mL oral suspension 500,000 Units  500,000 Units Oral QID    0.9% sodium chloride infusion 250 mL  250 mL IntraVENous PRN    insulin lispro (HUMALOG) injection   SubCUTAneous Q6H    glucose chewable tablet 16 g  4 Tab Oral PRN    dextrose 10% infusion 125-250 mL  125-250 mL IntraVENous PRN    glucagon (GLUCAGEN) injection 1 mg  1 mg IntraMUSCular PRN    enoxaparin (LOVENOX) injection 40 mg  40 mg SubCUTAneous Q24H    sodium chloride (NS) flush 5-10 mL  5-10 mL IntraVENous Q8H    sodium chloride (NS) flush 5-10 mL  5-10 mL IntraVENous PRN    ondansetron (ZOFRAN) injection 4 mg  4 mg IntraVENous Q6H PRN    albuterol-ipratropium (DUO-NEB) 2.5 MG-0.5 MG/3 ML  3 mL Nebulization Q4H PRN    prasugrel (EFFIENT) tablet 10 mg  10 mg Per G Tube DAILY    aspirin chewable tablet 81 mg  81 mg Per G Tube DAILY    atorvastatin (LIPITOR) tablet 40 mg  40 mg Per G Tube QHS    oxyCODONE (ROXICODONE INTENSOL) 20 mg/mL concentrated solution 5 mg  5 mg Per G Tube Q4H PRN    acetaminophen (TYLENOL) solution 650 mg  650 mg Per G Tube Q4H PRN    lidocaine (LIDODERM) 5 % patch 1 Patch  1 Patch TransDERmal Q24H        Physical Exam:  Blood pressure 130/63, pulse 81, temperature 98.7 °F (37.1 °C), resp. rate 16, height 5' 11\" (1.803 m), weight 82.2 kg (181 lb 4.8 oz), SpO2 100 %.   LUNG: clear to auscultation bilaterally, HEART: regular rate and rhythm, S1, S2 normal, no murmur, click, rub or gallop      Alerts:    Hospital Problems  Date Reviewed: 5/22/2017          Codes Class Noted POA    Stenosis of both internal carotid arteries ICD-10-CM: N72.18  ICD-9-CM: 433.10, 433.30  5/31/2017 Unknown        Thrombotic stroke involving left middle cerebral artery ([facility] ICD-10-CM: Y18.777  ICD-9-CM: 434.01  5/31/2017 Unknown        Moderate anoxic-ischemic encephalopathy ([facility] ICD-10-CM: G93.1, I67.82  ICD-9-CM: 348.1, 437.1  5/31/2017 Unknown        Pneumonia due to Serratia marcescens [facility]) ICD-10-CM: J15.6  ICD-9-CM: 482.83  6/12/7593 Yes        Lice infestation DZX-38-LN: B85.2  ICD-9-CM: 132.9  5/22/2017 Yes        Cardiomyopathy ([place] [address] ICD-10-CM: I42.9  ICD-9-CM: 425.4  5/22/2017 Yes        * (Principal)Anoxic brain injury [facility]) ICD-10-CM: G93.1  ICD-9-CM: 348.1  5/20/2017 Yes        H/O cardiac arrest ICD-10-CM: Z86.74  ICD-9-CM: V12.53  5/19/2017 Yes    Overview Signed 5/19/2017  4:48 PM by [place] HITESH Gould     4/28/17:  OSH in [place]             Respiratory failure ([place] [address] ICD-10-CM: J96.90  ICD-9-CM: 518.81  5/19/2017 Unknown        Coronary artery disease of bypass graft with stable angina pectoris ([facility] ICD-10-CM: I25.708  ICD-9-CM: 414.05, 413.9  10/5/2016 Yes        Essential hypertension ICD-10-CM: I10  ICD-9-CM: 401.9  10/5/2016 Yes        Dyslipidemia ICD-10-CM: E78.5  ICD-9-CM: 272.4  10/5/2016 Yes        Type 2 diabetes mellitus without complication ([facility] COQ-24-HF: E11.9  ICD-9-CM: 250.00  10/5/2016 Yes              Laboratory:      Recent Labs      05/31/17   0326  05/30/17   0337   HGB   --   11.5*   HCT   --   37.6   WBC   --   11.6*   PLT   --   208   BUN  19  30*   CREA  0.67*  1.21   K  3.7  3.4*         Plan of Care/Planned Procedure:  Risks, benefits, and alternatives reviewed with patient and he agrees to proceed with the procedure.      Plan is to replace G tube       Kirill Cordero MD

## 2017-06-01 NOTE — PROGRESS NOTES
OT note:     Chart reviewed and cleared by nursing. Attempted OT session and noted patient very drowsy, difficult to arouse. Patient slow to follow commands this date which is different from last session. Aborted OT session due to safety and patient is not appropriate this date.  Will follow up for OT session.        Total time spent: 18 minutes

## 2017-06-01 NOTE — PROGRESS NOTES
Chart review. Per MD note peg tube is to be replaced. [facility] has accepted patient. Auth pending. [facility] anticipates able to accept on 6/2/2107. CM will continue to follow.     Darrell Marti  Ext [phone]

## 2017-06-02 PROBLEM — Z71.89 COUNSELING REGARDING GOALS OF CARE: Status: ACTIVE | Noted: 2017-06-02

## 2017-06-02 LAB
GLUCOSE BLD STRIP.AUTO-MCNC: 82 MG/DL (ref 65–100)
GLUCOSE BLD STRIP.AUTO-MCNC: 86 MG/DL (ref 65–100)
GLUCOSE BLD STRIP.AUTO-MCNC: 96 MG/DL (ref 65–100)
GLUCOSE BLD STRIP.AUTO-MCNC: 97 MG/DL (ref 65–100)
SERVICE CMNT-IMP: NORMAL

## 2017-06-02 PROCEDURE — 74011250637 HC RX REV CODE- 250/637: Performed by: INTERNAL MEDICINE

## 2017-06-02 PROCEDURE — 74011250637 HC RX REV CODE- 250/637: Performed by: NURSE PRACTITIONER

## 2017-06-02 PROCEDURE — 82962 GLUCOSE BLOOD TEST: CPT

## 2017-06-02 PROCEDURE — 74011250637 HC RX REV CODE- 250/637: Performed by: HOSPITALIST

## 2017-06-02 PROCEDURE — 65660000000 HC RM CCU STEPDOWN

## 2017-06-02 PROCEDURE — 97112 NEUROMUSCULAR REEDUCATION: CPT

## 2017-06-02 PROCEDURE — 77010033678 HC OXYGEN DAILY

## 2017-06-02 PROCEDURE — 0B21XFZ CHANGE TRACHEOSTOMY DEVICE IN TRACHEA, EXTERNAL APPROACH: ICD-10-PCS | Performed by: INTERNAL MEDICINE

## 2017-06-02 PROCEDURE — 92507 TX SP LANG VOICE COMM INDIV: CPT

## 2017-06-02 PROCEDURE — 97530 THERAPEUTIC ACTIVITIES: CPT

## 2017-06-02 PROCEDURE — 74011250636 HC RX REV CODE- 250/636: Performed by: INTERNAL MEDICINE

## 2017-06-02 PROCEDURE — 92597 ORAL SPEECH DEVICE EVAL: CPT

## 2017-06-02 RX ORDER — TRAMADOL HYDROCHLORIDE 50 MG/1
25 TABLET ORAL
Status: DISCONTINUED | OUTPATIENT
Start: 2017-06-02 | End: 2017-06-03 | Stop reason: HOSPADM

## 2017-06-02 RX ORDER — FUROSEMIDE 40 MG/1
40 TABLET ORAL DAILY
Status: DISCONTINUED | OUTPATIENT
Start: 2017-06-02 | End: 2017-06-03 | Stop reason: HOSPADM

## 2017-06-02 RX ORDER — LEVOFLOXACIN 750 MG/1
750 TABLET ORAL
Status: DISCONTINUED | OUTPATIENT
Start: 2017-06-02 | End: 2017-06-03 | Stop reason: HOSPADM

## 2017-06-02 RX ADMIN — ACETAMINOPHEN 650 MG: 325 SOLUTION ORAL at 09:54

## 2017-06-02 RX ADMIN — Medication 10 ML: at 15:29

## 2017-06-02 RX ADMIN — MELATONIN 3 MG ORAL TABLET 3 MG: 3 TABLET ORAL at 21:41

## 2017-06-02 RX ADMIN — TRAMADOL HYDROCHLORIDE 25 MG: 50 TABLET, FILM COATED ORAL at 18:34

## 2017-06-02 RX ADMIN — ENOXAPARIN SODIUM 40 MG: 40 INJECTION SUBCUTANEOUS at 15:28

## 2017-06-02 RX ADMIN — PRASUGREL HYDROCHLORIDE 10 MG: 10 TABLET, FILM COATED ORAL at 08:36

## 2017-06-02 RX ADMIN — NYSTATIN 500000 UNITS: 100000 SUSPENSION ORAL at 21:40

## 2017-06-02 RX ADMIN — NYSTATIN 500000 UNITS: 100000 SUSPENSION ORAL at 08:37

## 2017-06-02 RX ADMIN — ASPIRIN 81 MG CHEWABLE TABLET 81 MG: 81 TABLET CHEWABLE at 08:37

## 2017-06-02 RX ADMIN — TRAMADOL HYDROCHLORIDE 25 MG: 50 TABLET, FILM COATED ORAL at 12:20

## 2017-06-02 RX ADMIN — PANTOPRAZOLE SODIUM 40 MG: 40 GRANULE, DELAYED RELEASE ORAL at 08:36

## 2017-06-02 RX ADMIN — NYSTATIN 500000 UNITS: 100000 SUSPENSION ORAL at 18:34

## 2017-06-02 RX ADMIN — ATORVASTATIN CALCIUM 40 MG: 40 TABLET, FILM COATED ORAL at 21:41

## 2017-06-02 RX ADMIN — LEVOFLOXACIN 750 MG: 750 TABLET, FILM COATED ORAL at 12:20

## 2017-06-02 RX ADMIN — ACETAMINOPHEN 650 MG: 325 SOLUTION ORAL at 22:39

## 2017-06-02 RX ADMIN — FUROSEMIDE 40 MG: 40 TABLET ORAL at 12:26

## 2017-06-02 RX ADMIN — Medication 10 ML: at 05:33

## 2017-06-02 RX ADMIN — Medication 10 ML: at 21:40

## 2017-06-02 RX ADMIN — NYSTATIN 500000 UNITS: 100000 SUSPENSION ORAL at 13:00

## 2017-06-02 RX ADMIN — FLUOCINONIDE: 0.5 CREAM TOPICAL at 19:23

## 2017-06-02 RX ADMIN — FLUOCINONIDE: 0.5 CREAM TOPICAL at 08:39

## 2017-06-02 RX ADMIN — ACETAMINOPHEN 650 MG: 325 SOLUTION ORAL at 15:35

## 2017-06-02 NOTE — PROGRESS NOTES
Shandra Giron from [facility] informed CM that Authorization for [facility] will be changed to coordinate with anticipated d/c date of 6/3/2017. If patient does not discharge to [facility] on 6/3/2017; we will need to wait til 6/5/2017 Monday to readdress. MD informed. Verbalizing understanding.     Karine De Jesus RN CM  Ext [phone]

## 2017-06-02 NOTE — PROGRESS NOTES
Problem: Mobility Impaired (Adult and Pediatric)  Goal: *Acute Goals and Plan of Care (Insert Text)  Physical Therapy Goals  Revised 6/2/2017  1. Patient will move from supine to sit and sit to supine , scoot up and down and roll side to side in bed with minimal assistance/contact guard assist within 7 day(s). 2. Patient will transfer from bed to chair and chair to bed with supervision/set-up using the least restrictive device within 7 day(s). 3. Patient will perform sit to stand with supervision/set-up within 7 day(s). 4. Patient will ambulate with minimal assistance/contact guard assist for 20 feet with the least restrictive device within 7 day(s). Physical Therapy Goals  Revised 5/26/2017  1. Patient will move from supine to sit and sit to supine , scoot up and down and roll side to side in bed with moderate assistance within 7 day(s). Goal met 6/2/2017   2. Patient will transfer from bed to chair and chair to bed with maximal assistance using the least restrictive device within 7 day(s). Goal met 6/2/2017   3. Patient will perform sit to stand with moderate assistance within 7 day(s). Goal met 6/2/2017   4. Patient will tolerate standing x 1 minute with CGA-min A in midline in preparation for gait training within 7 days. Goal met 6/2/2017     Physical Therapy Goals  Initiated 5/20/2017  1. Patient will roll side to side in bed with maximal assistance x2 within 7 day(s). 2. Patient will follow simple, one-step commands 50% of the time within 7 day(s). 3. Patient and family will demonstrate independence with BLE and BUE AA/PROM HEP within 7 day(s). 4. Patient will tolerate sitting in chair position in bed for 1 hr within 7 day(s).    PHYSICAL THERAPY TREATMENT: WEEKLY REASSESSMENT  Patient: Jermaine Ortega [de-identified]56 y.o. male)  Date: 6/2/2017  Diagnosis: Cardiac Arrest  Respiratory failure ([facility] [address] Anoxic brain injury ([facility] [address]       Precautions:  (.)      ASSESSMENT:  Patient received resting in bed, agreeable to PT. Patient required mod A x 1 for bed mobility. Patient with intact sitting balance this date. Patient able to stand with min A x 2. Patient's HR elevated to 140's with standing although patient asymptomatic. RN aware. Patient able to ambulate to the chair x 3 feet with min A x 2 with no knee buckling noted. Patient left sitting in the chair at the conclusion of PT treatment session with bed alarm on and VSS on RA. Patient continues to be an excellent candidate for IP rehab at discharge. Patient's progression toward goals since last assessment: patient met 5/5 goals and goals readjusted. Patient continues to make excellent progress with therapy. Plan to ambulate next session. PLAN:  Goals have been updated based on progression since last assessment. Patient continues to benefit from skilled intervention to address the above impairments. Continue to follow the patient 5 days per week to address goals. Planned Interventions:  [X]              Bed Mobility Training             [X]       Neuromuscular Re-Education  [X]              Transfer Training                   [ ]       Orthotic/Prosthetic Training  [X]              Gait Training                         [ ]       Modalities  [X]              Therapeutic Exercises           [ ]       Edema Management/Control  [X]              Therapeutic Activities            [X]       Patient and Family Training/Education  [ ]              Other (comment):  Discharge Recommendations: Inpatient Rehab  Further Equipment Recommendations for Discharge: TBD       SUBJECTIVE:   Patient stated Thank you.  with PMV on      OBJECTIVE DATA SUMMARY:   Critical Behavior:  Neurologic State: Alert  Orientation Level: Disoriented to situation  Cognition: Decreased attention/concentration, Decreased command following, Impaired decision making  Safety/Judgement: Fall prevention     Strength:   Strength: Grossly decreased, non-functional (on RUE)                       Functional Mobility Training:  Bed Mobility:  Rolling: Moderate assistance;Assist x1  Supine to Sit: Moderate assistance     Scooting: Minimum assistance;Assist x1        Transfers:  Sit to Stand: Minimum assistance;Assist x2  Stand to Sit: Minimum assistance;Assist x2        Bed to Chair: Minimum assistance;Assist x2                    Balance:  Sitting: Intact; Without support  Standing: Impaired; With support  Standing - Static: Constant support;Good  Standing - Dynamic : Fair  Ambulation/Gait Training:  Distance (ft): 3 Feet (ft)  Assistive Device: Gait belt  Ambulation - Level of Assistance: Contact guard assistance;Minimal assistance        Gait Abnormalities: Decreased step clearance;Trunk sway increased        Base of Support: Widened     Speed/Melinda: Pace decreased (<100 feet/min)  Step Length: Right shortened;Left shortened                    Pain:  Pain Scale 1: Numeric (0 - 10)  Pain Intensity 1: 0              Activity Tolerance:   Improving, HR elevated to 140's with standing. Please refer to the flowsheet for vital signs taken during this treatment.   After treatment:   [X]  Patient left in no apparent distress sitting up in chair  [ ]  Patient left in no apparent distress in bed  [X]  Call bell left within reach  [X]  Nursing notified  [X]  Caregiver present  [X]  Bed alarm activated      COMMUNICATION/COLLABORATION:   The patients plan of care was discussed with: Occupational Therapist, Registered Nurse and      Imelda Dickinson, PT, DPT   Time Calculation: 38 mins

## 2017-06-02 NOTE — PROGRESS NOTES
Problem: Voice Impaired (Adult)  Goal: *Acute Goals and Plan of Care (Insert Text)  Speech path goals:  5/22/2017, re-evaluation 5/31/2017   1. Pt will tolerate PMV trials once he has #6 trach tube with no change in vital signs prn. Continue for 7 days with current trach  2. Pt will participate with swallow eval once tolerating PMV. Continue for 7 days  3. Pt will follow 2 step commands and answer mod level y/n questions with 80% acc. Goal met 5/24. He is following 3 step commands with repetition with 100% acc. 4. Pt will participate with eval of verbal expression once he is tolerating PMV. Continue for 7 days   SPEECH LANGUAGE PATHOLOGY TREATMENT  Patient: Zehra Parks (56 y.o. male)  Date: 6/2/2017  Diagnosis: Cardiac Arrest  Respiratory failure ([facility] [address] Anoxic brain injury ([facility] [address]           ASSESSMENT:  Pt seen today for Passy michael eval. He wore the valve for 30 minutes with stable VS. His voicing was adequate. Speech was slow rate and mildly imprecise. Progression toward goals:  [ ]       Improving appropriately and progressing toward goals  [ ]       Improving slowly and progressing toward goals  [ ]       Not making progress toward goals and plan of care will be adjusted       PLAN:  Patient continues to benefit from skilled intervention to address the above impairments. Continue treatment per established plan of care. Discharge Recommendations:  Inpatient Rehab       SUBJECTIVE:   Patient stated Javier Torres . OBJECTIVE:   Mental Status:  Neurologic State: Alert  Orientation Level: Disoriented to situation  Cognition: Decreased attention/concentration, Decreased command following, Impaired decision making  Perception: Cues to attend right visual field, Cues to attend to right side of body  Perseveration: No perseveration noted  Safety/Judgement: Fall prevention  Treatment & Interventions:   Motor Speech:     Intelligibility: No impairment                       Dysarthric Characteristics: Decreased rate; Imprecise     Language Comprehension and Expression:     Verbal Expression  Primary Mode of Expression: Verbal  Initiation: Impaired (%)                  Naming: Impaired  Confrontation (%): 75 %                     Response & Tolerance to Activities:   good  Pain:  Pain Scale 1: Numeric (0 - 10)  Pain Intensity 1: 0     After treatment:   [ ]       Patient left in no apparent distress sitting up in chair  [X]       Patient left in no apparent distress in bed  [X]       Call bell left within reach  [X]       Nursing notified  [ ]       Caregiver present  [ ]       Bed alarm activated      COMMUNICATION/COLLABORATION:   Educated the dtr on how to don/doff the PMV. All VS must be stable while on. Do not sleep in the valve. No swallowing eval here. Will be done at [address] [place] [address].   The patients plan of care was discussed with: Registered Nurse     Marzena Griffin, SLP  Time Calculation: 30 mins

## 2017-06-02 NOTE — PROGRESS NOTES
Bedside shift change report given to [employer] (oncoming nurse) by Micki Rankin RN (offgoing nurse). Report included the following information SBAR, Kardex, Procedure Summary, Intake/Output, MAR, Recent Results and Cardiac Rhythm Lines.

## 2017-06-02 NOTE — PROGRESS NOTES
Rounded on patient with Angelo Carlos Cultural Navigator. Spoke to patient's daughter Sarahi Qureshi and wife who were at bedside. Patient seems like he has a hard time communication due to his trachea. We asked daughter if we could help with anything to meet their cultural needs and about using the [employer]  phone. Daughter stated that they had to cut father's hair because it was too tangled and it could interfere with his care and cleanliness. Their Holiness ([employer]) does not permit to cut hair, so now they have to hide the fact from family members and patient himself that his hair was cut. Daughter stated for the staff not to mention anything in front of visitors and patient about his hair being cut. Daughter also stated that they refused to use the phone because she would like to explain everything to her mother in a culturally appropriate way and filter the information so that she doesn't get upset. The daughter stated that their mother is also sick, and she doesn't want her to get upset. The mother is informed and knows her father's condition. Daughter also stated that she's worried about privacy if an  is used. We explained that the interpreters maintain privacy and the importance of using a trained  so that her mother can express herself as well. We also informed her of language policy and that it's not always possible to have an on- available, especially last minute. Daughter stated that patient will be discharged tomorrow [facility].

## 2017-06-02 NOTE — PROGRESS NOTES
[address] [place] [address]  [phone]      Cardiology Progress Note      6/2/2017 10AM    Admit Date: 5/19/2017    Admit Diagnosis:   Cardiac Arrest  Respiratory failure ([address]    Subjective:     Ericka Myrick  is a 56 y.o. male with PMH HTN, HLD, DM, CAD s/p CABG (2014) who was transferred from OSH where he had been admitted s/p cardiac arrest 4/28. Overnight events:  -VSS   -labs stable except for low blood glucose   -see that there are plans for discharge to rehab tomorrow   -Mr. Hudson Ours opens his eyes, nods, and follows basic commands. Limited English comprehension and no family at bedside currently. No acute need for  during assessment. Patient denies his pain being improved today.         Visit Vitals    /73 (BP 1 Location: Left arm, BP Patient Position: Supine)    Pulse 79    Temp 98.1 °F (36.7 °C)    Resp 18    Ht 5' 11\" (1.803 m)    Wt 82.2 kg (181 lb 4.8 oz)    SpO2 99%    BMI 25.29 kg/m2       Current Facility-Administered Medications   Medication Dose Route Frequency    traMADol (ULTRAM) tablet 25 mg  25 mg Per G Tube Q6H PRN    levoFLOXacin (LEVAQUIN) tablet 750 mg  750 mg Oral ACB    0.9% sodium chloride infusion  75 mL/hr IntraVENous CONTINUOUS    morphine injection 1 mg  1 mg IntraVENous Q4H PRN    0.9% sodium chloride infusion  25 mL/hr IntraVENous CONTINUOUS    fentaNYL citrate (PF) injection 100 mcg  100 mcg IntraVENous Multiple    midazolam (VERSED) injection 5 mg  5 mg IntraVENous Multiple    LORazepam (ATIVAN) 2 mg/mL injection 1 mg  1 mg IntraVENous ONCE PRN    melatonin tablet 3 mg  3 mg Oral QHS    artificial saliva (MOUTH KOTE) 1 Spray  1 Spray Oral PRN    pantoprazole (PROTONIX) granules for oral suspension 40 mg  40 mg Per G Tube ACB    fluocinoNIDE (LIDEX) 0.05 % cream   Topical BID    nystatin (MYCOSTATIN) 100,000 unit/mL oral suspension 500,000 Units  500,000 Units Oral QID    0.9% sodium chloride infusion 250 mL 250 mL IntraVENous PRN    insulin lispro (HUMALOG) injection   SubCUTAneous Q6H    glucose chewable tablet 16 g  4 Tab Oral PRN    dextrose 10% infusion 125-250 mL  125-250 mL IntraVENous PRN    glucagon (GLUCAGEN) injection 1 mg  1 mg IntraMUSCular PRN    enoxaparin (LOVENOX) injection 40 mg  40 mg SubCUTAneous Q24H    sodium chloride (NS) flush 5-10 mL  5-10 mL IntraVENous Q8H    sodium chloride (NS) flush 5-10 mL  5-10 mL IntraVENous PRN    ondansetron (ZOFRAN) injection 4 mg  4 mg IntraVENous Q6H PRN    albuterol-ipratropium (DUO-NEB) 2.5 MG-0.5 MG/3 ML  3 mL Nebulization Q4H PRN    prasugrel (EFFIENT) tablet 10 mg  10 mg Per G Tube DAILY    aspirin chewable tablet 81 mg  81 mg Per G Tube DAILY    atorvastatin (LIPITOR) tablet 40 mg  40 mg Per G Tube QHS    acetaminophen (TYLENOL) solution 650 mg  650 mg Per G Tube Q4H PRN    lidocaine (LIDODERM) 5 % patch 1 Patch  1 Patch TransDERmal Q24H       Objective:      Physical Exam:  General: obese, [place] [facility] male, resting in bed in NAD. Alert and interactive. Heart: RRR, distant heart sounds   Lungs: diminished; trach; clear  Abdomen: Soft, +BS, NTND   Extremities: LE chan +DP/PT, generalized 1+ edema   Neurologic: trached; Nods head. Weaker on R side        Data Review:   No results for input(s): WBC, HGB, HCT, PLT, HGBEXT, HCTEXT, PLTEXT, HGBEXT, HCTEXT, PLTEXT in the last 72 hours. Recent Labs      05/31/17   0326   NA  136   K  3.7   CL  100   CO2  29   GLU  119*   BUN  19   CREA  0.67*   CA  8.7       No results for input(s): TROIQ, CPK, CKMB in the last 72 hours.       Intake/Output Summary (Last 24 hours) at 06/02/17 1202  Last data filed at 06/02/17 0900   Gross per 24 hour   Intake              150 ml   Output              465 ml   Net             -315 ml        Telemetry: SR; rare PVC  ECG: NSR; artifact   Echocardiogram: EF 30-35%; moderate diffuse hypokinesis; mild-mod MR; mild-mod TR                    Repeat limited: EF 50-55%; NWMA; trivial MR; CXRAY: no acute process     Assessment:     Principal Problem:    Anoxic brain injury ([address] (5/20/2017)    Active Problems:    Coronary artery disease of bypass graft with stable angina pectoris ([address] (10/5/2016)      Essential hypertension (10/5/2016)      Dyslipidemia (10/5/2016)      Type 2 diabetes mellitus without complication ([address] (90/3/7645)      H/O cardiac arrest (5/19/2017)      Overview: 4/28/17:  OSH in [place]      Respiratory failure ([address] (5/19/2017)      Pneumonia due to Serratia marcescens ([address] (7/87/2651)      Lice infestation (0/11/0630)      Cardiomyopathy ([address] (5/22/2017)      Stenosis of both internal carotid arteries (5/31/2017)      Thrombotic stroke involving left middle cerebral artery ([address] (5/31/2017)      Moderate anoxic-ischemic encephalopathy ([address] (5/31/2017)        Plan:     Cardiomyopathy: EF 30-35% now improved to 50-55% s/p cardiac arrest 4/28 and history of extensive CAD. Likely ischemic cardiomyopathy due to history and HPI vs damage from prolonged cardiac arrest.  Despite some improvements with rehab and speech, seems his quality of life is currently very poor and I am not sure how much better it is likely to get. · Continue ASA, statin, effient for CAD. · BB, ARB, and diuretic have been on  hold 3 days for hypotension: BP improved  · Patient has edema from the holding of diuretic and IVF needed while NPO, so will restart his diuretic first.  If BP stable after lasix, would restart low-dose BB. ACEi can be restarted at a later date in follow-up. · Palliative care family meeting pending to assess goals of care. · Medical management for cardiology concerns at this time. Interventions such as further caths with his history or placement of an ICD will carry a very high risk of complications. If it is determined that patient needs interventions, this can always be scheduled as an out-patient.          Brian Wheeler, NP  DNP, RN, AGACNP-BC

## 2017-06-02 NOTE — PROGRESS NOTES
[facility] Authorization - received for admission to [facility] on 6/3/2017. Authorization is valid for designated day only. Clarified with Luke Poole, Liaison at [facility]. There will be no staff available to process insurance authorizations on 6/4/2017 and insurance offices will be closed on 6/4/2017.    1451 - Date of authorization and admission to [facility] must coincide.     Lionel Jones RN CM  Ext [phone]

## 2017-06-02 NOTE — PROGRESS NOTES
Problem: Voice Impaired (Adult)  Goal: *Acute Goals and Plan of Care (Insert Text)  Speech path goals:  5/22/2017, re-evaluation 5/31/2017   1. Pt will tolerate PMV trials once he has #6 trach tube with no change in vital signs prn. Continue for 7 days with current trach  2. Pt will participate with swallow eval once tolerating PMV. Continue for 7 days  3. Pt will follow 2 step commands and answer mod level y/n questions with 80% acc. Goal met 5/24. He is following 3 step commands with repetition with 100% acc. 4. Pt will participate with eval of verbal expression once he is tolerating PMV. Continue for 7 days   SPEECH LANGUAGE PATHOLOGY EVALUATION  Patient: Jeremy Michelle [de-identified]56 y.o. male)  Date: 6/2/2017  Primary Diagnosis: Cardiac Arrest  Respiratory failure ([facility]        Precautions:   Contact      ASSESSMENT :  Pt seen today for Passy michael eval. He wore the valve for 30 minutes with stable VS. His voicing was adequate. Speech was slow rate and mildly imprecise. Basic naming and stating functions of objects mild to mod impaired. He produced phrase level utterances. Mild dysarthria with imprecise artic and slow rate of speech. .  Patient will benefit from skilled intervention to address the above impairments. Patients rehabilitation potential is considered to be Good  Factors which may influence rehabilitation potential include:   [ ]              None noted  [ ]              Mental ability/status  [ ]              Medical condition  [ ]              Home/family situation and support systems  [ ]              Safety awareness  [ ]              Pain tolerance/management  [ ]              Other:        PLAN :  Recommendations and Planned Interventions:  Wear PMV prn.    Speaking Valve Placement:  [ ]  SLP only  [ ]  SLP/RT only  [ ]  SLP/RT/RN only  [X]  SLP/RT/RN and patient/family  Recommended Speaking Valve Wearing Schedule:     [X]  As tolerated  Frequency/Duration: Patient will be followed by speech-language pathology 3 times a week to address goals. Discharge Recommendations: Inpatient Rehab       SUBJECTIVE:   Patient stated Mehreen Roland. OBJECTIVE:       Past Medical History:   Diagnosis Date    Anoxic [facility] ([facility] [facility]       following cardiac arrest; MRI shows involvement BG and temporal lobes    Aspiration pneumonia (HCC)       during cardiac arrest intubation    Bradycardia      CAD (coronary artery disease)      Cardiac arrest ([facility] [address]       no intervention by [facility] cardiology. [address] [facility] [facility]    Diabetes [facility])      Essential hypertension      Hyperlipidemia       Past Surgical History:   Procedure Laterality Date    HX CORONARY ARTERY BYPASS GRAFT        HX CORONARY STENT PLACEMENT        HX HEART CATHETERIZATION        HX OTHER SURGICAL   05/12/2017     PEG and trach    [facility] TUBE CHANGE   5/25/2017           Prior Level of Function/Home Situation:        Mental Status  Neurologic State: Alert  Orientation Level: Disoriented to situation     Airway Clearance  Suction: Deep  Suction Device: Suction catheter  Suction Catheter Size: 14 Fr  Sputum Method Obtained: Tracheal  Sputum Amount: Moderate  Sputum Color/Odor: White;Yellow  Sputum Consistency: Tenacious        Oxygen Therapy  O2 Sat (%): 99 %  Airway Clearance  Suction: Deep  Suction Device: Suction catheter  Suction Catheter Size: 14 Fr  Sputum Method Obtained: Tracheal  Sputum Amount: Moderate  Sputum Color/Odor: White;Yellow  Sputum Consistency: Tenacious     NOMS:  The NOMS functional outcome measure was used to quantify this patient's level of voice  impairment. Based on the NOMS, the patient was determined to be at level 7 for voice function. G Codes: In compliance with CMSs Claims Based Outcome Reporting, the following G-code set was chosen for this patient based the use of the NOMS functional outcome to quantify this patient's level of swallowing impairment.      Using the NOMS, the patient was determined to be at level 7 for swallow function which correlates with the CH= 0% level of severity. Based on the objective assessment provided within this note, the current, goal, and discharge g-codes are as follows:     Voice   Voice Current Status CH= 0%   Voice Goal Status CH= 0%   Voice D/C Status CH= 0%         NOMS Voice:  Level 1 (CN): Unable to use voice to communicate. Needs AAC. Level 2 (CM): Voice not functional most of time. Level 3 (CL): Voice functional but distracting & interferes with communication. Participation in activities is limited most of time. Level 4 (CK): Voice is functional and sometimes distracting. High vocal demand consistently impacted. Level 5 (CJ): Voice occasionally sounds normal with self monitoring; high vocal demand occasionally impacted. Level 6 (CI): Voice sounds normal across most situations. High vocal demand rarely impacted. Level 7 ([address] [place] [address]): Voice is normal and self monitoring is effective but only occasionally needed. SAJAN. (2003). National Outcomes Measurement System (NOMS): Adult Speech-Language Pathology User's Guide. Pain:  Pain Scale 1: Numeric (0 - 10)  Pain Intensity 1: 0     After treatment:   [ ]              Patient left in no apparent distress sitting up in chair  [X]              Patient left in no apparent distress in bed  [X]              Call bell left within reach  [X]              Nursing notified  [ ]              Caregiver present  [ ]              Bed alarm activated  [ ]                 Patient left with Passy-Felt valve on, nursing staff or respiratory therapist notified      COMMUNICATION/EDUCATION:   The patients plan of care including recommendations, planned interventions, and recommended diet changes were discussed with: Registered Nurse.   Education was provided to patient, family, and staff regarding speaking valve placement, wearing schedule, safety precautions including cuff deflation and removal when sleeping, and care/cleaning guidelines. [X] Posted safety precautions in patient's room. [X] Patient/family have participated as able in goal setting and plan of care. [ ] Patient/family agree to work toward stated goals and plan of care. [ ] Patient understands intent and goals of therapy, but is neutral about his/her participation. [ ] Patient is unable to participate in goal setting and plan of care.      Thank you for this referral.  Patrizia Murillo, SLP  Time Calculation: 30 mins

## 2017-06-02 NOTE — PROGRESS NOTES
Nutrition Assessment:    INTERVENTIONS/RECOMMENDATIONS:   Enteral/Parenteral Nutrition: Initiate enteral nutrition: Recommend restarting TwoCal HN @ 15 mL/hr and advancing as tolerated by 10 mL q 12 hours to a goal rate of 45 mL/hr + 150 mL water flushes q 4 hours (provides 2160 kcal, 90g protein, 1656 mL water)  Consider increasing water flushes to 150 mL q 3 hours to better meet fluid needs at discharge (1956 mL water total)    Continue to monitor routine weights and adjust TF rate as needed  If tolerates continuous TF at goal rate can consider a transition to bolus feeding schedule    ASSESSMENT:   Chart reviewed; Patient's PEG was dislodged into subcutaneous space and has now been replaced. Previously experiencing vomiting. Plans to restart TF slowly. Recommendations provided above. Previously receiving TF at rate of 50 mL/hr; Weights with a downward trend noted (5/27 206#, 5/28 188#, 5/29 181#). No weight x 4 days. Nutritional needs re-estimated and recommended rate adjusted to prevent overfeeding. Recommendations above will meet 99% of estimated kcal needs and >100% protein needs. Will monitor tolerance and routine weights to make further recommendations as needed. Noted plans for discharge tomorrow to [facility]. Diet Order: NPO  % Eaten:  No data found. Pertinent Medications: [x] Reviewed []Other: Atorvastatin, humalog, melatonin, Protonix   Pertinent Labs: [x]Reviewed  []Other:  Food Allergies: [x]None []Other:     Last BM: 6/1   []Active     []Hyperactive  [x]Hypoactive       [] Absent  BS  Skin:    [x] Intact   [] Incision  [] Breakdown   []Edema   []Other:    Anthropometrics: Height: 5' 11\" (180.3 cm) Weight: 82.2 kg (181 lb 4.8 oz)    IBW (%IBW):   ( ) UBW (%UBW):   (  %)    BMI: Body mass index is 25.29 kg/(m^2).     This BMI is indicative of:  []Underweight   []Normal   [x]Overweight   [] Obesity   [] Extreme Obesity (BMI>40)  Last Weight Metrics:  Weight Loss Metrics 5/29/2017 1/4/2017 10/5/2016 Today's Wt 181 lb 4.8 oz 208 lb 204 lb   BMI 25.29 kg/m2 29.01 kg/m2 28.45 kg/m2       Estimated Nutrition Needs (Based on): 2180 Kcals/day (BMR (1677) x 1.3) , 82 g (1.0 g/kg bw) Protein  Carbohydrate:  At Least 130 g/day  Fluids: 2200 mL/day     Pt expected to meet estimated nutrient needs: [x]Yes []No    NUTRITION DIAGNOSES:   Problem:  Swallowing difficulty      Etiology: related to dysphagia     Signs/Symptoms: as evidenced by PEG dependent      NUTRITION INTERVENTIONS:    Enteral/Parenteral Nutrition: Initiate enteral nutrition                GOAL:   TF advanced to goal rate and tolerated with residual <250 mL without vomiting next 2-4 days    NUTRITION MONITORING AND EVALUATION   Food/Nutrient Intake Outcomes: Enteral/parenteral nutrition intake  Physical Signs/Symptoms Outcomes: Weight/weight change, Electrolyte and renal profile, GI profile, Glucose profile    Previous Goal Met:   [] Met              [x] Progressing Towards Goal              [] Not Progressing Towards Goal   Previous Recommendations:   [x] Implemented          [] Not Implemented          [] Not Applicable    LEARNING NEEDS (Diet, Food/Nutrient-Drug Interaction):    [x] None Identified   [] Identified and Education Provided/Documented   [] Identified and Pt declined/was not appropriate     Cultural, Taoist, OR Ethnic Dietary Needs:    [x] None Identified   [] Identified and Addressed     [x] Interdisciplinary Care Plan Reviewed/Documented    [x] Discharge Planning: Twocal @ 45 mL/hr + 150 mL water flushes q 3 hours (2160 kcal, 90g protein, 1956 mL water)   [] Participated in Interdisciplinary Rounds    NUTRITION RISK:    [x] High              [] Moderate           []  Low  []  Minimal/Uncompromised      Surinder Noun  Pager [phone]              Weekend Pager [phone]

## 2017-06-02 NOTE — PROGRESS NOTES
Chart reviewed  No acute events  VSS  Adequate O2 sats  Trach changed to #6 uncuffed fenestrated 6/1  Nothing to add  Will be following as needed   Zoie Good MD

## 2017-06-02 NOTE — CONSULTS
Palliative Medicine Consult  [place]: [phone] ([phone])    Patient Name: Liza Ren  YOB: 1960    Date of Initial Consult: 5/30/17  Reason for Consult: Care Decisions  Requesting Provider: Mark Rasmussen  Primary Care Physician: Lillian Romero MD      SUMMARY:   Liza Ren is a 56 y.o. with a past history of CAD, s/p CABG 2014, stents, DM, hyperlipidemia, out of hospital cardiac arrest on 4/28/17,  who was transferred on 5/19/2017 from [facility] due to insurance reasons, with a diagnosis of cardiac arrest. Current medical issues leading to Palliative Medicine involvement include: cardiac arrest, MRI showed abnormal changes in basal ganglia and bilateral temporal lobes, c/w anoxic brain injury, treated for aspiration PNA, Peg/trached on 5/12/17. PALLIATIVE DIAGNOSES:   1. Anoxic brain injury following cardiac arrest  2. S/p out of hospital cardiac arrest 4/28/17  3. Diarrhea   4. Vomit   5. [place] right ear is \"the better\" ear  6. Dysphagia   7. S/p trach on 28% TC  8. S/p peg        PLAN:   1. Per Palliative FLEX Fragoso's meeting with wife and daughter yesterday, wife wants to use daughter as the , however, daughter was not translating much of the discussion. They told Fern RiveraArchie that they would speak with pt's son, and call us today to set up a time to meet, however, it is now 7pm and no one called. I went to the room and wife was there alone, and she asked me to call the daughter on the phone. 2. Spoke with daughter Nuno Barnard, who stated son was not able to come today, so they were going to call on Monday to meet then. I explained that pt was going to be transferred to [facility] tomorrow, however, if he was still here on Monday, we could meet. We talked about what their plan was in the future, in the event that pt does not make significant recovery,ie, able to do some self care, discussed SNF or home.   Nuno Barnard said that they would not place pt in a SNF because a family member would have to stay with him 24/7 because of the language barrier. They were hoping that there was financial assistance that would pay for someone to come to the home and care for pt. We discussed Medicaid, having to qualify; she reports having forms from [employer], but not completing them yet. I offered my phone number for future, when it is more clear what his residual disabilities are, however, she was not interested. 3. Initial consult note routed to primary continuity provider  4. Communicated plan of care with: Palliative IDT, RN     GOALS OF CARE / TREATMENT PREFERENCES:   [====Goals of Care====]  GOALS OF CARE:  Patient / health care proxy stated goals:     Rehab, then home       TREATMENT PREFERENCES:   Code Status: Full Code    Advance Care Planning:  No flowsheet data found. Other:    The palliative care team has discussed with patient / health care proxy about goals of care / treatment preferences for patient.  [====Goals of Care====]         HISTORY:     History obtained from: chart    CHIEF COMPLAINT: anoxic brain injury    HPI/SUBJECTIVE:    The patient is:   [] Verbal and participatory  [x] Non-participatory due to: anoxic brain injury    Transferred from [facility]: on 4/28/17, pt was in [place] at a wedding, during which he experienced nausea and CP. While in a car, he became unresponsive and EMS was activated. EMS found pt pulseless on the side of the road, resuscitation started by EMS, ROSC and taken to [address], where CPR was resumed for asystole, pt vomited during intubation and was reintubated, after another 10 min of CPR, ROSC and placed in hypothermia protocol. During CCU stay, required pressors, abx. Followed by cardiology and neurology: felt not to have ACS, LVEF 75% on pressors with minimal enzymes. Head CT showed anoxic injury. 5/14/17 he was Peg and trached.  On 5/17/17 he was transferred to [facility] where he had had a CABG x4 in 2014 by Dr. Megan Hernandez, who noted complex coronary anatomy and stenosis of one of his grafts. It was suggested pt have another CABG, however, family declined and he has since seen a cardiologist in [place]. It became apparent that pt's insurance would only provide coverage in a itie 6. 5/2: awake and alert, no eye contact    Clinical Pain Assessment (nonverbal scale for severity on nonverbal patients): none  [++++ Clinical Pain Assessment++++]  [++++Pain Severity++++]: Pain: 0  [++++Pain Character++++]:   [++++Pain Duration++++]:   [++++Pain Effect++++]:   [++++Pain Factors++++]:   [++++Pain Frequency++++]:   [++++Pain Location++++]:   [++++ Clinical Pain Assessment++++]    Adult Non-Verbal Pain Assessment    Face  [x] 0   No particular expression or smile  [] 1   Occasional grimace, tearing, frowning, wrinkled forehead  [] 2   Frequent grimace, tearing, frowning, wrinkled forehead    Activity (movement)  [x] 0   Lying quietly, normal position  [] 1   Seeking attention through movement or slow, cautious movement  [] 2   Restless, excessive activity and/or withdrawal reflexes    Guarding  [x] 0   Lying quietly, no positioning of hands over areas of body  [] 1   Splinting areas of the body, tense  [] 2   Rigid, stiff    Physiology (vital signs)  [x] 0   Stable vital signs  [] 1   Change in any of the following: SBP > 20mm Hg; HR > 20/minute  [] 2   Change in any of the following: SBP > 30mm Hg; HR > 25/minute    Respiratory  [x] 0   Baseline RR/SpO2, compliant with ventilator  [] 1   RR > 10 above baseline, or 5% drop SpO2, mild asynchrony with ventilator  [] 2   RR > 20 above baseline, or 10% drop SpO2, asynchrony with ventilator    Total Non-Verbal Pain Score: 0     FUNCTIONAL ASSESSMENT:     Palliative Performance Scale (PPS):  PPS: 20       PSYCHOSOCIAL/SPIRITUAL SCREENING:     Advance Care Planning:  No flowsheet data found.      Any spiritual / Gnosticist concerns:  [] Yes /  [x] No    Caregiver Burnout:  [] Yes /  [x] No / [] No Caregiver Present      Anticipatory grief assessment:   [] Normal  / [] Maladaptive       ESAS Anxiety: Anxiety: 0    ESAS Depression:          REVIEW OF SYSTEMS:     Positive and pertinent negative findings in ROS are noted above in HPI. The following systems were [] reviewed / [x] unable to be reviewed as noted in HPI  Other findings are noted below. Systems: constitutional, ears/nose/mouth/throat, respiratory, gastrointestinal, genitourinary, musculoskeletal, integumentary, neurologic, psychiatric, endocrine. Positive findings noted below. Modified ESAS Completed by: provider     Drowsiness: 0     Pain: 0   Anxiety: 0       Dyspnea: 0     Constipation: No              PHYSICAL EXAM:     From RN flowsheet:  Wt Readings from Last 3 Encounters:   05/29/17 181 lb 4.8 oz (82.2 kg)   05/27/17 206 lb (93.4 kg)   01/04/17 208 lb (94.3 kg)     Blood pressure 134/74, pulse 81, temperature 98.5 °F (36.9 °C), resp. rate 18, height 5' 11\" (1.803 m), weight 181 lb 4.8 oz (82.2 kg), SpO2 99 %. Pain Scale 1: Numeric (0 - 10)  Pain Intensity 1: 0     Pain Location 1: Abdomen  Pain Orientation 1: Other (comment) (Family insists that pt is having pain.  Language barriers)  Pain Description 1: Aching  Pain Intervention(s) 1: Medication (see MAR)  Last bowel movement, if known:     Constitutional: WD, WN, NAD, awake, alert, nonverbal  Eyes: pupils equal, anicteric, no eye contact today  ENMT: no nasal discharge, moist mucous membranes, trach in place  Cardiovascular: regular rhythm, distal pulses intact  Respiratory: breathing not labored, symmetric  Gastrointestinal: soft non-tender, +bowel sounds, PEG  Musculoskeletal: no deformity, no tenderness to palpation  Skin: warm, dry  Neurologic: not following commands, not moving all extremities  Psychiatric: unable to assess due to pt factors     HISTORY:     Principal Problem:    Anoxic brain injury ([facility] (5/20/2017)    Active Problems:    Coronary artery disease of bypass graft with stable angina pectoris ([address] (10/5/2016)      Essential hypertension (10/5/2016)      Dyslipidemia (10/5/2016)      Type 2 diabetes mellitus without complication ([address] (23/6/3885)      H/O cardiac arrest (5/19/2017)      Overview: 4/28/17:  OSH in [place]      Respiratory failure ([address] (5/19/2017)      Pneumonia due to Serratia marcescens ([address] (4/48/7772)      Lice infestation (0/69/6998)      Cardiomyopathy ([address] (5/22/2017)      Stenosis of both internal carotid arteries (5/31/2017)      Thrombotic stroke involving left middle cerebral artery ([address] (5/31/2017)      Moderate anoxic-ischemic encephalopathy ([address] (5/31/2017)      Past Medical History:   Diagnosis Date    Anoxic brain injury ([address]     following cardiac arrest; MRI shows involvement BG and temporal lobes    Aspiration pneumonia ([address]     during cardiac arrest intubation    Bradycardia     CAD (coronary artery disease)     Cardiac arrest ([address]     no intervention by [place] cardiology. [address] [facility]    Diabetes [facility])     Essential hypertension     Hyperlipidemia       Past Surgical History:   Procedure Laterality Date    HX CORONARY ARTERY BYPASS GRAFT      HX CORONARY STENT PLACEMENT      HX HEART CATHETERIZATION      HX OTHER SURGICAL  05/12/2017    PEG and trach    TRACH TUBE CHANGE  5/25/2017           Family History   Problem Relation Age of Onset    Family history unknown: Yes      History reviewed, no pertinent family history.   Social History   Substance Use Topics    Smoking status: Never Smoker    Smokeless tobacco: Not on file    Alcohol use Not on file     Allergies   Allergen Reactions    Cymbalta [Duloxetine] Rash      Current Facility-Administered Medications   Medication Dose Route Frequency    traMADol (ULTRAM) tablet 25 mg  25 mg Per G Tube Q6H PRN    levoFLOXacin (LEVAQUIN) tablet 750 mg  750 mg Oral ACB    furosemide (LASIX) tablet 40 mg  40 mg Oral DAILY    0.9% sodium chloride infusion  75 mL/hr IntraVENous CONTINUOUS    morphine injection 1 mg  1 mg IntraVENous Q4H PRN    0.9% sodium chloride infusion  25 mL/hr IntraVENous CONTINUOUS    fentaNYL citrate (PF) injection 100 mcg  100 mcg IntraVENous Multiple    midazolam (VERSED) injection 5 mg  5 mg IntraVENous Multiple    LORazepam (ATIVAN) 2 mg/mL injection 1 mg  1 mg IntraVENous ONCE PRN    melatonin tablet 3 mg  3 mg Oral QHS    artificial saliva (MOUTH KOTE) 1 Spray  1 Spray Oral PRN    pantoprazole (PROTONIX) granules for oral suspension 40 mg  40 mg Per G Tube ACB    fluocinoNIDE (LIDEX) 0.05 % cream   Topical BID    nystatin (MYCOSTATIN) 100,000 unit/mL oral suspension 500,000 Units  500,000 Units Oral QID    0.9% sodium chloride infusion 250 mL  250 mL IntraVENous PRN    insulin lispro (HUMALOG) injection   SubCUTAneous Q6H    glucose chewable tablet 16 g  4 Tab Oral PRN    dextrose 10% infusion 125-250 mL  125-250 mL IntraVENous PRN    glucagon (GLUCAGEN) injection 1 mg  1 mg IntraMUSCular PRN    enoxaparin (LOVENOX) injection 40 mg  40 mg SubCUTAneous Q24H    sodium chloride (NS) flush 5-10 mL  5-10 mL IntraVENous Q8H    sodium chloride (NS) flush 5-10 mL  5-10 mL IntraVENous PRN    ondansetron (ZOFRAN) injection 4 mg  4 mg IntraVENous Q6H PRN    albuterol-ipratropium (DUO-NEB) 2.5 MG-0.5 MG/3 ML  3 mL Nebulization Q4H PRN    prasugrel (EFFIENT) tablet 10 mg  10 mg Per G Tube DAILY    aspirin chewable tablet 81 mg  81 mg Per G Tube DAILY    atorvastatin (LIPITOR) tablet 40 mg  40 mg Per G Tube QHS    acetaminophen (TYLENOL) solution 650 mg  650 mg Per G Tube Q4H PRN    lidocaine (LIDODERM) 5 % patch 1 Patch  1 Patch TransDERmal Q24H          LAB AND IMAGING FINDINGS:     Lab Results   Component Value Date/Time    WBC 11.6 05/30/2017 03:37 AM    HGB 11.5 05/30/2017 03:37 AM    PLATELET 363 71/32/7468 03:37 AM     Lab Results   Component Value Date/Time    Sodium 136 05/31/2017 03:26 AM    Potassium 3.7 05/31/2017 03:26 AM Chloride 100 05/31/2017 03:26 AM    CO2 29 05/31/2017 03:26 AM    BUN 19 05/31/2017 03:26 AM    Creatinine 0.67 05/31/2017 03:26 AM    Calcium 8.7 05/31/2017 03:26 AM    Magnesium 2.2 05/24/2017 03:04 AM    Phosphorus 3.1 05/24/2017 03:04 AM      Lab Results   Component Value Date/Time    AST (SGOT) 42 05/27/2017 03:20 AM    Alk. phosphatase 198 05/27/2017 03:20 AM    Protein, total 8.5 05/27/2017 03:20 AM    Albumin 2.3 05/27/2017 03:20 AM    Globulin 6.2 05/27/2017 03:20 AM     No results found for: INR, PTMR, PTP, PT1, PT2, APTT   Lab Results   Component Value Date/Time    Iron 34 05/20/2017 01:14 AM    TIBC 229 05/20/2017 01:14 AM    Iron % saturation 15 05/20/2017 01:14 AM    Ferritin 191 05/20/2017 01:14 AM      No results found for: PH, PCO2, PO2  No components found for: [place]   Lab Results   Component Value Date/Time    CK 52 05/21/2017 07:10 PM    CK - MB 1.5 05/21/2017 07:10 PM                Total time: 55 min  Counseling / coordination time, spent as noted above: 45 min  > 50% counseling / coordination?: no    Prolonged service was provided for  []30 min   []75 min in face to face time in the presence of the patient, spent as noted above. Time Start:   Time End:   Note: this can only be billed with 98295 (initial) or 97824 (follow up). If multiple start / stop times, list each separately.

## 2017-06-02 NOTE — PROGRESS NOTES
Plan to discharge to [facility] on 6/3 if ok with all consultants  Need to review the meds from cardiac standpoint, as many of the meds were on hold secondary to hypotension, would start low dose coreg as tolerated, holding ace and diuretic for now if ok with cardiology.   Advance peg feeds slowly as tolerated  Need to see whether isolation can be discontinued, as lice infestation treated  Change the antibiotics via peg to complete the course

## 2017-06-02 NOTE — PROGRESS NOTES
Hospitalist Progress Note    NAME: Della Londono   :  1960   MRN:  [id number]       Interim Hospital Summary: 56 y.o. male whom presented on 2017 with cardiomyopathy, s/p trach, S/p PEG at [facility] transferred to [facility] for 1 day then transferred to ED [facility] due to insurance reasons. Systolic chf ef 56% with extensive CAD ( CABG) and cardiac arrest   Anoxic brain injury  S/p cardiac arrest  Diabetes  Hypertension  Anemia  Lice infestation treated. plan to discharge to [facility] on 6/3 if ok with all consultants  Need to review the meds from cardiac standpoint, as many of the meds were on hold secondary to hypotension, would start low dose coreg as tolerated, holding ace and diuretic for now if ok with cardiology. Advance peg feeds slowly as tolerated  Isolation discontinued, cleared by infection control  Complete levo via peg  Advance the feeds as tolerated  Urinary retention- failed voiding trials, need to discharged with pinto    Clearly daughter not realistic. Palliative medicine to discuss goals of care   Coreg, valsartan, lasix and metformin on hold for lorie, need to resume as appropriate           Assessment / Plan:    Anoxic brain injury following cardiac arrest in [place]  >1 month ago  --outside MRI brain with abnormalities in basal ganglia and bilateral temporal lobes  --has right>left sided weakness  Following commands given in his native [place] language from family members.   --s/p PEG and trach   Seen by neurology here-- egg slow  MRI ordered by neurology initally as family insisting, and it would not , now family leaning not having it     S/p out of hospital cardiac arrest 17, POA  CAD s/p CABG  x 3v at HD  Hx cardiac stents (twice before CABG and once after)  HTN  Severe Anemia POA - Hb improved to 11.6 s/p 2 units PRBCs  --hospitalized at [address] [facility] [facility] in [place], [place]; transferred to [facility]  and to [facility]   --per Dr. Adrianne Lizama had stenosis of circumflex anastomosis graft, PTCA -->restenosis -->stent-->restenosis and redo bypass recommended but patient seeked second opinion with Dr. Miller Giles in [place] and reportedly told redo bypass not needed. Cardiology consulted & following-   Echo showing diffuse Hypokinesis with EF 30-35% (new?)  S/p Transfuse 2 units PRBC  Coreg, valsartan and lasix on hold with hypotension  Need to see whether we can resume coreg at the low doses      Per discussions with cardiology-- very difficult situation with high risk of recurrent CAD, very high risk of recurrent infections, placing a stent or defibrillator could actually hasten the demise if either were to get infected. Palliative medicine to meet with the family as well  Needs GOC discussion      Hx aspiration PNA during intubation  For cardiac  arrest  Chronic respiratory failure, s/p trach  -- treated with invanz in [place]. CXR clear here. Sputum Cx at [facility] growing Pan sensitive Serratia Marcescens     Tolerating cuffless trach  On empiric levo, also covering stacy peg infection   Sputum/secretion & OSH sputum Cx growing Serratia sp & Sputum Cx here growing Gram neg Rods       DM 2  --SSI for now    Lice infestation  S/p treatment on 5/21      Urinary retention: will be discharging with pinto  Urology as out patient      Abdominal pain  CT abd with contrast via PEG -- peg  Changed,  Now slowly advance the diet as tolerated  Gi following     Code: full  DVT prophylaxis: lovenox  Surrogate decision maker: Wife \"Savanna\" Gabriel De Jesus [phone], 2 son & daughter (rina) # [phone]      Body mass index is 25.29 kg/(m^2). Recommended Disposition: LTAC versus SNF- to be determined     Subjective:     Chief Complaint / Reason for Physician Visit F/U Anoxic brain injury, CHF  Pt alert    Discussed with RN events overnight.      Review of Systems:  Symptom Y/N Comments  Symptom Y/N Comments   Fever/Chills    Chest Pain     Poor Appetite    Edema     Cough    Abdominal Pain     Sputum Joint Pain     SOB/HAM    Pruritis/Rash     Nausea/vomit    Tolerating PT/OT     Diarrhea    Tolerating Diet     Constipation    Other       Could NOT obtain due to: Non verbal     Objective:     VITALS:   Last 24hrs VS reviewed since prior progress note. Most recent are:  Patient Vitals for the past 24 hrs:   Temp Pulse Resp BP SpO2   06/02/17 0832 98.1 °F (36.7 °C) 79 18 128/73 99 %   06/02/17 0425 98.3 °F (36.8 °C) 88 18 124/78 98 %   06/01/17 2357 98.1 °F (36.7 °C) 74 16 - 99 %   06/01/17 2124 - - - - 99 %   06/01/17 2100 97.9 °F (36.6 °C) 83 15 124/72 100 %   06/01/17 1740 - - 14 122/64 99 %   06/01/17 1735 - 81 15 120/68 100 %   06/01/17 1730 - 85 14 108/58 100 %   06/01/17 1725 - 81 15 118/70 100 %   06/01/17 1720 - 81 16 130/63 100 %   06/01/17 1708 - 86 16 128/69 100 %   06/01/17 1605 98.7 °F (37.1 °C) 87 18 125/71 99 %   06/01/17 1259 - - - - 99 %   06/01/17 1256 98 °F (36.7 °C) 91 20 120/69 100 %       Intake/Output Summary (Last 24 hours) at 06/02/17 1038  Last data filed at 06/01/17 2100   Gross per 24 hour   Intake                0 ml   Output              465 ml   Net             -465 ml        PHYSICAL EXAM:  General: WD, WN. Alert, cooperative, no acute distress     EENT:  EOMI. Anicteric sclerae. MMM,trach collar  Resp:  B/l air entry. No accessory muscle use  CV:  Regular  rhythm,  No edema  GI:  Soft, Non distended, Non tender.  +Bowel sounds,peg tube +  Neurologic:  Alert and oriented X 2,   Psych:    Not anxious nor agitated  Skin:  No rashes.   No jaundice    Reviewed most current lab test results and cultures  YES  Reviewed most current radiology test results   YES  Review and summation of old records today    NO  Reviewed patient's current orders and MAR    YES  PMH/SH reviewed - no change compared to H&P  ________________________________________________________________________  Care Plan discussed with:    Comments   Patient     Family  y    RN y    Care Manager     Consultant  y Cardiology, neurology, gi in the last few days                     Multidiciplinary team rounds were held today with , nursing, pharmacist and clinical coordinator. Patient's plan of care was discussed; medications were reviewed and discharge planning was addressed. ________________________________________________________________________  Total NON critical care TIME:  25 Minutes    Total CRITICAL CARE TIME Spent:   Minutes non procedure based      Comments   >50% of visit spent in counseling and coordination of care     ________________________________________________________________________  Frank Negrete MD     Procedures: see electronic medical records for all procedures/Xrays and details which were not copied into this note but were reviewed prior to creation of Plan. LABS:  I reviewed today's most current labs and imaging studies. Pertinent labs include:  No results for input(s): WBC, HGB, HCT, PLT, HGBEXT, HCTEXT, PLTEXT, HGBEXT, HCTEXT, PLTEXT in the last 72 hours.   Recent Labs      05/31/17   0326   NA  136   K  3.7   CL  100   CO2  29   GLU  119*   BUN  19   CREA  0.67*   CA  8.7       Signed: Frank Negrete MD

## 2017-06-02 NOTE — WOUND CARE
Wound care consult from staff nurse for \"stacy-PEG wound, erosion\". Patient is a 57 y/o [place] male with anoxic brain injury following cardiac arrest out of hospital 4/28/17, CAD s/p Cabg 2014, HTN, chronic respiratory failure, s/p trach and PEG 5/12/2017, DM 2, transferred from [facility] due to insurance reason on 5/19/17. Peg tube came out and was replaced 6/1/17 in radiology. Current Peg tube seems to be anchored per-peg by x4 \"buttons\". There is some coffee ground stacy-peg stoma drainage mixed with saliva type drainage consistent with intervention done yesterday. Recommend:  Per-PEG stoma care: q 8hrs, remove split sponge, cleanse with NS moistened q-tips and strips of 4x4's under bumper, dry with dry q-tip and dry 4x4 strips, place a small amount of Sensicare zinc cream on Q-tip and apply to skin under bumper. Replace split drainage sponge and dry 4x4, secure with paper tape.   Gonzalo Canada RN, CWON, zone ph# [phone]

## 2017-06-02 NOTE — PROGRESS NOTES
GI Progress Note Holden Dominguez for Luis Enrique Doty ICH:[id number] ACU:[id number]   ATTG: Dr. Gaetano Barrett  PCP: Shanna Beal MD  Date/Time:  6/2/2017 9:45 AM   Assessment:   · PEG tube site infection, dislodged -- now corrected  · Cardiac arrest and resulting anoxic brain injury      Plan:   · Continue abx  · Appreciate surg/IR help in getting tube re-placed/adjusted. · SLP  · Resume TF slowly     Subjective:   Discussed with patient and his daughter, at bedside. Much more comfortable now. Interactive. Clear to her he's much better. Review of Systems:  Symptom Y/N Comments  Symptom Y/N Comments   Fever/Chills n   Chest Pain n    Cough y   Headaches n    Sputum y   Joint Pain n    SOB/HAM n   Pruritis/Rash n    Tolerating Diet n  TF held  Other       Could NOT obtain due to:      Objective:   VITALS:   Last 24hrs VS reviewed since prior progress note. Most recent are:  Visit Vitals    /73 (BP 1 Location: Left arm, BP Patient Position: Supine)    Pulse 79    Temp 98.1 °F (36.7 °C)    Resp 18    Ht 5' 11\" (1.803 m)    Wt 82.2 kg (181 lb 4.8 oz)    SpO2 99%    BMI 25.29 kg/m2       Intake/Output Summary (Last 24 hours) at 06/02/17 0836  Last data filed at 06/01/17 2100   Gross per 24 hour   Intake                0 ml   Output              465 ml   Net             -465 ml     PHYSICAL EXAM:  General: WD, WN. Trache'd, lying in bed. Cooperative somewhat    HEENT: Tracheostomy with ATC  Lungs:  Coarse bilaterally, productive cough  Heart:  Regular  rhythm,  No murmur (), No Rubs, No Gallops  Abdomen: Soft, Non distended.  +Bowel sounds, no HSM    PEG tube site, surgical PEG with erythema, bandage soaked with sero-sanguinous discharge, changed. Tenderness around the site resolved. Neurologic:  Awakens to voice, responds to questions but I can't understand him - daughter says with PM valve he speaks  Psych:   Fair insight. Not anxious nor agitated.     Lab and Radiology Data Reviewed: (see below)    Medications Reviewed: (see below)  PMH/SH reviewed - no change compared to H&P  ________________________________________________________________________  Total time spent with patient: 15 minutes ________________________________________________________________________  Care Plan discussed with:  Patient y   Family  y   RN y              Consultant:       Claudia Adams MD     Procedures: see electronic medical records for all procedures/Xrays and details which were not copied into this note but were reviewed prior to creation of Plan. LABS:  No results for input(s): WBC, HGB, HCT, PLT, HGBEXT, HCTEXT, PLTEXT, HGBEXT, HCTEXT, PLTEXT in the last 72 hours. Recent Labs      05/31/17   0326   NA  136   K  3.7   CL  100   CO2  29   BUN  19   CREA  0.67*   GLU  119*   CA  8.7     No results for input(s): SGOT, GPT, AP, TBIL, TP, ALB, GLOB, GGT, AML, LPSE in the last 72 hours. No lab exists for component: AMYP, HLPSE  No results for input(s): INR, PTP, APTT in the last 72 hours. No lab exists for component: INREXT, INREXT   No results for input(s): FE, TIBC, PSAT, FERR in the last 72 hours. No results found for: FOL, RBCF  No results for input(s): PH, PCO2, PO2 in the last 72 hours. No results for input(s): CPK, CKMB in the last 72 hours.     No lab exists for component: TROPONINI  No results found for: COLOR, APPRN, SPGRU, REFSG, JOHANNE, PROTU, GLUCU, KETU, BILU, UROU, DILAN, LEUKU, GLUKE, EPSU, BACTU, WBCU, RBCU, CASTS, UCRY    MEDICATIONS:  Current Facility-Administered Medications   Medication Dose Route Frequency    0.9% sodium chloride infusion  75 mL/hr IntraVENous CONTINUOUS    morphine injection 1 mg  1 mg IntraVENous Q4H PRN    0.9% sodium chloride infusion  25 mL/hr IntraVENous CONTINUOUS    fentaNYL citrate (PF) injection 100 mcg  100 mcg IntraVENous Multiple    midazolam (VERSED) injection 5 mg  5 mg IntraVENous Multiple    LORazepam (ATIVAN) 2 mg/mL injection 1 mg  1 mg IntraVENous ONCE PRN    melatonin tablet 3 mg  3 mg Oral QHS    artificial saliva (MOUTH KOTE) 1 Spray  1 Spray Oral PRN    pantoprazole (PROTONIX) granules for oral suspension 40 mg  40 mg Per G Tube ACB    fluocinoNIDE (LIDEX) 0.05 % cream   Topical BID    levoFLOXacin (LEVAQUIN) 750 mg in D5W IVPB  750 mg IntraVENous Q24H    nystatin (MYCOSTATIN) 100,000 unit/mL oral suspension 500,000 Units  500,000 Units Oral QID    0.9% sodium chloride infusion 250 mL  250 mL IntraVENous PRN    insulin lispro (HUMALOG) injection   SubCUTAneous Q6H    glucose chewable tablet 16 g  4 Tab Oral PRN    dextrose 10% infusion 125-250 mL  125-250 mL IntraVENous PRN    glucagon (GLUCAGEN) injection 1 mg  1 mg IntraMUSCular PRN    enoxaparin (LOVENOX) injection 40 mg  40 mg SubCUTAneous Q24H    sodium chloride (NS) flush 5-10 mL  5-10 mL IntraVENous Q8H    sodium chloride (NS) flush 5-10 mL  5-10 mL IntraVENous PRN    ondansetron (ZOFRAN) injection 4 mg  4 mg IntraVENous Q6H PRN    albuterol-ipratropium (DUO-NEB) 2.5 MG-0.5 MG/3 ML  3 mL Nebulization Q4H PRN    prasugrel (EFFIENT) tablet 10 mg  10 mg Per G Tube DAILY    aspirin chewable tablet 81 mg  81 mg Per G Tube DAILY    atorvastatin (LIPITOR) tablet 40 mg  40 mg Per G Tube QHS    oxyCODONE (ROXICODONE INTENSOL) 20 mg/mL concentrated solution 5 mg  5 mg Per G Tube Q4H PRN    acetaminophen (TYLENOL) solution 650 mg  650 mg Per G Tube Q4H PRN    lidocaine (LIDODERM) 5 % patch 1 Patch  1 Patch TransDERmal Q24H

## 2017-06-02 NOTE — PROGRESS NOTES
[address]      6/2/2017      RE: Brittany Darby      To Whom it May Concern: This is to certify that Brittany Darby hospitalized for medical reasons  From 5/19. Pt with hypoxic brain injury with significant deficits  Pt cannot sign the papers      Please feel free to contact my office if you have any questions or concerns. Thank you for your assistance in this matter.     Sincerely,      Poppy Rodriguez MD

## 2017-06-03 ENCOUNTER — HOSPITAL ENCOUNTER (OUTPATIENT)
Age: 57
Discharge: OTHER INSTITUTION | End: 2017-06-09
Attending: PHYSICAL MEDICINE & REHABILITATION | Admitting: PHYSICAL MEDICINE & REHABILITATION

## 2017-06-03 VITALS
WEIGHT: 181.3 LBS | DIASTOLIC BLOOD PRESSURE: 69 MMHG | TEMPERATURE: 97 F | BODY MASS INDEX: 25.38 KG/M2 | SYSTOLIC BLOOD PRESSURE: 102 MMHG | OXYGEN SATURATION: 99 % | HEART RATE: 93 BPM | HEIGHT: 71 IN | RESPIRATION RATE: 20 BRPM

## 2017-06-03 LAB
GLUCOSE BLD STRIP.AUTO-MCNC: 108 MG/DL (ref 65–100)
GLUCOSE BLD STRIP.AUTO-MCNC: 164 MG/DL (ref 65–100)
SERVICE CMNT-IMP: ABNORMAL
SERVICE CMNT-IMP: ABNORMAL

## 2017-06-03 PROCEDURE — 74011250637 HC RX REV CODE- 250/637: Performed by: INTERNAL MEDICINE

## 2017-06-03 PROCEDURE — 74011250636 HC RX REV CODE- 250/636: Performed by: INTERNAL MEDICINE

## 2017-06-03 PROCEDURE — 87077 CULTURE AEROBIC IDENTIFY: CPT | Performed by: PHYSICAL MEDICINE & REHABILITATION

## 2017-06-03 PROCEDURE — 74011000258 HC RX REV CODE- 258: Performed by: PHYSICAL MEDICINE & REHABILITATION

## 2017-06-03 PROCEDURE — 74011250637 HC RX REV CODE- 250/637: Performed by: HOSPITALIST

## 2017-06-03 PROCEDURE — 74011250637 HC RX REV CODE- 250/637: Performed by: NURSE PRACTITIONER

## 2017-06-03 PROCEDURE — 87086 URINE CULTURE/COLONY COUNT: CPT | Performed by: PHYSICAL MEDICINE & REHABILITATION

## 2017-06-03 PROCEDURE — 74011250636 HC RX REV CODE- 250/636: Performed by: HOSPITALIST

## 2017-06-03 PROCEDURE — 74011250637 HC RX REV CODE- 250/637: Performed by: PHYSICAL MEDICINE & REHABILITATION

## 2017-06-03 PROCEDURE — 81001 URINALYSIS AUTO W/SCOPE: CPT | Performed by: PHYSICAL MEDICINE & REHABILITATION

## 2017-06-03 PROCEDURE — 82962 GLUCOSE BLOOD TEST: CPT

## 2017-06-03 PROCEDURE — 87186 SC STD MICRODIL/AGAR DIL: CPT | Performed by: PHYSICAL MEDICINE & REHABILITATION

## 2017-06-03 RX ORDER — MAGNESIUM SULFATE 100 %
16 CRYSTALS MISCELLANEOUS AS NEEDED
Status: DISCONTINUED | OUTPATIENT
Start: 2017-06-03 | End: 2017-06-09 | Stop reason: HOSPADM

## 2017-06-03 RX ORDER — PRASUGREL 10 MG/1
10 TABLET, FILM COATED ORAL DAILY
Status: DISCONTINUED | OUTPATIENT
Start: 2017-06-04 | End: 2017-06-09 | Stop reason: HOSPADM

## 2017-06-03 RX ORDER — ATORVASTATIN CALCIUM 40 MG/1
40 TABLET, FILM COATED ORAL DAILY
Qty: 30 TAB | Refills: 1 | Status: SHIPPED | OUTPATIENT
Start: 2017-06-03

## 2017-06-03 RX ORDER — FUROSEMIDE 40 MG/1
40 TABLET ORAL DAILY
Qty: 30 TAB | Refills: 1 | Status: SHIPPED
Start: 2017-06-03

## 2017-06-03 RX ORDER — PANTOPRAZOLE SODIUM 40 MG/1
40 GRANULE, DELAYED RELEASE ORAL
Qty: 30 EACH | Refills: 1 | Status: SHIPPED
Start: 2017-06-03

## 2017-06-03 RX ORDER — OXYCODONE HYDROCHLORIDE 5 MG/1
5 TABLET ORAL
Status: DISCONTINUED | OUTPATIENT
Start: 2017-06-03 | End: 2017-06-03

## 2017-06-03 RX ORDER — PANTOPRAZOLE SODIUM 40 MG/1
40 GRANULE, DELAYED RELEASE ORAL
Status: DISCONTINUED | OUTPATIENT
Start: 2017-06-04 | End: 2017-06-09 | Stop reason: HOSPADM

## 2017-06-03 RX ORDER — DEXTROSE MONOHYDRATE AND SODIUM CHLORIDE 5; .45 G/100ML; G/100ML
75 INJECTION, SOLUTION INTRAVENOUS CONTINUOUS
Status: DISCONTINUED | OUTPATIENT
Start: 2017-06-03 | End: 2017-06-04

## 2017-06-03 RX ORDER — NITROGLYCERIN 0.4 MG/1
0.4 TABLET SUBLINGUAL
Status: DISCONTINUED | OUTPATIENT
Start: 2017-06-03 | End: 2017-06-09 | Stop reason: HOSPADM

## 2017-06-03 RX ORDER — ACETAMINOPHEN 325 MG/1
650 TABLET ORAL
Qty: 30 TAB | Refills: 0 | Status: SHIPPED
Start: 2017-06-03

## 2017-06-03 RX ORDER — FLUOCINONIDE 0.5 MG/G
CREAM TOPICAL
Status: DISCONTINUED | OUTPATIENT
Start: 2017-06-03 | End: 2017-06-09 | Stop reason: HOSPADM

## 2017-06-03 RX ORDER — LEVOFLOXACIN 750 MG/1
750 TABLET ORAL
Qty: 5 TAB | Refills: 0 | Status: SHIPPED
Start: 2017-06-03 | End: 2017-06-12

## 2017-06-03 RX ORDER — LANOLIN ALCOHOL/MO/W.PET/CERES
3 CREAM (GRAM) TOPICAL
Status: DISCONTINUED | OUTPATIENT
Start: 2017-06-03 | End: 2017-06-06

## 2017-06-03 RX ORDER — GUAIFENESIN 100 MG/5ML
81 LIQUID (ML) ORAL DAILY
Qty: 30 TAB | Refills: 1 | Status: SHIPPED
Start: 2017-06-03

## 2017-06-03 RX ORDER — ACETAMINOPHEN 325 MG/1
650 TABLET ORAL
Status: DISCONTINUED | OUTPATIENT
Start: 2017-06-03 | End: 2017-06-09 | Stop reason: HOSPADM

## 2017-06-03 RX ORDER — TRAMADOL HYDROCHLORIDE 50 MG/1
50 TABLET ORAL
Status: DISCONTINUED | OUTPATIENT
Start: 2017-06-03 | End: 2017-06-04

## 2017-06-03 RX ORDER — OXYCODONE HYDROCHLORIDE 5 MG/1
10 TABLET ORAL
Status: DISCONTINUED | OUTPATIENT
Start: 2017-06-03 | End: 2017-06-09 | Stop reason: HOSPADM

## 2017-06-03 RX ORDER — FUROSEMIDE 40 MG/1
40 TABLET ORAL DAILY
Status: DISCONTINUED | OUTPATIENT
Start: 2017-06-04 | End: 2017-06-09 | Stop reason: HOSPADM

## 2017-06-03 RX ORDER — ENOXAPARIN SODIUM 100 MG/ML
40 INJECTION SUBCUTANEOUS DAILY
Status: DISCONTINUED | OUTPATIENT
Start: 2017-06-04 | End: 2017-06-09

## 2017-06-03 RX ORDER — GUAIFENESIN 100 MG/5ML
81 LIQUID (ML) ORAL DAILY
Status: DISCONTINUED | OUTPATIENT
Start: 2017-06-04 | End: 2017-06-09 | Stop reason: HOSPADM

## 2017-06-03 RX ORDER — ATORVASTATIN CALCIUM 40 MG/1
40 TABLET, FILM COATED ORAL
Status: DISCONTINUED | OUTPATIENT
Start: 2017-06-03 | End: 2017-06-09 | Stop reason: HOSPADM

## 2017-06-03 RX ORDER — OXYCODONE HYDROCHLORIDE 5 MG/1
5 TABLET ORAL
Status: DISCONTINUED | OUTPATIENT
Start: 2017-06-03 | End: 2017-06-09 | Stop reason: HOSPADM

## 2017-06-03 RX ORDER — IPRATROPIUM BROMIDE AND ALBUTEROL SULFATE 2.5; .5 MG/3ML; MG/3ML
3 SOLUTION RESPIRATORY (INHALATION)
Status: DISCONTINUED | OUTPATIENT
Start: 2017-06-03 | End: 2017-06-09 | Stop reason: HOSPADM

## 2017-06-03 RX ORDER — INSULIN LISPRO 100 [IU]/ML
INJECTION, SOLUTION INTRAVENOUS; SUBCUTANEOUS
Status: DISCONTINUED | OUTPATIENT
Start: 2017-06-03 | End: 2017-06-09 | Stop reason: HOSPADM

## 2017-06-03 RX ORDER — DEXTROSE MONOHYDRATE 100 MG/ML
125-250 INJECTION, SOLUTION INTRAVENOUS AS NEEDED
Status: DISCONTINUED | OUTPATIENT
Start: 2017-06-03 | End: 2017-06-09 | Stop reason: HOSPADM

## 2017-06-03 RX ORDER — TRAMADOL HYDROCHLORIDE 50 MG/1
50 TABLET ORAL
Qty: 25 TAB | Refills: 0 | Status: SHIPPED | OUTPATIENT
Start: 2017-06-03

## 2017-06-03 RX ORDER — LEVOFLOXACIN 750 MG/1
750 TABLET ORAL
Status: COMPLETED | OUTPATIENT
Start: 2017-06-04 | End: 2017-06-08

## 2017-06-03 RX ADMIN — ACETAMINOPHEN 650 MG: 325 SOLUTION ORAL at 13:11

## 2017-06-03 RX ADMIN — ENOXAPARIN SODIUM 40 MG: 40 INJECTION SUBCUTANEOUS at 14:25

## 2017-06-03 RX ADMIN — Medication 10 ML: at 05:30

## 2017-06-03 RX ADMIN — NYSTATIN 500000 UNITS: 100000 SUSPENSION ORAL at 12:04

## 2017-06-03 RX ADMIN — ATORVASTATIN CALCIUM 40 MG: 40 TABLET, FILM COATED ORAL at 21:11

## 2017-06-03 RX ADMIN — PANTOPRAZOLE SODIUM 40 MG: 40 GRANULE, DELAYED RELEASE ORAL at 07:19

## 2017-06-03 RX ADMIN — NYSTATIN 500000 UNITS: 100000 SUSPENSION ORAL at 08:51

## 2017-06-03 RX ADMIN — TRAMADOL HYDROCHLORIDE 50 MG: 50 TABLET, FILM COATED ORAL at 17:36

## 2017-06-03 RX ADMIN — DEXTROSE AND SODIUM CHLORIDE 75 ML/HR: 5; 450 INJECTION, SOLUTION INTRAVENOUS at 21:21

## 2017-06-03 RX ADMIN — LEVOFLOXACIN 750 MG: 750 TABLET, FILM COATED ORAL at 07:18

## 2017-06-03 RX ADMIN — ASPIRIN 81 MG CHEWABLE TABLET 81 MG: 81 TABLET CHEWABLE at 08:51

## 2017-06-03 RX ADMIN — MORPHINE SULFATE 1 MG: 2 INJECTION, SOLUTION INTRAMUSCULAR; INTRAVENOUS at 07:26

## 2017-06-03 RX ADMIN — OXYCODONE HYDROCHLORIDE 5 MG: 5 TABLET ORAL at 21:11

## 2017-06-03 RX ADMIN — TRAMADOL HYDROCHLORIDE 25 MG: 50 TABLET, FILM COATED ORAL at 04:07

## 2017-06-03 RX ADMIN — PRASUGREL HYDROCHLORIDE 10 MG: 10 TABLET, FILM COATED ORAL at 08:52

## 2017-06-03 RX ADMIN — TRAMADOL HYDROCHLORIDE 25 MG: 50 TABLET, FILM COATED ORAL at 11:20

## 2017-06-03 RX ADMIN — Medication 10 ML: at 12:04

## 2017-06-03 RX ADMIN — MELATONIN 3 MG ORAL TABLET 3 MG: 3 TABLET ORAL at 21:11

## 2017-06-03 RX ADMIN — FUROSEMIDE 40 MG: 40 TABLET ORAL at 08:51

## 2017-06-03 RX ADMIN — DEXTROSE AND SODIUM CHLORIDE 75 ML/HR: 5; 450 INJECTION, SOLUTION INTRAVENOUS at 18:39

## 2017-06-03 RX ADMIN — ACETAMINOPHEN 650 MG: 325 SOLUTION ORAL at 07:18

## 2017-06-03 RX ADMIN — FLUOCINONIDE: 0.5 CREAM TOPICAL at 08:51

## 2017-06-03 RX ADMIN — MELATONIN 3 MG ORAL TABLET 3 MG: 3 TABLET ORAL at 21:12

## 2017-06-03 NOTE — ROUTINE PROCESS
[facility]                                                                        56 y.o.   male    [address]   Room: [facility]8/01    [facility] 2 PROGRESSIVE CARE  Unit Phone# :  [phone]      [address]  [facility] [address] [facility]  [address] [address]  Dept: [phone]  Loc: [phone]                    SITUATION     Admitted:  5/19/2017         Attending Provider:  Krystle Cervantes MD       Consultations:  IP CONSULT TO CARDIOLOGY  IP CONSULT TO NEUROLOGY  IP CONSULT TO PALLIATIVE CARE - PROVIDER  IP CONSULT TO GASTROENTEROLOGY  IP CONSULT TO GENERAL SURGERY    PCP:  Marianne Mireles MD   [phone]    Treatment Team: Attending Provider: Krystle Cervantes MD; Consulting Provider: Kim Powers MD; Consulting Provider: Emmanuel Cohn MD; Utilization Review: Felecia Myers; Consulting Provider: Rachel Rodriguez MD; Consulting Provider: Betsy Isaacs NP; Consulting Provider: Rojelio Richardson MD    Admitting Dx:  Cardiac Arrest  Respiratory failure [facility])       Principal Problem: [facility]    * No surgery found * of      BY: * Surgery not found *             ON: * No surgery found *                  Code Status: Full Code                Advance Directives:   Advance Care Planning 6/3/2017   Patient's Healthcare Decision Maker is: Legal Next of Kin   Confirm Advance Directive None    (Send w/patient)   No Doesnt Have       Isolation:  There are currently no Active Isolations       MDRO: No current active infections    Pain Medications given:  Tylenol and Morphine    Last dose:See MAR report Special Equipment needed: no  Type of equipment:       BACKGROUND     Allergies:   Allergies   Allergen Reactions    Cymbalta [Duloxetine] Rash       Past Medical History:   Diagnosis Date    Anoxic brain injury ([facility]     following cardiac arrest; MRI shows involvement BG and temporal lobes    Aspiration pneumonia ([facility] during cardiac arrest intubation    Bradycardia     CAD (coronary artery disease)     Cardiac arrest ([facility]     no intervention by [place] cardiology. [address]    Diabetes [facility])     Essential hypertension     Hyperlipidemia        Past Surgical History:   Procedure Laterality Date    HX CORONARY ARTERY BYPASS GRAFT      HX CORONARY STENT PLACEMENT      HX HEART CATHETERIZATION      HX OTHER SURGICAL  05/12/2017    PEG and [facility] TUBE CHANGE  5/25/2017            Prescriptions Prior to Admission   Medication Sig    prasugrel (EFFIENT) 10 mg tablet 10 mg by PEG Tube route daily.  carvedilol (COREG) 3.125 mg tablet 3.125 mg by PEG Tube route two (2) times daily (with meals).  vancomycin 1,000 mg 1,000 mg IVPB 1,000 mg by IntraVENous route.  oxyCODONE-acetaminophen (PERCOCET) 5-325 mg per tablet Take 1 Tab by mouth every four (4) hours as needed for Pain.  lidocaine (LIDODERM) 5 % 1 Patch by TransDERmal route every twenty-four (24) hours. Apply patch to the affected area for 12 hours a day and remove for 12 hours a day.  famotidine (PEPCID) 20 mg tablet Take 20 mg by mouth two (2) times a day.  piperacillin-tazobactam (ZOSYN) 3.375 gram injection 3.375 g by IntraVENous route every six (6) hours.  albuterol-ipratropium (DUONEB) 2.5 mg-0.5 mg/3 ml nebu 3 mL by Nebulization route every four (4) hours as needed.  ondansetron hcl (ZOFRAN, AS HYDROCHLORIDE,) 4 mg tablet Take 4 mg by mouth every eight (8) hours as needed for Nausea.  metoprolol tartrate (LOPRESSOR) 25 mg tablet TAKE ONE TABLET BY MOUTH TWICE DAILY    clopidogrel (PLAVIX) 75 mg tab Take 1 Tab by mouth daily.  Ranolazine (RANEXA) 1,000 mg Tb12 Take 1 Tab by mouth two (2) times a day.  aspirin delayed-release 81 mg tablet Take  by mouth daily.  losartan (COZAAR) 25 mg tablet Take  by mouth two (2) times a day.     metFORMIN (GLUCOPHAGE) 500 mg tablet Take  by mouth two (2) times daily (with meals).  nitroglycerin (NITROSTAT) 0.4 mg SL tablet by SubLINGual route every five (5) minutes as needed for Chest Pain.  pantoprazole (PROTONIX) 40 mg tablet Take 40 mg by mouth daily. Hard scripts included in transfer packet yes    Vaccinations: There is no immunization history on file for this patient. Readmission Risks:    Known Risks: Trach/PEG h/o cardiac arrest with anoxic brain injury        The [employer] Index tool is an evidenced based tool that has more automatic generated information. The tool looks at many different items such as the age of the patient, how many times they were admitted in the last calendar year, current length of stay in the hospital and their diagnosis. All of these items are pulled automatically from information documented in the chart from various places and will generate a score that predicts whether a patient is at low (less than 13), medium (13-20) or high (21 or greater) risk of being readmitted.         ASSESSMENT                Temp: 97 °F (36.1 °C) (06/03/17 0722) Pulse (Heart Rate): 91 (06/03/17 0722)     Resp Rate: 18 (06/03/17 0722)           BP: 126/65 (06/03/17 0722)     O2 Sat (%): 100 % (06/03/17 0722)     Weight: 82.2 kg (181 lb 4.8 oz)    Height: 5' 11\" (180.3 cm) (05/19/17 1935)       If above not within 1 hour of discharge:    BP:_____  P:____  R:____ T:_____ O2 Sat: ___%  O2: ______    Active Orders   Diet    DIET NPO With Tube Feedings         Orientation: oriented to time, place, person and situation (Per family)  Active Behaviors: None                                   Active Lines/Drains:  (Peg Tube / Romero / CL or S/L?): yes    Urinary Status: Romero     Last BM: Last Bowel Movement Date: 06/03/17     Skin Integrity: Intact   Wound Abdomen Anterior;Left;Mid-DRESSING STATUS: Clean, dry, and intact    Wound Abdomen Anterior;Left;Mid-DRESSING TYPE: Split gauze    Mobility: Very limited   Weight Bearing Status: WBAT (Weight Bearing as Tolerated)      Gait Training  Assistive Device: Gait belt  Ambulation - Level of Assistance: Contact guard assistance, Minimal assistance  Distance (ft): 3 Feet (ft)         Lab Results   Component Value Date/Time    Glucose 119 05/31/2017 03:26 AM    HGB 11.5 05/30/2017 03:37 AM    HGB 12.3 05/27/2017 03:20 AM        RECOMMENDATION     See After Visit Summary (AVS) for:  · Discharge instructions  · After [address]   · Special equipment needed (entered pre-discharge by Care Management)  · Medication Reconciliation    · Follow up Appointment(s)         Report given/sent by:  Kamini Larkin RN                    Verbal report given to: Sathish Thurston RN  FAXED to:           Estimated discharge time:  6/3/2017 at 1400

## 2017-06-03 NOTE — DISCHARGE INSTRUCTIONS
HOSPITALIST DISCHARGE INSTRUCTIONS    NAME: Jacquie Fabian   :  1960   MRN:  [id number]     Date/Time:  6/3/2017 8:30 AM    ADMIT DATE: 2017   DISCHARGE DATE: 6/3/2017     Attending Physician: Jamie Correia MD    DISCHARGE DIAGNOSIS:  Hypotension  tracheobronchitis      Medications: Per above medication reconciliation. Pain Management: per above medications    Recommended diet: peg feeds, advance as tolerated to goal of 45ml/hr    Recommended activity: as tolerated    Wound care ( peg site) :q 8hrs, remove split sponge, cleanse with NS moistened q-tips and strips of 4x4's under bumper, dry with dry q-tip and dry 4x4 strips, place a small amount of Sensicare zinc cream on Q-tip and apply to skin under bumper. Replace split drainage sponge and dry 4x4, secure with paper tape. Indwelling devices: pinto        Required Lab work: per rehab    Glucose management:  poc and achs  Sliding scale per sah    Code status: full    Need to resume coreg and ace as tolerated    Outside physician follow up: Follow-up Information     Follow up With Details Comments  S KAIN Coronado MD   [address] [facility]      Wale Herrera MD In 1 month  [facility]  [address] [address]  [address] [facility] In 1 week  [facility] Fadia Lea MD In 1 month  [address] [place] [address] [address]  [phone]                 Skilled nursing facility/ SNF MD responsible for above on discharge. Information obtained by :  I understand that if any problems occur once I am at home I am to contact my physician. I understand and acknowledge receipt of the instructions indicated above.                                                                                                                                            Physician's or R.N.'s Signature                                                                  Date/Time                                                                                                                                              Patient or Repres

## 2017-06-03 NOTE — DISCHARGE SUMMARY
Hospitalist Discharge Summary     Patient ID:  Della Londono  [id number]  56 y.o.  1960    PCP on record: Shanna Beal MD    Admit date: 5/19/2017  Discharge date and time: 6/3/2017      DISCHARGE DIAGNOSIS:    Abdominal pain  Anoxic brain injury  Hypotension        CONSULTATIONS:  IP CONSULT TO CARDIOLOGY  IP CONSULT TO NEUROLOGY  IP CONSULT TO PALLIATIVE CARE - PROVIDER  IP CONSULT TO GASTROENTEROLOGY  IP CONSULT TO GENERAL SURGERY    Excerpted HPI from H&P of Nunzio Councilman, MD:  Della Londono is a 56 y.o. right handed [place] ([facility]) male who is transferred from [address] [place] [address] due to insurance reason. Patient with hx CAD s/p CABG 2014 and stents, DM, hyperlipidemia who had out of hospital cardiac arrest in [place] on 4/28/17 while attending rehearsal wedding dinner of his cousin's daughter. Patient was hospitalized at [facility], had vomited during intubation. Echo showed EF 74%, unclear whether he had MI but no cardiology intervention done. Patient was unresponsive after hypothermic protocol and outside MRI showed abnormal changes in basal ganglia and bilateral temporal lobes c/w with anoxic brain injury. Per cousin at bedside, he has become responsive to motor commands in past week but that right side seems weaker than left. Patient treated for aspiration pneumonia with abx. Underwent PEG and trach on 5/12. He had diarrhea; C. Diff was negative. Has rectal tube.     Family requested transfer to [facility] where patient resides. Declined by [facility]. He was transfered to [facility] where his prior cardiologist, Dr. Lyn Hargrove, has clinical privilege. However, [facility] is not in network with his insurance so request made for [facility].  [facility]     Two cousins present at bedside during exam.    ______________________________________________________________________  DISCHARGE SUMMARY/HOSPITAL COURSE:  for full details see H&P, daily progress notes, labs, consult notes. 56 y.o. male whom presented on 5/19/2017 with cardiomyopathy, cardiac arrest, anoxic injury  s/p trach, S/p PEG at [facility] transferred to [facility] for 1 day then transferred to [facility] due to insurance reasons. Key medical history   Systolic chf ef 18% with extensive CAD ( CABG) and cardiac arrest 4/28, with repeat echo EF 55%  Anoxic brain injury S/p cardiac arrest  Diabetes  Hypertension  Anemia  Lice infestation treated. Abdominal pain around the peg site, seen by surgery and gi, peg replaced, tolerating the peg feeds  Urinary retention: failed the voiding trial, now has pinto, need to follow up with urology as out patient, and also voiding trials as able    Per cardiology : very difficult situation with high risk of recurrent CAD, very high risk of recurrent infections, placing a stent or defibrillator could actually hasten the demise if either were to get infected. If he continues to progress and infectious risk decreases, can consider further cardiac evaluation as an outpatient  Seen by the palliative medicine: tried to arrange the family meeting, never happened, family not interested  Made excuses. Family unrealistic in the expectations  Per pulmonary :  Changed to cuffless trach tolerating  Per ID : respiratory cultures with acinetobacter, serratia and klebsiella all sensitive to levo. And also with concern for peg site infection: complete the levo course for another 5 days    Stable to discharge to [facility], very high risk of recurrent hospitalizations  Updated the wife and daughter. Details this admission    Anoxic brain injury following cardiac arrest in [place] >1 month ago  --outside MRI brain with abnormalities in basal ganglia and bilateral temporal lobes  --has right>left sided weakness Following commands given in his native [place] language from family members.   --s/p PEG and trach 5/12, and peg replaced here  Seen by neurology here-- egg slow  MRI ordered by neurology initally as family insisting, and it would not , now family leaning not having it  Carotids neg for significant stenosis      S/p out of hospital cardiac arrest 4/28/17, POA  CAD s/p CABG 2014 x 3v at HD  Hx cardiac stents (twice before CABG and once after)  HTN  Severe Anemia POA - Hb improved to 11.6 s/p 2 units PRBCs  --hospitalized at [facility] in [place], [place]; transferred to [facility] 5/18 and to [facility] 5/19  --per Dr. Kimberley Craig had stenosis of circumflex anastomosis graft, PTCA -->restenosis -->stent-->restenosis and redo bypass recommended but patient seeked second opinion with Dr. Sarah Harden in [place] and reportedly told redo bypass not needed.     Cardiology seen this admission  Repeat echo with ef 55%  Per discussions with cardiology-- very difficult situation with high risk of recurrent CAD, very high risk of recurrent infections, placing a stent or defibrillator could actually hasten the demise if either were to get infected. Course complicated with hypotension, had to hold coreg, valsartan and lasix  Now resumed lasix, need to resume coreg and valsartan as able at the [facility]          Hx aspiration PNA during intubation For cardiac arrest  Chronic respiratory failure, s/p trach  -- treated with invanz in [place]. CXR clear here.      Tolerating cuffless trach  On empiric levo, also covering stacy peg infection, complete the course        DM 2  --SSI for now   Lice infestation  S/p treatment on 5/21     Urinary retention: will be discharging with pinto  Urology as out patient        Code: full  Surrogate decision maker: Wife \"Savanna\" Ashley Philip [phone], 2 son & daughter (rina) # [phone]      _______________________________________________________________________  Patient seen and examined by me on discharge day. Pertinent Findings:  Gen:    Not in distress  Chest: Clear lungs  CVS:   Regular rhythm.   No edema  Abd:  Soft, not distended, not tender  Neuro: awake  _______________________________________________________________________  DISCHARGE MEDICATIONS:   Current Discharge Medication List      START taking these medications    Details   aspirin 81 mg chewable tablet 1 Tab by Per G Tube route daily. Qty: 30 Tab, Refills: 1      furosemide (LASIX) 40 mg tablet 1 Tab by Per G Tube route daily. Qty: 30 Tab, Refills: 1      levoFLOXacin (LEVAQUIN) 750 mg tablet 1 Tab by Per G Tube route Daily (before breakfast) for 5 days. Qty: 5 Tab, Refills: 0      traMADol (ULTRAM) 50 mg tablet 1 Tab by Per G Tube route every six (6) hours as needed. Max Daily Amount: 200 mg. Qty: 25 Tab, Refills: 0      acetaminophen (TYLENOL) 325 mg tablet 2 Tabs by Per G Tube route every four (4) hours as needed for Pain. Qty: 30 Tab, Refills: 0      pantoprazole (PROTONIX) 40 mg granules for oral suspension 40 mg by Per G Tube route Daily (before breakfast). Qty: 30 Each, Refills: 1         CONTINUE these medications which have CHANGED    Details   atorvastatin (LIPITOR) 40 mg tablet 1 Tab by Per G Tube route daily. Qty: 30 Tab, Refills: 1         CONTINUE these medications which have NOT CHANGED    Details   prasugrel (EFFIENT) 10 mg tablet 10 mg by PEG Tube route daily. albuterol-ipratropium (DUONEB) 2.5 mg-0.5 mg/3 ml nebu 3 mL by Nebulization route every four (4) hours as needed.          STOP taking these medications       carvedilol (COREG) 3.125 mg tablet Comments:   Reason for Stopping:         oxyCODONE-acetaminophen (PERCOCET) 5-325 mg per tablet Comments:   Reason for Stopping:         famotidine (PEPCID) 20 mg tablet Comments:   Reason for Stopping:         piperacillin-tazobactam (ZOSYN) 3.375 gram injection Comments:   Reason for Stopping:         ondansetron hcl (ZOFRAN, AS HYDROCHLORIDE,) 4 mg tablet Comments:   Reason for Stopping:         metoprolol tartrate (LOPRESSOR) 25 mg tablet Comments:   Reason for Stopping:         clopidogrel (PLAVIX) 75 mg tab Comments: Reason for Stopping:         Ranolazine (RANEXA) 1,000 mg Tb12 Comments:   Reason for Stopping:         aspirin delayed-release 81 mg tablet Comments:   Reason for Stopping:         losartan (COZAAR) 25 mg tablet Comments:   Reason for Stopping:         metFORMIN (GLUCOPHAGE) 500 mg tablet Comments:   Reason for Stopping:         nitroglycerin (NITROSTAT) 0.4 mg SL tablet Comments:   Reason for Stopping:         pantoprazole (PROTONIX) 40 mg tablet Comments:   Reason for Stopping:               My Recommended Diet, Activity, Wound Care, and follow-up labs are listed in the patient's Discharge Insturctions which I have personally completed and reviewed.     _______________________________________________________________________  DISPOSITION:    Home with Family:    Home with HH/PT/OT/RN:    SNF/LTC:    VITO: y   OTHER:        Condition at Discharge:  Stable  _______________________________________________________________________  Follow up with:   PCP : Og Sr MD  Follow-up Information     Follow up With Details Comments [address]   [address]  [phone]                Total time in minutes spent coordinating this discharge (includes going over instructions, follow-up, prescriptions, and preparing report for sign off to her PCP) :  45minutes    Signed:  Rosalba Gunter MD

## 2017-06-03 NOTE — PROGRESS NOTES
DONG spoke with Yemi at [facility] regarding time for discharge. She expressed pt has been accepted however a room has not been assigned yet and she will be in touch regarding that soon. Received call from Yemi at [facility] to inform that patient's bed is avail and he will be going to room 103 and for RN to call report to [phone]. Please dc after lunch as per request from Yemi. DONG has notified pt's RN Cody Faulkner.     Apolonia Hernandez

## 2017-06-03 NOTE — ROUTINE PROCESS
Pt discharged per MD order. Pt has all personal belongings, 2 prescriptions, and discharge instructions.

## 2017-06-04 LAB
ALBUMIN SERPL BCP-MCNC: 2.3 G/DL (ref 3.5–5)
ALBUMIN/GLOB SERPL: 0.4 {RATIO} (ref 1.1–2.2)
ALP SERPL-CCNC: 136 U/L (ref 45–117)
ALT SERPL-CCNC: 25 U/L (ref 12–78)
ANION GAP BLD CALC-SCNC: 4 MMOL/L (ref 5–15)
APPEARANCE UR: ABNORMAL
AST SERPL W P-5'-P-CCNC: 23 U/L (ref 15–37)
BACTERIA URNS QL MICRO: NEGATIVE /HPF
BILIRUB SERPL-MCNC: 0.4 MG/DL (ref 0.2–1)
BILIRUB UR QL: NEGATIVE
BUN SERPL-MCNC: 7 MG/DL (ref 6–20)
BUN/CREAT SERPL: 10 (ref 12–20)
CALCIUM SERPL-MCNC: 8.3 MG/DL (ref 8.5–10.1)
CHLORIDE SERPL-SCNC: 99 MMOL/L (ref 97–108)
CO2 SERPL-SCNC: 30 MMOL/L (ref 21–32)
COLOR UR: ABNORMAL
CREAT SERPL-MCNC: 0.72 MG/DL (ref 0.7–1.3)
EPITH CASTS URNS QL MICRO: ABNORMAL /LPF
ERYTHROCYTE [DISTWIDTH] IN BLOOD BY AUTOMATED COUNT: 15.7 % (ref 11.5–14.5)
GLOBULIN SER CALC-MCNC: 5.2 G/DL (ref 2–4)
GLUCOSE SERPL-MCNC: 174 MG/DL (ref 65–100)
GLUCOSE UR STRIP.AUTO-MCNC: NEGATIVE MG/DL
HCT VFR BLD AUTO: 35.7 % (ref 36.6–50.3)
HGB BLD-MCNC: 11.6 G/DL (ref 12.1–17)
HGB UR QL STRIP: ABNORMAL
KETONES UR QL STRIP.AUTO: NEGATIVE MG/DL
LEUKOCYTE ESTERASE UR QL STRIP.AUTO: ABNORMAL
MCH RBC QN AUTO: 27.7 PG (ref 26–34)
MCHC RBC AUTO-ENTMCNC: 32.5 G/DL (ref 30–36.5)
MCV RBC AUTO: 85.2 FL (ref 80–99)
NITRITE UR QL STRIP.AUTO: NEGATIVE
PH UR STRIP: 6 [PH] (ref 5–8)
PLATELET # BLD AUTO: 225 K/UL (ref 150–400)
POTASSIUM SERPL-SCNC: 3.4 MMOL/L (ref 3.5–5.1)
PROT SERPL-MCNC: 7.5 G/DL (ref 6.4–8.2)
PROT UR STRIP-MCNC: ABNORMAL MG/DL
RBC # BLD AUTO: 4.19 M/UL (ref 4.1–5.7)
RBC #/AREA URNS HPF: ABNORMAL /HPF (ref 0–5)
SODIUM SERPL-SCNC: 133 MMOL/L (ref 136–145)
SP GR UR REFRACTOMETRY: 1.02 (ref 1–1.03)
UROBILINOGEN UR QL STRIP.AUTO: 4 EU/DL (ref 0.2–1)
WBC # BLD AUTO: 16.7 K/UL (ref 4.1–11.1)
WBC URNS QL MICRO: >100 /HPF (ref 0–4)
YEAST BUDDING URNS QL: PRESENT

## 2017-06-04 PROCEDURE — 80053 COMPREHEN METABOLIC PANEL: CPT | Performed by: PHYSICAL MEDICINE & REHABILITATION

## 2017-06-04 PROCEDURE — 74011636637 HC RX REV CODE- 636/637: Performed by: PHYSICAL MEDICINE & REHABILITATION

## 2017-06-04 PROCEDURE — 74011250637 HC RX REV CODE- 250/637: Performed by: PHYSICAL MEDICINE & REHABILITATION

## 2017-06-04 PROCEDURE — 74011250636 HC RX REV CODE- 250/636: Performed by: PHYSICAL MEDICINE & REHABILITATION

## 2017-06-04 PROCEDURE — 36415 COLL VENOUS BLD VENIPUNCTURE: CPT | Performed by: PHYSICAL MEDICINE & REHABILITATION

## 2017-06-04 PROCEDURE — 85027 COMPLETE CBC AUTOMATED: CPT | Performed by: PHYSICAL MEDICINE & REHABILITATION

## 2017-06-04 RX ORDER — POTASSIUM CHLORIDE 1.5 G/1.77G
20 POWDER, FOR SOLUTION ORAL DAILY
Status: DISCONTINUED | OUTPATIENT
Start: 2017-06-04 | End: 2017-06-09 | Stop reason: HOSPADM

## 2017-06-04 RX ORDER — ALPRAZOLAM 0.25 MG/1
0.25 TABLET ORAL
Status: DISCONTINUED | OUTPATIENT
Start: 2017-06-04 | End: 2017-06-09 | Stop reason: HOSPADM

## 2017-06-04 RX ORDER — NEOMYCIN SULFATE, POLYMYXIN B SULFATE, HYDROCORTISONE 3.5; 10000; 1 MG/ML; [USP'U]/ML; MG/ML
2 SOLUTION/ DROPS AURICULAR (OTIC) 3 TIMES DAILY
Status: DISPENSED | OUTPATIENT
Start: 2017-06-04 | End: 2017-06-07

## 2017-06-04 RX ADMIN — LEVOFLOXACIN 750 MG: 750 TABLET, FILM COATED ORAL at 09:14

## 2017-06-04 RX ADMIN — OXYCODONE HYDROCHLORIDE 10 MG: 5 TABLET ORAL at 03:42

## 2017-06-04 RX ADMIN — ACETAMINOPHEN 650 MG: 325 TABLET, FILM COATED ORAL at 03:42

## 2017-06-04 RX ADMIN — ENOXAPARIN SODIUM 40 MG: 40 INJECTION SUBCUTANEOUS at 09:14

## 2017-06-04 RX ADMIN — ATORVASTATIN CALCIUM 40 MG: 40 TABLET, FILM COATED ORAL at 21:11

## 2017-06-04 RX ADMIN — MELATONIN 3 MG ORAL TABLET 3 MG: 3 TABLET ORAL at 21:11

## 2017-06-04 RX ADMIN — OXYCODONE HYDROCHLORIDE 5 MG: 5 TABLET ORAL at 01:00

## 2017-06-04 RX ADMIN — NEOMYCIN SULFATE, POLYMYXIN B SULFATE AND HYDROCORTISONE 2 DROP: 3.5; 10000; 1 SOLUTION AURICULAR (OTIC) at 22:00

## 2017-06-04 RX ADMIN — POTASSIUM CHLORIDE 20 MEQ: 1.5 POWDER, FOR SOLUTION ORAL at 13:50

## 2017-06-04 RX ADMIN — FUROSEMIDE 40 MG: 40 TABLET ORAL at 09:14

## 2017-06-04 RX ADMIN — ACETAMINOPHEN 650 MG: 325 TABLET, FILM COATED ORAL at 21:11

## 2017-06-04 RX ADMIN — PANTOPRAZOLE SODIUM 40 MG: 40 GRANULE, DELAYED RELEASE ORAL at 09:15

## 2017-06-04 RX ADMIN — OXYCODONE HYDROCHLORIDE 5 MG: 5 TABLET ORAL at 10:46

## 2017-06-04 RX ADMIN — ASPIRIN 81 MG CHEWABLE TABLET 81 MG: 81 TABLET CHEWABLE at 09:14

## 2017-06-04 RX ADMIN — PRASUGREL HYDROCHLORIDE 10 MG: 10 TABLET, FILM COATED ORAL at 09:14

## 2017-06-04 RX ADMIN — OXYCODONE HYDROCHLORIDE 10 MG: 5 TABLET ORAL at 21:11

## 2017-06-04 RX ADMIN — INSULIN LISPRO 4 UNITS: 100 INJECTION, SOLUTION INTRAVENOUS; SUBCUTANEOUS at 09:15

## 2017-06-04 RX ADMIN — ACETAMINOPHEN 650 MG: 325 TABLET, FILM COATED ORAL at 13:51

## 2017-06-05 LAB
ANION GAP BLD CALC-SCNC: 8 MMOL/L (ref 5–15)
BUN SERPL-MCNC: 9 MG/DL (ref 6–20)
BUN/CREAT SERPL: 14 (ref 12–20)
CALCIUM SERPL-MCNC: 8.6 MG/DL (ref 8.5–10.1)
CHLORIDE SERPL-SCNC: 99 MMOL/L (ref 97–108)
CO2 SERPL-SCNC: 28 MMOL/L (ref 21–32)
CREAT SERPL-MCNC: 0.63 MG/DL (ref 0.7–1.3)
ERYTHROCYTE [DISTWIDTH] IN BLOOD BY AUTOMATED COUNT: 15.9 % (ref 11.5–14.5)
GLUCOSE SERPL-MCNC: 189 MG/DL (ref 65–100)
HCT VFR BLD AUTO: 37.4 % (ref 36.6–50.3)
HGB BLD-MCNC: 11.9 G/DL (ref 12.1–17)
MCH RBC QN AUTO: 27.3 PG (ref 26–34)
MCHC RBC AUTO-ENTMCNC: 31.8 G/DL (ref 30–36.5)
MCV RBC AUTO: 85.8 FL (ref 80–99)
PLATELET # BLD AUTO: 241 K/UL (ref 150–400)
POTASSIUM SERPL-SCNC: 3.6 MMOL/L (ref 3.5–5.1)
RBC # BLD AUTO: 4.36 M/UL (ref 4.1–5.7)
SODIUM SERPL-SCNC: 135 MMOL/L (ref 136–145)
WBC # BLD AUTO: 12.2 K/UL (ref 4.1–11.1)

## 2017-06-05 PROCEDURE — 74011000250 HC RX REV CODE- 250: Performed by: PHYSICAL MEDICINE & REHABILITATION

## 2017-06-05 PROCEDURE — 74011250637 HC RX REV CODE- 250/637: Performed by: PHYSICAL MEDICINE & REHABILITATION

## 2017-06-05 PROCEDURE — 36415 COLL VENOUS BLD VENIPUNCTURE: CPT | Performed by: PHYSICAL MEDICINE & REHABILITATION

## 2017-06-05 PROCEDURE — 80048 BASIC METABOLIC PNL TOTAL CA: CPT | Performed by: PHYSICAL MEDICINE & REHABILITATION

## 2017-06-05 PROCEDURE — 74011636637 HC RX REV CODE- 636/637: Performed by: PHYSICAL MEDICINE & REHABILITATION

## 2017-06-05 PROCEDURE — 74011250636 HC RX REV CODE- 250/636: Performed by: PHYSICAL MEDICINE & REHABILITATION

## 2017-06-05 PROCEDURE — 85027 COMPLETE CBC AUTOMATED: CPT | Performed by: PHYSICAL MEDICINE & REHABILITATION

## 2017-06-05 RX ORDER — LIDOCAINE 50 MG/G
1 PATCH TOPICAL EVERY 24 HOURS
Status: DISCONTINUED | OUTPATIENT
Start: 2017-06-05 | End: 2017-06-09 | Stop reason: HOSPADM

## 2017-06-05 RX ADMIN — IPRATROPIUM BROMIDE AND ALBUTEROL SULFATE 3 ML: .5; 3 SOLUTION RESPIRATORY (INHALATION) at 00:00

## 2017-06-05 RX ADMIN — ENOXAPARIN SODIUM 40 MG: 40 INJECTION SUBCUTANEOUS at 10:12

## 2017-06-05 RX ADMIN — NEOMYCIN SULFATE, POLYMYXIN B SULFATE AND HYDROCORTISONE 2 DROP: 3.5; 10000; 1 SOLUTION AURICULAR (OTIC) at 21:27

## 2017-06-05 RX ADMIN — OXYCODONE HYDROCHLORIDE 10 MG: 5 TABLET ORAL at 01:28

## 2017-06-05 RX ADMIN — ACETAMINOPHEN 650 MG: 325 TABLET, FILM COATED ORAL at 01:29

## 2017-06-05 RX ADMIN — PANTOPRAZOLE SODIUM 40 MG: 40 GRANULE, DELAYED RELEASE ORAL at 10:13

## 2017-06-05 RX ADMIN — FLUOCINONIDE: 0.5 CREAM TOPICAL at 21:27

## 2017-06-05 RX ADMIN — NEOMYCIN SULFATE, POLYMYXIN B SULFATE AND HYDROCORTISONE 2 DROP: 3.5; 10000; 1 SOLUTION AURICULAR (OTIC) at 10:13

## 2017-06-05 RX ADMIN — NEOMYCIN SULFATE, POLYMYXIN B SULFATE AND HYDROCORTISONE 2 DROP: 3.5; 10000; 1 SOLUTION AURICULAR (OTIC) at 16:42

## 2017-06-05 RX ADMIN — OXYCODONE HYDROCHLORIDE 5 MG: 5 TABLET ORAL at 10:15

## 2017-06-05 RX ADMIN — LEVOFLOXACIN 750 MG: 750 TABLET, FILM COATED ORAL at 10:12

## 2017-06-05 RX ADMIN — FUROSEMIDE 40 MG: 40 TABLET ORAL at 10:12

## 2017-06-05 RX ADMIN — ASPIRIN 81 MG CHEWABLE TABLET 81 MG: 81 TABLET CHEWABLE at 10:13

## 2017-06-05 RX ADMIN — OXYCODONE HYDROCHLORIDE 10 MG: 5 TABLET ORAL at 21:30

## 2017-06-05 RX ADMIN — MELATONIN 3 MG ORAL TABLET 3 MG: 3 TABLET ORAL at 21:27

## 2017-06-05 RX ADMIN — ACETAMINOPHEN 650 MG: 325 TABLET, FILM COATED ORAL at 23:30

## 2017-06-05 RX ADMIN — INSULIN LISPRO 4 UNITS: 100 INJECTION, SOLUTION INTRAVENOUS; SUBCUTANEOUS at 17:37

## 2017-06-05 RX ADMIN — POTASSIUM CHLORIDE 20 MEQ: 1.5 POWDER, FOR SOLUTION ORAL at 10:13

## 2017-06-05 RX ADMIN — NEOMYCIN SULFATE, POLYMYXIN B SULFATE AND HYDROCORTISONE 2 DROP: 3.5; 10000; 1 SOLUTION AURICULAR (OTIC) at 06:00

## 2017-06-05 RX ADMIN — OXYCODONE HYDROCHLORIDE 5 MG: 5 TABLET ORAL at 16:40

## 2017-06-05 RX ADMIN — ALPRAZOLAM 0.25 MG: 0.25 TABLET ORAL at 01:29

## 2017-06-05 RX ADMIN — OXYCODONE HYDROCHLORIDE 10 MG: 5 TABLET ORAL at 05:09

## 2017-06-05 RX ADMIN — PRASUGREL HYDROCHLORIDE 10 MG: 10 TABLET, FILM COATED ORAL at 10:12

## 2017-06-05 RX ADMIN — ATORVASTATIN CALCIUM 40 MG: 40 TABLET, FILM COATED ORAL at 21:27

## 2017-06-06 LAB
BACTERIA SPEC CULT: ABNORMAL
BACTERIA SPEC CULT: ABNORMAL
CC UR VC: ABNORMAL
SERVICE CMNT-IMP: ABNORMAL

## 2017-06-06 PROCEDURE — 74011250636 HC RX REV CODE- 250/636: Performed by: PHYSICAL MEDICINE & REHABILITATION

## 2017-06-06 PROCEDURE — 74011250637 HC RX REV CODE- 250/637: Performed by: PHYSICAL MEDICINE & REHABILITATION

## 2017-06-06 PROCEDURE — 74011000250 HC RX REV CODE- 250: Performed by: PHYSICAL MEDICINE & REHABILITATION

## 2017-06-06 RX ORDER — GUAIFENESIN 100 MG/5ML
600 SOLUTION ORAL EVERY 12 HOURS
Status: DISCONTINUED | OUTPATIENT
Start: 2017-06-06 | End: 2017-06-09 | Stop reason: HOSPADM

## 2017-06-06 RX ORDER — LANOLIN ALCOHOL/MO/W.PET/CERES
6 CREAM (GRAM) TOPICAL
Status: DISCONTINUED | OUTPATIENT
Start: 2017-06-06 | End: 2017-06-08

## 2017-06-06 RX ORDER — IPRATROPIUM BROMIDE AND ALBUTEROL SULFATE 2.5; .5 MG/3ML; MG/3ML
3 SOLUTION RESPIRATORY (INHALATION) EVERY 12 HOURS
Status: DISCONTINUED | OUTPATIENT
Start: 2017-06-06 | End: 2017-06-09 | Stop reason: HOSPADM

## 2017-06-06 RX ORDER — IPRATROPIUM BROMIDE AND ALBUTEROL SULFATE 2.5; .5 MG/3ML; MG/3ML
3 SOLUTION RESPIRATORY (INHALATION)
Status: DISCONTINUED | OUTPATIENT
Start: 2017-06-06 | End: 2017-06-09 | Stop reason: HOSPADM

## 2017-06-06 RX ORDER — GUAIFENESIN/DEXTROMETHORPHAN 100-10MG/5
10 SYRUP ORAL
Status: DISCONTINUED | OUTPATIENT
Start: 2017-06-06 | End: 2017-06-09 | Stop reason: HOSPADM

## 2017-06-06 RX ADMIN — ACETAMINOPHEN 650 MG: 325 TABLET, FILM COATED ORAL at 04:20

## 2017-06-06 RX ADMIN — GUAIFENESIN 600 MG: 100 SOLUTION ORAL at 09:45

## 2017-06-06 RX ADMIN — FLUOCINONIDE: 0.5 CREAM TOPICAL at 21:55

## 2017-06-06 RX ADMIN — ATORVASTATIN CALCIUM 40 MG: 40 TABLET, FILM COATED ORAL at 21:54

## 2017-06-06 RX ADMIN — OXYCODONE HYDROCHLORIDE 10 MG: 5 TABLET ORAL at 21:54

## 2017-06-06 RX ADMIN — MELATONIN 3 MG ORAL TABLET 6 MG: 3 TABLET ORAL at 21:54

## 2017-06-06 RX ADMIN — GUAIFENESIN AND DEXTROMETHORPHAN 10 ML: 100; 10 SYRUP ORAL at 17:08

## 2017-06-06 RX ADMIN — OXYCODONE HYDROCHLORIDE 5 MG: 5 TABLET ORAL at 09:46

## 2017-06-06 RX ADMIN — IPRATROPIUM BROMIDE AND ALBUTEROL SULFATE 3 ML: .5; 3 SOLUTION RESPIRATORY (INHALATION) at 21:54

## 2017-06-06 RX ADMIN — IPRATROPIUM BROMIDE AND ALBUTEROL SULFATE 3 ML: .5; 3 SOLUTION RESPIRATORY (INHALATION) at 06:11

## 2017-06-06 RX ADMIN — OXYCODONE HYDROCHLORIDE 10 MG: 5 TABLET ORAL at 17:08

## 2017-06-06 RX ADMIN — NEOMYCIN SULFATE, POLYMYXIN B SULFATE AND HYDROCORTISONE 2 DROP: 3.5; 10000; 1 SOLUTION AURICULAR (OTIC) at 13:02

## 2017-06-06 RX ADMIN — PRASUGREL HYDROCHLORIDE 10 MG: 10 TABLET, FILM COATED ORAL at 09:46

## 2017-06-06 RX ADMIN — IPRATROPIUM BROMIDE AND ALBUTEROL SULFATE 3 ML: .5; 3 SOLUTION RESPIRATORY (INHALATION) at 09:45

## 2017-06-06 RX ADMIN — POTASSIUM CHLORIDE 20 MEQ: 1.5 POWDER, FOR SOLUTION ORAL at 09:45

## 2017-06-06 RX ADMIN — PANTOPRAZOLE SODIUM 40 MG: 40 GRANULE, DELAYED RELEASE ORAL at 09:45

## 2017-06-06 RX ADMIN — ASPIRIN 81 MG CHEWABLE TABLET 81 MG: 81 TABLET CHEWABLE at 09:45

## 2017-06-06 RX ADMIN — ENOXAPARIN SODIUM 40 MG: 40 INJECTION SUBCUTANEOUS at 09:45

## 2017-06-06 RX ADMIN — NEOMYCIN SULFATE, POLYMYXIN B SULFATE AND HYDROCORTISONE 2 DROP: 3.5; 10000; 1 SOLUTION AURICULAR (OTIC) at 21:55

## 2017-06-06 RX ADMIN — GUAIFENESIN 600 MG: 100 SOLUTION ORAL at 21:54

## 2017-06-06 RX ADMIN — LEVOFLOXACIN 750 MG: 750 TABLET, FILM COATED ORAL at 09:46

## 2017-06-06 RX ADMIN — ACETAMINOPHEN 650 MG: 325 TABLET, FILM COATED ORAL at 13:02

## 2017-06-06 RX ADMIN — FUROSEMIDE 40 MG: 40 TABLET ORAL at 09:48

## 2017-06-07 PROCEDURE — 74011636637 HC RX REV CODE- 636/637: Performed by: PHYSICAL MEDICINE & REHABILITATION

## 2017-06-07 PROCEDURE — 74011250637 HC RX REV CODE- 250/637: Performed by: PHYSICAL MEDICINE & REHABILITATION

## 2017-06-07 PROCEDURE — 74011250636 HC RX REV CODE- 250/636: Performed by: PHYSICAL MEDICINE & REHABILITATION

## 2017-06-07 PROCEDURE — 74011000250 HC RX REV CODE- 250: Performed by: PHYSICAL MEDICINE & REHABILITATION

## 2017-06-07 RX ADMIN — ASPIRIN 81 MG CHEWABLE TABLET 81 MG: 81 TABLET CHEWABLE at 08:44

## 2017-06-07 RX ADMIN — OXYCODONE HYDROCHLORIDE 5 MG: 5 TABLET ORAL at 03:12

## 2017-06-07 RX ADMIN — IPRATROPIUM BROMIDE AND ALBUTEROL SULFATE 3 ML: .5; 3 SOLUTION RESPIRATORY (INHALATION) at 08:43

## 2017-06-07 RX ADMIN — POTASSIUM CHLORIDE 20 MEQ: 1.5 POWDER, FOR SOLUTION ORAL at 08:43

## 2017-06-07 RX ADMIN — PRASUGREL HYDROCHLORIDE 10 MG: 10 TABLET, FILM COATED ORAL at 08:44

## 2017-06-07 RX ADMIN — ATORVASTATIN CALCIUM 40 MG: 40 TABLET, FILM COATED ORAL at 21:14

## 2017-06-07 RX ADMIN — IPRATROPIUM BROMIDE AND ALBUTEROL SULFATE 3 ML: .5; 3 SOLUTION RESPIRATORY (INHALATION) at 21:15

## 2017-06-07 RX ADMIN — OXYCODONE HYDROCHLORIDE 5 MG: 5 TABLET ORAL at 18:44

## 2017-06-07 RX ADMIN — OXYCODONE HYDROCHLORIDE 5 MG: 5 TABLET ORAL at 07:57

## 2017-06-07 RX ADMIN — ENOXAPARIN SODIUM 40 MG: 40 INJECTION SUBCUTANEOUS at 08:43

## 2017-06-07 RX ADMIN — GUAIFENESIN AND DEXTROMETHORPHAN 10 ML: 100; 10 SYRUP ORAL at 21:14

## 2017-06-07 RX ADMIN — NEOMYCIN SULFATE, POLYMYXIN B SULFATE AND HYDROCORTISONE 2 DROP: 3.5; 10000; 1 SOLUTION AURICULAR (OTIC) at 04:43

## 2017-06-07 RX ADMIN — MELATONIN 3 MG ORAL TABLET 6 MG: 3 TABLET ORAL at 21:13

## 2017-06-07 RX ADMIN — ALPRAZOLAM 0.25 MG: 0.25 TABLET ORAL at 21:13

## 2017-06-07 RX ADMIN — LEVOFLOXACIN 750 MG: 750 TABLET, FILM COATED ORAL at 08:44

## 2017-06-07 RX ADMIN — FUROSEMIDE 40 MG: 40 TABLET ORAL at 08:43

## 2017-06-07 RX ADMIN — PANTOPRAZOLE SODIUM 40 MG: 40 GRANULE, DELAYED RELEASE ORAL at 08:44

## 2017-06-07 RX ADMIN — ACETAMINOPHEN 650 MG: 325 TABLET, FILM COATED ORAL at 04:41

## 2017-06-07 RX ADMIN — INSULIN LISPRO 4 UNITS: 100 INJECTION, SOLUTION INTRAVENOUS; SUBCUTANEOUS at 08:43

## 2017-06-07 RX ADMIN — GUAIFENESIN 600 MG: 100 SOLUTION ORAL at 09:00

## 2017-06-07 RX ADMIN — OXYCODONE HYDROCHLORIDE 10 MG: 5 TABLET ORAL at 23:00

## 2017-06-07 RX ADMIN — ACETAMINOPHEN 650 MG: 325 TABLET, FILM COATED ORAL at 09:48

## 2017-06-08 VITALS
BODY MASS INDEX: 25.9 KG/M2 | WEIGHT: 185 LBS | HEART RATE: 95 BPM | SYSTOLIC BLOOD PRESSURE: 122 MMHG | DIASTOLIC BLOOD PRESSURE: 75 MMHG | HEIGHT: 71 IN

## 2017-06-08 PROCEDURE — 74011250637 HC RX REV CODE- 250/637: Performed by: PHYSICAL MEDICINE & REHABILITATION

## 2017-06-08 PROCEDURE — 74011000250 HC RX REV CODE- 250: Performed by: PHYSICAL MEDICINE & REHABILITATION

## 2017-06-08 PROCEDURE — 74011250636 HC RX REV CODE- 250/636: Performed by: PHYSICAL MEDICINE & REHABILITATION

## 2017-06-08 RX ORDER — POLYETHYLENE GLYCOL 3350 17 G/17G
17 POWDER, FOR SOLUTION ORAL DAILY
Status: DISCONTINUED | OUTPATIENT
Start: 2017-06-08 | End: 2017-06-09 | Stop reason: HOSPADM

## 2017-06-08 RX ORDER — FACIAL-BODY WIPES
10 EACH TOPICAL DAILY PRN
Status: DISCONTINUED | OUTPATIENT
Start: 2017-06-08 | End: 2017-06-09 | Stop reason: HOSPADM

## 2017-06-08 RX ORDER — ADHESIVE BANDAGE
30 BANDAGE TOPICAL DAILY PRN
Status: DISCONTINUED | OUTPATIENT
Start: 2017-06-08 | End: 2017-06-09 | Stop reason: HOSPADM

## 2017-06-08 RX ORDER — DOCUSATE SODIUM 100 MG/1
100 CAPSULE, LIQUID FILLED ORAL 2 TIMES DAILY
Status: DISCONTINUED | OUTPATIENT
Start: 2017-06-08 | End: 2017-06-09

## 2017-06-08 RX ORDER — LANOLIN ALCOHOL/MO/W.PET/CERES
3 CREAM (GRAM) TOPICAL
Status: DISCONTINUED | OUTPATIENT
Start: 2017-06-08 | End: 2017-06-09 | Stop reason: HOSPADM

## 2017-06-08 RX ORDER — QUETIAPINE FUMARATE 25 MG/1
25 TABLET, FILM COATED ORAL
Status: DISCONTINUED | OUTPATIENT
Start: 2017-06-08 | End: 2017-06-09 | Stop reason: HOSPADM

## 2017-06-08 RX ADMIN — POLYETHYLENE GLYCOL 3350 17 G: 17 POWDER, FOR SOLUTION ORAL at 11:12

## 2017-06-08 RX ADMIN — OXYCODONE HYDROCHLORIDE 10 MG: 5 TABLET ORAL at 15:10

## 2017-06-08 RX ADMIN — OXYCODONE HYDROCHLORIDE 5 MG: 5 TABLET ORAL at 11:08

## 2017-06-08 RX ADMIN — DOCUSATE SODIUM 100 MG: 100 CAPSULE, LIQUID FILLED ORAL at 11:12

## 2017-06-08 RX ADMIN — LEVOFLOXACIN 750 MG: 750 TABLET, FILM COATED ORAL at 09:02

## 2017-06-08 RX ADMIN — ATORVASTATIN CALCIUM 40 MG: 40 TABLET, FILM COATED ORAL at 21:56

## 2017-06-08 RX ADMIN — FUROSEMIDE 40 MG: 40 TABLET ORAL at 09:02

## 2017-06-08 RX ADMIN — POTASSIUM CHLORIDE 20 MEQ: 1.5 POWDER, FOR SOLUTION ORAL at 09:02

## 2017-06-08 RX ADMIN — FLUOCINONIDE: 0.5 CREAM TOPICAL at 22:03

## 2017-06-08 RX ADMIN — OXYCODONE HYDROCHLORIDE 10 MG: 5 TABLET ORAL at 07:02

## 2017-06-08 RX ADMIN — ASPIRIN 81 MG CHEWABLE TABLET 81 MG: 81 TABLET CHEWABLE at 09:02

## 2017-06-08 RX ADMIN — ENOXAPARIN SODIUM 40 MG: 40 INJECTION SUBCUTANEOUS at 09:11

## 2017-06-08 RX ADMIN — ACETAMINOPHEN 650 MG: 325 TABLET, FILM COATED ORAL at 01:30

## 2017-06-08 RX ADMIN — GUAIFENESIN 600 MG: 100 SOLUTION ORAL at 21:55

## 2017-06-08 RX ADMIN — QUETIAPINE FUMARATE 25 MG: 25 TABLET, FILM COATED ORAL at 21:56

## 2017-06-08 RX ADMIN — GUAIFENESIN 600 MG: 100 SOLUTION ORAL at 09:02

## 2017-06-08 RX ADMIN — IPRATROPIUM BROMIDE AND ALBUTEROL SULFATE 3 ML: .5; 3 SOLUTION RESPIRATORY (INHALATION) at 09:02

## 2017-06-08 RX ADMIN — PANTOPRAZOLE SODIUM 40 MG: 40 GRANULE, DELAYED RELEASE ORAL at 09:02

## 2017-06-08 RX ADMIN — MAGNESIUM HYDROXIDE 30 ML: 400 SUSPENSION ORAL at 15:12

## 2017-06-08 RX ADMIN — ACETAMINOPHEN 650 MG: 325 TABLET, FILM COATED ORAL at 09:01

## 2017-06-08 RX ADMIN — IPRATROPIUM BROMIDE AND ALBUTEROL SULFATE 3 ML: .5; 3 SOLUTION RESPIRATORY (INHALATION) at 22:00

## 2017-06-08 RX ADMIN — OXYCODONE HYDROCHLORIDE 10 MG: 5 TABLET ORAL at 19:03

## 2017-06-08 RX ADMIN — PRASUGREL HYDROCHLORIDE 10 MG: 10 TABLET, FILM COATED ORAL at 09:02

## 2017-06-09 ENCOUNTER — HOSPITAL ENCOUNTER (INPATIENT)
Age: 57
LOS: 5 days | Discharge: NURSING, REHAB OR LONG-TERM CARE | DRG: 698 | End: 2017-06-14
Attending: EMERGENCY MEDICINE | Admitting: INTERNAL MEDICINE
Payer: COMMERCIAL

## 2017-06-09 ENCOUNTER — APPOINTMENT (OUTPATIENT)
Dept: GENERAL RADIOLOGY | Age: 57
DRG: 698 | End: 2017-06-09
Attending: EMERGENCY MEDICINE
Payer: COMMERCIAL

## 2017-06-09 ENCOUNTER — APPOINTMENT (OUTPATIENT)
Dept: CT IMAGING | Age: 57
DRG: 698 | End: 2017-06-09
Attending: EMERGENCY MEDICINE
Payer: COMMERCIAL

## 2017-06-09 DIAGNOSIS — A41.9 SEPSIS, DUE TO UNSPECIFIED ORGANISM: Primary | ICD-10-CM

## 2017-06-09 DIAGNOSIS — N39.0 URINARY TRACT INFECTION ASSOCIATED WITH INDWELLING URETHRAL CATHETER, INITIAL ENCOUNTER (HCC): ICD-10-CM

## 2017-06-09 DIAGNOSIS — T83.511A URINARY TRACT INFECTION ASSOCIATED WITH INDWELLING URETHRAL CATHETER, INITIAL ENCOUNTER (HCC): ICD-10-CM

## 2017-06-09 PROBLEM — R65.20 SEVERE SEPSIS (HCC): Status: ACTIVE | Noted: 2017-06-09

## 2017-06-09 LAB
ALBUMIN SERPL BCP-MCNC: 2.7 G/DL (ref 3.5–5)
ALBUMIN/GLOB SERPL: 0.5 {RATIO} (ref 1.1–2.2)
ALP SERPL-CCNC: 143 U/L (ref 45–117)
ALT SERPL-CCNC: 27 U/L (ref 12–78)
ANION GAP BLD CALC-SCNC: 6 MMOL/L (ref 5–15)
ANION GAP BLD CALC-SCNC: 6 MMOL/L (ref 5–15)
APPEARANCE UR: ABNORMAL
APPEARANCE UR: ABNORMAL
AST SERPL W P-5'-P-CCNC: 31 U/L (ref 15–37)
BACTERIA URNS QL MICRO: ABNORMAL /HPF
BACTERIA URNS QL MICRO: ABNORMAL /HPF
BASOPHILS # BLD AUTO: 0 K/UL (ref 0–0.1)
BASOPHILS # BLD: 0 % (ref 0–1)
BILIRUB SERPL-MCNC: 0.6 MG/DL (ref 0.2–1)
BILIRUB UR QL CFM: NEGATIVE
BILIRUB UR QL: NEGATIVE
BUN SERPL-MCNC: 11 MG/DL (ref 6–20)
BUN SERPL-MCNC: 12 MG/DL (ref 6–20)
BUN/CREAT SERPL: 14 (ref 12–20)
BUN/CREAT SERPL: 15 (ref 12–20)
CALCIUM SERPL-MCNC: 8.4 MG/DL (ref 8.5–10.1)
CALCIUM SERPL-MCNC: 8.7 MG/DL (ref 8.5–10.1)
CHLORIDE SERPL-SCNC: 94 MMOL/L (ref 97–108)
CHLORIDE SERPL-SCNC: 97 MMOL/L (ref 97–108)
CK MB CFR SERPL CALC: NORMAL % (ref 0–2.5)
CK MB SERPL-MCNC: <1 NG/ML (ref 5–25)
CK SERPL-CCNC: 79 U/L (ref 39–308)
CO2 SERPL-SCNC: 30 MMOL/L (ref 21–32)
CO2 SERPL-SCNC: 31 MMOL/L (ref 21–32)
COLOR UR: ABNORMAL
COLOR UR: ABNORMAL
CREAT SERPL-MCNC: 0.79 MG/DL (ref 0.7–1.3)
CREAT SERPL-MCNC: 0.81 MG/DL (ref 0.7–1.3)
EOSINOPHIL # BLD: 0.1 K/UL (ref 0–0.4)
EOSINOPHIL NFR BLD: 0 % (ref 0–7)
EPITH CASTS URNS QL MICRO: ABNORMAL /LPF
EPITH CASTS URNS QL MICRO: ABNORMAL /LPF
ERYTHROCYTE [DISTWIDTH] IN BLOOD BY AUTOMATED COUNT: 15.9 % (ref 11.5–14.5)
ERYTHROCYTE [DISTWIDTH] IN BLOOD BY AUTOMATED COUNT: 16 % (ref 11.5–14.5)
GLOBULIN SER CALC-MCNC: 5.9 G/DL (ref 2–4)
GLUCOSE BLD STRIP.AUTO-MCNC: 132 MG/DL (ref 65–100)
GLUCOSE SERPL-MCNC: 179 MG/DL (ref 65–100)
GLUCOSE SERPL-MCNC: 201 MG/DL (ref 65–100)
GLUCOSE UR STRIP.AUTO-MCNC: NEGATIVE MG/DL
GLUCOSE UR STRIP.AUTO-MCNC: NEGATIVE MG/DL
HCT VFR BLD AUTO: 37.4 % (ref 36.6–50.3)
HCT VFR BLD AUTO: 39 % (ref 36.6–50.3)
HGB BLD-MCNC: 11.7 G/DL (ref 12.1–17)
HGB BLD-MCNC: 12.3 G/DL (ref 12.1–17)
HGB UR QL STRIP: ABNORMAL
HGB UR QL STRIP: ABNORMAL
INR PPP: 1.1 (ref 0.9–1.1)
KETONES UR QL STRIP.AUTO: ABNORMAL MG/DL
KETONES UR QL STRIP.AUTO: NEGATIVE MG/DL
LACTATE SERPL-SCNC: 2.1 MMOL/L (ref 0.4–2)
LACTATE SERPL-SCNC: 2.2 MMOL/L (ref 0.4–2)
LEUKOCYTE ESTERASE UR QL STRIP.AUTO: ABNORMAL
LEUKOCYTE ESTERASE UR QL STRIP.AUTO: ABNORMAL
LYMPHOCYTES # BLD AUTO: 14 % (ref 12–49)
LYMPHOCYTES # BLD: 2.7 K/UL (ref 0.8–3.5)
MCH RBC QN AUTO: 26.5 PG (ref 26–34)
MCH RBC QN AUTO: 26.8 PG (ref 26–34)
MCHC RBC AUTO-ENTMCNC: 31.3 G/DL (ref 30–36.5)
MCHC RBC AUTO-ENTMCNC: 31.5 G/DL (ref 30–36.5)
MCV RBC AUTO: 84.8 FL (ref 80–99)
MCV RBC AUTO: 85 FL (ref 80–99)
MONOCYTES # BLD: 1 K/UL (ref 0–1)
MONOCYTES NFR BLD AUTO: 5 % (ref 5–13)
MUCOUS THREADS URNS QL MICRO: ABNORMAL /LPF
NEUTS SEG # BLD: 16.1 K/UL (ref 1.8–8)
NEUTS SEG NFR BLD AUTO: 81 % (ref 32–75)
NITRITE UR QL STRIP.AUTO: NEGATIVE
NITRITE UR QL STRIP.AUTO: NEGATIVE
PH UR STRIP: 8 [PH] (ref 5–8)
PH UR STRIP: 8 [PH] (ref 5–8)
PLATELET # BLD AUTO: 263 K/UL (ref 150–400)
PLATELET # BLD AUTO: 274 K/UL (ref 150–400)
POTASSIUM SERPL-SCNC: 4 MMOL/L (ref 3.5–5.1)
POTASSIUM SERPL-SCNC: 4 MMOL/L (ref 3.5–5.1)
PROT SERPL-MCNC: 8.6 G/DL (ref 6.4–8.2)
PROT UR STRIP-MCNC: 100 MG/DL
PROT UR STRIP-MCNC: ABNORMAL MG/DL
PROTHROMBIN TIME: 11.4 SEC (ref 9–11.1)
RBC # BLD AUTO: 4.41 M/UL (ref 4.1–5.7)
RBC # BLD AUTO: 4.59 M/UL (ref 4.1–5.7)
RBC #/AREA URNS HPF: >100 /HPF (ref 0–5)
RBC #/AREA URNS HPF: ABNORMAL /HPF (ref 0–5)
SERVICE CMNT-IMP: ABNORMAL
SODIUM SERPL-SCNC: 131 MMOL/L (ref 136–145)
SODIUM SERPL-SCNC: 133 MMOL/L (ref 136–145)
SP GR UR REFRACTOMETRY: 1.02 (ref 1–1.03)
SP GR UR REFRACTOMETRY: 1.02 (ref 1–1.03)
TROPONIN I SERPL-MCNC: <0.04 NG/ML
UA: UC IF INDICATED,UAUC: ABNORMAL
UROBILINOGEN UR QL STRIP.AUTO: 1 EU/DL (ref 0.2–1)
UROBILINOGEN UR QL STRIP.AUTO: 1 EU/DL (ref 0.2–1)
WBC # BLD AUTO: 14.8 K/UL (ref 4.1–11.1)
WBC # BLD AUTO: 20 K/UL (ref 4.1–11.1)
WBC URNS QL MICRO: >100 /HPF (ref 0–4)
WBC URNS QL MICRO: ABNORMAL /HPF (ref 0–4)
YEAST URNS QL MICRO: PRESENT
YEAST URNS QL MICRO: PRESENT

## 2017-06-09 PROCEDURE — 96376 TX/PRO/DX INJ SAME DRUG ADON: CPT

## 2017-06-09 PROCEDURE — 74177 CT ABD & PELVIS W/CONTRAST: CPT

## 2017-06-09 PROCEDURE — 87040 BLOOD CULTURE FOR BACTERIA: CPT | Performed by: EMERGENCY MEDICINE

## 2017-06-09 PROCEDURE — 84484 ASSAY OF TROPONIN QUANT: CPT | Performed by: EMERGENCY MEDICINE

## 2017-06-09 PROCEDURE — 96361 HYDRATE IV INFUSION ADD-ON: CPT

## 2017-06-09 PROCEDURE — 99285 EMERGENCY DEPT VISIT HI MDM: CPT

## 2017-06-09 PROCEDURE — 74011000258 HC RX REV CODE- 258: Performed by: EMERGENCY MEDICINE

## 2017-06-09 PROCEDURE — 74011250637 HC RX REV CODE- 250/637: Performed by: EMERGENCY MEDICINE

## 2017-06-09 PROCEDURE — 74011250637 HC RX REV CODE- 250/637: Performed by: INTERNAL MEDICINE

## 2017-06-09 PROCEDURE — 87086 URINE CULTURE/COLONY COUNT: CPT | Performed by: PHYSICAL MEDICINE & REHABILITATION

## 2017-06-09 PROCEDURE — 82550 ASSAY OF CK (CPK): CPT | Performed by: EMERGENCY MEDICINE

## 2017-06-09 PROCEDURE — 96360 HYDRATION IV INFUSION INIT: CPT

## 2017-06-09 PROCEDURE — 74011000250 HC RX REV CODE- 250: Performed by: PHYSICAL MEDICINE & REHABILITATION

## 2017-06-09 PROCEDURE — 82962 GLUCOSE BLOOD TEST: CPT

## 2017-06-09 PROCEDURE — 71010 XR CHEST PORT: CPT

## 2017-06-09 PROCEDURE — 85610 PROTHROMBIN TIME: CPT | Performed by: EMERGENCY MEDICINE

## 2017-06-09 PROCEDURE — 65660000000 HC RM CCU STEPDOWN

## 2017-06-09 PROCEDURE — 74011250637 HC RX REV CODE- 250/637: Performed by: PHYSICAL MEDICINE & REHABILITATION

## 2017-06-09 PROCEDURE — 94761 N-INVAS EAR/PLS OXIMETRY MLT: CPT

## 2017-06-09 PROCEDURE — 87086 URINE CULTURE/COLONY COUNT: CPT | Performed by: EMERGENCY MEDICINE

## 2017-06-09 PROCEDURE — 96375 TX/PRO/DX INJ NEW DRUG ADDON: CPT

## 2017-06-09 PROCEDURE — 80053 COMPREHEN METABOLIC PANEL: CPT | Performed by: EMERGENCY MEDICINE

## 2017-06-09 PROCEDURE — 81001 URINALYSIS AUTO W/SCOPE: CPT | Performed by: EMERGENCY MEDICINE

## 2017-06-09 PROCEDURE — 85027 COMPLETE CBC AUTOMATED: CPT | Performed by: PHYSICAL MEDICINE & REHABILITATION

## 2017-06-09 PROCEDURE — 83605 ASSAY OF LACTIC ACID: CPT | Performed by: EMERGENCY MEDICINE

## 2017-06-09 PROCEDURE — 74011250636 HC RX REV CODE- 250/636: Performed by: INTERNAL MEDICINE

## 2017-06-09 PROCEDURE — 81001 URINALYSIS AUTO W/SCOPE: CPT | Performed by: PHYSICAL MEDICINE & REHABILITATION

## 2017-06-09 PROCEDURE — 74011250636 HC RX REV CODE- 250/636: Performed by: EMERGENCY MEDICINE

## 2017-06-09 PROCEDURE — 85025 COMPLETE CBC W/AUTO DIFF WBC: CPT | Performed by: EMERGENCY MEDICINE

## 2017-06-09 PROCEDURE — 80048 BASIC METABOLIC PNL TOTAL CA: CPT | Performed by: PHYSICAL MEDICINE & REHABILITATION

## 2017-06-09 PROCEDURE — 74011636320 HC RX REV CODE- 636/320: Performed by: EMERGENCY MEDICINE

## 2017-06-09 PROCEDURE — 96365 THER/PROPH/DIAG IV INF INIT: CPT

## 2017-06-09 PROCEDURE — 36415 COLL VENOUS BLD VENIPUNCTURE: CPT | Performed by: EMERGENCY MEDICINE

## 2017-06-09 PROCEDURE — 74011250636 HC RX REV CODE- 250/636: Performed by: PHYSICAL MEDICINE & REHABILITATION

## 2017-06-09 RX ORDER — SODIUM CHLORIDE 9 MG/ML
50 INJECTION, SOLUTION INTRAVENOUS
Status: COMPLETED | OUTPATIENT
Start: 2017-06-09 | End: 2017-06-09

## 2017-06-09 RX ORDER — SODIUM CHLORIDE 0.9 % (FLUSH) 0.9 %
5-10 SYRINGE (ML) INJECTION AS NEEDED
Status: DISCONTINUED | OUTPATIENT
Start: 2017-06-09 | End: 2017-06-14 | Stop reason: HOSPADM

## 2017-06-09 RX ORDER — TRIPROLIDINE/PSEUDOEPHEDRINE 2.5MG-60MG
200 TABLET ORAL
Status: DISCONTINUED | OUTPATIENT
Start: 2017-06-09 | End: 2017-06-14 | Stop reason: HOSPADM

## 2017-06-09 RX ORDER — SODIUM CHLORIDE 0.9 % (FLUSH) 0.9 %
5-10 SYRINGE (ML) INJECTION EVERY 8 HOURS
Status: DISCONTINUED | OUTPATIENT
Start: 2017-06-09 | End: 2017-06-14 | Stop reason: HOSPADM

## 2017-06-09 RX ORDER — GUAIFENESIN 100 MG/5ML
200 SOLUTION ORAL 4 TIMES DAILY
Status: DISCONTINUED | OUTPATIENT
Start: 2017-06-09 | End: 2017-06-14 | Stop reason: HOSPADM

## 2017-06-09 RX ORDER — MAGNESIUM SULFATE 100 %
4 CRYSTALS MISCELLANEOUS AS NEEDED
Status: DISCONTINUED | OUTPATIENT
Start: 2017-06-09 | End: 2017-06-14 | Stop reason: HOSPADM

## 2017-06-09 RX ORDER — FENTANYL CITRATE 50 UG/ML
100 INJECTION, SOLUTION INTRAMUSCULAR; INTRAVENOUS
Status: COMPLETED | OUTPATIENT
Start: 2017-06-09 | End: 2017-06-09

## 2017-06-09 RX ORDER — ACETAMINOPHEN 325 MG/1
650 TABLET ORAL
Status: DISCONTINUED | OUTPATIENT
Start: 2017-06-09 | End: 2017-06-14 | Stop reason: HOSPADM

## 2017-06-09 RX ORDER — PANTOPRAZOLE SODIUM 40 MG/1
40 GRANULE, DELAYED RELEASE ORAL
Status: DISCONTINUED | OUTPATIENT
Start: 2017-06-10 | End: 2017-06-14 | Stop reason: HOSPADM

## 2017-06-09 RX ORDER — NALOXONE HYDROCHLORIDE 0.4 MG/ML
0.4 INJECTION, SOLUTION INTRAMUSCULAR; INTRAVENOUS; SUBCUTANEOUS
Status: COMPLETED | OUTPATIENT
Start: 2017-06-09 | End: 2017-06-09

## 2017-06-09 RX ORDER — PRASUGREL 10 MG/1
10 TABLET, FILM COATED ORAL DAILY
Status: DISCONTINUED | OUTPATIENT
Start: 2017-06-10 | End: 2017-06-10

## 2017-06-09 RX ORDER — DEXTROSE 50 % IN WATER (D50W) INTRAVENOUS SYRINGE
12.5-25 AS NEEDED
Status: DISCONTINUED | OUTPATIENT
Start: 2017-06-09 | End: 2017-06-09

## 2017-06-09 RX ORDER — DEXTROSE MONOHYDRATE 100 MG/ML
125-250 INJECTION, SOLUTION INTRAVENOUS AS NEEDED
Status: DISCONTINUED | OUTPATIENT
Start: 2017-06-09 | End: 2017-06-14 | Stop reason: HOSPADM

## 2017-06-09 RX ORDER — FUROSEMIDE 40 MG/1
40 TABLET ORAL DAILY
Status: DISCONTINUED | OUTPATIENT
Start: 2017-06-10 | End: 2017-06-14 | Stop reason: HOSPADM

## 2017-06-09 RX ORDER — SODIUM CHLORIDE 0.9 % (FLUSH) 0.9 %
10 SYRINGE (ML) INJECTION
Status: COMPLETED | OUTPATIENT
Start: 2017-06-09 | End: 2017-06-09

## 2017-06-09 RX ORDER — INSULIN LISPRO 100 [IU]/ML
INJECTION, SOLUTION INTRAVENOUS; SUBCUTANEOUS EVERY 6 HOURS
Status: DISCONTINUED | OUTPATIENT
Start: 2017-06-09 | End: 2017-06-14 | Stop reason: HOSPADM

## 2017-06-09 RX ORDER — GUAIFENESIN 100 MG/5ML
81 LIQUID (ML) ORAL DAILY
Status: DISCONTINUED | OUTPATIENT
Start: 2017-06-10 | End: 2017-06-14 | Stop reason: HOSPADM

## 2017-06-09 RX ORDER — IPRATROPIUM BROMIDE AND ALBUTEROL SULFATE 2.5; .5 MG/3ML; MG/3ML
3 SOLUTION RESPIRATORY (INHALATION)
Status: DISCONTINUED | OUTPATIENT
Start: 2017-06-09 | End: 2017-06-14 | Stop reason: HOSPADM

## 2017-06-09 RX ORDER — ONDANSETRON 2 MG/ML
4 INJECTION INTRAMUSCULAR; INTRAVENOUS
Status: DISCONTINUED | OUTPATIENT
Start: 2017-06-09 | End: 2017-06-14 | Stop reason: HOSPADM

## 2017-06-09 RX ORDER — SODIUM CHLORIDE 9 MG/ML
50 INJECTION, SOLUTION INTRAVENOUS CONTINUOUS
Status: DISCONTINUED | OUTPATIENT
Start: 2017-06-09 | End: 2017-06-11

## 2017-06-09 RX ORDER — ATORVASTATIN CALCIUM 40 MG/1
40 TABLET, FILM COATED ORAL DAILY
Status: DISCONTINUED | OUTPATIENT
Start: 2017-06-10 | End: 2017-06-14 | Stop reason: HOSPADM

## 2017-06-09 RX ORDER — ACETAMINOPHEN 160 MG/5ML
650 SOLUTION ORAL
Status: DISCONTINUED | OUTPATIENT
Start: 2017-06-09 | End: 2017-06-09

## 2017-06-09 RX ORDER — ONDANSETRON 2 MG/ML
4 INJECTION INTRAMUSCULAR; INTRAVENOUS
Status: COMPLETED | OUTPATIENT
Start: 2017-06-09 | End: 2017-06-09

## 2017-06-09 RX ORDER — ENOXAPARIN SODIUM 100 MG/ML
40 INJECTION SUBCUTANEOUS DAILY
Status: DISCONTINUED | OUTPATIENT
Start: 2017-06-10 | End: 2017-06-14 | Stop reason: HOSPADM

## 2017-06-09 RX ORDER — FENTANYL CITRATE 50 UG/ML
50 INJECTION, SOLUTION INTRAMUSCULAR; INTRAVENOUS
Status: COMPLETED | OUTPATIENT
Start: 2017-06-09 | End: 2017-06-09

## 2017-06-09 RX ADMIN — FUROSEMIDE 40 MG: 40 TABLET ORAL at 08:41

## 2017-06-09 RX ADMIN — FENTANYL CITRATE 50 MCG: 50 INJECTION, SOLUTION INTRAMUSCULAR; INTRAVENOUS at 14:51

## 2017-06-09 RX ADMIN — DOCUSATE SODIUM 100 MG: 100 CAPSULE, LIQUID FILLED ORAL at 08:41

## 2017-06-09 RX ADMIN — PRASUGREL HYDROCHLORIDE 10 MG: 10 TABLET, FILM COATED ORAL at 08:40

## 2017-06-09 RX ADMIN — OXYCODONE HYDROCHLORIDE 10 MG: 5 TABLET ORAL at 03:53

## 2017-06-09 RX ADMIN — SODIUM CHLORIDE 1000 ML: 900 INJECTION, SOLUTION INTRAVENOUS at 12:28

## 2017-06-09 RX ADMIN — SODIUM CHLORIDE 1800 ML: 900 INJECTION, SOLUTION INTRAVENOUS at 13:48

## 2017-06-09 RX ADMIN — ACETAMINOPHEN 650 MG: 160 SOLUTION ORAL at 12:31

## 2017-06-09 RX ADMIN — IBUPROFEN 200 MG: 100 SUSPENSION ORAL at 19:17

## 2017-06-09 RX ADMIN — IOPAMIDOL 100 ML: 755 INJECTION, SOLUTION INTRAVENOUS at 15:04

## 2017-06-09 RX ADMIN — Medication 10 ML: at 15:04

## 2017-06-09 RX ADMIN — ONDANSETRON HYDROCHLORIDE 4 MG: 2 INJECTION, SOLUTION INTRAMUSCULAR; INTRAVENOUS at 14:51

## 2017-06-09 RX ADMIN — NALOXONE HYDROCHLORIDE 0.4 MG: 0.4 INJECTION, SOLUTION INTRAMUSCULAR; INTRAVENOUS; SUBCUTANEOUS at 10:39

## 2017-06-09 RX ADMIN — IPRATROPIUM BROMIDE AND ALBUTEROL SULFATE 3 ML: .5; 3 SOLUTION RESPIRATORY (INHALATION) at 08:40

## 2017-06-09 RX ADMIN — POTASSIUM CHLORIDE 20 MEQ: 1.5 POWDER, FOR SOLUTION ORAL at 08:41

## 2017-06-09 RX ADMIN — SODIUM CHLORIDE 50 ML/HR: 900 INJECTION, SOLUTION INTRAVENOUS at 19:23

## 2017-06-09 RX ADMIN — FENTANYL CITRATE 100 MCG: 50 INJECTION, SOLUTION INTRAMUSCULAR; INTRAVENOUS at 16:35

## 2017-06-09 RX ADMIN — ENOXAPARIN SODIUM 40 MG: 40 INJECTION SUBCUTANEOUS at 08:40

## 2017-06-09 RX ADMIN — CEFTRIAXONE 1 G: 1 INJECTION, POWDER, FOR SOLUTION INTRAMUSCULAR; INTRAVENOUS at 13:48

## 2017-06-09 RX ADMIN — BISACODYL 10 MG: 10 SUPPOSITORY RECTAL at 06:00

## 2017-06-09 RX ADMIN — POLYETHYLENE GLYCOL 3350 17 G: 17 POWDER, FOR SOLUTION ORAL at 08:40

## 2017-06-09 RX ADMIN — SODIUM CHLORIDE 50 ML/HR: 900 INJECTION, SOLUTION INTRAVENOUS at 15:04

## 2017-06-09 RX ADMIN — PANTOPRAZOLE SODIUM 40 MG: 40 GRANULE, DELAYED RELEASE ORAL at 08:41

## 2017-06-09 RX ADMIN — Medication 10 ML: at 22:00

## 2017-06-09 RX ADMIN — ACETAMINOPHEN 650 MG: 325 SOLUTION ORAL at 17:49

## 2017-06-09 RX ADMIN — Medication 10 ML: at 16:36

## 2017-06-09 RX ADMIN — ASPIRIN 81 MG CHEWABLE TABLET 81 MG: 81 TABLET CHEWABLE at 08:41

## 2017-06-09 RX ADMIN — Medication 10 ML: at 14:51

## 2017-06-09 RX ADMIN — GUAIFENESIN 600 MG: 100 SOLUTION ORAL at 08:41

## 2017-06-09 RX ADMIN — GUAIFENESIN 200 MG: 100 SOLUTION ORAL at 22:47

## 2017-06-09 NOTE — ED NOTES
Ian Mauricio RN fm [facility] states the pt has increased AMS today, states he was given scheduled narcotic during the night. After increased AMS noted narcan was given with minimal results. Pt noted to have elevated WBC.

## 2017-06-09 NOTE — ED NOTES
Bedside and Verbal shift change report given to Lacey Del Rosario RN (oncoming nurse) by Jennifer CLARK RN (offgoing nurse). Report included the following information SBAR, Kardex, ED Summary and MAR. Pt in CT.

## 2017-06-09 NOTE — H&P
Hospitalist Admission Note    NAME: Arsenio Sahu   :  1960   MRN:  [id number]     Date/Time:  2017 5:09 PM    Patient PCP: Carlo Gomez MD  ________________________________________________________________________    My assessment of this patient's clinical condition and my plan of care is as follows. Assessment / Plan:    Acute encephalopathy on baseline DORA  Severe sepsis (WBC, fever, Tachy, lactate 2.2) due to complicated UTI  -CT abdomen No free air. No evidence of fluid collection. No acute intraperitoneal process.  -patient received 30cc/kg NS bolus in ER. Monitor for volume overload  -continue IV rocephin started by ER. Follow up urine and blood cultures  -pinto will need to be changed at some point  -patient now more arousable and follows simple commands. Suspect this is all metabolic related and not structural.  Consider CT head and neuro consult if he does not return to his baseline. S/p out of hospital cardiac arrest 17 resulting in DORA  S/P PEG and trach 17  -hold on TF for today. Restart tomorrow if clinically stable and appropriate    CAD s/p CABG 2014 x 3v at HD  Hx cardiac stents (twice before CABG and once after)  Ischemic cardiomyopathy  HTN  -expected poor quality of life and deemed high risk for recurrent ischemic events and infections. Cardiology feels not appropriate for further procedures like stents or AICD as this may even hasten his demise if either would get infected.   -restart asa, effient, lasix and statin    DM  -SSI prn    Urine retention  -pinto inserted     [facility]        Code Status:   Full  Surrogate Decision Maker:   Family    DVT Prophylaxis:   lovenox     Baseline:   He is close          Subjective:   CHIEF COMPLAINT:   AMS    HISTORY OF PRESENT ILLNESS:     Arsenio Sahu is a 56 y.o. [place] ([facility]) male with a history of cardiac arrest, DORA, CAD, ischemic CM and DM who presents with acute onset of AMS.   Patient was recently in this hospital from 5/19 to 6/03, initially accepted as a transfer from [facility] due to insurance reasons. He had a complicated stay and ultimately was discharged to [facility] for rehab. He was doing well and engaged with PT. This morning he was found by staff to be altered and was transferred to this ER for evaluation. He has a pinto last changed 5/31. His WBC 20k, lactate 2.2, tachy on exam, UA with pyuria and funguria. Patient started on IV abx and we were asked to admit for work up and evaluation of the above problems. Past Medical History:   Diagnosis Date    Anoxic brain injury [facility])     following cardiac arrest; MRI shows involvement BG and temporal lobes    Aspiration pneumonia (HCC)     during cardiac arrest intubation    Bradycardia     CAD (coronary artery disease)     Cardiac arrest ([facility] [address]     no intervention by [place] cardiology. [address] [facility] [facility]    Diabetes [facility])     Essential hypertension     Hyperlipidemia         Past Surgical History:   Procedure Laterality Date    HX CORONARY ARTERY BYPASS GRAFT      HX CORONARY STENT PLACEMENT      HX HEART CATHETERIZATION      HX OTHER SURGICAL  05/12/2017    PEG and [facility] TUBE CHANGE  5/25/2017            Social History   Substance Use Topics    Smoking status: Never Smoker    Smokeless tobacco: Not on file    Alcohol use Not on file        Family History   Problem Relation Age of Onset    Family history unknown: Yes     Allergies   Allergen Reactions    Cymbalta [Duloxetine] Rash        Prior to Admission medications    Medication Sig Start Date End Date Taking? Authorizing Provider   atorvastatin (LIPITOR) 40 mg tablet 1 Tab by Per G Tube route daily. 6/3/17   Owen Santiago MD   aspirin 81 mg chewable tablet 1 Tab by Per G Tube route daily. 6/3/17   Owen Santiago MD   furosemide (LASIX) 40 mg tablet 1 Tab by Per G Tube route daily.  6/3/17   Owen Santiago MD   levoFLOXacin (LEVAQUIN) 750 mg tablet 1 Tab by Per G Tube route Daily (before breakfast) for 5 days. 6/3/17 6/8/17  Mark Rasmussen MD   traMADol (ULTRAM) 50 mg tablet 1 Tab by Per G Tube route every six (6) hours as needed. Max Daily Amount: 200 mg. 6/3/17   Mark Rasmussen MD   acetaminophen (TYLENOL) 325 mg tablet 2 Tabs by Per G Tube route every four (4) hours as needed for Pain. 6/3/17   Mark Rasmussen MD   pantoprazole (PROTONIX) 40 mg granules for oral suspension 40 mg by Per G Tube route Daily (before breakfast). 6/3/17   Mark Rasmussen MD   prasugrel (EFFIENT) 10 mg tablet 10 mg by PEG Tube route daily. Historical Provider   vancomycin 1,000 mg 1,000 mg IVPB 1,000 mg by IntraVENous route. Historical Provider   lidocaine (LIDODERM) 5 % 1 Patch by TransDERmal route every twenty-four (24) hours. Apply patch to the affected area for 12 hours a day and remove for 12 hours a day. Historical Provider   albuterol-ipratropium (DUONEB) 2.5 mg-0.5 mg/3 ml nebu 3 mL by Nebulization route every four (4) hours as needed. Historical Provider       REVIEW OF SYSTEMS:     I am not able to complete the review of systems because: The patient is intubated and sedated   x The patient has altered mental status due to his acute medical problems    The patient has baseline aphasia from prior stroke(s)    The patient has baseline dementia and is not reliable historian    The patient is in acute medical distress and unable to provide information           Objective:   VITALS:    Visit Vitals    /78    Pulse (!) 114    Temp (!) 101 °F (38.3 °C)    Resp 9    Wt 86.2 kg (190 lb)    SpO2 100%    BMI 26.5 kg/m2       PHYSICAL EXAM:    General:    Awake,  no distress, appears stated age. HEENT: Atraumatic, anicteric sclerae, pink conjunctivae     No oral ulcers, mucosa moist, throat clear, dentition fair  Neck:  Supple, symmetrical,  (+) tracheostomy  Lungs:   Clear to auscultation bilaterally. No Wheezing or Rhonchi. No rales.   Chest wall:  No tenderness  No Accessory muscle use.  Heart:   Regular  rhythm,  No  murmur   + edema  Abdomen:   Soft, non-tender. Not distended. Bowel sounds normal  (+) PEG tube  Extremities: No cyanosis. No clubbing, Skin turgor normal, Capillary refill normal, Radial dial pulse 2+  Skin:     Not pale. Not Jaundiced  No rashes   Psych:  Not anxious or agitated. Neurologic: No facial asymmetry. Right side weaker,  Left side 3-4/5 Sensation grossly intact. Does not answer my questions. _______________________________________________________________________  Care Plan discussed with:    Comments   Patient     Family  x  son   RN     Care Manager                    Consultant:      _______________________________________________________________________  Expected  Disposition:   Home with Family    HH/PT/OT/RN    SNF/LTC    VITO x   ________________________________________________________________________  TOTAL TIME:   54   Minutes    Critical Care Provided     Minutes non procedure based      Comments    x Reviewed previous records   >50% of visit spent in counseling and coordination of care  Discussion with patient and/or family and questions answered       Given the patient's current clinical presentation, I have a high level of concern for decompensation if discharged from the ED. Complex decision making was performed which includes reviewing the patient's available past medical records, laboratory results, and Xray films. I have also directly communicated my plan and discussed this case with the involved ED physician.     ____________________________________________________________________  Cabrera Clement MD    Procedures: see electronic medical records for all procedures/Xrays and details which were not copied into this note but were reviewed prior to creation of Plan.     LAB DATA REVIEWED:    Recent Results (from the past 24 hour(s))   CBC W/O DIFF    Collection Time: 06/09/17  6:35 AM   Result Value Ref Range    WBC 14.8 (H) 4.1 - 11.1 K/uL RBC 4.41 4.10 - 5.70 M/uL    HGB 11.7 (L) 12.1 - 17.0 g/dL    HCT 37.4 36.6 - 50.3 %    MCV 84.8 80.0 - 99.0 FL    MCH 26.5 26.0 - 34.0 PG    MCHC 31.3 30.0 - 36.5 g/dL    RDW 15.9 (H) 11.5 - 14.5 %    PLATELET 647 694 - 032 K/uL   METABOLIC PANEL, BASIC    Collection Time: 06/09/17  6:35 AM   Result Value Ref Range    Sodium 133 (L) 136 - 145 mmol/L    Potassium 4.0 3.5 - 5.1 mmol/L    Chloride 97 97 - 108 mmol/L    CO2 30 21 - 32 mmol/L    Anion gap 6 5 - 15 mmol/L    Glucose 201 (H) 65 - 100 mg/dL    BUN 11 6 - 20 MG/DL    Creatinine 0.79 0.70 - 1.30 MG/DL    BUN/Creatinine ratio 14 12 - 20      GFR est AA >60 >60 ml/min/1.73m2    GFR est non-AA >60 >60 ml/min/1.73m2    Calcium 8.4 (L) 8.5 - 10.1 MG/DL   URINALYSIS W/MICROSCOPIC    Collection Time: 06/09/17 10:50 AM   Result Value Ref Range    Color MIKEY      Appearance TURBID (A) CLEAR      Specific gravity 1.020 1.003 - 1.030      pH (UA) 8.0 5.0 - 8.0      Protein 100 (A) NEG mg/dL    Glucose NEGATIVE  NEG mg/dL    Ketone TRACE (A) NEG mg/dL    Blood LARGE (A) NEG      Urobilinogen 1.0 0.2 - 1.0 EU/dL    Nitrites NEGATIVE  NEG      Leukocyte Esterase LARGE (A) NEG      WBC >100 (H) 0 - 4 /hpf    RBC >100 (H) 0 - 5 /hpf    Epithelial cells FEW FEW /lpf    Bacteria 1+ (A) NEG /hpf    Mucus TRACE (A) NEG /lpf    Yeast PRESENT (A) NEG     BILIRUBIN, CONFIRM    Collection Time: 06/09/17 10:50 AM   Result Value Ref Range    Bilirubin UA, confirm NEGATIVE  NEG     CBC WITH AUTOMATED DIFF    Collection Time: 06/09/17 12:26 PM   Result Value Ref Range    WBC 20.0 (H) 4.1 - 11.1 K/uL    RBC 4.59 4. 10 - 5.70 M/uL    HGB 12.3 12.1 - 17.0 g/dL    HCT 39.0 36.6 - 50.3 %    MCV 85.0 80.0 - 99.0 FL    MCH 26.8 26.0 - 34.0 PG    MCHC 31.5 30.0 - 36.5 g/dL    RDW 16.0 (H) 11.5 - 14.5 %    PLATELET 456 935 - 869 K/uL    NEUTROPHILS 81 (H) 32 - 75 %    LYMPHOCYTES 14 12 - 49 %    MONOCYTES 5 5 - 13 %    EOSINOPHILS 0 0 - 7 %    BASOPHILS 0 0 - 1 %    ABS.  NEUTROPHILS 16.1 (H) 1.8 - 8.0 K/UL    ABS. LYMPHOCYTES 2.7 0.8 - 3.5 K/UL    ABS. MONOCYTES 1.0 0.0 - 1.0 K/UL    ABS. EOSINOPHILS 0.1 0.0 - 0.4 K/UL    ABS. BASOPHILS 0.0 0.0 - 0.1 K/UL   METABOLIC PANEL, COMPREHENSIVE    Collection Time: 06/09/17 12:26 PM   Result Value Ref Range    Sodium 131 (L) 136 - 145 mmol/L    Potassium 4.0 3.5 - 5.1 mmol/L    Chloride 94 (L) 97 - 108 mmol/L    CO2 31 21 - 32 mmol/L    Anion gap 6 5 - 15 mmol/L    Glucose 179 (H) 65 - 100 mg/dL    BUN 12 6 - 20 MG/DL    Creatinine 0.81 0.70 - 1.30 MG/DL    BUN/Creatinine ratio 15 12 - 20      GFR est AA >60 >60 ml/min/1.73m2    GFR est non-AA >60 >60 ml/min/1.73m2    Calcium 8.7 8.5 - 10.1 MG/DL    Bilirubin, total 0.6 0.2 - 1.0 MG/DL    ALT (SGPT) 27 12 - 78 U/L    AST (SGOT) 31 15 - 37 U/L    Alk.  phosphatase 143 (H) 45 - 117 U/L    Protein, total 8.6 (H) 6.4 - 8.2 g/dL    Albumin 2.7 (L) 3.5 - 5.0 g/dL    Globulin 5.9 (H) 2.0 - 4.0 g/dL    A-G Ratio 0.5 (L) 1.1 - 2.2     CK W/ CKMB & INDEX    Collection Time: 06/09/17 12:26 PM   Result Value Ref Range    CK 79 39 - 308 U/L    CK - MB <1.0 <3.6 NG/ML    CK-MB Index Cannot be calulated 0 - 2.5     TROPONIN I    Collection Time: 06/09/17 12:26 PM   Result Value Ref Range    Troponin-I, Qt. <0.04 <0.05 ng/mL   URINALYSIS W/ REFLEX CULTURE    Collection Time: 06/09/17 12:26 PM   Result Value Ref Range    Color DARK YELLOW      Appearance CLOUDY (A) CLEAR      Specific gravity 1.019 1.003 - 1.030      pH (UA) 8.0 5.0 - 8.0      Protein TRACE (A) NEG mg/dL    Glucose NEGATIVE  NEG mg/dL    Ketone NEGATIVE  NEG mg/dL    Bilirubin NEGATIVE  NEG      Blood MODERATE (A) NEG      Urobilinogen 1.0 0.2 - 1.0 EU/dL    Nitrites NEGATIVE  NEG      Leukocyte Esterase MODERATE (A) NEG      WBC 20-50 0 - 4 /hpf    RBC 20-50 0 - 5 /hpf    Epithelial cells FEW FEW /lpf    Bacteria 1+ (A) NEG /hpf    UA:UC IF INDICATED URINE CULTURE ORDERED (A) CNI      Yeast PRESENT (A) NEG     PROTHROMBIN TIME + INR    Collection Time: 06/09/17 12:26 PM   Result Value Ref Range    INR 1.1 0.9 - 1.1      Prothrombin time 11.4 (H) 9.0 - 11.1 sec   LACTIC ACID, PLASMA    Collection Time: 06/09/17 12:26 PM   Result Value Ref Range    Lactic acid 2.2 (HH) 0.4 - 2.0 MMOL/L

## 2017-06-09 NOTE — ED PROVIDER NOTES
HPI Comments: Analilia Poon, 56 y.o. Male with PMHx of anoxic brain injury, DM, HTN, and CAD presents via EMS to the ED with cc of AMS x this morning. Family also reports hematuria, intermittently productive cough with increasing frequency, diffuse abd pain and shoulder pain. Family reports that they went to the rehab facility to visit the pt and found him unresponsive and were unable to wake him. He is becoming increasingly responsive. Pt notes that he has been able to communicate recently. Per family, the pt has been constipated for the last several days and was given medications today that has resolved the constipation. Pt has a Romero catheter placed. Family notes that the PEG tube insertion site has been leaking fluid and has been drained multiple times by Dr. Ugo Corley. They are not sure if the drainage is malodorous. The PEG tube was inserted ~ 3 weeks ago at a hospital in [place] after aspiration and LOC. The pt was placed on blood thinners 1 day ago. Family denies any N/V/D or CP. PCP: Rivka Castleman, MD    Social history significant for: - Tobacco, - EtOH, - Illicit Drug Use  PSHx: heart catheterization, CABG, coronary stent, PEG tube placement    There are no other complaints, changes, or physical findings at this time. Written by ESTUARDO Gonzalez, as dictated by Nhi Gupta DO. The history is provided by a relative. No  was used. Past Medical History:   Diagnosis Date    Anoxic brain injury [facility])     following cardiac arrest; MRI shows involvement BG and temporal lobes    Aspiration pneumonia (HCC)     during cardiac arrest intubation    Bradycardia     CAD (coronary artery disease)     Cardiac arrest ([facility] [address]     no intervention by [place] cardiology.   [address] [facility]    Diabetes [facility])     Essential hypertension     Hyperlipidemia        Past Surgical History:   Procedure Laterality Date    HX CORONARY ARTERY BYPASS GRAFT      HX CORONARY STENT PLACEMENT      HX HEART CATHETERIZATION      HX OTHER SURGICAL  05/12/2017    PEG and trach    [facility] TUBE CHANGE  5/25/2017              Family History:   Problem Relation Age of Onset    Family history unknown: Yes       Social History     Social History    Marital status:      Spouse name: N/A    Number of children: N/A    Years of education: N/A     Occupational History    Not on file. Social History Main Topics    Smoking status: Never Smoker    Smokeless tobacco: Not on file    Alcohol use Not on file    Drug use: Not on file    Sexual activity: Not on file     Other Topics Concern    Not on file     Social History Narrative         ALLERGIES: Cymbalta [duloxetine]    Review of Systems   Constitutional: Negative. Negative for appetite change, chills, fatigue and fever. HENT: Negative. Negative for congestion, rhinorrhea, sinus pressure and sore throat. Eyes: Negative. Respiratory: Positive for cough. Negative for choking, chest tightness, shortness of breath and wheezing. Cardiovascular: Negative. Negative for chest pain, palpitations and leg swelling. Gastrointestinal: Positive for abdominal pain. Negative for constipation, diarrhea, nausea and vomiting. Endocrine: Negative. Genitourinary: Positive for hematuria. Negative for difficulty urinating, dysuria, flank pain and urgency. Musculoskeletal: Positive for myalgias (shoulders). Skin: Negative. Neurological: Negative. Negative for dizziness, speech difficulty, weakness, light-headedness, numbness and headaches. Psychiatric/Behavioral:        (+) AMS   All other systems reviewed and are negative.       Patient Vitals for the past 12 hrs:   Temp Pulse Resp BP SpO2   06/09/17 1500 - (!) 113 15 134/69 96 %   06/09/17 1430 - (!) 122 16 131/67 99 %   06/09/17 1400 - (!) 115 18 115/65 99 %   06/09/17 1330 - 96 17 111/54 99 %   06/09/17 1300 - (!) 110 19 113/58 98 %   06/09/17 1245 - (!) 111 18 111/64 98 % 06/09/17 1200 - - - 108/66 97 %   06/09/17 1157 (!) 100.9 °F (38.3 °C) (!) 106 20 (!) 88/64 97 %       Physical Exam   Constitutional: He appears well-developed and well-nourished. Chronically ill appearing, somnolent arouses to gentle physical stimulus   HENT:   Head: Normocephalic and atraumatic. Mouth/Throat: Oropharynx is clear and moist. No oropharyngeal exudate. Eyes: Conjunctivae and EOM are normal. Pupils are equal, round, and reactive to light. Neck: Normal range of motion. Neck supple. No JVD present. No tracheal deviation present. Cardiovascular: Regular rhythm, normal heart sounds and intact distal pulses. No murmur heard. Tachycardia   Pulmonary/Chest: Effort normal and breath sounds normal. No stridor. No respiratory distress. He has no wheezes. He has no rales. He exhibits no tenderness. Abdominal: Soft. He exhibits distension. There is tenderness (epigastric). There is no rebound and no guarding. Hypoactive BS, G-tube in place, mild redness around site in LUQ   Genitourinary:   Genitourinary Comments: Romero cath in place   Musculoskeletal: Normal range of motion. He exhibits no edema or tenderness. Neurological:   Aphasic, unable to follow commands   Skin: Skin is warm and dry. He is not diaphoretic. Psychiatric: He has a normal mood and affect. His behavior is normal.   Nursing note and vitals reviewed.        MDM  Number of Diagnoses or Management Options  Sepsis, due to unspecified organism [facility]):   Urinary tract infection associated with indwelling urethral catheter, initial encounter:   Diagnosis management comments: DDx: sepsis, UTI, PNA, head bleed, constipation       Amount and/or Complexity of Data Reviewed  Clinical lab tests: ordered and reviewed  Tests in the radiology section of CPT®: ordered and reviewed  Obtain history from someone other than the patient: yes (family)  Review and summarize past medical records: yes  Discuss the patient with other providers: yes (Hospitalist)    Patient Progress  Patient progress: stable    ED Course     Procedures    Pt at [address] [place] [address] s/p anoxic brain injury per family has been able to participate in rehab, but today difficult to arouse with fever, presumed UTI. Will start IV fluids, anti-pyretic and IV Ab. Amy Garces DO       CONSULT NOTE:   1:40 PM  Jayshree Francois DO spoke with Dr. Laura Friedman,   Specialty: Hospitalist  Discussed pt's hx, disposition, and available diagnostic and imaging results. Reviewed care plans. Consultant will evaluate pt for admission.   Written by Hamlet Bhatia, ED Scribe, as dictated by Jayshree Francois DO.    LABORATORY TESTS:  Recent Results (from the past 12 hour(s))   CBC W/O DIFF    Collection Time: 06/09/17  6:35 AM   Result Value Ref Range    WBC 14.8 (H) 4.1 - 11.1 K/uL    RBC 4.41 4.10 - 5.70 M/uL    HGB 11.7 (L) 12.1 - 17.0 g/dL    HCT 37.4 36.6 - 50.3 %    MCV 84.8 80.0 - 99.0 FL    MCH 26.5 26.0 - 34.0 PG    MCHC 31.3 30.0 - 36.5 g/dL    RDW 15.9 (H) 11.5 - 14.5 %    PLATELET 831 244 - 180 K/uL   METABOLIC PANEL, BASIC    Collection Time: 06/09/17  6:35 AM   Result Value Ref Range    Sodium 133 (L) 136 - 145 mmol/L    Potassium 4.0 3.5 - 5.1 mmol/L    Chloride 97 97 - 108 mmol/L    CO2 30 21 - 32 mmol/L    Anion gap 6 5 - 15 mmol/L    Glucose 201 (H) 65 - 100 mg/dL    BUN 11 6 - 20 MG/DL    Creatinine 0.79 0.70 - 1.30 MG/DL    BUN/Creatinine ratio 14 12 - 20      GFR est AA >60 >60 ml/min/1.73m2    GFR est non-AA >60 >60 ml/min/1.73m2    Calcium 8.4 (L) 8.5 - 10.1 MG/DL   URINALYSIS W/MICROSCOPIC    Collection Time: 06/09/17 10:50 AM   Result Value Ref Range    Color MIKEY      Appearance TURBID (A) CLEAR      Specific gravity 1.020 1.003 - 1.030      pH (UA) 8.0 5.0 - 8.0      Protein 100 (A) NEG mg/dL    Glucose NEGATIVE  NEG mg/dL    Ketone TRACE (A) NEG mg/dL    Blood LARGE (A) NEG      Urobilinogen 1.0 0.2 - 1.0 EU/dL    Nitrites NEGATIVE  NEG      Leukocyte Esterase LARGE (A) NEG      WBC >100 (H) 0 - 4 /hpf    RBC >100 (H) 0 - 5 /hpf    Epithelial cells FEW FEW /lpf    Bacteria 1+ (A) NEG /hpf    Mucus TRACE (A) NEG /lpf    Yeast PRESENT (A) NEG     BILIRUBIN, CONFIRM    Collection Time: 06/09/17 10:50 AM   Result Value Ref Range    Bilirubin UA, confirm NEGATIVE  NEG     CBC WITH AUTOMATED DIFF    Collection Time: 06/09/17 12:26 PM   Result Value Ref Range    WBC 20.0 (H) 4.1 - 11.1 K/uL    RBC 4.59 4. 10 - 5.70 M/uL    HGB 12.3 12.1 - 17.0 g/dL    HCT 39.0 36.6 - 50.3 %    MCV 85.0 80.0 - 99.0 FL    MCH 26.8 26.0 - 34.0 PG    MCHC 31.5 30.0 - 36.5 g/dL    RDW 16.0 (H) 11.5 - 14.5 %    PLATELET 709 435 - 067 K/uL    NEUTROPHILS 81 (H) 32 - 75 %    LYMPHOCYTES 14 12 - 49 %    MONOCYTES 5 5 - 13 %    EOSINOPHILS 0 0 - 7 %    BASOPHILS 0 0 - 1 %    ABS. NEUTROPHILS 16.1 (H) 1.8 - 8.0 K/UL    ABS. LYMPHOCYTES 2.7 0.8 - 3.5 K/UL    ABS. MONOCYTES 1.0 0.0 - 1.0 K/UL    ABS. EOSINOPHILS 0.1 0.0 - 0.4 K/UL    ABS. BASOPHILS 0.0 0.0 - 0.1 K/UL   METABOLIC PANEL, COMPREHENSIVE    Collection Time: 06/09/17 12:26 PM   Result Value Ref Range    Sodium 131 (L) 136 - 145 mmol/L    Potassium 4.0 3.5 - 5.1 mmol/L    Chloride 94 (L) 97 - 108 mmol/L    CO2 31 21 - 32 mmol/L    Anion gap 6 5 - 15 mmol/L    Glucose 179 (H) 65 - 100 mg/dL    BUN 12 6 - 20 MG/DL    Creatinine 0.81 0.70 - 1.30 MG/DL    BUN/Creatinine ratio 15 12 - 20      GFR est AA >60 >60 ml/min/1.73m2    GFR est non-AA >60 >60 ml/min/1.73m2    Calcium 8.7 8.5 - 10.1 MG/DL    Bilirubin, total 0.6 0.2 - 1.0 MG/DL    ALT (SGPT) 27 12 - 78 U/L    AST (SGOT) 31 15 - 37 U/L    Alk.  phosphatase 143 (H) 45 - 117 U/L    Protein, total 8.6 (H) 6.4 - 8.2 g/dL    Albumin 2.7 (L) 3.5 - 5.0 g/dL    Globulin 5.9 (H) 2.0 - 4.0 g/dL    A-G Ratio 0.5 (L) 1.1 - 2.2     CK W/ CKMB & INDEX    Collection Time: 06/09/17 12:26 PM   Result Value Ref Range    CK 79 39 - 308 U/L    CK - MB <1.0 <3.6 NG/ML    CK-MB Index Cannot be calulated 0 - 2.5     TROPONIN I    Collection Time: 06/09/17 12:26 PM   Result Value Ref Range    Troponin-I, Qt. <0.04 <0.05 ng/mL   URINALYSIS W/ REFLEX CULTURE    Collection Time: 06/09/17 12:26 PM   Result Value Ref Range    Color DARK YELLOW      Appearance CLOUDY (A) CLEAR      Specific gravity 1.019 1.003 - 1.030      pH (UA) 8.0 5.0 - 8.0      Protein TRACE (A) NEG mg/dL    Glucose NEGATIVE  NEG mg/dL    Ketone NEGATIVE  NEG mg/dL    Bilirubin NEGATIVE  NEG      Blood MODERATE (A) NEG      Urobilinogen 1.0 0.2 - 1.0 EU/dL    Nitrites NEGATIVE  NEG      Leukocyte Esterase MODERATE (A) NEG      WBC 20-50 0 - 4 /hpf    RBC 20-50 0 - 5 /hpf    Epithelial cells FEW FEW /lpf    Bacteria 1+ (A) NEG /hpf    UA:UC IF INDICATED URINE CULTURE ORDERED (A) CNI      Yeast PRESENT (A) NEG     PROTHROMBIN TIME + INR    Collection Time: 06/09/17 12:26 PM   Result Value Ref Range    INR 1.1 0.9 - 1.1      Prothrombin time 11.4 (H) 9.0 - 11.1 sec   LACTIC ACID, PLASMA    Collection Time: 06/09/17 12:26 PM   Result Value Ref Range    Lactic acid 2.2 (HH) 0.4 - 2.0 MMOL/L       IMAGING RESULTS:  XR CHEST PORT   Final Result   EXAM: XR CHEST PORT     INDICATION: altered mental status     COMPARISON: 5/25/2017     FINDINGS: A portable AP radiograph of the chest was obtained at 1247 hours. The  patient is on a cardiac monitor. The tracheostomy tube overlies the airway. Patient status post median sternotomy. Heart size is normal. The lungs are  clear.     There appears to be an acute fracture of the left sixth rib laterally.     IMPRESSION  IMPRESSION:  1. Lungs are clear  2.  Probable acute fracture of the left sixth rib laterally       MEDICATIONS GIVEN:  Medications   sodium chloride (NS) flush 5-10 mL (not administered)   sodium chloride (NS) flush 5-10 mL (10 mL IntraVENous Given 6/9/17 1451)   sodium chloride 0.9 % bolus infusion 1,800 mL (1,800 mL IntraVENous New Bag 6/9/17 1348)   0.9% sodium chloride infusion (not administered)   iopamidol (ISOVUE-370) 76 % injection 100 mL (not administered)   sodium chloride (NS) flush 10 mL (not administered)   sodium chloride 0.9 % bolus infusion 1,000 mL (0 mL IntraVENous IV Completed 6/9/17 1435)   acetaminophen (TYLENOL) solution 650 mg (650 mg Per G Tube Given 6/9/17 1231)   cefTRIAXone (ROCEPHIN) 1 g in 0.9% sodium chloride (MBP/ADV) 50 mL (0 g IntraVENous IV Completed 6/9/17 1435)   fentaNYL citrate (PF) injection 50 mcg (50 mcg IntraVENous Given 6/9/17 1451)   ondansetron (ZOFRAN) injection 4 mg (4 mg IntraVENous Given 6/9/17 1451)       IMPRESSION:  1. Sepsis, due to unspecified organism ([facility] [address]    2. Urinary tract infection associated with indwelling urethral catheter, initial encounter        PLAN:  1. Admit     Admit Note:  1:42 PM  Pt is being admitted by Dr. Stacy Fox. The results of their tests and reason(s) for their admission have been discussed with pt and/or available family. They convey agreement and understanding for the need to be admitted and for admission diagnosis. This note is prepared by Christina Avalos, acting as a Scribe for Nhi Gupta, [address] [place] [address], : The scribe's documentation has been prepared under my direction and personally reviewed by me in its entirety. I confirm that the notes above accurately reflects all work, treatment, procedures, and medical decision making performed by me.

## 2017-06-09 NOTE — IP AVS SNAPSHOT
[address] [facility] 
[phone] Patient: Elena Garrett MRN: [id number] HXM:[id number] You are allergic to the following Allergen Reactions Cymbalta (Duloxetine) Rash Recent Documentation Weight BMI Smoking Status 86.2 kg 26.5 kg/m2 Never Smoker Emergency Contacts Name Discharge Info Relation Home Work Mobile Katie Jackman  Spouse [3] [phone] ESE Cordero  Child [2] [phone] Ameena Bentley  Child [2] [phone] About your hospitalization You were admitted on:  June 9, 2017 You last received care in the:  [facility] 2 GENERAL SURGERY You were discharged on:  June 14, 2017 Unit phone number:  [phone] Why you were hospitalized Your primary diagnosis was:  Not on File Your diagnoses also included:  Severe Sepsis (Hcc), Uti (Urinary Tract Infection) Providers Seen During Your Hospitalizations Provider Role Specialty Primary office phone Livan Lim DO Attending Provider Emergency Medicine [phone] Gareth Contreras MD Attending Provider Hospitalist [phone] Jelly Davis MD Attending Provider Internal Medicine [phone] Your Primary Care Physician (PCP) Primary Care Physician Office Phone Office Fax 50 Pending sale to [facility] 61 [phone] Follow-up Information Follow up With Details Comments Contact Info Bill Dacosta MD In 2 weeks for post hospital follow up [address] [facility] [address] 
[phone] [address]  This is your post acute care provider of choice  [address] [facility] [address] [facility] 
[phone] Your Appointments Wednesday July 26, 2017  1:00 PM EDT Follow Up with Myles Shahid MD  
[facility] ([address]) [facility] [address] [id number] [place] [id number] [phone] Current Discharge Medication List  
  
START taking these medications Dose & Instructions Dispensing Information Comments Morning Noon Evening Bedtime  
 cefdinir 125 mg/5 mL suspension Commonly known as:  OMNICEF Your last dose was: Your next dose is:    
   
   
 Dose:  300 mg Take 12 mL by mouth two (2) times a day for 7 days. Quantity:  168 mL Refills:  0  
     
   
   
   
  
 fluconazole 100 mg tablet Commonly known as:  DIFLUCAN Your last dose was: Your next dose is:    
   
   
 Dose:  100 mg  
1 Tab by Per G Tube route daily for 7 days. FDA advises cautious prescribing of oral fluconazole in pregnancy. Quantity:  7 Tab Refills:  0  
     
   
   
   
  
 ibuprofen 100 mg/5 mL suspension Commonly known as:  ADVIL;MOTRIN Your last dose was: Your next dose is:    
   
   
 Dose:  200 mg  
10 mL by Per G Tube route every six (6) hours as needed for Fever (if tylenol not helping). Quantity:  1 Bottle Refills:  0  
     
   
   
   
  
 ticagrelor 90 mg tablet Commonly known as:  [employer] Your last dose was: Your next dose is:    
   
   
 Dose:  90 mg Take 1 Tab by mouth every twelve (12) hours every twelve (12) hours. Quantity:  60 Tab Refills:  2 CONTINUE these medications which have NOT CHANGED Dose & Instructions Dispensing Information Comments Morning Noon Evening Bedtime  
 acetaminophen 325 mg tablet Commonly known as:  TYLENOL Your last dose was: Your next dose is:    
   
   
 Dose:  650 mg  
2 Tabs by Per G Tube route every four (4) hours as needed for Pain. Quantity:  30 Tab Refills:  0  
     
   
   
   
  
 aspirin 81 mg chewable tablet Your last dose was: Your next dose is:    
   
   
 Dose:  81 mg  
1 Tab by Per G Tube route daily. Quantity:  30 Tab Refills:  1 atorvastatin 40 mg tablet Commonly known as:  LIPITOR Your last dose was: Your next dose is:    
   
   
 Dose:  40 mg  
1 Tab by Per G Tube route daily. Quantity:  30 Tab Refills:  1 DUONEB 2.5 mg-0.5 mg/3 ml Nebu Generic drug:  albuterol-ipratropium Your last dose was: Your next dose is:    
   
   
 Dose:  3 mL  
3 mL by Nebulization route every four (4) hours as needed. Refills:  0  
     
   
   
   
  
 furosemide 40 mg tablet Commonly known as:  LASIX Your last dose was: Your next dose is:    
   
   
 Dose:  40 mg  
1 Tab by Per G Tube route daily. Quantity:  30 Tab Refills:  1  
     
   
   
   
  
 lidocaine 5 % Commonly known as:  [employer] Your last dose was: Your next dose is:    
   
   
 Dose:  1 Patch 1 Patch by TransDERmal route every twenty-four (24) hours. Apply patch to the affected area for 12 hours a day and remove for 12 hours a day. Refills:  0  
     
   
   
   
  
 pantoprazole 40 mg granules for oral suspension Commonly known as:  PROTONIX Your last dose was: Your next dose is:    
   
   
 Dose:  40 mg  
40 mg by Per G Tube route Daily (before breakfast). Quantity:  30 Each Refills:  1  
     
   
   
   
  
 traMADol 50 mg tablet Commonly known as:  ULTRAM  
   
Your last dose was: Your next dose is:    
   
   
 Dose:  50 mg  
1 Tab by Per G Tube route every six (6) hours as needed. Max Daily Amount: 200 mg. Quantity:  25 Tab Refills:  0 STOP taking these medications   
 levoFLOXacin 750 mg tablet Commonly known as:  LEVAQUIN  
   
  
 prasugrel 10 mg tablet Commonly known as:  EFFIENT  
   
  
 vancomycin 1,000 mg 1,000 mg IVPB Where to Get Your Medications Information on where to get these meds will be given to you by the nurse or doctor. ! Ask your nurse or doctor about these medications cefdinir 125 mg/5 mL suspension  
 fluconazole 100 mg tablet  
 ibuprofen 100 mg/5 mL suspension  
 ticagrelor 90 mg tablet Discharge Instructions HOSPITALIST DISCHARGE INSTRUCTIONS 
 
NAME: Jermaine Ortega :  1960 MRN:  [id number] Date/Time:  2017 12:49 PM 
 
ADMIT DATE: 2017 DISCHARGE DATE: 2017 DISCHARGE DIAGNOSIS: 
Acute encephalopathy POA resolved- now back to baseline MS as per the pt's family/daughter 
due to sepsis Severe POA- resolved UTI due to indwelling pinto catheter POA urine culture with GNR and C albicans - complete Diflucan & Omnicef as ordered, Pinto out now, pt voiding appropriately,if retention recurs, plan is to start him on Intermittent Catheterization regimen Q 8 hrs as per the family wishes (avoid indwelling pinto) Urine retention with chronic indwelling pinto catheter POA- now off pinto >48 hrs with pt voiding appropriately, Plan as above going forward CAD s/p CABG 2014 x 3v at HD Hx cardiac stents (twice before CABG and once after) Ischemic cardiomyopathy HTN 
S/p out of hospital cardiac arrest 17 resulting in DORA 
S/P PEG and trach 17 - cont tube feeding as before DM type 2 POA Active Problems: 
  Severe sepsis ([address] (2017) UTI (urinary tract infection) (2017) MEDICATIONS: 
As per medication reconciliation  list 
· It is important that you take the medication exactly as they are prescribed. · Keep your medication in the bottles provided by the pharmacist and keep a list of the medication names, dosages, and times to be taken in your wallet. · Do not take other medications without consulting your doctor. Pain Management: per above medications What to do at [facility] Recommended diet:  PEG feeding at 45 ml/hr (2Cal HN), Free water flushes 200ml Q 4 hrs Recommended activity: Activity as tolerated If you have questions regarding the hospital related prescriptions or hospital related issues please call Waqar López at . If you experience any of the following symptoms then please call your primary care physician or return to the emergency room if you cannot get hold of your doctor: 
Fever, chills, nausea, vomiting, diarrhea, change in mentation, falling, bleeding, shortness of breath, Follow Up: 
Dr. Lindy Moncada MD  you are to call and set up an appointment to see them in 7-10 days. [facility] In 3-4 weeks as desired by family versus pt may follow primary cardiology at [facility] (Dr Mehran Jacobson) or Dr in [facility] Information obtained by : 
I understand that if any problems occur once I am at home I am to contact my physician. I understand and acknowledge receipt of the instructions indicated above. Physician's or R.N.'s Signature                                                                  Date/Time Patient or Representative Signature                                                          Date/Time Discharge Orders None [facility] Announcement We are excited to announce that we are making your provider's discharge notes available to you in [employer]. You will see these notes when they are completed and signed by the physician that discharged you from your recent hospital stay. If you have any questions or concerns about any information you see in Home [employer] Service (HDS)t, please call the Health Information Department where you were seen or reach out to your Primary Care Provider for more information about your plan of care. Introducing [facility]! [employer] introduces [employer] patient portal. Now you can access parts of your medical record, email your doctor's office, and request medication refills online. 1. In your internet browser, go to [email or web address] 2. Click on the First Time User? Click Here link in the Sign In box. You will see the New Member Sign Up page. 3. Enter your [employer] Access Code exactly as it appears below. You will not need to use this code after youve completed the sign-up process. If you do not sign up before the expiration date, you must request a new code. · [employer] Access Code: [email or web address] Expires: 8/2/2017  5:02 PM 
 
4. Enter the last four digits of your Social Security Number (xxxx) and Date of Birth (mm/dd/yyyy) as indicated and click Submit. You will be taken to the next sign-up page. 5. Create a [employer] ID. This will be your [employer] login ID and cannot be changed, so think of one that is secure and easy to remember. 6. Create a [employer] password. You can change your password at any time. 7. Enter your Password Reset Question and Answer. This can be used at a later time if you forget your password. 8. Enter your e-mail address. You will receive e-mail notification when new information is available in [facility] 9. Click Sign Up. You can now view and download portions of your medical record. 10. Click the Download Summary menu link to download a portable copy of your medical information. If you have questions, please visit the Frequently Asked Questions section of the [employer] website. Remember, [employer] is NOT to be used for urgent needs. For medical emergencies, dial 911. Now available from your iPhone and Android! General Information Please provide this summary of care documentation to your next provider. Patient Signature:  ____________________________________________________________ Date:  ____________________________________________________________  
  
Yung Evangelist Provider Signature:  ____________________________________________________________ Date:  ____________________________________________________________

## 2017-06-09 NOTE — ED NOTES
Performed incontinence care. Changed pt's incontinence brief. Placed an incontinence pad under the pt. Readjusted pt in the bed. Pt's temp 101F rectally. Cici Homlan MD notified. Pt requesting to be suctioned per the pt's family. Suctioned the pt's trach. A small amount of clear white secretions were suctioned.

## 2017-06-09 NOTE — ED NOTES
Family given updated plan of care, pt repositioned for comfort. Pt presents with pinto catheter in place. Placed 5/31/2017.

## 2017-06-09 NOTE — ED NOTES
Assumed care of pt from Saloni Oliver RN Bedside report using SBAR competed. Family given updated plan of care.

## 2017-06-09 NOTE — ED NOTES
Pt requesting to be suctioned per the pt's family. Suctioned the pt. A small amount of clear white secretions were suctioned.

## 2017-06-10 LAB
ANION GAP BLD CALC-SCNC: 5 MMOL/L (ref 5–15)
BACTERIA SPEC CULT: ABNORMAL
BACTERIA SPEC CULT: ABNORMAL
BASOPHILS # BLD AUTO: 0 K/UL (ref 0–0.1)
BASOPHILS # BLD: 0 % (ref 0–1)
BUN SERPL-MCNC: 8 MG/DL (ref 6–20)
BUN/CREAT SERPL: 15 (ref 12–20)
CALCIUM SERPL-MCNC: 8.4 MG/DL (ref 8.5–10.1)
CC UR VC: ABNORMAL
CHLORIDE SERPL-SCNC: 104 MMOL/L (ref 97–108)
CO2 SERPL-SCNC: 27 MMOL/L (ref 21–32)
CREAT SERPL-MCNC: 0.54 MG/DL (ref 0.7–1.3)
EOSINOPHIL # BLD: 0.1 K/UL (ref 0–0.4)
EOSINOPHIL NFR BLD: 0 % (ref 0–7)
ERYTHROCYTE [DISTWIDTH] IN BLOOD BY AUTOMATED COUNT: 16.2 % (ref 11.5–14.5)
GLUCOSE BLD STRIP.AUTO-MCNC: 75 MG/DL (ref 65–100)
GLUCOSE BLD STRIP.AUTO-MCNC: 81 MG/DL (ref 65–100)
GLUCOSE BLD STRIP.AUTO-MCNC: 82 MG/DL (ref 65–100)
GLUCOSE BLD STRIP.AUTO-MCNC: 85 MG/DL (ref 65–100)
GLUCOSE BLD STRIP.AUTO-MCNC: 94 MG/DL (ref 65–100)
GLUCOSE BLD STRIP.AUTO-MCNC: 95 MG/DL (ref 65–100)
GLUCOSE SERPL-MCNC: 81 MG/DL (ref 65–100)
HCT VFR BLD AUTO: 30.8 % (ref 36.6–50.3)
HGB BLD-MCNC: 9.8 G/DL (ref 12.1–17)
LACTATE SERPL-SCNC: 1.5 MMOL/L (ref 0.4–2)
LYMPHOCYTES # BLD AUTO: 12 % (ref 12–49)
LYMPHOCYTES # BLD: 2.5 K/UL (ref 0.8–3.5)
MAGNESIUM SERPL-MCNC: 2.4 MG/DL (ref 1.6–2.4)
MCH RBC QN AUTO: 26.8 PG (ref 26–34)
MCHC RBC AUTO-ENTMCNC: 31.8 G/DL (ref 30–36.5)
MCV RBC AUTO: 84.4 FL (ref 80–99)
MONOCYTES # BLD: 0.9 K/UL (ref 0–1)
MONOCYTES NFR BLD AUTO: 4 % (ref 5–13)
NEUTS SEG # BLD: 16.9 K/UL (ref 1.8–8)
NEUTS SEG NFR BLD AUTO: 84 % (ref 32–75)
PLATELET # BLD AUTO: 239 K/UL (ref 150–400)
POTASSIUM SERPL-SCNC: 3.9 MMOL/L (ref 3.5–5.1)
RBC # BLD AUTO: 3.65 M/UL (ref 4.1–5.7)
SERVICE CMNT-IMP: ABNORMAL
SERVICE CMNT-IMP: NORMAL
SODIUM SERPL-SCNC: 136 MMOL/L (ref 136–145)
WBC # BLD AUTO: 20.3 K/UL (ref 4.1–11.1)

## 2017-06-10 PROCEDURE — 36415 COLL VENOUS BLD VENIPUNCTURE: CPT | Performed by: INTERNAL MEDICINE

## 2017-06-10 PROCEDURE — 80048 BASIC METABOLIC PNL TOTAL CA: CPT | Performed by: INTERNAL MEDICINE

## 2017-06-10 PROCEDURE — 82962 GLUCOSE BLOOD TEST: CPT

## 2017-06-10 PROCEDURE — 83735 ASSAY OF MAGNESIUM: CPT | Performed by: INTERNAL MEDICINE

## 2017-06-10 PROCEDURE — 65660000000 HC RM CCU STEPDOWN

## 2017-06-10 PROCEDURE — 77030006564

## 2017-06-10 PROCEDURE — 77030009834 HC MSK O2 TRACH VYRM -A

## 2017-06-10 PROCEDURE — 77030018622 HC TU TRACH CUF1 COVD -B

## 2017-06-10 PROCEDURE — 74011000258 HC RX REV CODE- 258: Performed by: INTERNAL MEDICINE

## 2017-06-10 PROCEDURE — 74011250636 HC RX REV CODE- 250/636: Performed by: INTERNAL MEDICINE

## 2017-06-10 PROCEDURE — 77030006998

## 2017-06-10 PROCEDURE — 77030018626 HC TU TRACH CUF3 COVD -B

## 2017-06-10 PROCEDURE — 77030021668 HC NEB PREFIL KT VYRM -A

## 2017-06-10 PROCEDURE — 74011250637 HC RX REV CODE- 250/637: Performed by: INTERNAL MEDICINE

## 2017-06-10 PROCEDURE — 74011250637 HC RX REV CODE- 250/637: Performed by: EMERGENCY MEDICINE

## 2017-06-10 PROCEDURE — 85025 COMPLETE CBC W/AUTO DIFF WBC: CPT | Performed by: INTERNAL MEDICINE

## 2017-06-10 PROCEDURE — 83605 ASSAY OF LACTIC ACID: CPT | Performed by: EMERGENCY MEDICINE

## 2017-06-10 PROCEDURE — L8501 TRACHEOSTOMY SPEAKING VALVE: HCPCS

## 2017-06-10 RX ORDER — TRAMADOL HYDROCHLORIDE 50 MG/1
50 TABLET ORAL
Status: DISCONTINUED | OUTPATIENT
Start: 2017-06-10 | End: 2017-06-14 | Stop reason: HOSPADM

## 2017-06-10 RX ORDER — PRASUGREL 10 MG/1
10 TABLET, FILM COATED ORAL DAILY
Status: DISCONTINUED | OUTPATIENT
Start: 2017-06-10 | End: 2017-06-10

## 2017-06-10 RX ORDER — LIDOCAINE 50 MG/G
1 PATCH TOPICAL EVERY 24 HOURS
Status: DISCONTINUED | OUTPATIENT
Start: 2017-06-10 | End: 2017-06-14 | Stop reason: HOSPADM

## 2017-06-10 RX ORDER — VANCOMYCIN HYDROCHLORIDE
1250 EVERY 8 HOURS
Status: DISCONTINUED | OUTPATIENT
Start: 2017-06-10 | End: 2017-06-12

## 2017-06-10 RX ADMIN — ENOXAPARIN SODIUM 40 MG: 40 INJECTION SUBCUTANEOUS at 09:48

## 2017-06-10 RX ADMIN — GUAIFENESIN 200 MG: 100 SOLUTION ORAL at 22:02

## 2017-06-10 RX ADMIN — VANCOMYCIN HYDROCHLORIDE 2250 MG: 10 INJECTION, POWDER, LYOPHILIZED, FOR SOLUTION INTRAVENOUS at 12:25

## 2017-06-10 RX ADMIN — PANTOPRAZOLE SODIUM 40 MG: 40 GRANULE, DELAYED RELEASE ORAL at 09:49

## 2017-06-10 RX ADMIN — GUAIFENESIN 200 MG: 100 SOLUTION ORAL at 17:40

## 2017-06-10 RX ADMIN — TRAMADOL HYDROCHLORIDE 50 MG: 50 TABLET, FILM COATED ORAL at 20:01

## 2017-06-10 RX ADMIN — Medication 10 ML: at 22:02

## 2017-06-10 RX ADMIN — ASPIRIN 81 MG 81 MG: 81 TABLET ORAL at 09:48

## 2017-06-10 RX ADMIN — VANCOMYCIN HYDROCHLORIDE 1250 MG: 10 INJECTION, POWDER, LYOPHILIZED, FOR SOLUTION INTRAVENOUS at 20:20

## 2017-06-10 RX ADMIN — CEFEPIME 1 G: 1 INJECTION, POWDER, FOR SOLUTION INTRAMUSCULAR; INTRAVENOUS at 11:26

## 2017-06-10 RX ADMIN — CEFTRIAXONE 1 G: 1 INJECTION, POWDER, FOR SOLUTION INTRAMUSCULAR; INTRAVENOUS at 09:49

## 2017-06-10 RX ADMIN — GUAIFENESIN 200 MG: 100 SOLUTION ORAL at 13:13

## 2017-06-10 RX ADMIN — GUAIFENESIN 200 MG: 100 SOLUTION ORAL at 09:48

## 2017-06-10 RX ADMIN — Medication 10 ML: at 06:00

## 2017-06-10 RX ADMIN — IBUPROFEN 200 MG: 100 SUSPENSION ORAL at 09:48

## 2017-06-10 RX ADMIN — IBUPROFEN 200 MG: 100 SUSPENSION ORAL at 20:02

## 2017-06-10 RX ADMIN — TICAGRELOR 90 MG: 90 TABLET ORAL at 22:02

## 2017-06-10 RX ADMIN — ACETAMINOPHEN 650 MG: 325 TABLET, FILM COATED ORAL at 23:32

## 2017-06-10 RX ADMIN — ATORVASTATIN CALCIUM 40 MG: 40 TABLET, FILM COATED ORAL at 09:49

## 2017-06-10 RX ADMIN — TRAMADOL HYDROCHLORIDE 50 MG: 50 TABLET, FILM COATED ORAL at 05:54

## 2017-06-10 RX ADMIN — FUROSEMIDE 40 MG: 40 TABLET ORAL at 09:49

## 2017-06-10 RX ADMIN — CEFEPIME 1 G: 1 INJECTION, POWDER, FOR SOLUTION INTRAMUSCULAR; INTRAVENOUS at 17:49

## 2017-06-10 NOTE — PROGRESS NOTES
FLEX met with pt family member in regards to paperwork regarding disability. Floor nurse informed SW family was requesting nurse assist with completing paperwork in regards to previous admissions. FLEX met with family member and informed her she would need to contact medical records to obtain any info from previous admissions. Family member not happy about having to contact medical records and being informed of the possible fee for the records. SW provided her with contact number to medical records.     Roly Gallagher, MSW  Ext [phone]

## 2017-06-10 NOTE — ED NOTES
Pt family asked for the pt to be suctioned. Suctioned pt and obtained a small amount of clear thick secretions.

## 2017-06-10 NOTE — ED NOTES
Pt's family stating the pt is constantly coughing and requesting cough medication. Called RT to come and help suction the pt. Gareth Whitley MD notified.

## 2017-06-10 NOTE — PROGRESS NOTES
End of Shift Nursing Note    Bedside shift change report given to juliana (oncoming nurse) by Ranjan Mohr (offgoing nurse). Report included the following information SBAR. Significant changes during shift:    none   Non-emergent issues for physician to address:          Vital Signs:    Temp: 98.8 °F (37.1 °C)     Pulse (Heart Rate): 95     BP: 129/75     Resp Rate: 18     O2 Sat (%): 100 %    Lines & Drains:     Urinary Catheter?  Yes       NG tube [] in [] removed [x] not applicable   Drains [] in [] removed [x] not applicable     Skin Integrity:      Wounds: no   Dressings Present: no    Wound Concerns: no      GI:    Current diet:  DIET ONE TIME MESSAGE  DIET TUBE FEEDING    Nausea: NO  Vomiting: NO  Bowel Sounds: YES  Flatus: YES      Rodrigo Ennis RN

## 2017-06-10 NOTE — PROGRESS NOTES
Pharmacy Automatic Renal Dosing Protocol - Antimicrobials      Indication for Antimicrobials: UTI    Current Regimen of Each Antimicrobial (Start Day & Day of Therapy):  Vancomycin 2.25 gm IV then Vancomycin 1.25 gm IV q8h (start 6/10, Day 1)  Cefepime 1 gm IV q8h (start 6/10, Day 1)    Previous antimicrobials:  Ceftriaxone 1 gm IV q24h (start , stop 6/10)    Significant cultures:    Urine: Pending   Urine: Pending   Blood: Pending    CAPD, Intermittent Hemodialysis or Renal Replacement Therapy: n/a  Paralysis, amputations, malnutrition: n/a  Recent Labs      06/10/17   0617  17   1226  17   0635   CREA  0.54*  0.81  0.79   BUN  8  12  11   WBC  20.3*  20.0*  14.8*     Temp (24hrs), Av.3 °F (37.9 °C), Min:98.6 °F (37 °C), Max:101.7 °F (38.7 °C)    Creatinine Clearance (ml/min): > 100      Goal Vancomycin Level(s): 10-15      Impression/Plan: Vancomycin 2.25 gm IV then Vancomycin 1.25 gm IV q8h for predicted trough of about 15. Also, Cefepime 1 gm IV q8h is appropriate for renal function        Pharmacy will follow daily and adjust doses of monitored medications as appropriate for renal function and/or serum levels.     Thank you,  Liv Bhardwaj, PHARMD

## 2017-06-10 NOTE — PROGRESS NOTES
Nutrition Assessment:    RECOMMENDATIONS:   Agree with start TwoCal HN, recommend:   Advance rate 10mL q 12h as tolerated to Goal Rate of TwoCal HN @ 45mL/h + 200mL H2O flush q 4h (provides 2160kcals/90gPro/1956mL)    DIETITIANS INTERVENTIONS/PLAN:   TF recommendations  Monitor tolerance    ASSESSMENT:   Pt admitted with severe sepsis. PMH: DM, HTN, CAD, CVA, PEG. Chart reviewed, pt lying in bed nonverbal but awake. Spoke with family member at the bedside who reports that as far as he knows Mr. Laura Flynn has been tolerating TF at [facility]. He reports to me what happened to pt last admission (PEG dislodged and required replacement). Abdominal CT yesterday shows PEG in correct placement in the stomach. Per family pt still c/o abdominal discomfort from time to time. Noted current TF order, goal rate will be inadequate to meet est needs. Discussed goal rate of 45mL/h with his RN who is waiting for pump and TF bottle to be delivered. Agree with starting rate. Will bump up H2O flush frequency to ensure fluid needs are met. Noted pt has had favorable 4.9% wt gain over the past 2 weeks. SUBJECTIVE/OBJECTIVE:   Pt nonverbal, spoke with family at the bedside and pt's RN   Diet Order: NPO, Other (comment) (TF via PEG: TwoCal @ 35mL/h + 200mL flush q 6h (provides 1680kcals/70gPro/1388mL) )  % Eaten:  No data found. Pertinent Medications:cefepime, lasix, humalog, protonix, vancomycin; Nitin@[employer]).     Chemistries:  Lab Results   Component Value Date/Time    Sodium 136 06/10/2017 06:17 AM    Potassium 3.9 06/10/2017 06:17 AM    Chloride 104 06/10/2017 06:17 AM    CO2 27 06/10/2017 06:17 AM    Anion gap 5 06/10/2017 06:17 AM    Glucose 81 06/10/2017 06:17 AM    BUN 8 06/10/2017 06:17 AM    Creatinine 0.54 06/10/2017 06:17 AM    BUN/Creatinine ratio 15 06/10/2017 06:17 AM    GFR est AA >60 06/10/2017 06:17 AM    GFR est non-AA >60 06/10/2017 06:17 AM    Calcium 8.4 06/10/2017 06:17 AM    AST (SGOT) 31 06/09/2017 12:26 PM Alk. phosphatase 143 06/09/2017 12:26 PM    Protein, total 8.6 06/09/2017 12:26 PM    Albumin 2.7 06/09/2017 12:26 PM    Globulin 5.9 06/09/2017 12:26 PM    A-G Ratio 0.5 06/09/2017 12:26 PM    ALT (SGPT) 27 06/09/2017 12:26 PM      Anthropometrics: Height:   Weight: 86.2 kg (190 lb)    IBW (%IBW):   ( ) UBW (%UBW):   (  %)    BMI: Body mass index is 26.5 kg/(m^2). This BMI is indicative of:  []Underweight   [x]Normal   []Overweight   [] Obesity   [] Extreme Obesity (BMI>40)  Estimated Nutrition Needs (Based on): 2063 Kcals/day (MSJ 1719 x 1.2) , 86 g (1gPro/kg) Protein  Carbohydrate: At Least 130 g/day  Fluids: 2000 mL/day    Last BM: PTA   [x]Active     []Hyperactive  []Hypoactive       [] Absent   BS  Skin:    [x] Intact   [] Incision  [] Breakdown   [] DTI   [] Tears/Excoriation/Abrasion  []Edema [] Other: Wt Readings from Last 30 Encounters:   06/10/17 86.2 kg (190 lb)   06/03/17 83.9 kg (185 lb)   05/29/17 82.2 kg (181 lb 4.8 oz)   05/27/17 93.4 kg (206 lb)   01/04/17 94.3 kg (208 lb)   10/05/16 92.5 kg (204 lb)      NUTRITION DIAGNOSES:   Problem:  Less than optimal enteral nutrition      Etiology: related to TF rate of 35mL/h and TF not started yet      Signs/Symptoms: as evidenced by TF goal rate will only meet 81% kcal and 81% protein needs. NUTRITION INTERVENTIONS:    Enteral/Parenteral Nutrition: Modify rate, concentration, composition, and schedule                GOAL:   Pt will tolerate TF @ goal rate with residuals <250mL in 2-4 days.      Cultural, Scientologist, or Ethnic Dietary Needs:   none at this time     LEARNING NEEDS (Diet, Food/Nutrient-Drug Interaction):    [x] None Identified   [] Identified and Education Provided/Documented   [] Identified and Pt declined/was not appropriate      [x] Interdisciplinary Care Plan Reviewed/Documented    [x] Participated in Discharge Planning: See TF order above    [] Interdisciplinary Rounds     NUTRITION RISK:    [x] High              [] Moderate []  Low  []  Minimal/Uncompromised    PT SEEN FOR:    [x]  MD Consult: []Calorie Count      []Diabetic Diet Education        []Diet Education     []Electrolyte Management     []General Nutrition Management and Supplements     [x]Management of Tube Feeding     []TPN Recommendations    [x]  RN Referral:  []MST score >=2     [x]Enteral/Parenteral Nutrition PTA     []Pregnant: Gestational DM or Multigestation   []  Low BMI  []  DTR Referral       Adriana Stephens, [address] [place] [address], [address] [place] [address] Dr   Pager [phone]  Weekend Pager [phone]

## 2017-06-10 NOTE — ED NOTES
TRANSFER - OUT REPORT:    Verbal report given to Marquita VILLATORO RN(name) on Cameron Moran  being transferred to [facility] for routine progression of care       Report consisted of patients Situation, Background, Assessment and   Recommendations(SBAR). Information from the following report(s) SBAR, Kardex, ED Summary, Intake/Output, MAR and Recent Results was reviewed with the receiving nurse. Lines:   Peripheral IV 06/09/17 Right Hand (Active)   Site Assessment Clean, dry, & intact 6/9/2017 12:36 PM   Phlebitis Assessment 0 6/9/2017 12:36 PM   Infiltration Assessment 0 6/9/2017 12:36 PM   Dressing Status Clean, dry, & intact 6/9/2017 12:36 PM   Hub Color/Line Status Blue 6/9/2017 12:36 PM       Peripheral IV 06/09/17 Left;Upper Arm (Active)   Site Assessment Clean, dry, & intact 6/9/2017 12:36 PM   Phlebitis Assessment 0 6/9/2017 12:36 PM   Infiltration Assessment 0 6/9/2017 12:36 PM   Dressing Status Clean, dry, & intact 6/9/2017 12:36 PM   Hub Color/Line Status Blue 6/9/2017 12:36 PM        Opportunity for questions and clarification was provided.       Patient transported with:   [employer]

## 2017-06-10 NOTE — ED NOTES
Patient resting comfortably, side rails up, call bell w/in reach, no further needs expressed at this time, aware of POC. Wife at the bedside.

## 2017-06-10 NOTE — PROGRESS NOTES
Hospitalist Progress Note    NAME: Barbara Solorzano   :  1960   MRN:  [id number]     Admit date: 2017    Today's date: 06/10/17    PCP: MD David Felton M.D. Cell [phone]      Assessment / Plan:  Acute encephalopathy POA  Severe sepsis POA with WBC, fever, Tachy, lactate 2.2. UTI due to indwelling pinto catheter POA   CT abdomen No free air. No evidence of fluid collection or acute intraperitoneal process  pCXR with no ASD  Received a 30cc/kg NS bolus in ER. Transition to cefepime and vanco till cultures back  Follow up urine and blood cultures  MS seems closer to baseline, hold on head CT  Resume TF    CAD s/p CABG  x 3v at HD  Hx cardiac stents (twice before CABG and once after)  Ischemic cardiomyopathy  HTN  ASA, effient, lasix and statin     S/p out of hospital cardiac arrest 17 resulting in DORA  S/P PEG and trach 17  Resume TF    DM type 2 POA  , 81, 94, 85  SSI PRN     Urine retention with chronic indwelling pinto catheter POA  Pinto inserted      [facility] POA    Code Status: Full    Surrogate Decision Maker: Family     DVT Prophylaxis: lovenox         Subjective:     Chief Complaint / Reason for Physician Visit  PT not speaking for me, family communicating with him in his native language  More awake per family, was unresponsive yesterday AM  Febrile overnight, now afebrile  Complaints of moderate right shoulder pain(old) and pain around the PEG tube(since it was put in). No other compaintsDiscussed with RN events overnight.      Review of Systems:  Symptom Y/N Comments  Symptom Y/N Comments   Fever/Chills y   Chest Pain n    Poor Appetite    Edema     Cough y   Abdominal Pain y    Sputum    Joint Pain     SOB/HAM n   Pruritis/Rash     Nausea/vomit n   Tolerating PT/OT     Diarrhea n   Tolerating Diet n NPO   Constipation    Other       Could NOT obtain due to:      Objective:     VITALS:   Last 24hrs VS reviewed since prior progress note. Most recent are:  Patient Vitals for the past 24 hrs:   Temp Pulse Resp BP SpO2   06/10/17 0405 98.6 °F (37 °C) (!) 103 18 131/76 98 %   06/10/17 0256 - 98 20 - 99 %   06/10/17 0240 100.1 °F (37.8 °C) - - - -   06/10/17 0230 - (!) 106 18 125/78 99 %   06/10/17 0200 - (!) 111 14 123/66 100 %   06/10/17 0115 - 99 17 120/63 100 %   06/10/17 0100 - 94 17 120/62 100 %   06/10/17 0045 - 96 19 118/70 99 %   06/10/17 0030 - 97 18 117/71 100 %   06/10/17 0015 - 93 15 115/65 100 %   06/10/17 0001 - (!) 103 15 120/68 100 %   06/09/17 2345 - 99 16 123/67 98 %   06/09/17 2330 - 100 16 106/71 96 %   06/09/17 2315 - 94 16 101/55 95 %   06/09/17 2300 - 93 15 115/74 98 %   06/09/17 2245 - (!) 115 16 120/73 100 %   06/09/17 2230 - (!) 105 21 123/67 98 %   06/09/17 2200 - 97 15 117/63 98 %   06/09/17 2145 - - - 117/81 98 %   06/09/17 2143 - 97 14 - 97 %   06/09/17 2130 - (!) 102 18 126/67 98 %   06/09/17 2115 - 94 16 114/66 97 %   06/09/17 2100 99.5 °F (37.5 °C) - - - -   06/09/17 2100 - (!) 108 19 125/64 99 %   06/09/17 2045 - (!) 103 11 126/65 100 %   06/09/17 2030 - (!) 105 10 117/70 99 %   06/09/17 2015 - (!) 109 14 122/63 98 %   06/09/17 2000 - (!) 107 10 110/83 99 %   06/09/17 1945 - (!) 108 11 122/64 98 %   06/09/17 1930 - (!) 113 18 116/62 99 %   06/09/17 1915 - (!) 110 20 112/66 96 %   06/09/17 1900 - (!) 111 15 121/59 98 %   06/09/17 1846 (!) 101.7 °F (38.7 °C) - - - -   06/09/17 1845 - (!) 117 18 (!) 140/99 97 %   06/09/17 1830 - (!) 119 11 121/70 98 %   06/09/17 1821 - (!) 116 18 - 97 %   06/09/17 1815 - - - 137/87 99 %   06/09/17 1800 - (!) 112 14 130/65 99 %   06/09/17 1747 - (!) 117 17 - 99 %   06/09/17 1745 - - - 125/80 99 %   06/09/17 1730 - (!) 116 18 134/68 99 %   06/09/17 1715 - (!) 118 13 131/66 100 %   06/09/17 1700 - (!) 117 - 131/66 100 %   06/09/17 1659 - - 10 - -   06/09/17 1645 - (!) 113 15 127/75 97 %   06/09/17 1630 - (!) 114 18 125/70 98 %   06/09/17 1622 (!) 101 °F (38.3 °C) - - - -   06/09/17 1615 - (!) 114 9 132/78 100 %   06/09/17 1608 - (!) 114 20 132/81 97 %   06/09/17 1500 - (!) 113 15 134/69 96 %   06/09/17 1430 - (!) 122 16 131/67 99 %   06/09/17 1400 - (!) 115 18 115/65 99 %   06/09/17 1330 - 96 17 111/54 99 %   06/09/17 1300 - (!) 110 19 113/58 98 %   06/09/17 1245 - (!) 111 18 111/64 98 %   06/09/17 1200 - - - 108/66 97 %   06/09/17 1157 (!) 100.9 °F (38.3 °C) (!) 106 20 (!) 88/64 97 %       Intake/Output Summary (Last 24 hours) at 06/10/17 0939  Last data filed at 06/10/17 0429   Gross per 24 hour   Intake           435.83 ml   Output              900 ml   Net          -464.17 ml        Wt Readings from Last 12 Encounters:   06/09/17 86.2 kg (190 lb)   06/03/17 83.9 kg (185 lb)   05/29/17 82.2 kg (181 lb 4.8 oz)   05/27/17 93.4 kg (206 lb)   01/04/17 94.3 kg (208 lb)   10/05/16 92.5 kg (204 lb)       PHYSICAL EXAM:  General: WD, WN. Alert, cooperative, no acute distress    EENT:  PERRL. Anicteric sclerae. MMM  Neck:  No meningismus, no thyromegaly    Trach in place  Resp:  CTA bilaterally, no wheezing or rales. No accessory muscle use  CV:  Regular  rhythm,  No edema  GI:  Soft, Non distended, Non tender.  +Bowel sounds, no rebound, PEG tube in place  LN:  No cervical or inguinal DINORAH  Neurologic:  Alert, talking with family(not in english)  Psych:   Not anxious nor agitated  Skin:  No rashes. No jaundice    Reviewed most current lab test results and cultures  YES  Reviewed most current radiology test results   YES  Review and summation of old records today    NO  Reviewed patient's current orders and MAR    YES  PMH/SH reviewed - no change compared to H&P  ________________________________________________________________________  Care Plan discussed with:    Comments   Patient x    Family  x Wife and son in room   RN x    Care Manager     Consultant                        Multidiciplinary team rounds were held today with , nursing, pharmacist and clinical coordinator.   Patient's plan of care was discussed; medications were reviewed and discharge planning was addressed. ________________________________________________________________________  Total NON critical care TIME:  35   Minutes    Total CRITICAL CARE TIME Spent:   Minutes non procedure based      Comments   >50% of visit spent in counseling and coordination of care     ________________________________________________________________________  Mini Pride MD     Procedures: see electronic medical records for all procedures/Xrays and details which were not copied into this note but were reviewed prior to creation of Plan. LABS:  I reviewed today's most current labs and imaging studies.   Pertinent labs include:  Recent Labs      06/10/17   0617  06/09/17   1226  06/09/17   0635   WBC  20.3*  20.0*  14.8*   HGB  9.8*  12.3  11.7*   HCT  30.8*  39.0  37.4   PLT  239  274  263     Recent Labs      06/10/17   0617  06/09/17   1226  06/09/17   0635   NA  136  131*  133*   K  3.9  4.0  4.0   CL  104  94*  97   CO2  27  31  30   GLU  81  179*  201*   BUN  8  12  11   CREA  0.54*  0.81  0.79   CA  8.4*  8.7  8.4*   MG  2.4   --    --    ALB   --   2.7*   --    TBILI   --   0.6   --    SGOT   --   31   --    ALT   --   27   --    INR   --   1.1   --

## 2017-06-10 NOTE — ED NOTES
Pt's peg tube dressing was soild with yellow and a small amount of red secretions. Removed the dressing and assessed the site. The pt's site was clean without leakage around the site. Redressed the pt's peg tube with 4 x4 gauze and perforated tape. Changed the pt's sheet and gown. Readjusted the pt in the bed. Pt lying flat on his back.

## 2017-06-11 ENCOUNTER — APPOINTMENT (OUTPATIENT)
Dept: GENERAL RADIOLOGY | Age: 57
DRG: 698 | End: 2017-06-11
Attending: INTERNAL MEDICINE
Payer: COMMERCIAL

## 2017-06-11 LAB
ANION GAP BLD CALC-SCNC: 7 MMOL/L (ref 5–15)
BASOPHILS # BLD AUTO: 0 K/UL (ref 0–0.1)
BASOPHILS # BLD: 0 % (ref 0–1)
BUN SERPL-MCNC: 10 MG/DL (ref 6–20)
BUN/CREAT SERPL: 17 (ref 12–20)
CALCIUM SERPL-MCNC: 8.9 MG/DL (ref 8.5–10.1)
CHLORIDE SERPL-SCNC: 102 MMOL/L (ref 97–108)
CO2 SERPL-SCNC: 28 MMOL/L (ref 21–32)
CREAT SERPL-MCNC: 0.58 MG/DL (ref 0.7–1.3)
EOSINOPHIL # BLD: 0.3 K/UL (ref 0–0.4)
EOSINOPHIL NFR BLD: 2 % (ref 0–7)
ERYTHROCYTE [DISTWIDTH] IN BLOOD BY AUTOMATED COUNT: 16 % (ref 11.5–14.5)
GLUCOSE BLD STRIP.AUTO-MCNC: 102 MG/DL (ref 65–100)
GLUCOSE BLD STRIP.AUTO-MCNC: 127 MG/DL (ref 65–100)
GLUCOSE BLD STRIP.AUTO-MCNC: 142 MG/DL (ref 65–100)
GLUCOSE BLD STRIP.AUTO-MCNC: 155 MG/DL (ref 65–100)
GLUCOSE BLD STRIP.AUTO-MCNC: 77 MG/DL (ref 65–100)
GLUCOSE BLD STRIP.AUTO-MCNC: 84 MG/DL (ref 65–100)
GLUCOSE BLD STRIP.AUTO-MCNC: 87 MG/DL (ref 65–100)
GLUCOSE SERPL-MCNC: 89 MG/DL (ref 65–100)
HCT VFR BLD AUTO: 32.3 % (ref 36.6–50.3)
HGB BLD-MCNC: 10.2 G/DL (ref 12.1–17)
LYMPHOCYTES # BLD AUTO: 17 % (ref 12–49)
LYMPHOCYTES # BLD: 2.3 K/UL (ref 0.8–3.5)
MCH RBC QN AUTO: 26.4 PG (ref 26–34)
MCHC RBC AUTO-ENTMCNC: 31.6 G/DL (ref 30–36.5)
MCV RBC AUTO: 83.7 FL (ref 80–99)
MONOCYTES # BLD: 1 K/UL (ref 0–1)
MONOCYTES NFR BLD AUTO: 7 % (ref 5–13)
NEUTS SEG # BLD: 10.1 K/UL (ref 1.8–8)
NEUTS SEG NFR BLD AUTO: 74 % (ref 32–75)
PLATELET # BLD AUTO: 244 K/UL (ref 150–400)
POTASSIUM SERPL-SCNC: 3.5 MMOL/L (ref 3.5–5.1)
RBC # BLD AUTO: 3.86 M/UL (ref 4.1–5.7)
SERVICE CMNT-IMP: ABNORMAL
SERVICE CMNT-IMP: NORMAL
SODIUM SERPL-SCNC: 137 MMOL/L (ref 136–145)
WBC # BLD AUTO: 13.6 K/UL (ref 4.1–11.1)

## 2017-06-11 PROCEDURE — 74011000258 HC RX REV CODE- 258: Performed by: INTERNAL MEDICINE

## 2017-06-11 PROCEDURE — 74011250637 HC RX REV CODE- 250/637: Performed by: INTERNAL MEDICINE

## 2017-06-11 PROCEDURE — 74011636637 HC RX REV CODE- 636/637: Performed by: INTERNAL MEDICINE

## 2017-06-11 PROCEDURE — 85025 COMPLETE CBC W/AUTO DIFF WBC: CPT | Performed by: INTERNAL MEDICINE

## 2017-06-11 PROCEDURE — 74011250637 HC RX REV CODE- 250/637: Performed by: EMERGENCY MEDICINE

## 2017-06-11 PROCEDURE — 65660000000 HC RM CCU STEPDOWN

## 2017-06-11 PROCEDURE — 77010033678 HC OXYGEN DAILY

## 2017-06-11 PROCEDURE — 36415 COLL VENOUS BLD VENIPUNCTURE: CPT | Performed by: INTERNAL MEDICINE

## 2017-06-11 PROCEDURE — 82962 GLUCOSE BLOOD TEST: CPT

## 2017-06-11 PROCEDURE — 80048 BASIC METABOLIC PNL TOTAL CA: CPT | Performed by: INTERNAL MEDICINE

## 2017-06-11 PROCEDURE — 77030021668 HC NEB PREFIL KT VYRM -A

## 2017-06-11 PROCEDURE — 74011250636 HC RX REV CODE- 250/636: Performed by: EMERGENCY MEDICINE

## 2017-06-11 PROCEDURE — 74011250636 HC RX REV CODE- 250/636: Performed by: INTERNAL MEDICINE

## 2017-06-11 PROCEDURE — 73030 X-RAY EXAM OF SHOULDER: CPT

## 2017-06-11 RX ORDER — FLUCONAZOLE 200 MG/1
100 TABLET ORAL DAILY
Status: DISCONTINUED | OUTPATIENT
Start: 2017-06-11 | End: 2017-06-14 | Stop reason: HOSPADM

## 2017-06-11 RX ORDER — ALPRAZOLAM 0.25 MG/1
0.25 TABLET ORAL
Status: DISCONTINUED | OUTPATIENT
Start: 2017-06-11 | End: 2017-06-14 | Stop reason: HOSPADM

## 2017-06-11 RX ORDER — MORPHINE SULFATE 2 MG/ML
2 INJECTION, SOLUTION INTRAMUSCULAR; INTRAVENOUS
Status: DISCONTINUED | OUTPATIENT
Start: 2017-06-11 | End: 2017-06-14 | Stop reason: HOSPADM

## 2017-06-11 RX ADMIN — VANCOMYCIN HYDROCHLORIDE 1250 MG: 10 INJECTION, POWDER, LYOPHILIZED, FOR SOLUTION INTRAVENOUS at 11:18

## 2017-06-11 RX ADMIN — GUAIFENESIN 200 MG: 100 SOLUTION ORAL at 17:41

## 2017-06-11 RX ADMIN — GUAIFENESIN 200 MG: 100 SOLUTION ORAL at 21:11

## 2017-06-11 RX ADMIN — ALPRAZOLAM 0.25 MG: 0.25 TABLET ORAL at 19:57

## 2017-06-11 RX ADMIN — FUROSEMIDE 40 MG: 40 TABLET ORAL at 08:27

## 2017-06-11 RX ADMIN — TICAGRELOR 90 MG: 90 TABLET ORAL at 20:08

## 2017-06-11 RX ADMIN — VANCOMYCIN HYDROCHLORIDE 1250 MG: 10 INJECTION, POWDER, LYOPHILIZED, FOR SOLUTION INTRAVENOUS at 20:11

## 2017-06-11 RX ADMIN — CEFEPIME 1 G: 1 INJECTION, POWDER, FOR SOLUTION INTRAMUSCULAR; INTRAVENOUS at 02:32

## 2017-06-11 RX ADMIN — MORPHINE SULFATE 2 MG: 2 INJECTION, SOLUTION INTRAMUSCULAR; INTRAVENOUS at 06:18

## 2017-06-11 RX ADMIN — ALPRAZOLAM 0.25 MG: 0.25 TABLET ORAL at 11:17

## 2017-06-11 RX ADMIN — IBUPROFEN 200 MG: 100 SUSPENSION ORAL at 19:57

## 2017-06-11 RX ADMIN — TRAMADOL HYDROCHLORIDE 50 MG: 50 TABLET, FILM COATED ORAL at 17:43

## 2017-06-11 RX ADMIN — CEFEPIME 1 G: 1 INJECTION, POWDER, FOR SOLUTION INTRAMUSCULAR; INTRAVENOUS at 17:40

## 2017-06-11 RX ADMIN — INSULIN LISPRO 2 UNITS: 100 INJECTION, SOLUTION INTRAVENOUS; SUBCUTANEOUS at 11:50

## 2017-06-11 RX ADMIN — MORPHINE SULFATE 2 MG: 2 INJECTION, SOLUTION INTRAMUSCULAR; INTRAVENOUS at 16:45

## 2017-06-11 RX ADMIN — VANCOMYCIN HYDROCHLORIDE 1250 MG: 10 INJECTION, POWDER, LYOPHILIZED, FOR SOLUTION INTRAVENOUS at 04:12

## 2017-06-11 RX ADMIN — MORPHINE SULFATE 2 MG: 2 INJECTION, SOLUTION INTRAMUSCULAR; INTRAVENOUS at 02:40

## 2017-06-11 RX ADMIN — PANTOPRAZOLE SODIUM 40 MG: 40 GRANULE, DELAYED RELEASE ORAL at 08:19

## 2017-06-11 RX ADMIN — ASPIRIN 81 MG 81 MG: 81 TABLET ORAL at 08:20

## 2017-06-11 RX ADMIN — GUAIFENESIN 200 MG: 100 SOLUTION ORAL at 11:51

## 2017-06-11 RX ADMIN — ATORVASTATIN CALCIUM 40 MG: 40 TABLET, FILM COATED ORAL at 08:20

## 2017-06-11 RX ADMIN — MORPHINE SULFATE 2 MG: 2 INJECTION, SOLUTION INTRAMUSCULAR; INTRAVENOUS at 21:11

## 2017-06-11 RX ADMIN — MORPHINE SULFATE 2 MG: 2 INJECTION, SOLUTION INTRAMUSCULAR; INTRAVENOUS at 10:26

## 2017-06-11 RX ADMIN — TICAGRELOR 90 MG: 90 TABLET ORAL at 08:20

## 2017-06-11 RX ADMIN — Medication 10 ML: at 16:45

## 2017-06-11 RX ADMIN — Medication 10 ML: at 06:18

## 2017-06-11 RX ADMIN — TRAMADOL HYDROCHLORIDE 50 MG: 50 TABLET, FILM COATED ORAL at 01:55

## 2017-06-11 RX ADMIN — FLUCONAZOLE 100 MG: 200 TABLET ORAL at 11:17

## 2017-06-11 RX ADMIN — GUAIFENESIN 200 MG: 100 SOLUTION ORAL at 08:16

## 2017-06-11 RX ADMIN — CEFEPIME 1 G: 1 INJECTION, POWDER, FOR SOLUTION INTRAMUSCULAR; INTRAVENOUS at 10:26

## 2017-06-11 NOTE — PROGRESS NOTES
1940- Bedside and Verbal shift change report given to Randy Brunner RN (oncoming nurse) by Melia Watt RN (offgoing nurse). Report included the following information SBAR, Kardex, ED Summary, Procedure Summary, Intake/Output, MAR, Accordion, Recent Results and Med Rec Status. Family at bedside, CB in reach. 2000- Patent deep suctioned per request; mod amount of white mucous noted. Patient repositioned in bed for comfort. 2215- Patient deep suctioned per request; minimal mucous noted. Patient turned and repositioned in bed for comfort. 0030- Patient complaining of pain to left shoulder. Tramadol due at 0200. Repositioned patient for comfort. 0155- Tramadol given as ordered. 0230- Patient still complaining of pain, family at bedside concerned. Reports pain is not improving.   0225- On call hospitalist paged. 12- Order for 2mg Morphine IV q4hr prn obtained. 8110- Patient turned and repositioned in bed; family at bedside. 0600- Patient requesting to be deep suctioned. Patient suctioned without difficulty; min amount of mucous noted. 6659- Patient reports pain to bilateral shoulders. Morphine IV given as ordered. Family at bedside. 0710- PEG feeding rate increased by 10ml/hr to a total rate of 25ml/hr per order. Bedside and Verbal shift change report given to Azul Haley 320 (oncoming nurse) by Randy Brunner RN (offgoing nurse). Report included the following information SBAR, Kardex, ED Summary, OR Summary, Procedure Summary, Intake/Output, MAR, Accordion and Recent Results. Patient sleeping comfortably during report, no ss distress. CB in reach, family at bedside.

## 2017-06-11 NOTE — PROGRESS NOTES
Hospitalist Progress Note    NAME: Sophia Martínez   :  1960   MRN:  [id number]     Admit date: 2017    Today's date: 17    PCP: Althea Banister, MD Marylee Rhodes, M.D. Cell [phone]      Assessment / Plan:  Acute encephalopathy POA improved, due to sepsis  Severe sepsis POA with WBC, fever, Tachy, lactate 2.2. UTI due to indwelling pinto catheter POA   CT abdomen No free air. No evidence of fluid collection or acute intraperitoneal process  pCXR with no ASD  Received a 30cc/kg NS bolus in ER. Transition to cefepime and vanco till cultures back  Yeast in urine, asked lab to speciate, add fluconazole  Follow up urine and blood cultures  Resume TF  Fever and leukocytosis resolving, plan for retrun to [facility] in AM if stable    CAD s/p CABG 2014 x 3v at HD  Hx cardiac stents (twice before CABG and once after)  Ischemic cardiomyopathy  HTN  ASA, Brilinta, lasix and statin     S/p out of hospital cardiac arrest 17 resulting in DORA  S/P PEG and trach 17  Resume TF    DM type 2 POA  BS 77, 87, 84, 102, 142  SSI PRN     Urine retention with chronic indwelling pinto catheter POA  Pinto inserted      [facility] POA    Code Status: Full    Surrogate Decision Maker: Family     DVT Prophylaxis: lovenox         Subjective:     Chief Complaint / Reason for Physician Visit  Family communicating with him in his native language  Daughter says his left shoulder is hurting, joint does \"not feel right\"  Afebrile overnight  Anxious  Discussed with RN events overnight. Review of Systems:  Symptom Y/N Comments  Symptom Y/N Comments   Fever/Chills n   Chest Pain n    Poor Appetite    Edema     Cough n   Abdominal Pain y    Sputum    Joint Pain     SOB/HAM n   Pruritis/Rash     Nausea/vomit n   Tolerating PT/OT     Diarrhea n   Tolerating Diet n Tube feeds   Constipation    Other       Could NOT obtain due to:      Objective:     VITALS:   Last 24hrs VS reviewed since prior progress note. Most recent are:  Patient Vitals for the past 24 hrs:   Temp Pulse Resp BP SpO2   06/11/17 0825 97.4 °F (36.3 °C) 86 16 120/76 100 %   06/11/17 0429 97.7 °F (36.5 °C) 93 18 119/78 -   06/10/17 1600 98.8 °F (37.1 °C) 95 18 129/75 100 %       Intake/Output Summary (Last 24 hours) at 06/11/17 1205  Last data filed at 06/11/17 0800   Gross per 24 hour   Intake              250 ml   Output              900 ml   Net             -650 ml        Wt Readings from Last 12 Encounters:   06/10/17 86.2 kg (190 lb)   06/03/17 83.9 kg (185 lb)   05/29/17 82.2 kg (181 lb 4.8 oz)   05/27/17 93.4 kg (206 lb)   01/04/17 94.3 kg (208 lb)   10/05/16 92.5 kg (204 lb)       PHYSICAL EXAM:  General: WD, WN. Alert, cooperative, no acute distress    EENT:  PERRL. Anicteric sclerae. MMM  Neck:  No meningismus, no thyromegaly    Trach in place  Resp:  CTA bilaterally, no wheezing or rales. No accessory muscle use  CV:  Regular  rhythm,  No edema  GI:  Soft, Non distended, Non tender.  +Bowel sounds, no rebound, PEG tube in place  MS:  Left shoulder with full passive ROM, no clear pain  Neurologic:  Alert, talking with family(not in english)  Psych:   Not anxious nor agitated  Skin:  No rashes. No jaundice    Reviewed most current lab test results and cultures  YES  Reviewed most current radiology test results   YES  Review and summation of old records today    NO  Reviewed patient's current orders and MAR    YES  PMH/SH reviewed - no change compared to H&P  ________________________________________________________________________  Care Plan discussed with:    Comments   Patient x    Family  x Daughter in room   RN x    Care Manager     Consultant                        Multidiciplinary team rounds were held today with , nursing, pharmacist and clinical coordinator. Patient's plan of care was discussed; medications were reviewed and discharge planning was addressed. ________________________________________________________________________  Total NON critical care TIME:  25   Minutes    Total CRITICAL CARE TIME Spent:   Minutes non procedure based      Comments   >50% of visit spent in counseling and coordination of care     ________________________________________________________________________  Katty Galloway MD     Procedures: see electronic medical records for all procedures/Xrays and details which were not copied into this note but were reviewed prior to creation of Plan. LABS:  I reviewed today's most current labs and imaging studies.   Pertinent labs include:  Recent Labs      06/11/17   0428  06/10/17   0617  06/09/17   1226   WBC  13.6*  20.3*  20.0*   HGB  10.2*  9.8*  12.3   HCT  32.3*  30.8*  39.0   PLT  244  239  274     Recent Labs      06/11/17   0428  06/10/17   0617  06/09/17   1226   NA  137  136  131*   K  3.5  3.9  4.0   CL  102  104  94*   CO2  28  27  31   GLU  89  81  179*   BUN  10  8  12   CREA  0.58*  0.54*  0.81   CA  8.9  8.4*  8.7   MG   --   2.4   --    ALB   --    --   2.7*   TBILI   --    --   0.6   SGOT   --    --   31   ALT   --    --   27   INR   --    --   1.1

## 2017-06-11 NOTE — PROGRESS NOTES
End of shift bedside report given to [facility]. Patient situation, background, SBAR and meds reviewed. Patient stable and in no distress.   2 rails up, call bell within reach

## 2017-06-12 LAB
ANION GAP BLD CALC-SCNC: 6 MMOL/L (ref 5–15)
BACTERIA SPEC CULT: ABNORMAL
BASOPHILS # BLD AUTO: 0 K/UL (ref 0–0.1)
BASOPHILS # BLD: 0 % (ref 0–1)
BUN SERPL-MCNC: 11 MG/DL (ref 6–20)
BUN/CREAT SERPL: 17 (ref 12–20)
CALCIUM SERPL-MCNC: 8.4 MG/DL (ref 8.5–10.1)
CC UR VC: ABNORMAL
CHLORIDE SERPL-SCNC: 98 MMOL/L (ref 97–108)
CO2 SERPL-SCNC: 29 MMOL/L (ref 21–32)
CREAT SERPL-MCNC: 0.63 MG/DL (ref 0.7–1.3)
DATE LAST DOSE: ABNORMAL
EOSINOPHIL # BLD: 0.3 K/UL (ref 0–0.4)
EOSINOPHIL NFR BLD: 3 % (ref 0–7)
ERYTHROCYTE [DISTWIDTH] IN BLOOD BY AUTOMATED COUNT: 15.9 % (ref 11.5–14.5)
GLUCOSE BLD STRIP.AUTO-MCNC: 128 MG/DL (ref 65–100)
GLUCOSE BLD STRIP.AUTO-MCNC: 133 MG/DL (ref 65–100)
GLUCOSE BLD STRIP.AUTO-MCNC: 160 MG/DL (ref 65–100)
GLUCOSE BLD STRIP.AUTO-MCNC: 183 MG/DL (ref 65–100)
GLUCOSE BLD STRIP.AUTO-MCNC: 207 MG/DL (ref 65–100)
GLUCOSE SERPL-MCNC: 168 MG/DL (ref 65–100)
HCT VFR BLD AUTO: 36 % (ref 36.6–50.3)
HGB BLD-MCNC: 11.4 G/DL (ref 12.1–17)
LYMPHOCYTES # BLD AUTO: 24 % (ref 12–49)
LYMPHOCYTES # BLD: 2.3 K/UL (ref 0.8–3.5)
MCH RBC QN AUTO: 26.5 PG (ref 26–34)
MCHC RBC AUTO-ENTMCNC: 31.7 G/DL (ref 30–36.5)
MCV RBC AUTO: 83.5 FL (ref 80–99)
MONOCYTES # BLD: 0.7 K/UL (ref 0–1)
MONOCYTES NFR BLD AUTO: 8 % (ref 5–13)
NEUTS SEG # BLD: 6.2 K/UL (ref 1.8–8)
NEUTS SEG NFR BLD AUTO: 65 % (ref 32–75)
PLATELET # BLD AUTO: 257 K/UL (ref 150–400)
POTASSIUM SERPL-SCNC: 3.5 MMOL/L (ref 3.5–5.1)
RBC # BLD AUTO: 4.31 M/UL (ref 4.1–5.7)
REPORTED DOSE,DOSE: ABNORMAL UNITS
REPORTED DOSE/TIME,TMG: ABNORMAL
SERVICE CMNT-IMP: ABNORMAL
SODIUM SERPL-SCNC: 133 MMOL/L (ref 136–145)
VANCOMYCIN TROUGH SERPL-MCNC: 24.2 UG/ML (ref 5–10)
WBC # BLD AUTO: 9.5 K/UL (ref 4.1–11.1)

## 2017-06-12 PROCEDURE — 97116 GAIT TRAINING THERAPY: CPT

## 2017-06-12 PROCEDURE — 85025 COMPLETE CBC W/AUTO DIFF WBC: CPT | Performed by: INTERNAL MEDICINE

## 2017-06-12 PROCEDURE — 74011250636 HC RX REV CODE- 250/636: Performed by: INTERNAL MEDICINE

## 2017-06-12 PROCEDURE — 77010026065 HC OXYGEN MINIMUM MEDICAL AIR

## 2017-06-12 PROCEDURE — 74011250636 HC RX REV CODE- 250/636: Performed by: EMERGENCY MEDICINE

## 2017-06-12 PROCEDURE — 97161 PT EVAL LOW COMPLEX 20 MIN: CPT

## 2017-06-12 PROCEDURE — 65660000000 HC RM CCU STEPDOWN

## 2017-06-12 PROCEDURE — 74011250637 HC RX REV CODE- 250/637: Performed by: INTERNAL MEDICINE

## 2017-06-12 PROCEDURE — 80202 ASSAY OF VANCOMYCIN: CPT | Performed by: INTERNAL MEDICINE

## 2017-06-12 PROCEDURE — G8988 SELF CARE GOAL STATUS: HCPCS | Performed by: OCCUPATIONAL THERAPIST

## 2017-06-12 PROCEDURE — 82962 GLUCOSE BLOOD TEST: CPT

## 2017-06-12 PROCEDURE — 74011000258 HC RX REV CODE- 258: Performed by: INTERNAL MEDICINE

## 2017-06-12 PROCEDURE — 77030018798 HC PMP KT ENTRL FED COVD -A

## 2017-06-12 PROCEDURE — G8978 MOBILITY CURRENT STATUS: HCPCS

## 2017-06-12 PROCEDURE — G8979 MOBILITY GOAL STATUS: HCPCS

## 2017-06-12 PROCEDURE — 80048 BASIC METABOLIC PNL TOTAL CA: CPT | Performed by: INTERNAL MEDICINE

## 2017-06-12 PROCEDURE — 97165 OT EVAL LOW COMPLEX 30 MIN: CPT

## 2017-06-12 PROCEDURE — 36415 COLL VENOUS BLD VENIPUNCTURE: CPT | Performed by: INTERNAL MEDICINE

## 2017-06-12 PROCEDURE — 74011636637 HC RX REV CODE- 636/637: Performed by: INTERNAL MEDICINE

## 2017-06-12 PROCEDURE — G8987 SELF CARE CURRENT STATUS: HCPCS | Performed by: OCCUPATIONAL THERAPIST

## 2017-06-12 RX ORDER — FLUCONAZOLE 100 MG/1
100 TABLET ORAL DAILY
Qty: 7 TAB | Refills: 0 | Status: SHIPPED | OUTPATIENT
Start: 2017-06-12 | End: 2017-06-19

## 2017-06-12 RX ORDER — CEFDINIR 125 MG/5ML
300 POWDER, FOR SUSPENSION ORAL 2 TIMES DAILY
Qty: 168 ML | Refills: 0 | Status: SHIPPED
Start: 2017-06-12 | End: 2017-06-19

## 2017-06-12 RX ORDER — TRIPROLIDINE/PSEUDOEPHEDRINE 2.5MG-60MG
200 TABLET ORAL
Qty: 1 BOTTLE | Refills: 0 | Status: SHIPPED
Start: 2017-06-12

## 2017-06-12 RX ADMIN — MORPHINE SULFATE 2 MG: 2 INJECTION, SOLUTION INTRAMUSCULAR; INTRAVENOUS at 01:05

## 2017-06-12 RX ADMIN — PANTOPRAZOLE SODIUM 40 MG: 40 GRANULE, DELAYED RELEASE ORAL at 08:54

## 2017-06-12 RX ADMIN — CEFEPIME 1 G: 1 INJECTION, POWDER, FOR SOLUTION INTRAMUSCULAR; INTRAVENOUS at 18:23

## 2017-06-12 RX ADMIN — IBUPROFEN 200 MG: 100 SUSPENSION ORAL at 18:26

## 2017-06-12 RX ADMIN — ASPIRIN 81 MG 81 MG: 81 TABLET ORAL at 08:47

## 2017-06-12 RX ADMIN — INSULIN LISPRO 2 UNITS: 100 INJECTION, SOLUTION INTRAVENOUS; SUBCUTANEOUS at 08:47

## 2017-06-12 RX ADMIN — GUAIFENESIN 200 MG: 100 SOLUTION ORAL at 12:24

## 2017-06-12 RX ADMIN — ATORVASTATIN CALCIUM 40 MG: 40 TABLET, FILM COATED ORAL at 08:47

## 2017-06-12 RX ADMIN — ALPRAZOLAM 0.25 MG: 0.25 TABLET ORAL at 08:47

## 2017-06-12 RX ADMIN — Medication 10 ML: at 20:55

## 2017-06-12 RX ADMIN — INSULIN LISPRO 3 UNITS: 100 INJECTION, SOLUTION INTRAVENOUS; SUBCUTANEOUS at 18:26

## 2017-06-12 RX ADMIN — MORPHINE SULFATE 2 MG: 2 INJECTION, SOLUTION INTRAMUSCULAR; INTRAVENOUS at 21:06

## 2017-06-12 RX ADMIN — GUAIFENESIN 200 MG: 100 SOLUTION ORAL at 20:55

## 2017-06-12 RX ADMIN — GUAIFENESIN 200 MG: 100 SOLUTION ORAL at 08:49

## 2017-06-12 RX ADMIN — TICAGRELOR 90 MG: 90 TABLET ORAL at 20:57

## 2017-06-12 RX ADMIN — TICAGRELOR 90 MG: 90 TABLET ORAL at 08:54

## 2017-06-12 RX ADMIN — CEFEPIME 1 G: 1 INJECTION, POWDER, FOR SOLUTION INTRAMUSCULAR; INTRAVENOUS at 12:24

## 2017-06-12 RX ADMIN — FUROSEMIDE 40 MG: 40 TABLET ORAL at 08:47

## 2017-06-12 RX ADMIN — GUAIFENESIN 200 MG: 100 SOLUTION ORAL at 18:26

## 2017-06-12 RX ADMIN — CEFEPIME 1 G: 1 INJECTION, POWDER, FOR SOLUTION INTRAMUSCULAR; INTRAVENOUS at 01:04

## 2017-06-12 RX ADMIN — ALPRAZOLAM 0.25 MG: 0.25 TABLET ORAL at 01:05

## 2017-06-12 RX ADMIN — ALPRAZOLAM 0.25 MG: 0.25 TABLET ORAL at 21:06

## 2017-06-12 RX ADMIN — VANCOMYCIN HYDROCHLORIDE 1250 MG: 10 INJECTION, POWDER, LYOPHILIZED, FOR SOLUTION INTRAVENOUS at 06:45

## 2017-06-12 RX ADMIN — ENOXAPARIN SODIUM 40 MG: 40 INJECTION SUBCUTANEOUS at 08:47

## 2017-06-12 RX ADMIN — FLUCONAZOLE 100 MG: 200 TABLET ORAL at 08:45

## 2017-06-12 NOTE — PROGRESS NOTES
End of Shift Nursing Note    Bedside shift change report given to [facility] (oncoming nurse) by Vielka Chavis (offgoing nurse). Report included the following information SBAR, Kardex and Intake/Output. Significant changes during shift:    Frequent requests to deep suction from pt/suzanney (6x today)  pinto out at 1600 per daughters request (and order from Dr. Padmini Ortiz).     Non-emergent issues for physician to address:   none     Number times ambulated in hallway past shift: 1 with PT/OT      Number of times OOB to chair past shift: 1      Vital Signs:    Temp: 98.5 °F (36.9 °C)     Pulse (Heart Rate): (!) 109     BP: 98/73     Resp Rate: 14     O2 Sat (%): 98 %    Skin Integrity:      Wounds: no   Dressings Present: no    Wound Concerns: no      GI:    Current diet:  DIET ONE TIME MESSAGE  DIET TUBE FEEDING    Nausea: NO  Vomiting: NO  Bowel Sounds: YES  Flatus: YES  Last Bowel Movement: today   Appearance: loose brown    Respiratory:  Supplemental O2: No          Shasta Aly

## 2017-06-12 NOTE — PROGRESS NOTES
Hospitalist Progress Note    NAME: Carlos Hall   :  1960   MRN:  [id number]     Admit date: 2017    Today's date: 17    PCP: MD Javad Benedict M.D. Cell [phone]      Assessment / Plan:  Acute encephalopathy POA improved, due to sepsis  Severe sepsis POA with WBC, fever, Tachy, lactate 2.2. UTI due to indwelling pinto catheter POA urine culture with GNR and C albicans  CT abdomen No free air. No evidence of fluid collection or acute intraperitoneal process  pCXR with no ASD  Received a 30cc/kg NS bolus in ER. Cefepime and vanco, wean to cefepime and fluconazole  C albicans in urine, fluconazole x 1 week  BC remain negative  Fever and leukocytosis resolved  Po third generation cephalosporin and fluconazole x 1 week  D/C to [facility] when bed available    CAD s/p CABG 2014 x 3v at HD  Hx cardiac stents (twice before CABG and once after)  Ischemic cardiomyopathy  HTN  ASA, Brilinta, lasix and statin     S/p out of hospital cardiac arrest 17 resulting in DORA  S/P PEG and trach 17  Resumed TF    DM type 2 POA  , 155, 133, 160, 183  SSI PRN     Urine retention with chronic indwelling pinto catheter POA  Pinto inserted      [facility] POA    Code Status: Full    Surrogate Decision Maker: Family     DVT Prophylaxis: lovenox         Subjective:     Chief Complaint / Reason for Physician Visit  Family communicating with him in his native language  Still sore in left shoulder, x-rays negativ  Sore where trach collar is in place  Discussed with RN events overnight.      Review of Systems:  Symptom Y/N Comments  Symptom Y/N Comments   Fever/Chills n   Chest Pain n    Poor Appetite    Edema     Cough n   Abdominal Pain y    Sputum    Joint Pain     SOB/HAM n   Pruritis/Rash     Nausea/vomit n   Tolerating PT/OT     Diarrhea n   Tolerating Diet n Tube feeds   Constipation    Other       Could NOT obtain due to:      Objective:     VITALS:   Last 24hrs VS reviewed since prior progress note. Most recent are:  Patient Vitals for the past 24 hrs:   Temp Pulse Resp BP SpO2   06/12/17 1219 99.1 °F (37.3 °C) (!) 105 14 117/68 100 %   06/12/17 0820 98.3 °F (36.8 °C) 83 19 112/67 100 %   06/12/17 0339 99.1 °F (37.3 °C) 88 18 136/74 99 %   06/11/17 2009 99.3 °F (37.4 °C) 92 18 127/73 98 %   06/11/17 1934 98.3 °F (36.8 °C) - - - -       Intake/Output Summary (Last 24 hours) at 06/12/17 1406  Last data filed at 06/12/17 1239   Gross per 24 hour   Intake             1618 ml   Output             2250 ml   Net             -632 ml        Wt Readings from Last 12 Encounters:   06/10/17 86.2 kg (190 lb)   06/03/17 83.9 kg (185 lb)   05/29/17 82.2 kg (181 lb 4.8 oz)   05/27/17 93.4 kg (206 lb)   01/04/17 94.3 kg (208 lb)   10/05/16 92.5 kg (204 lb)       PHYSICAL EXAM:  General: WD, WN. Alert, cooperative, no acute distress    EENT:  PERRL. Anicteric sclerae. MMM  Neck:  Trach in place  Resp:  Rhonchi bilaterally, no wheezing or rales. No accessory muscle use  CV:  Regular  rhythm,  No edema  GI:  Soft, Non distended, Non tender.  +Bowel sounds, no rebound, PEG tube in place  MS:  Left shoulder with full passive ROM, no clear pain  Neurologic:  Alert, talking with family(not in english)  Psych:   Not anxious nor agitated  Skin:  No rashes. No jaundice    Reviewed most current lab test results and cultures  YES  Reviewed most current radiology test results   YES  Review and summation of old records today    NO  Reviewed patient's current orders and MAR    YES  PMH/SH reviewed - no change compared to H&P  ________________________________________________________________________  Care Plan discussed with:    Comments   Patient x    Family  x Family in room   RN x    Care Manager     Consultant                        Multidiciplinary team rounds were held today with , nursing, pharmacist and clinical coordinator.   Patient's plan of care was discussed; medications were reviewed and discharge planning was addressed. ________________________________________________________________________  Total NON critical care TIME:  25   Minutes    Total CRITICAL CARE TIME Spent:   Minutes non procedure based      Comments   >50% of visit spent in counseling and coordination of care     ________________________________________________________________________  Lianna Spicer MD     Procedures: see electronic medical records for all procedures/Xrays and details which were not copied into this note but were reviewed prior to creation of Plan. LABS:  I reviewed today's most current labs and imaging studies.   Pertinent labs include:  Recent Labs      06/12/17   0559  06/11/17 0428  06/10/17   0617   WBC  9.5  13.6*  20.3*   HGB  11.4*  10.2*  9.8*   HCT  36.0*  32.3*  30.8*   PLT  257  244  239     Recent Labs      06/12/17   0559  06/11/17 0428  06/10/17   0617   NA  133*  137  136   K  3.5  3.5  3.9   CL  98  102  104   CO2  29  28  27   GLU  168*  89  81   BUN  11  10  8   CREA  0.63*  0.58*  0.54*   CA  8.4*  8.9  8.4*   MG   --    --   2.4

## 2017-06-12 NOTE — PROGRESS NOTES
Pharmacy Medication Reconciliation     The patient was interviewed regarding current PTA medication list, use and drug allergies;  4th year Kizzy Mazariegos intern was present in room and obtained permission from patient's son to discuss drug regimen with visitors present. The patient's son was questioned regarding use of any other inhalers, topical products, over the counter medications, herbal medications, vitamin products or ophthalmic/nasal/otic medication use. Allergy Update: None    Recommendations/Findings: The following amendments were made to the patient's active medication list on file at [id number]:   1) Additions: None    2) Deletions: None    3) Changes: None      -Clarified PTA med list with patient's son. PTA medication list was corrected to the following:     Prior to Admission Medications   Prescriptions Last Dose Informant Patient Reported? Taking?   acetaminophen (TYLENOL) 325 mg tablet   No Yes   Si Tabs by Per G Tube route every four (4) hours as needed for Pain. albuterol-ipratropium (DUONEB) 2.5 mg-0.5 mg/3 ml nebu   Yes Yes   Sig: 3 mL by Nebulization route every four (4) hours as needed. aspirin 81 mg chewable tablet   No Yes   Si Tab by Per G Tube route daily. atorvastatin (LIPITOR) 40 mg tablet   No Yes   Si Tab by Per G Tube route daily. furosemide (LASIX) 40 mg tablet   No Yes   Si Tab by Per G Tube route daily. levoFLOXacin (LEVAQUIN) 750 mg tablet   No Yes   Si Tab by Per G Tube route Daily (before breakfast) for 5 days. lidocaine (LIDODERM) 5 %   Yes Yes   Si Patch by TransDERmal route every twenty-four (24) hours. Apply patch to the affected area for 12 hours a day and remove for 12 hours a day. pantoprazole (PROTONIX) 40 mg granules for oral suspension   No Yes   Si mg by Per G Tube route Daily (before breakfast). prasugrel (EFFIENT) 10 mg tablet   Yes Yes   Sig: 10 mg by PEG Tube route daily.    traMADol (ULTRAM) 50 mg tablet   No Yes   Sig: 1 Tab by Per G Tube route every six (6) hours as needed. Max Daily Amount: 200 mg.   vancomycin 1,000 mg 1,000 mg IVPB   Yes Yes   Si,000 mg by IntraVENous route.       Facility-Administered Medications: None          Thank you,  Selena Johnston  PharmD candidate  Class of 2018

## 2017-06-12 NOTE — PROGRESS NOTES
CM sent referral to [facility] to determine if they are able to accept pt back.      ZURDO Johnson, [address] [facility]   [phone]

## 2017-06-12 NOTE — PROGRESS NOTES
Spiritual Care Partner Volunteer visited patient on 6/12/17. Documented by:    Curt Leone M.S.   Spiritual Care Department  If needs arise please call Alberto-FARRAH ([phone])

## 2017-06-12 NOTE — PROGRESS NOTES
End of Shift Nursing Note    Bedside shift change report given to Shanna POWELL (oncoming nurse) by Renny Hurt RN (offgoing nurse). Report included the following information SBAR and Recent Results. Zone Phone:   [phone]    Significant changes during shift:    0   Non-emergent issues for physician to address:   0     Number times ambulated in hallway past shift: 0      Number of times OOB to chair past shift: 0    POD #: 0     Vital Signs:    Temp: 99.1 °F (37.3 °C)     Pulse (Heart Rate): 88     BP: 136/74     Resp Rate: 18     O2 Sat (%): 99 %    Lines & Drains:     Urinary Catheter? Yes   Placement Date: 05/31   Medical Necessity: yes  Central Line? No   Placement Date: 0   Medical Necessity: 0  PICC Line? No   Placement Date: 0   Medical Necessity: 0    NG tube [] in [] removed [] not applicable   Drains [] in [] removed [] not applicable     Skin Integrity:      Wounds: yes   Dressings Present: yes   Wound Concerns: no      GI:    Current diet:  DIET ONE TIME MESSAGE  DIET TUBE FEEDING    Nausea: NO  Vomiting: NO  Bowel Sounds: YES  Flatus: YES  Last Bowel Movement: yesterday   Appearance: smear    Respiratory:  Supplemental O2: trach      Device: trach collar   via  Liters/min     Incentive Spirometer: NO  Volume: 0  Coughing and Deep Breathing: YES  Oral Care: YES  Understanding (patient/family education): YES   Getting out of bed: NO  Head of bed elevation: YES    Patient Safety:    Falls Score: 3  Mobility Score: 2  Bed Alarm On? Yes  Sitter? No      Opportunity for questions and clarification was given to oncoming nurse. Patient bed is in low position, side rails are up x 2, door & observation blinds open as needed, call bell within reach and patient not in distress.     Kelley Acosta RN

## 2017-06-12 NOTE — PROGRESS NOTES
** Pt is a re-admit **     Pt is a 57 y/o male admitted for severe sepsis from [facility]. Pt lives with spouse, son, daughter and mother in a one story home. Pt has six steps to the entrance of his residence. Pt was independent with ADL's to include driving at baseline. Pt had no previous HH, SNF or DME providers. Pt was admitted to [facility] on 06.03.2017 then re-admitted to [facility] on 06.09.2017 for severe sepsis. CM was unable to complete assessment with pt due to his inability to communicate at this time. CM spoke with pt's brother-in-law, Mr. Awa Ang to complete initial assessment. CM sent referral to [facility] to determine if they are able to accept pt back for rehab. Care Management Interventions  PCP Verified by CM: Yes (Dr. Elizabet Wasserman )  Mode of Transport at Discharge: Other (see comment) ([facility] transport to [facility] )  Transition of Care Consult (CM Consult):  Other [facility] inpatient rehab)  Discharge Durable Medical Equipment: No  Health Maintenance Reviewed: Yes  Physical Therapy Consult: Yes  Occupational Therapy Consult: Yes  Speech Therapy Consult: Yes  Current Support Network: Lives with Spouse (pt lives with spouse, son, daughter and mother)  Confirm Follow Up Transport:  (at baseline pt drove and was independent with ADL's )  Discharge Location  Discharge Placement: Rehab hospital/unit acute [facility] )    ZURDO Robles, [address] [facility]   [phone]

## 2017-06-12 NOTE — PROGRESS NOTES
Problem: Self Care Deficits Care Plan (Adult)  Goal: *Acute Goals and Plan of Care (Insert Text)  Occupational Therapy Goals  Initiated 6/12/2017  1. Patient will perform grooming with moderate assistance within 7 day(s). 2. Patient will perform upper body bathing with minimal assistance/contact guard assist within 7 day(s). 3. Patient will perform upper body dressing with moderate assistance within 7 day(s). 4. Patient will perform toilet transfers with moderate assistance/contact guard assist within 7 day(s). 5. Patient will participate in upper extremity therapeutic exercise/activities with minimal assistance/contact guard assist for 5 minutes within 7 day(s). OCCUPATIONAL THERAPY EVALUATION  Patient: Barbara Solorzano [de-identified]56 y.o. male)  Date: 6/12/2017  Primary Diagnosis: Severe sepsis ([address]        Precautions:   Bed Alarm, Fall      ASSESSMENT :  Based on the objective data described below, the patient presents with complex medical history (including anoxic brain injury) and recent  inpatient rehab admission that lasted only several days, due to admission to [facility] for  sepsis diagnosis/treatment. Pt is generally moderate to maximal assistance for self care (assist X1 to 2)  and needs assistance X2 for functional mobility/transfers-max A x2 for  Bed mobility and Paddy x 2 for ambulation . BP was generally stable, HR increased during tx session. Pt's daughter was present to translate language during tx session and provide PLOF information. She reports that her father is doing better after being admitted for sepsis and she is eager for him to return to rehab. [facility] .     Patient will benefit from skilled intervention to address the above impairments.   Patients rehabilitation potential is considered to be Good  Factors which may influence rehabilitation potential include:   [ ]             None noted  [ ]             Mental ability/status  [X] Medical condition (has PEG and trach, anoxic brain injury)  [ ]             Home/family situation and support systems  [ ]             Safety awareness  [ ]             Pain tolerance/management  [X]             Other: language/communication       PLAN :  Recommendations and Planned Interventions:  [X]               Self Care Training                  [X]        Therapeutic Activities  [X]               Functional Mobility Training    [X]        Cognitive Retraining  [X]               Therapeutic Exercises           [X]        Endurance Activities  [X]               Balance Training                   [X]        Neuromuscular Re-Education  [X]               Visual/Perceptual Training     [ ]   Home Safety Training  [X]               Patient Education                 [X]        Family Training/Education  [ ]               Other (comment):     Frequency/Duration: Patient will be followed by occupational therapy 4 times a week to address goals. Discharge Recommendations: Inpatient Rehab  Further Equipment Recommendations for Discharge: tbd       SUBJECTIVE:   Patient making sounds, tongue protruding-daughter translating      OBJECTIVE DATA SUMMARY:   HISTORY:   Past Medical History:   Diagnosis Date    [facility] [address] [facility] [address]       following cardiac arrest; MRI shows involvement BG and temporal lobes    Aspiration pneumonia ([address]       during cardiac arrest intubation    Bradycardia      CAD (coronary artery disease)      Cardiac arrest ([facility] [address]       no intervention by [place] cardiology.   [address] [facility]    Diabetes [facility])      Essential hypertension      Hyperlipidemia         Past Surgical History:   Procedure Laterality Date    HX CORONARY ARTERY BYPASS GRAFT        HX CORONARY STENT PLACEMENT        HX HEART CATHETERIZATION        HX OTHER SURGICAL   05/12/2017     PEG and trach    [facility] TUBE CHANGE   5/25/2017               Prior Level of Function/Home Situation: Pt was admitted from [facility] rehab. Family expects pt to return. Pt had been independent in adls IADLs prior to medical issues starting in [place]  Expanded or extensive additional review of patient history:      Home Situation  Home Environment: Rehabilitation facility ([facility])  Patient Expects to be Discharged to[de-identified] Rehabilitation facility ([facility])  [X]  Right hand dominant             [ ]  Left hand dominant     EXAMINATION OF PERFORMANCE DEFICITS:  Cognitive/Behavioral Status:  Neurologic State: Alert  Orientation Level: Unable to verbalize  Cognition: Follows commands;Recognition of people/places  Perception: Cues to attend right visual field;Cues to attend to right side of body  Perseveration: No perseveration noted  Safety/Judgement: Decreased awareness of need for safety;Decreased insight into deficits; Fall prevention;Lack of insight into deficits     Skin: generally intact     Edema: none observed     Hearing:   appears intact     Vision/Perceptual:    Tracking:  (able to follow commands to view out window to name objects)       Daughter assisting with translating               Acuity:  (unable to accurately assess)    Corrective Lenses:  (none present)   Pt demonstrates decreased attention to R UE  Range of Motion:     AROM: Generally decreased, functional ( RLE )   impaired AROM and non functional RUE     LUE generally functional ROM-needs assistance-physical and verbal cues for functional use of LUE/limited initiation of adls                       Strength:  RUE (dominant) non functional strength at this time.   Poor to 0 grasp; unable to use RUE as a functional assist.  Strength: Generally decreased, functional ( RLE )                 Coordination:  Coordination: Generally decreased, functional  Fine Motor Skills-Upper: Left Impaired;Right Impaired (R>L:  LUE functional AROM, but limited functional use)    Gross Motor Skills-Upper:  (LUE intact AROM, but limited functional use,  IMpaired AROM RUE--non functional  RUE; 0 initiation of RUE)     Tone & Sensation:     Tone: Abnormal (increased in RLE, UE not tested)   Sensation:  (not tested)                       Balance:  Sitting: Intact  Sitting - Static: Good (unsupported)  Sitting - Dynamic:  (not tested)  Standing: Impaired (decreased weight bearing into RLE)  Standing - Static: Constant support; Fair  Standing - Dynamic : Fair     Functional Mobility and Transfers for ADLs:  Bed Mobility:  Rolling: Maximum assistance;Assist x2; Additional time  Supine to Sit: Maximum assistance;Assist x2; Additional time  Scooting: Maximum assistance;Assist x2     Transfers:  Sit to Stand: Minimum assistance;Assist x2  Stand to Sit: Minimum assistance;Assist x2 (hand over hand placement of UE's )  Bed to Chair: Minimum assistance;Assist x2  Toilet Transfer :  (not formally tested-inferred per transfer and amb. this date)     ADL Assessment:  Feeding: Total assistance     Oral Facial Hygiene/Grooming: Moderate assistance     Bathing: Maximum assistance     Upper Body Dressing: Moderate assistance     Lower Body Dressing: Maximum assistance     Toileting: Total assistance (pinto)                 ADL Intervention and task modifications:   Role of OT--pt's daughter acknowledged. Commands presented through daughter translating. Cognitive Retraining  Safety/Judgement: Decreased awareness of need for safety;Decreased insight into deficits; Fall prevention;Lack of insight into deficits     Therapeutic Exercise:  PROM RUE. No initiation of movement in RUE, decreased attention to RUE.   Functional Measure:  Barthel Index:      Bathin  Bladder: 0  Bowels: 0  Groomin  Dressin  Feedin  Mobility: 0  Stairs: 0  Toilet Use: 5  Transfer (Bed to Chair and Back): 5  Total: 15         Barthel and G-code impairment scale:  Percentage of impairment CH  0% CI  1-19% CJ  20-39% CK  40-59% CL  60-79% CM  80-99% CN  100%   Barthel Score 0-100 100 99-80 79-60 59-40 20-39 1-19    0   Barthel Score 0-20 20 17-19 13-16 9-12 5-8 1-4 0      The Barthel ADL Index: Guidelines  1. The index should be used as a record of what a patient does, not as a record of what a patient could do. 2. The main aim is to establish degree of independence from any help, physical or verbal, however minor and for whatever reason. 3. The need for supervision renders the patient not independent. 4. A patient's performance should be established using the best available evidence. Asking the patient, friends/relatives and nurses are the usual sources, but direct observation and common sense are also important. However direct testing is not needed. 5. Usually the patient's performance over the preceding 24-48 hours is important, but occasionally longer periods will be relevant. 6. Middle categories imply that the patient supplies over 50 per cent of the effort. 7. Use of aids to be independent is allowed. Crispin Aceves., Barthel, [place]. (1607). Functional evaluation: the Barthel Index. [address] [facility] (14)2. Theodore Ralph marily CJ Marcano, Phi Monson., Caryn Medina., Vivienne Reyescy, [address] [place] [address] (1999). Measuring the change indisability after inpatient rehabilitation; comparison of the responsiveness of the Barthel Index and Functional [place] Measure. Journal of Neurology, Neurosurgery, and Psychiatry, 66(4), 505-230. Miladis Gustafson, N.J.A, CHERISE Palma.JES.SANDRA, & Lizabeth Szymanski, M.A. (2004.) Assessment of post-stroke quality of life in cost-effectiveness studies: The usefulness of the Barthel Index and the EuroQoL-5D. Quality of Life Research, 13, 111-53         G codes: In compliance with CMSs Claims Based Outcome Reporting, the following G-code set was chosen for this patient based on their primary functional limitation being treated:      The outcome measure chosen to determine the severity of the functional limitation was the Barthel Index with a score of 15/100 which was correlated with the impairment scale.      · Self Care:               - CURRENT STATUS:    CM - 80%-99% impaired, limited or restricted               - GOAL STATUS:           CL - 60%-79% impaired, limited or restricted               - D/C STATUS:                       ---------------To be determined---------------      Occupational Therapy Evaluation Charge Determination   History Examination Decision-Making   MEDIUM Complexity : Expanded review of history including physical, cognitive and psychosocial  history  MEDIUM Complexity : 3-5 performance deficits relating to physical, cognitive , or psychosocial skils that result in activity limitations and / or participation restrictions MEDIUM Complexity : Patient may present with comorbidities that affect occupational performnce. Miniml to moderate modification of tasks or assistance (eg, physical or verbal ) with assesment(s) is necessary to enable patient to complete evaluation       Based on the above components, the patient evaluation is determined to be of the following complexity level: MEDIUM  Pain:       no complaints of pain. Activity Tolerance:   BP stable, HR increased during functional mobility  Please refer to the flowsheet for vital signs taken during this treatment. After treatment:   [X] Patient left in no apparent distress sitting up in chair  [ ] Patient left in no apparent distress in bed  [X] Call bell left within reach  [X] Nursing notified  [X] Caregiver present  [ ] Bed alarm activated      COMMUNICATION/EDUCATION:   The patients plan of care was discussed with: Physical Therapist, Registered Nurse and . [ ] Home safety education was provided and the patient/caregiver indicated understanding. [X] family have participated as able in goal setting and plan of care. [ ] Patient/family agree to work toward stated goals and plan of care.   [ ] Patient understands intent and goals of therapy, but is neutral about his/her participation. [X] Patient is unable to participate in goal setting and plan of care. This patients plan of care is appropriate for delegation to [facility].      Thank you for this referral.  Sophy Dhillon OTR/L  Time Calculation: 41 mins

## 2017-06-12 NOTE — PROGRESS NOTES
Physical Therapy Goals  Initiated 6/12/2017  1. Patient will move from supine to sit and sit to supine , scoot up and down and roll side to side in bed with moderate assistance within 7 day(s). 2. Patient will transfer from bed to chair and chair to bed with minimal assistance/contact guard assist using the least restrictive device within 7 day(s). 3. Patient will perform sit to stand with minimal assistance/contact guard assist within 7 day(s). 4. Patient will ambulate with minimal assistance/contact guard assist for 150 feet with the least restrictive device within 7 day(s). 5. Patient will ascend/descend 4 stairs with dual handrail(s) with moderate assistance within 7 day(s). 6. Patient will be tested on [place] scale and goal set within 7 days. physical Therapy EVALUATION  Patient: Sharon Connor [de-identified]56 y.o. male)  Date: 6/12/2017  Primary Diagnosis: Severe sepsis (HCC)        Precautions:  Bed Alarm, Fall    ASSESSMENT :  Based on the objective data described below, the patient presents with impaired activity tolerance, decreased functional mobility following admission for severe sepsis from [facility] IP rehab. Prior to rehab, patient admitted s/p cardiac arrest with subsequent resultant TBI. Received this date in supine, with RN appreciably removing tube feeding. Daughter present and although hypervigilant at times, quite helpful with regards to providing functioning since patient at rehab as well as translation as she translated entirety of session to author. Patient required max A of 2 and significant time, multi-modal cueing to mobilize to EOB as initially he appeared to refuse activity. From there, required majority min A of 2 with little discernable difference seen from L to R (weaker, increase tone) side although he did notably shift his weight towards his L during gait.  Patient with HHA throughout, requiring x1 standing rest period and return to room after noting HR up to 140 and patient c/o dizziness although BP stable on RA. Concluded at bedside chair with all needs met and daughter present with questions regarding disposition; notified CM of such. Recommend return to IP rehab at ID. Patient will benefit from skilled intervention to address the above impairments. Patients rehabilitation potential is considered to be Fair  Factors which may influence rehabilitation potential include:   []         None noted  [x]         Mental ability/status  [x]         Medical condition  []         Home/family situation and support systems  []         Safety awareness  []         Pain tolerance/management  []         Other:      PLAN :  Recommendations and Planned Interventions:  [x]           Bed Mobility Training             [x]    Neuromuscular Re-Education  [x]           Transfer Training                   []    Orthotic/Prosthetic Training  [x]           Gait Training                         []    Modalities  [x]           Therapeutic Exercises           []    Edema Management/Control  [x]           Therapeutic Activities            [x]    Patient and Family Training/Education  []           Other (comment):    Frequency/Duration: Patient will be followed by physical therapy  5 times a week to address goals. Discharge Recommendations: Inpatient Rehab  Further Equipment Recommendations for Discharge: defer      SUBJECTIVE:   Patient stated bye.  Patient spoke to daughter in native language otherwise with daughter translating. OBJECTIVE DATA SUMMARY:   HISTORY:    Past Medical History:   Diagnosis Date    Anoxic brain injury ([address]     following cardiac arrest; MRI shows involvement BG and temporal lobes    Aspiration pneumonia ([address]     during cardiac arrest intubation    Bradycardia     CAD (coronary artery disease)     Cardiac arrest ([address]     no intervention by [place] cardiology.   [address] [facility]    [facility])     Essential hypertension     Hyperlipidemia      Past Surgical History: Procedure Laterality Date    HX CORONARY ARTERY BYPASS GRAFT      HX CORONARY STENT PLACEMENT      HX HEART CATHETERIZATION      HX OTHER SURGICAL  05/12/2017    PEG and trach    [facility] TUBE CHANGE  5/25/2017          Prior Level of Function/Home Situation: Per daughter, he was doing quite well at [address] IPR, walking '200 feet' without chair follow and without assistive device or harness. Personal factors and/or comorbidities impacting plan of care:     Home Situation  Home Environment: Rehabilitation facility ([facility])  Patient Expects to be Discharged to[de-identified] Rehabilitation facility ([facility])    EXAMINATION/PRESENTATION/DECISION MAKING:   Critical Behavior:  Neurologic State: Alert  Orientation Level: Unable to verbalize  Cognition: Follows commands, Recognition of people/places     Range Of Motion:  AROM: Generally decreased, functional (RUE, RLE )                       Strength:    Strength: Generally decreased, functional (RUE, RLE )                    Tone & Sensation:   Tone: Abnormal (increased in RLE, UE not tested)              Sensation:  (not tested)               Coordination:  Coordination: Generally decreased, functional     Functional Mobility:  Bed Mobility:  Rolling: Maximum assistance;Assist x2; Additional time  Supine to Sit: Maximum assistance;Assist x2; Additional time     Scooting: Maximum assistance;Assist x2  Transfers:  Sit to Stand: Minimum assistance;Assist x2  Stand to Sit: Minimum assistance;Assist x2 (hand over hand placement of UE's )        Bed to Chair: Minimum assistance;Assist x2              Balance:   Sitting: Intact; Without support  Standing: Impaired; With support  Standing - Static: Good;Fair;Constant support  Standing - Dynamic : Fair  Ambulation/Gait Training:  Distance (ft): 50 Feet (ft)  Assistive Device: Gait belt; Other (comment) (2 HHA)  Ambulation - Level of Assistance: Minimal assistance;Assist x2        Gait Abnormalities: Decreased step clearance; Path deviations        Base of Support: Widened;Shift to left     Speed/Melinda: Pace decreased (<100 feet/min)  Step Length: Left shortened;Right shortened               x1 standing rest period of 1' due to tachycardia up to 140 per RN per telemetry; consistent VC's translated by daughter for directional control        Functional Measure:  Barthel Index:    Bathin  Bladder: 0  Bowels: 0  Groomin  Dressin  Feedin  Mobility: 0  Stairs: 0  Toilet Use: 5  Transfer (Bed to Chair and Back): 5  Total: 15       Barthel and G-code impairment scale:  Percentage of impairment CH  0% CI  1-19% CJ  20-39% CK  40-59% CL  60-79% CM  80-99% CN  100%   Barthel Score 0-100 100 99-80 79-60 59-40 20-39 1-19   0   Barthel Score 0-20 20 17-19 13-16 9-12 5-8 1-4 0      The Barthel ADL Index: Guidelines  1. The index should be used as a record of what a patient does, not as a record of what a patient could do. 2. The main aim is to establish degree of independence from any help, physical or verbal, however minor and for whatever reason. 3. The need for supervision renders the patient not independent. 4. A patient's performance should be established using the best available evidence. Asking the patient, friends/relatives and nurses are the usual sources, but direct observation and common sense are also important. However direct testing is not needed. 5. Usually the patient's performance over the preceding 24-48 hours is important, but occasionally longer periods will be relevant. 6. Middle categories imply that the patient supplies over 50 per cent of the effort. 7. Use of aids to be independent is allowed. Sonia Miranda., Barthel, D.W. (4305). Functional evaluation: the Barthel Index. [facility] (14)2. CJ Salomon, Linden Burdick., Penny Valladares., [place], [address] [place] [address] ().  Measuring the change indisability after inpatient rehabilitation; comparison of the responsiveness of the Barthel Index and Functional Almo Measure. Journal of Neurology, Neurosurgery, and Psychiatry, 66(4), 204-732. JUAN Farnsworth, ANIRUDH Palma, & Steff Nicholson M.A. (2004.) Assessment of post-stroke quality of life in cost-effectiveness studies: The usefulness of the Barthel Index and the EuroQoL-5D. Quality of Life Research, 13, 330-24         G codes: In compliance with CMSs Claims Based Outcome Reporting, the following G-code set was chosen for this patient based on their primary functional limitation being treated: The outcome measure chosen to determine the severity of the functional limitation was the Barthel with a score of 15/100 which was correlated with the impairment scale. ? Mobility - Walking and Moving Around:     - CURRENT STATUS: CM - 80%-99% impaired, limited or restricted    - GOAL STATUS: CL - 60%-79% impaired, limited or restricted    - D/C STATUS:  ---------------To be determined---------------      Activity Tolerance:       Please refer to the flowsheet for vital signs taken during this treatment. After treatment:   [x]         Patient left in no apparent distress sitting up in chair  []         Patient left in no apparent distress in bed  [x]         Call bell left within reach  [x]         Nursing notified  [x]         Caregiver present  []         Bed alarm activated    COMMUNICATION/EDUCATION:   The patients plan of care was discussed with: Registered Nurse. [x]         Fall prevention education was provided and the patient/caregiver indicated understanding. [x]         Patient/family have participated as able in goal setting and plan of care. [x]         Patient/family agree to work toward stated goals and plan of care. []         Patient understands intent and goals of therapy, but is neutral about his/her participation. [x]         Patient is unable to participate in goal setting and plan of care.     Thank you for this referral.  Claudine Quiñones, PT, DPT    Time Calculation: 43 mins

## 2017-06-12 NOTE — PROGRESS NOTES
Update from [facility] consult was as follows:     \" Thanks for the new referral. Lexie Parnell and Jesus Love are working on it. If medically stable, we'll need to clarify with his insurance about his return.  Alexey Saldana \"    Cari Monte, ZURDO, [address] [place] [address]   [phone]

## 2017-06-13 PROBLEM — N39.0 UTI (URINARY TRACT INFECTION): Status: ACTIVE | Noted: 2017-06-13

## 2017-06-13 LAB
ANION GAP BLD CALC-SCNC: 7 MMOL/L (ref 5–15)
BASOPHILS # BLD AUTO: 0 K/UL (ref 0–0.1)
BASOPHILS # BLD: 0 % (ref 0–1)
BUN SERPL-MCNC: 10 MG/DL (ref 6–20)
BUN/CREAT SERPL: 15 (ref 12–20)
CALCIUM SERPL-MCNC: 9 MG/DL (ref 8.5–10.1)
CHLORIDE SERPL-SCNC: 96 MMOL/L (ref 97–108)
CO2 SERPL-SCNC: 32 MMOL/L (ref 21–32)
CREAT SERPL-MCNC: 0.65 MG/DL (ref 0.7–1.3)
EOSINOPHIL # BLD: 0.3 K/UL (ref 0–0.4)
EOSINOPHIL NFR BLD: 3 % (ref 0–7)
ERYTHROCYTE [DISTWIDTH] IN BLOOD BY AUTOMATED COUNT: 15.7 % (ref 11.5–14.5)
GLUCOSE BLD STRIP.AUTO-MCNC: 123 MG/DL (ref 65–100)
GLUCOSE BLD STRIP.AUTO-MCNC: 191 MG/DL (ref 65–100)
GLUCOSE BLD STRIP.AUTO-MCNC: 219 MG/DL (ref 65–100)
GLUCOSE SERPL-MCNC: 155 MG/DL (ref 65–100)
HCT VFR BLD AUTO: 34.6 % (ref 36.6–50.3)
HGB BLD-MCNC: 11.1 G/DL (ref 12.1–17)
LYMPHOCYTES # BLD AUTO: 27 % (ref 12–49)
LYMPHOCYTES # BLD: 3 K/UL (ref 0.8–3.5)
MCH RBC QN AUTO: 26.5 PG (ref 26–34)
MCHC RBC AUTO-ENTMCNC: 32.1 G/DL (ref 30–36.5)
MCV RBC AUTO: 82.6 FL (ref 80–99)
MONOCYTES # BLD: 1.2 K/UL (ref 0–1)
MONOCYTES NFR BLD AUTO: 11 % (ref 5–13)
NEUTS SEG # BLD: 6.6 K/UL (ref 1.8–8)
NEUTS SEG NFR BLD AUTO: 59 % (ref 32–75)
PLATELET # BLD AUTO: 278 K/UL (ref 150–400)
POTASSIUM SERPL-SCNC: 3.1 MMOL/L (ref 3.5–5.1)
RBC # BLD AUTO: 4.19 M/UL (ref 4.1–5.7)
SERVICE CMNT-IMP: ABNORMAL
SODIUM SERPL-SCNC: 135 MMOL/L (ref 136–145)
WBC # BLD AUTO: 11.2 K/UL (ref 4.1–11.1)

## 2017-06-13 PROCEDURE — 74011250637 HC RX REV CODE- 250/637: Performed by: INTERNAL MEDICINE

## 2017-06-13 PROCEDURE — 82962 GLUCOSE BLOOD TEST: CPT

## 2017-06-13 PROCEDURE — 36415 COLL VENOUS BLD VENIPUNCTURE: CPT | Performed by: INTERNAL MEDICINE

## 2017-06-13 PROCEDURE — 74011250636 HC RX REV CODE- 250/636: Performed by: EMERGENCY MEDICINE

## 2017-06-13 PROCEDURE — 65660000000 HC RM CCU STEPDOWN

## 2017-06-13 PROCEDURE — 85025 COMPLETE CBC W/AUTO DIFF WBC: CPT | Performed by: INTERNAL MEDICINE

## 2017-06-13 PROCEDURE — 80048 BASIC METABOLIC PNL TOTAL CA: CPT | Performed by: INTERNAL MEDICINE

## 2017-06-13 PROCEDURE — 74011000258 HC RX REV CODE- 258: Performed by: INTERNAL MEDICINE

## 2017-06-13 PROCEDURE — 74011250636 HC RX REV CODE- 250/636: Performed by: INTERNAL MEDICINE

## 2017-06-13 PROCEDURE — 74011250637 HC RX REV CODE- 250/637: Performed by: EMERGENCY MEDICINE

## 2017-06-13 PROCEDURE — 74011636637 HC RX REV CODE- 636/637: Performed by: INTERNAL MEDICINE

## 2017-06-13 RX ORDER — POTASSIUM CHLORIDE 1.5 G/1.77G
40 POWDER, FOR SOLUTION ORAL
Status: COMPLETED | OUTPATIENT
Start: 2017-06-13 | End: 2017-06-13

## 2017-06-13 RX ORDER — DIPHENHYDRAMINE HCL 25 MG
25 CAPSULE ORAL
Status: DISCONTINUED | OUTPATIENT
Start: 2017-06-13 | End: 2017-06-14 | Stop reason: HOSPADM

## 2017-06-13 RX ORDER — TRAZODONE HYDROCHLORIDE 50 MG/1
50 TABLET ORAL
Status: DISCONTINUED | OUTPATIENT
Start: 2017-06-13 | End: 2017-06-14 | Stop reason: HOSPADM

## 2017-06-13 RX ADMIN — ALPRAZOLAM 0.25 MG: 0.25 TABLET ORAL at 01:17

## 2017-06-13 RX ADMIN — INSULIN LISPRO 3 UNITS: 100 INJECTION, SOLUTION INTRAVENOUS; SUBCUTANEOUS at 11:50

## 2017-06-13 RX ADMIN — TRAZODONE HYDROCHLORIDE 50 MG: 50 TABLET ORAL at 23:40

## 2017-06-13 RX ADMIN — MORPHINE SULFATE 2 MG: 2 INJECTION, SOLUTION INTRAMUSCULAR; INTRAVENOUS at 01:17

## 2017-06-13 RX ADMIN — MORPHINE SULFATE 2 MG: 2 INJECTION, SOLUTION INTRAMUSCULAR; INTRAVENOUS at 05:51

## 2017-06-13 RX ADMIN — TICAGRELOR 90 MG: 90 TABLET ORAL at 09:10

## 2017-06-13 RX ADMIN — ASPIRIN 81 MG 81 MG: 81 TABLET ORAL at 09:09

## 2017-06-13 RX ADMIN — CEFEPIME 1 G: 1 INJECTION, POWDER, FOR SOLUTION INTRAMUSCULAR; INTRAVENOUS at 01:20

## 2017-06-13 RX ADMIN — ALPRAZOLAM 0.25 MG: 0.25 TABLET ORAL at 23:40

## 2017-06-13 RX ADMIN — TRAMADOL HYDROCHLORIDE 50 MG: 50 TABLET, FILM COATED ORAL at 11:50

## 2017-06-13 RX ADMIN — CEFEPIME 1 G: 1 INJECTION, POWDER, FOR SOLUTION INTRAMUSCULAR; INTRAVENOUS at 09:10

## 2017-06-13 RX ADMIN — PANTOPRAZOLE SODIUM 40 MG: 40 GRANULE, DELAYED RELEASE ORAL at 09:10

## 2017-06-13 RX ADMIN — ATORVASTATIN CALCIUM 40 MG: 40 TABLET, FILM COATED ORAL at 09:09

## 2017-06-13 RX ADMIN — FUROSEMIDE 40 MG: 40 TABLET ORAL at 09:10

## 2017-06-13 RX ADMIN — IBUPROFEN 200 MG: 100 SUSPENSION ORAL at 11:10

## 2017-06-13 RX ADMIN — GUAIFENESIN 200 MG: 100 SOLUTION ORAL at 18:43

## 2017-06-13 RX ADMIN — GUAIFENESIN 200 MG: 100 SOLUTION ORAL at 20:21

## 2017-06-13 RX ADMIN — ENOXAPARIN SODIUM 40 MG: 40 INJECTION SUBCUTANEOUS at 09:10

## 2017-06-13 RX ADMIN — DIPHENHYDRAMINE HYDROCHLORIDE 25 MG: 25 CAPSULE ORAL at 20:21

## 2017-06-13 RX ADMIN — POTASSIUM CHLORIDE 40 MEQ: 1.5 POWDER, FOR SOLUTION ORAL at 09:08

## 2017-06-13 RX ADMIN — CEFEPIME 1 G: 1 INJECTION, POWDER, FOR SOLUTION INTRAMUSCULAR; INTRAVENOUS at 18:43

## 2017-06-13 RX ADMIN — FLUCONAZOLE 100 MG: 200 TABLET ORAL at 09:08

## 2017-06-13 RX ADMIN — TICAGRELOR 90 MG: 90 TABLET ORAL at 20:21

## 2017-06-13 RX ADMIN — GUAIFENESIN 200 MG: 100 SOLUTION ORAL at 09:24

## 2017-06-13 NOTE — PROGRESS NOTES
Hospitalist Progress Note    NAME: Pablo Wolf   :  1960   MRN:  [id number]     Admit date: 2017    Today's date: 17    PCP: Bohdan Felty, MD Donalynn Romano, M.D. Cell [phone]      Assessment / Plan:  Acute encephalopathy POA improved,   due to sepsis Severe POA (WBC, fever, Tachy, lactate 2.2)  UTI due to indwelling pinto catheter POA urine culture with GNR and C albicans  CT abdomen No free air. No evidence of fluid collection or acute intraperitoneal process  pCXR with no ASD    S/p 30cc/kg NS bolus in ER. S/p Cefepime and vanco, wean to cefepime and fluconazole  C albicans in urine, fluconazole x 1 week  BC remain negative  Fever and leukocytosis resolved  Po third generation cephalosporin and fluconazole x 1 week  D/C to [facility] when bed available    CAD s/p CABG 2014 x 3v at HD  Hx cardiac stents (twice before CABG and once after)  Ischemic cardiomyopathy  HTN  ASA, Brilinta, lasix and statin     S/p out of hospital cardiac arrest 17 resulting in DORA  S/P PEG and trach 17  Resumed TF    DM type 2 POA  , 155, 133, 160, 183  SSI PRN     Urine retention with chronic indwelling pinto catheter POA  Pinto inserted - taken out yesterday , pt is voiding on his own as per the RN report- will avoid placing Pinto back in as per the Family's request as long as no evidence of Urinary retention, may resort to Intermittent cath regimen if needed     [place] POA    Code Status: Full    Surrogate Decision Maker: Family     DVT Prophylaxis: lovenox         Subjective:     Chief Complaint / Reason for Physician Visit: F/U UTI, sepsis, L side abd/rib pain     Discussed with RN events overnight.      Review of Systems:  Symptom Y/N Comments  Symptom Y/N Comments   Fever/Chills n   Chest Pain n    Poor Appetite    Edema     Cough n   Abdominal /chest wall Pain y L sided/rib fracture   Sputum    Joint Pain y L sided Rib pain   SOB/HAM n   Pruritis/Rash Nausea/vomit n   Tolerating PT/OT y    Diarrhea n   Tolerating Diet n Tube feeds   Constipation    Other       Could NOT obtain due to:      Objective:     VITALS:   Last 24hrs VS reviewed since prior progress note. Most recent are:  Patient Vitals for the past 24 hrs:   Temp Pulse Resp BP SpO2   06/13/17 1552 98.2 °F (36.8 °C) 91 16 116/75 98 %   06/13/17 1200 98.1 °F (36.7 °C) 96 - 103/74 99 %   06/13/17 0532 98.7 °F (37.1 °C) (!) 102 - 141/79 -   06/12/17 1952 99.4 °F (37.4 °C) (!) 101 18 118/68 99 %   06/12/17 1604 98.5 °F (36.9 °C) (!) 109 14 98/73 98 %       Intake/Output Summary (Last 24 hours) at 06/13/17 1559  Last data filed at 06/13/17 0017   Gross per 24 hour   Intake              380 ml   Output                0 ml   Net              380 ml        Wt Readings from Last 12 Encounters:   06/10/17 86.2 kg (190 lb)   06/03/17 83.9 kg (185 lb)   05/29/17 82.2 kg (181 lb 4.8 oz)   05/27/17 93.4 kg (206 lb)   01/04/17 94.3 kg (208 lb)   10/05/16 92.5 kg (204 lb)       PHYSICAL EXAM:  General: WD, WN. Alert, cooperative, no acute distress    EENT:  PERRL. Anicteric sclerae. MMM  Neck:  Trach in place  Resp:  Rhonchi bilaterally, no wheezing or rales. No accessory muscle use  CV:  Regular  rhythm,  No edema  GI:  Soft, Non distended, Non tender.  +Bowel sounds, no rebound, PEG tube in place  MS:  Left shoulder with full passive ROM, no clear pain, L rib tenderness noted +  Neurologic:  Alert, talking with family(not in english)  Psych:   Not anxious nor agitated  Skin:  No rashes.   No jaundice    Reviewed most current lab test results and cultures  YES  Reviewed most current radiology test results   YES  Review and summation of old records today    NO  Reviewed patient's current orders and MAR    YES  PMH/SH reviewed - no change compared to H&P  ________________________________________________________________________  Care Plan discussed with:    Comments   Patient x    Family  x Daughter at bedside   RN x Care Manager x [id number]   Consultant                        Multidiciplinary team rounds were held today with , nursing, pharmacist and clinical coordinator. Patient's plan of care was discussed; medications were reviewed and discharge planning was addressed. ________________________________________________________________________  Total NON critical care TIME:  26   Minutes    Total CRITICAL CARE TIME Spent:   Minutes non procedure based      Comments   >50% of visit spent in counseling and coordination of care x    ________________________________________________________________________  Angie Oneal MD     Procedures: see electronic medical records for all procedures/Xrays and details which were not copied into this note but were reviewed prior to creation of Plan. LABS:  I reviewed today's most current labs and imaging studies.   Pertinent labs include:  Recent Labs      06/13/17   0446  06/12/17   0559  06/11/17   0428   WBC  11.2*  9.5  13.6*   HGB  11.1*  11.4*  10.2*   HCT  34.6*  36.0*  32.3*   PLT  278  257  244     Recent Labs      06/13/17   0446  06/12/17   0559  06/11/17   0428   NA  135*  133*  137   K  3.1*  3.5  3.5   CL  96*  98  102   CO2  32  29  28   GLU  155*  168*  89   BUN  10  11  10   CREA  0.65*  0.63*  0.58*   CA  9.0  8.4*  8.9

## 2017-06-13 NOTE — PROGRESS NOTES
End of Shift Nursing Note    Bedside shift change report given to Savanna (oncoming nurse) by Lynn Felix (offgoing nurse). Report included the following information SBAR, Kardex, Intake/Output, MAR and Recent Results. Zone Phone:       Significant changes during shift:    Requesting frequent suctioning, not very much secretions but patient still insisting that he be suctioned. Non-emergent issues for physician to address:   None     Number times ambulated in hallway past shift: 0      Number of times OOB to chair past shift: 0    POD #:      Vital Signs:    Temp: 98.7 °F (37.1 °C)     Pulse (Heart Rate): (!) 102     BP: 141/79     Resp Rate: 18     O2 Sat (%): 99 %    Lines & Drains:     Urinary Catheter? No   Placement Date:    Medical Necessity:   Central Line? Placement Date:    Medical Necessity:   PICC Line? No   Placement Date:    Medical Necessity:     NG tube [] in [] removed [] not applicable   Drains [] in [] removed [] not applicable     Skin Integrity:      Wounds: no   Dressings Present: no    Wound Concerns: no      GI:    Current diet:  DIET ONE TIME MESSAGE  DIET TUBE FEEDING    Nausea: NO  Vomiting: NO  Bowel Sounds: NO  Flatus: YES  Last Bowel Movement: several days ago   Appearance:     Respiratory:  Supplemental O2: Yes      Device:    via  Liters/min     Incentive Spirometer: NO  Volume:   Coughing and Deep Breathing: NO  Oral Care: YES  Understanding (patient/family education): YES   Getting out of bed: NO  Head of bed elevation: YES    Patient Safety:    Falls Score: 0  Mobility Score: 1  Bed Alarm On? No  Sitter? No      Opportunity for questions and clarification was given to oncoming nurse. Patient bed is in low position, side rails are up x 2, door & observation blinds open as needed, call bell within reach and patient not in distress.     Tomas Tolbert

## 2017-06-14 ENCOUNTER — HOSPITAL ENCOUNTER (OUTPATIENT)
Age: 57
Discharge: NURSING, REHAB OR LONG-TERM CARE | End: 2017-07-06
Attending: PHYSICAL MEDICINE & REHABILITATION | Admitting: PHYSICAL MEDICINE & REHABILITATION

## 2017-06-14 VITALS
BODY MASS INDEX: 26.5 KG/M2 | SYSTOLIC BLOOD PRESSURE: 116 MMHG | WEIGHT: 190 LBS | HEART RATE: 82 BPM | OXYGEN SATURATION: 100 % | TEMPERATURE: 97.7 F | RESPIRATION RATE: 16 BRPM | DIASTOLIC BLOOD PRESSURE: 67 MMHG

## 2017-06-14 LAB
AMORPH CRY URNS QL MICRO: ABNORMAL
APPEARANCE UR: ABNORMAL
BACTERIA SPEC CULT: NORMAL
BACTERIA URNS QL MICRO: ABNORMAL /HPF
BILIRUB UR QL CFM: NEGATIVE
COLOR UR: ABNORMAL
EPITH CASTS URNS QL MICRO: ABNORMAL /LPF
GLUCOSE BLD STRIP.AUTO-MCNC: 166 MG/DL (ref 65–100)
GLUCOSE BLD STRIP.AUTO-MCNC: 179 MG/DL (ref 65–100)
GLUCOSE BLD STRIP.AUTO-MCNC: 213 MG/DL (ref 65–100)
GLUCOSE UR STRIP.AUTO-MCNC: NEGATIVE MG/DL
HGB UR QL STRIP: ABNORMAL
KETONES UR QL STRIP.AUTO: NEGATIVE MG/DL
LEUKOCYTE ESTERASE UR QL STRIP.AUTO: ABNORMAL
MUCOUS THREADS URNS QL MICRO: ABNORMAL /LPF
NITRITE UR QL STRIP.AUTO: NEGATIVE
PH UR STRIP: 6 [PH] (ref 5–8)
PROT UR STRIP-MCNC: 30 MG/DL
RBC #/AREA URNS HPF: ABNORMAL /HPF (ref 0–5)
SERVICE CMNT-IMP: ABNORMAL
SERVICE CMNT-IMP: NORMAL
SP GR UR REFRACTOMETRY: 1.03 (ref 1–1.03)
UROBILINOGEN UR QL STRIP.AUTO: 1 EU/DL (ref 0.2–1)
WBC URNS QL MICRO: ABNORMAL /HPF (ref 0–4)

## 2017-06-14 PROCEDURE — 74011250637 HC RX REV CODE- 250/637: Performed by: INTERNAL MEDICINE

## 2017-06-14 PROCEDURE — 74011250637 HC RX REV CODE- 250/637: Performed by: EMERGENCY MEDICINE

## 2017-06-14 PROCEDURE — 87086 URINE CULTURE/COLONY COUNT: CPT | Performed by: PHYSICAL MEDICINE & REHABILITATION

## 2017-06-14 PROCEDURE — 74011250636 HC RX REV CODE- 250/636: Performed by: EMERGENCY MEDICINE

## 2017-06-14 PROCEDURE — 82962 GLUCOSE BLOOD TEST: CPT

## 2017-06-14 PROCEDURE — 74011000250 HC RX REV CODE- 250: Performed by: PHYSICAL MEDICINE & REHABILITATION

## 2017-06-14 PROCEDURE — 74011250637 HC RX REV CODE- 250/637: Performed by: PHYSICAL MEDICINE & REHABILITATION

## 2017-06-14 PROCEDURE — 74011250636 HC RX REV CODE- 250/636: Performed by: PHYSICAL MEDICINE & REHABILITATION

## 2017-06-14 PROCEDURE — 74011636637 HC RX REV CODE- 636/637: Performed by: INTERNAL MEDICINE

## 2017-06-14 PROCEDURE — 74011250636 HC RX REV CODE- 250/636: Performed by: INTERNAL MEDICINE

## 2017-06-14 PROCEDURE — 74011000258 HC RX REV CODE- 258: Performed by: PHYSICAL MEDICINE & REHABILITATION

## 2017-06-14 PROCEDURE — 74011000258 HC RX REV CODE- 258: Performed by: INTERNAL MEDICINE

## 2017-06-14 PROCEDURE — 81001 URINALYSIS AUTO W/SCOPE: CPT | Performed by: PHYSICAL MEDICINE & REHABILITATION

## 2017-06-14 RX ORDER — IPRATROPIUM BROMIDE AND ALBUTEROL SULFATE 2.5; .5 MG/3ML; MG/3ML
3 SOLUTION RESPIRATORY (INHALATION)
Status: DISCONTINUED | OUTPATIENT
Start: 2017-06-14 | End: 2017-07-06 | Stop reason: HOSPADM

## 2017-06-14 RX ORDER — ADHESIVE BANDAGE
30 BANDAGE TOPICAL DAILY PRN
Status: DISCONTINUED | OUTPATIENT
Start: 2017-06-14 | End: 2017-07-06 | Stop reason: HOSPADM

## 2017-06-14 RX ORDER — FACIAL-BODY WIPES
10 EACH TOPICAL DAILY PRN
Status: DISCONTINUED | OUTPATIENT
Start: 2017-06-14 | End: 2017-07-06 | Stop reason: HOSPADM

## 2017-06-14 RX ORDER — PANTOPRAZOLE SODIUM 40 MG/1
40 GRANULE, DELAYED RELEASE ORAL
Status: DISCONTINUED | OUTPATIENT
Start: 2017-06-15 | End: 2017-07-06 | Stop reason: HOSPADM

## 2017-06-14 RX ORDER — OXYCODONE HYDROCHLORIDE 5 MG/1
5 TABLET ORAL
Status: DISCONTINUED | OUTPATIENT
Start: 2017-06-14 | End: 2017-06-17

## 2017-06-14 RX ORDER — LIDOCAINE 50 MG/G
1-2 PATCH TOPICAL EVERY 24 HOURS
Status: DISCONTINUED | OUTPATIENT
Start: 2017-06-15 | End: 2017-06-18

## 2017-06-14 RX ORDER — DEXTROSE MONOHYDRATE 100 MG/ML
125-250 INJECTION, SOLUTION INTRAVENOUS AS NEEDED
Status: DISCONTINUED | OUTPATIENT
Start: 2017-06-14 | End: 2017-07-06 | Stop reason: HOSPADM

## 2017-06-14 RX ORDER — ACETAMINOPHEN 325 MG/1
650 TABLET ORAL
Status: DISCONTINUED | OUTPATIENT
Start: 2017-06-14 | End: 2017-07-06 | Stop reason: HOSPADM

## 2017-06-14 RX ORDER — SODIUM CHLORIDE 0.9 % (FLUSH) 0.9 %
SYRINGE (ML) INJECTION
Status: COMPLETED
Start: 2017-06-14 | End: 2017-06-14

## 2017-06-14 RX ORDER — QUETIAPINE FUMARATE 25 MG/1
25 TABLET, FILM COATED ORAL
Status: DISCONTINUED | OUTPATIENT
Start: 2017-06-14 | End: 2017-07-06 | Stop reason: HOSPADM

## 2017-06-14 RX ORDER — POTASSIUM CHLORIDE 1.5 G/1.77G
20 POWDER, FOR SOLUTION ORAL DAILY
Status: DISCONTINUED | OUTPATIENT
Start: 2017-06-15 | End: 2017-07-06 | Stop reason: HOSPADM

## 2017-06-14 RX ORDER — NITROGLYCERIN 0.4 MG/1
0.4 TABLET SUBLINGUAL
Status: DISCONTINUED | OUTPATIENT
Start: 2017-06-14 | End: 2017-07-06 | Stop reason: HOSPADM

## 2017-06-14 RX ORDER — ALPRAZOLAM 0.25 MG/1
0.25 TABLET ORAL
Status: DISCONTINUED | OUTPATIENT
Start: 2017-06-14 | End: 2017-07-06 | Stop reason: HOSPADM

## 2017-06-14 RX ORDER — ATORVASTATIN CALCIUM 40 MG/1
40 TABLET, FILM COATED ORAL DAILY
Status: DISCONTINUED | OUTPATIENT
Start: 2017-06-15 | End: 2017-07-06 | Stop reason: HOSPADM

## 2017-06-14 RX ORDER — FUROSEMIDE 40 MG/1
40 TABLET ORAL DAILY
Status: DISCONTINUED | OUTPATIENT
Start: 2017-06-15 | End: 2017-07-06 | Stop reason: HOSPADM

## 2017-06-14 RX ORDER — SODIUM CHLORIDE 0.9 % (FLUSH) 0.9 %
10 SYRINGE (ML) INJECTION AS NEEDED
Status: DISCONTINUED | OUTPATIENT
Start: 2017-06-14 | End: 2017-06-15 | Stop reason: ALTCHOICE

## 2017-06-14 RX ORDER — DIPHENHYDRAMINE HCL 25 MG
25 CAPSULE ORAL
Status: DISCONTINUED | OUTPATIENT
Start: 2017-06-14 | End: 2017-06-21

## 2017-06-14 RX ORDER — ONDANSETRON 4 MG/1
4 TABLET, ORALLY DISINTEGRATING ORAL
Status: DISCONTINUED | OUTPATIENT
Start: 2017-06-14 | End: 2017-07-06 | Stop reason: HOSPADM

## 2017-06-14 RX ORDER — IPRATROPIUM BROMIDE AND ALBUTEROL SULFATE 2.5; .5 MG/3ML; MG/3ML
3 SOLUTION RESPIRATORY (INHALATION) EVERY 12 HOURS
Status: DISCONTINUED | OUTPATIENT
Start: 2017-06-14 | End: 2017-07-06 | Stop reason: HOSPADM

## 2017-06-14 RX ORDER — FLUCONAZOLE 100 MG/1
100 TABLET ORAL DAILY
Status: COMPLETED | OUTPATIENT
Start: 2017-06-15 | End: 2017-06-21

## 2017-06-14 RX ORDER — TRAMADOL HYDROCHLORIDE 50 MG/1
50 TABLET ORAL
Status: DISCONTINUED | OUTPATIENT
Start: 2017-06-14 | End: 2017-06-17

## 2017-06-14 RX ORDER — GUAIFENESIN 100 MG/5ML
200 SOLUTION ORAL 4 TIMES DAILY
Status: DISCONTINUED | OUTPATIENT
Start: 2017-06-14 | End: 2017-06-30

## 2017-06-14 RX ORDER — GUAIFENESIN 100 MG/5ML
81 LIQUID (ML) ORAL DAILY
Status: DISCONTINUED | OUTPATIENT
Start: 2017-06-15 | End: 2017-07-06 | Stop reason: HOSPADM

## 2017-06-14 RX ORDER — MAGNESIUM SULFATE 100 %
16 CRYSTALS MISCELLANEOUS AS NEEDED
Status: DISCONTINUED | OUTPATIENT
Start: 2017-06-14 | End: 2017-07-06 | Stop reason: HOSPADM

## 2017-06-14 RX ORDER — INSULIN LISPRO 100 [IU]/ML
INJECTION, SOLUTION INTRAVENOUS; SUBCUTANEOUS EVERY 6 HOURS
Status: DISCONTINUED | OUTPATIENT
Start: 2017-06-14 | End: 2017-07-06 | Stop reason: HOSPADM

## 2017-06-14 RX ORDER — SODIUM CHLORIDE 0.9 % (FLUSH) 0.9 %
5 SYRINGE (ML) INJECTION EVERY 8 HOURS
Status: DISCONTINUED | OUTPATIENT
Start: 2017-06-14 | End: 2017-06-15 | Stop reason: ALTCHOICE

## 2017-06-14 RX ORDER — ENOXAPARIN SODIUM 100 MG/ML
40 INJECTION SUBCUTANEOUS DAILY
Status: DISCONTINUED | OUTPATIENT
Start: 2017-06-15 | End: 2017-06-22 | Stop reason: ALTCHOICE

## 2017-06-14 RX ADMIN — Medication 10 ML: at 20:51

## 2017-06-14 RX ADMIN — FUROSEMIDE 40 MG: 40 TABLET ORAL at 09:08

## 2017-06-14 RX ADMIN — GUAIFENESIN 200 MG: 100 SOLUTION ORAL at 12:06

## 2017-06-14 RX ADMIN — DIPHENHYDRAMINE HYDROCHLORIDE 25 MG: 25 CAPSULE ORAL at 19:50

## 2017-06-14 RX ADMIN — CEFEPIME 1 G: 1 INJECTION, POWDER, FOR SOLUTION INTRAMUSCULAR; INTRAVENOUS at 13:15

## 2017-06-14 RX ADMIN — GUAIFENESIN 200 MG: 100 SOLUTION ORAL at 09:08

## 2017-06-14 RX ADMIN — QUETIAPINE FUMARATE 25 MG: 25 TABLET, FILM COATED ORAL at 22:41

## 2017-06-14 RX ADMIN — TRAMADOL HYDROCHLORIDE 50 MG: 50 TABLET, FILM COATED ORAL at 18:05

## 2017-06-14 RX ADMIN — CEFEPIME 1 G: 1 INJECTION, POWDER, FOR SOLUTION INTRAMUSCULAR; INTRAVENOUS at 01:51

## 2017-06-14 RX ADMIN — FLUCONAZOLE 100 MG: 200 TABLET ORAL at 09:00

## 2017-06-14 RX ADMIN — DIPHENHYDRAMINE HYDROCHLORIDE 25 MG: 25 CAPSULE ORAL at 14:06

## 2017-06-14 RX ADMIN — MORPHINE SULFATE 2 MG: 2 INJECTION, SOLUTION INTRAMUSCULAR; INTRAVENOUS at 03:40

## 2017-06-14 RX ADMIN — INSULIN LISPRO 2 UNITS: 100 INJECTION, SOLUTION INTRAVENOUS; SUBCUTANEOUS at 14:10

## 2017-06-14 RX ADMIN — OXYCODONE HYDROCHLORIDE 5 MG: 5 TABLET ORAL at 19:39

## 2017-06-14 RX ADMIN — ASPIRIN 81 MG 81 MG: 81 TABLET ORAL at 09:08

## 2017-06-14 RX ADMIN — Medication 5 ML: at 23:44

## 2017-06-14 RX ADMIN — INSULIN LISPRO 2 UNITS: 100 INJECTION, SOLUTION INTRAVENOUS; SUBCUTANEOUS at 00:00

## 2017-06-14 RX ADMIN — GUAIFENESIN 200 MG: 100 SOLUTION ORAL at 18:05

## 2017-06-14 RX ADMIN — CEFEPIME 1 G: 1 INJECTION, POWDER, FOR SOLUTION INTRAMUSCULAR; INTRAVENOUS at 22:40

## 2017-06-14 RX ADMIN — TICAGRELOR 90 MG: 90 TABLET ORAL at 22:40

## 2017-06-14 RX ADMIN — ATORVASTATIN CALCIUM 40 MG: 40 TABLET, FILM COATED ORAL at 09:08

## 2017-06-14 RX ADMIN — TICAGRELOR 90 MG: 90 TABLET ORAL at 12:06

## 2017-06-14 RX ADMIN — PANTOPRAZOLE SODIUM 40 MG: 40 GRANULE, DELAYED RELEASE ORAL at 12:06

## 2017-06-14 RX ADMIN — TRAMADOL HYDROCHLORIDE 50 MG: 50 TABLET, FILM COATED ORAL at 23:43

## 2017-06-14 RX ADMIN — GUAIFENESIN 200 MG: 100 SOLUTION ORAL at 22:40

## 2017-06-14 RX ADMIN — ENOXAPARIN SODIUM 40 MG: 40 INJECTION SUBCUTANEOUS at 09:09

## 2017-06-14 RX ADMIN — IPRATROPIUM BROMIDE AND ALBUTEROL SULFATE 3 ML: .5; 3 SOLUTION RESPIRATORY (INHALATION) at 22:41

## 2017-06-14 RX ADMIN — IBUPROFEN 200 MG: 100 SUSPENSION ORAL at 09:07

## 2017-06-14 RX ADMIN — TRAMADOL HYDROCHLORIDE 50 MG: 50 TABLET, FILM COATED ORAL at 09:08

## 2017-06-14 NOTE — PROGRESS NOTES
[facility] physician request.  Discharge instructions given to daughter. Respiratory at bedside to give extra trach and supplies to go with patient to [facility].

## 2017-06-14 NOTE — PROGRESS NOTES
Pharmacist Discharge Medication Reconciliation    Significant PMH:   Past Medical History:   Diagnosis Date    An[facility] [address] [facility] ([facility]     following cardiac arrest; MRI shows involvement BG and temporal lobes    Aspiration pneumonia ([address]     during cardiac arrest intubation    Bradycardia     CAD (coronary artery disease)     Cardiac arrest ([facility]     no intervention by [place] cardiology. [address] [facility]    Diabetes [facility])     Essential hypertension     Hyperlipidemia      Chief Complaint for this Admission:   Chief Complaint   Patient presents with    Altered mental status     x today. Allergies: Cymbalta [duloxetine]    Discharge Medications:   Current Discharge Medication List        START taking these medications    Details   ticagrelor (BRILINTA) 90 mg tablet Take 1 Tab by mouth every twelve (12) hours every twelve (12) hours. Qty: 60 Tab, Refills: 2      ibuprofen (ADVIL;MOTRIN) 100 mg/5 mL suspension 10 mL by Per G Tube route every six (6) hours as needed for Fever (if tylenol not helping). Qty: 1 Bottle, Refills: 0      fluconazole (DIFLUCAN) 100 mg tablet 1 Tab by Per G Tube route daily for 7 days. FDA advises cautious prescribing of oral fluconazole in pregnancy. Qty: 7 Tab, Refills: 0      cefdinir (OMNICEF) 125 mg/5 mL suspension Take 12 mL by mouth two (2) times a day for 7 days. Qty: 168 mL, Refills: 0           CONTINUE these medications which have NOT CHANGED    Details   atorvastatin (LIPITOR) 40 mg tablet 1 Tab by Per G Tube route daily. Qty: 30 Tab, Refills: 1      aspirin 81 mg chewable tablet 1 Tab by Per G Tube route daily. Qty: 30 Tab, Refills: 1      furosemide (LASIX) 40 mg tablet 1 Tab by Per G Tube route daily. Qty: 30 Tab, Refills: 1      traMADol (ULTRAM) 50 mg tablet 1 Tab by Per G Tube route every six (6) hours as needed. Max Daily Amount: 200 mg.   Qty: 25 Tab, Refills: 0      acetaminophen (TYLENOL) 325 mg tablet 2 Tabs by Per G Tube route every four (4) hours as needed for Pain. Qty: 30 Tab, Refills: 0      pantoprazole (PROTONIX) 40 mg granules for oral suspension 40 mg by Per G Tube route Daily (before breakfast). Qty: 30 Each, Refills: 1      lidocaine (LIDODERM) 5 % 1 Patch by TransDERmal route every twenty-four (24) hours. Apply patch to the affected area for 12 hours a day and remove for 12 hours a day. albuterol-ipratropium (DUONEB) 2.5 mg-0.5 mg/3 ml nebu 3 mL by Nebulization route every four (4) hours as needed. STOP taking these medications       levoFLOXacin (LEVAQUIN) 750 mg tablet Comments:   Reason for Stopping:         prasugrel (EFFIENT) 10 mg tablet Comments:   Reason for Stopping:         vancomycin 1,000 mg 1,000 mg IVPB Comments:   Reason for Stopping:               The patient's chart, MAR and AVS were reviewed by Jh Rizzo RPH.     Discharging Provider: Tiara Schwarz          Thank You,     Jh Rizzo, [address] [facility]

## 2017-06-14 NOTE — DISCHARGE SUMMARY
Hospitalist Discharge Summary     Patient ID:  Analilia Poon  [id number]  56 y.o.  1960    PCP on record: Rivka Castleman, MD    Admit date: 6/9/2017  Discharge date and time: 6/14/2017      DISCHARGE DIAGNOSIS:    Acute encephalopathy POA resolved- now back to baseline MS as per the pt's family/daughter  due to sepsis Severe POA- resolved  UTI due to indwelling pinto catheter POA urine culture with GNR and C albicans - complete Diflucan & Omnicef as ordered, Pinto out now, pt voiding appropriately,if retention recurs, plan is to start him on Intermittent Catheterization regimen Q 8 hrs as per the family wishes (avoid indwelling pinto)  Urine retention with chronic indwelling pinto catheter POA- now off pinto >48 hrs with pt voiding appropriately, Plan as above going forward  CAD s/p CABG 2014 x 3v at HD  Hx cardiac stents (twice before CABG and once after)  Ischemic cardiomyopathy  HTN  S/p out of hospital cardiac arrest 4/28/17 resulting in DORA  S/P PEG and trach 5/12/17 - cont tube feeding as before  DM type 2 POA      CONSULTATIONS:  None    Excerpted HPI from H&P of Hector Castaneda MD:  John.Juliet zaria.venu Garcia See ([facility]) male with a history of cardiac arrest, DORA, CAD, ischemic CM and DM who presents with acute onset of AMS. Patient was recently in this hospital from 5/19 to 6/03, initially accepted as a transfer from [facility] EM[facility]GENC[facility] due to insurance reasons. He had a complicated stay and ultimately was discharged to [facility] for rehab. He was doing well and engaged with PT. This morning he was found by staff to be altered and was transferred to this ER for evaluation. He has a pinto last changed 5/31. His WBC 20k, lactate 2.2, tachy on exam, UA with pyuria and funguria. Patient started on IV abx and we were asked to admit for work up and evaluation of the above problems.  \"    ______________________________________________________________________  DISCHARGE SUMMARY/HOSPITAL COURSE:  for full details see H&P, daily progress notes, labs, consult notes. Acute encephalopathy POA improved,   due to sepsis Severe POA (WBC, fever, Tachy, lactate 2.2)  UTI due to indwelling pinto catheter POA urine culture with GNR and C albicans  CT abdomen No free air. No evidence of fluid collection or acute intraperitoneal process  pCXR with no ASD     S/p 30cc/kg NS bolus in ER. S/p Cefepime and vanco, wean to cefepime and fluconazole  C albicans in urine, fluconazole x 1 week  BC remain negative  Fever and leukocytosis resolved  Po third generation cephalosporin and fluconazole x 1 week  D/C to [facility] when bed available     CAD s/p CABG 2014 x 3v at HD  Hx cardiac stents (twice before CABG and once after)  Ischemic cardiomyopathy  HTN  ASA, Brilinta, lasix and statin      S/p out of hospital cardiac arrest 4/28/17 resulting in DORA  S/P PEG and trach 5/12/17  Resumed TF     DM type 2 POA  , 155, 133, 160, 183  SSI PRN      Urine retention with chronic indwelling pinto catheter POA  Pinto inserted 5/31- taken out yesterday 6/12, pt is voiding on his own as per the RN report- will avoid placing Pinto back in as per the Family's request as long as no evidence of Urinary retention, may resort to Intermittent cath regimen if needed     SAY[facility] POA     Code Status: Full     Surrogate Decision Maker: Family        _______________________________________________________________________  Patient seen and examined by me on discharge day. Pertinent Findings:  Gen:    Not in distress  HEENT: Trach site appears normal, secretions under control  Chest: Clear lungs,   CVS:   Regular rhythm.   No edema  Abd:  Soft, not distended, not tender, PEG site appears normal, non tender  Neuro:  Alert, oriented x 3, speaking in native language to the family, answers appropriately now , back to baseline as per the daughter  _______________________________________________________________________  DISCHARGE MEDICATIONS:   Current Discharge Medication List      START taking these medications    Details   ticagrelor (BRILINTA) 90 mg tablet Take 1 Tab by mouth every twelve (12) hours every twelve (12) hours. Qty: 60 Tab, Refills: 2      ibuprofen (ADVIL;MOTRIN) 100 mg/5 mL suspension 10 mL by Per G Tube route every six (6) hours as needed for Fever (if tylenol not helping). Qty: 1 Bottle, Refills: 0      fluconazole (DIFLUCAN) 100 mg tablet 1 Tab by Per G Tube route daily for 7 days. FDA advises cautious prescribing of oral fluconazole in pregnancy. Qty: 7 Tab, Refills: 0      cefdinir (OMNICEF) 125 mg/5 mL suspension Take 12 mL by mouth two (2) times a day for 7 days. Qty: 168 mL, Refills: 0         CONTINUE these medications which have NOT CHANGED    Details   atorvastatin (LIPITOR) 40 mg tablet 1 Tab by Per G Tube route daily. Qty: 30 Tab, Refills: 1      aspirin 81 mg chewable tablet 1 Tab by Per G Tube route daily. Qty: 30 Tab, Refills: 1      furosemide (LASIX) 40 mg tablet 1 Tab by Per G Tube route daily. Qty: 30 Tab, Refills: 1      traMADol (ULTRAM) 50 mg tablet 1 Tab by Per G Tube route every six (6) hours as needed. Max Daily Amount: 200 mg. Qty: 25 Tab, Refills: 0      acetaminophen (TYLENOL) 325 mg tablet 2 Tabs by Per G Tube route every four (4) hours as needed for Pain. Qty: 30 Tab, Refills: 0      pantoprazole (PROTONIX) 40 mg granules for oral suspension 40 mg by Per G Tube route Daily (before breakfast). Qty: 30 Each, Refills: 1      lidocaine (LIDODERM) 5 % 1 Patch by TransDERmal route every twenty-four (24) hours. Apply patch to the affected area for 12 hours a day and remove for 12 hours a day. albuterol-ipratropium (DUONEB) 2.5 mg-0.5 mg/3 ml nebu 3 mL by Nebulization route every four (4) hours as needed.          STOP taking these medications       levoFLOXacin (LEVAQUIN) 750 mg tablet Comments:   Reason for Stopping:         prasugrel (EFFIENT) 10 mg tablet Comments:   Reason for Stopping:         vancomycin 1,000 mg 1,000 mg IVPB Comments:   Reason for Stopping:               My Recommended Diet, Activity, Wound Care, and follow-up labs are listed in the patient's Discharge Insturctions which I have personally completed and reviewed.     _______________________________________________________________________  DISPOSITION:    Home with Family:    Home with HH/PT/OT/RN:    SNF/LTC:    VITO: x   OTHER:        Condition at Discharge:  Stable  _______________________________________________________________________  Follow up with:   PCP : Og Sr MD  Follow-up Information     Follow up With Details Comments [address] MD   [address]  [phone]      [address]  This is your post acute care provider of choice  [address] [address] [phone]              Total time in minutes spent coordinating this discharge (includes going over instructions, follow-up, prescriptions, and preparing report for sign off to her PCP) :  25 minutes    Signed:  Shayna Ashley MD

## 2017-06-14 NOTE — PROGRESS NOTES
Discharge Note:     Pt has been accepted to [facility] into room #107. CM provided attending and family with disposition pre-auth notification. Pt will transfer around 2:00 pm. CM provided floor nurse with call report number. CM updated AVS to reflect disposition location. There were no additional discharge needs. Care Management Interventions  PCP Verified by CM: Yes (Dr. Shruthi Agustin )  Mode of Transport at Discharge: Other (see comment) ([facility] transport to [facility] )  Transition of Care Consult (CM Consult):  Other [facility] inpatient rehab)  Discharge Durable Medical Equipment: No  Health Maintenance Reviewed: Yes  Physical Therapy Consult: Yes  Occupational Therapy Consult: Yes  Speech Therapy Consult: Yes  Current Support Network: Lives with Spouse (pt lives with spouse, son, daughter and mother)  Confirm Follow Up Transport:  (at baseline pt drove and was independent with ADL's )  Discharge Location  Discharge Placement: Rehab hospital/unit acute [facility] )    ZURDO Cárdenas, [address] [place] [address]   [phone]

## 2017-06-14 NOTE — PROGRESS NOTES
End of Shift Nursing Note    Bedside shift change report given to Gerardo Barakat (oncoming nurse) by Ren Chaidez (offgoing nurse). Report included the following information SBAR, Kardex, Intake/Output, MAR, Accordion and Recent Results. Zone Phone:       Significant changes during shift:    Still requesting to be suctioning frequently. Daughter inquired if patient could have a sitter because they, the family cannot get any sleep, told her that patient is pending discharge but she insisted that she will ask the doctor because she thinks that he needs one. Non-emergent issues for physician to address:   None     Number times ambulated in hallway past shift: 0      Number of times OOB to chair past shift: 0    POD #:      Vital Signs:    Temp: 97.8 °F (36.6 °C)     Pulse (Heart Rate): 89     BP: 113/75     Resp Rate: 18     O2 Sat (%): 99 %    Lines & Drains:     Urinary Catheter? No   Placement Date:    Medical Necessity:   Central Line? No   Placement Date:    Medical Necessity:   PICC Line? No   Placement Date:   Medical Necessity:     NG tube [] in [] removed [] not applicable   Drains [] in [] removed [] not applicable     Skin Integrity:      Wounds: no   Dressings Present: no    Wound Concerns: no      GI:    Current diet:  DIET ONE TIME MESSAGE  DIET TUBE FEEDING    Nausea: NO  Vomiting: NO  Bowel Sounds:yes  Flatus: YES  Last Bowel Movement: several days ago   Appearance:     Respiratory:  Supplemental O2: Yes      Device:    via  Liters/min     Incentive Spirometer: NO  Volume:   Coughing and Deep Breathing: YES  Oral Care: YES  Understanding (patient/family education): YES   Getting out of bed: YES  Head of bed elevation: YES    Patient Safety:    Falls Score: 0  Mobility Score: 2  Bed Alarm On? No  Sitter? No      Opportunity for questions and clarification was given to oncoming nurse.  Patient bed is in low position, side rails are up x 2, door & observation blinds open as needed, call bell within reach and patient not in distress.     [facility]

## 2017-06-14 NOTE — DISCHARGE INSTRUCTIONS
HOSPITALIST DISCHARGE INSTRUCTIONS    NAME: Cameron Moran   :  1960   MRN:  [id number]     Date/Time:  2017 12:49 PM    ADMIT DATE: 2017     DISCHARGE DATE: 2017     DISCHARGE DIAGNOSIS:  Acute encephalopathy POA resolved- now back to baseline MS as per the pt's family/daughter  due to sepsis Severe POA- resolved  UTI due to indwelling pinto catheter POA urine culture with GNR and C albicans - complete Diflucan & Omnicef as ordered, Pinto out now, pt voiding appropriately,if retention recurs, plan is to start him on Intermittent Catheterization regimen Q 8 hrs as per the family wishes (avoid indwelling pinto)  Urine retention with chronic indwelling pinto catheter POA- now off pinto >48 hrs with pt voiding appropriately, Plan as above going forward  CAD s/p CABG 2014 x 3v at HD  Hx cardiac stents (twice before CABG and once after)  Ischemic cardiomyopathy  HTN  S/p out of hospital cardiac arrest 17 resulting in DORA  S/P PEG and trach 17 - cont tube feeding as before  DM type 2 POA    Active Problems:    Severe sepsis ([facility] [address] (2017)      UTI (urinary tract infection) (2017)         MEDICATIONS:  As per medication reconciliation  list  · It is important that you take the medication exactly as they are prescribed. · Keep your medication in the bottles provided by the pharmacist and keep a list of the medication names, dosages, and times to be taken in your wallet. · Do not take other medications without consulting your doctor. Pain Management: per above medications    What to do at Home    Recommended diet:  PEG feeding at 45 ml/hr (2Cal HN), Free water flushes 200ml Q 4 hrs    Recommended activity: Activity as tolerated    If you have questions regarding the hospital related prescriptions or hospital related issues please [facility] .     If you experience any of the following symptoms then please call your primary care physician or return to the emergency room if you cannot get hold of your doctor:  Fever, chills, nausea, vomiting, diarrhea, change in mentation, falling, bleeding, shortness of breath,     Follow Up:  Dr. Lindy Moncada MD  you are to call and set up an appointment to see them in 7-10 days. [facility] In 3-4 weeks as desired by family versus pt may follow primary cardiology at [facility] (Dr Mehran Jacobson) or  in [facility]    Information obtained by :  I understand that if any problems occur once I am at home I am to contact my physician. I understand and acknowledge receipt of the instructions indicated above.                                                                                                                                            Physician's or R.N.'s Signature                                                                  Date/Time                                                                                                                                              Patient or Representative Signature                                                          Date/Time

## 2017-06-15 LAB
ALBUMIN SERPL BCP-MCNC: 2.2 G/DL (ref 3.5–5)
ALBUMIN/GLOB SERPL: 0.4 {RATIO} (ref 1.1–2.2)
ALP SERPL-CCNC: 128 U/L (ref 45–117)
ALT SERPL-CCNC: 25 U/L (ref 12–78)
ANION GAP BLD CALC-SCNC: 4 MMOL/L (ref 5–15)
AST SERPL W P-5'-P-CCNC: 23 U/L (ref 15–37)
BILIRUB SERPL-MCNC: 0.3 MG/DL (ref 0.2–1)
BUN SERPL-MCNC: 12 MG/DL (ref 6–20)
BUN/CREAT SERPL: 18 (ref 12–20)
CALCIUM SERPL-MCNC: 8.9 MG/DL (ref 8.5–10.1)
CHLORIDE SERPL-SCNC: 97 MMOL/L (ref 97–108)
CO2 SERPL-SCNC: 31 MMOL/L (ref 21–32)
CREAT SERPL-MCNC: 0.66 MG/DL (ref 0.7–1.3)
ERYTHROCYTE [DISTWIDTH] IN BLOOD BY AUTOMATED COUNT: 16 % (ref 11.5–14.5)
GLOBULIN SER CALC-MCNC: 5.4 G/DL (ref 2–4)
GLUCOSE SERPL-MCNC: 196 MG/DL (ref 65–100)
HCT VFR BLD AUTO: 36.3 % (ref 36.6–50.3)
HGB BLD-MCNC: 11.8 G/DL (ref 12.1–17)
MCH RBC QN AUTO: 27.6 PG (ref 26–34)
MCHC RBC AUTO-ENTMCNC: 32.5 G/DL (ref 30–36.5)
MCV RBC AUTO: 84.8 FL (ref 80–99)
PLATELET # BLD AUTO: 267 K/UL (ref 150–400)
POTASSIUM SERPL-SCNC: 3.9 MMOL/L (ref 3.5–5.1)
PROT SERPL-MCNC: 7.6 G/DL (ref 6.4–8.2)
RBC # BLD AUTO: 4.28 M/UL (ref 4.1–5.7)
SODIUM SERPL-SCNC: 132 MMOL/L (ref 136–145)
WBC # BLD AUTO: 12 K/UL (ref 4.1–11.1)

## 2017-06-15 PROCEDURE — 74011250637 HC RX REV CODE- 250/637: Performed by: PHYSICAL MEDICINE & REHABILITATION

## 2017-06-15 PROCEDURE — 74011000258 HC RX REV CODE- 258: Performed by: PHYSICAL MEDICINE & REHABILITATION

## 2017-06-15 PROCEDURE — 74011000250 HC RX REV CODE- 250: Performed by: PHYSICAL MEDICINE & REHABILITATION

## 2017-06-15 PROCEDURE — 74011636637 HC RX REV CODE- 636/637: Performed by: PHYSICAL MEDICINE & REHABILITATION

## 2017-06-15 PROCEDURE — 85027 COMPLETE CBC AUTOMATED: CPT | Performed by: PHYSICAL MEDICINE & REHABILITATION

## 2017-06-15 PROCEDURE — 80053 COMPREHEN METABOLIC PANEL: CPT | Performed by: PHYSICAL MEDICINE & REHABILITATION

## 2017-06-15 PROCEDURE — 74011250636 HC RX REV CODE- 250/636: Performed by: PHYSICAL MEDICINE & REHABILITATION

## 2017-06-15 PROCEDURE — 36415 COLL VENOUS BLD VENIPUNCTURE: CPT | Performed by: PHYSICAL MEDICINE & REHABILITATION

## 2017-06-15 RX ORDER — CEFDINIR 300 MG/1
300 CAPSULE ORAL EVERY 12 HOURS
Status: DISCONTINUED | OUTPATIENT
Start: 2017-06-15 | End: 2017-06-15

## 2017-06-15 RX ORDER — IBUPROFEN 400 MG/1
400 TABLET ORAL
Status: DISCONTINUED | OUTPATIENT
Start: 2017-06-15 | End: 2017-06-15 | Stop reason: ALTCHOICE

## 2017-06-15 RX ORDER — CEFDINIR 300 MG/1
300 CAPSULE ORAL EVERY 12 HOURS
Status: COMPLETED | OUTPATIENT
Start: 2017-06-15 | End: 2017-06-20

## 2017-06-15 RX ADMIN — TRAMADOL HYDROCHLORIDE 50 MG: 50 TABLET, FILM COATED ORAL at 09:42

## 2017-06-15 RX ADMIN — GUAIFENESIN 200 MG: 100 SOLUTION ORAL at 14:15

## 2017-06-15 RX ADMIN — Medication 5 ML: at 06:05

## 2017-06-15 RX ADMIN — OXYCODONE HYDROCHLORIDE 5 MG: 5 TABLET ORAL at 18:05

## 2017-06-15 RX ADMIN — IPRATROPIUM BROMIDE AND ALBUTEROL SULFATE 3 ML: .5; 3 SOLUTION RESPIRATORY (INHALATION) at 22:06

## 2017-06-15 RX ADMIN — TICAGRELOR 90 MG: 90 TABLET ORAL at 22:07

## 2017-06-15 RX ADMIN — IPRATROPIUM BROMIDE AND ALBUTEROL SULFATE 3 ML: .5; 3 SOLUTION RESPIRATORY (INHALATION) at 09:38

## 2017-06-15 RX ADMIN — GUAIFENESIN 200 MG: 100 SOLUTION ORAL at 17:58

## 2017-06-15 RX ADMIN — ASPIRIN 81 MG 81 MG: 81 TABLET ORAL at 09:41

## 2017-06-15 RX ADMIN — OXYCODONE HYDROCHLORIDE 5 MG: 5 TABLET ORAL at 14:15

## 2017-06-15 RX ADMIN — POTASSIUM CHLORIDE 20 MEQ: 1.5 POWDER, FOR SOLUTION ORAL at 09:36

## 2017-06-15 RX ADMIN — GUAIFENESIN 200 MG: 100 SOLUTION ORAL at 09:41

## 2017-06-15 RX ADMIN — OXYCODONE HYDROCHLORIDE 5 MG: 5 TABLET ORAL at 22:07

## 2017-06-15 RX ADMIN — CEFEPIME 1 G: 1 INJECTION, POWDER, FOR SOLUTION INTRAMUSCULAR; INTRAVENOUS at 06:06

## 2017-06-15 RX ADMIN — TICAGRELOR 90 MG: 90 TABLET ORAL at 09:48

## 2017-06-15 RX ADMIN — CEFDINIR 300 MG: 300 CAPSULE ORAL at 22:06

## 2017-06-15 RX ADMIN — PANTOPRAZOLE SODIUM 40 MG: 40 GRANULE, DELAYED RELEASE ORAL at 09:41

## 2017-06-15 RX ADMIN — ATORVASTATIN CALCIUM 40 MG: 40 TABLET, FILM COATED ORAL at 09:40

## 2017-06-15 RX ADMIN — FLUCONAZOLE 100 MG: 100 TABLET ORAL at 09:41

## 2017-06-15 RX ADMIN — INSULIN LISPRO 4 UNITS: 100 INJECTION, SOLUTION INTRAVENOUS; SUBCUTANEOUS at 18:06

## 2017-06-15 RX ADMIN — FUROSEMIDE 40 MG: 40 TABLET ORAL at 09:41

## 2017-06-15 RX ADMIN — GUAIFENESIN 200 MG: 100 SOLUTION ORAL at 22:06

## 2017-06-15 RX ADMIN — CEFDINIR 300 MG: 300 CAPSULE ORAL at 14:17

## 2017-06-15 RX ADMIN — QUETIAPINE FUMARATE 25 MG: 25 TABLET, FILM COATED ORAL at 23:22

## 2017-06-15 RX ADMIN — ENOXAPARIN SODIUM 40 MG: 40 INJECTION SUBCUTANEOUS at 09:36

## 2017-06-15 NOTE — ADT AUTH CERT NOTES
Patient Demographics        Patient Name 74 Faith Lopez Sex  Address Phone       Tenzin Mehta [phone] Male 1960 [address] [phone] (Home)  [phone] (Mobile) *Preferred*           CSN:       [id number]           Admit Date: Admit Time Room Bed       2017 11:54 AM 2109 [49929] 01 [46036]           Attending Providers        Provider Pager From To       Anali Salazar DO  06/09/17 06/10/17       Lamont Charles MD  06/10/17 06/12/17       Angie Oneal MD  17           Emergency Contact(s)        Name Relation Home Work Mobile     [address] [phone]       ESE Cordero Child [phone]         Ya Min Child [phone]           Utilization Review           Sepsis and Other Febrile Illness, without Focal Infection - Care Day 5 (2017) by Charito Rey        Review Status Review Entered       Completed 2017       Details              Care Day: 5 Care Date: 2017 Level of Care: Telemetry       Guideline Day 4        Clinical Status       (X) * Fever absent or reduced       (X) * Hemodynamic stability       2017 1:39 PM EDT by Ocutec         98.1  °F (36.7  °C) 96 bp 103/74 -- At rest --sat  99 % trache collar RA.              (X) * Cultures negative or infection identified and under adequate treatment       (X) * Mental status normal or at baseline       ( ) * Discharge plans and education understood              Activity       (X) * Ambulatory              Routes       ( ) * Oral hydration, medications [K]       2017 1:39 PM EDT by Octavio Huge         meds; per IV  and G tube              (X) Usual diet              Interventions       (X) WBC       2017 1:39 PM EDT by Ocutec         WBC 11.2. , Na 135, K 3.1.                     Medications       (X) Antibiotics [E]       2017 1:39 PM EDT by Octavio Huge         meds; Maxipime iv q 8h, lovenox sq, diflucan per G tube. Insulin sq, Morphine iv x 2. Brilinta po , KCL per G tube.                                          * Milestone              Additional Notes       D/C plan ;  to [facility] when bed available  ( needs insur auth)              PER MEDICINE;        Acute encephalopathy POA improved,        due to sepsis Severe POA . UTI due to indwelling pinto catheter POA urine culture with GNR and C albicans.       .        S/p Cefepime and vanco, wean to cefepime and fluconazole.       C albicans in urine, fluconazole x 1 week.       BC remain negative.       Fever and leukocytosis resolved.       Po third generation cephalosporin and fluconazole x 1 week.               CAD s/p CABG 2014 x 3v at HD       Ischemic cardiomyopathy       HTN       ASA, Brilinta, lasix and statin                S/p out of hospital cardiac arrest 4/28/17 resulting in DORA.       S/P PEG and trach 5/12/17.       Resumed TF.               Urine retention with chronic indwelling pinto catheter POA       Pinto inserted 5/31- taken out yesterday 6/12, pt is voiding on his own as per the RN report- will avoid placing Pinto back in as per the Family's request as long as no evidence of Urinary retention, may resort to Intermittent cath regimen if needed           Sepsis and Other Febrile Illness, without Focal Infection - Care Day 4 (6/12/2017) by Obinna Locke        Review Status Review Entered       Completed 6/13/2017       Details              Care Day: 4 Care Date: 6/12/2017 Level of Care: Telemetry       Guideline Day 4        Clinical Status       (X) * Fever absent or reduced       6/13/2017 2:01 PM EDT by William To         99.1  °F (37.3  °C)  105 /68 -- At rest 14 sat 100 % -- RA              (X) * Hemodynamic stability       (X) * Cultures negative or infection identified and under adequate treatment       (X) * Mental status normal or at baseline       ( ) * Discharge plans and education understood       6/13/2017 2:01 PM EDT by Ethan Fiddler       Per PT; Recommend return to IP rehab at [facility].                     Activity       (X) * Ambulatory       6/13/2017 2:01 PM EDT by Antia Ramirez         Ambulation - Level of Assistance: Minimal assistance;Assist x2                     Routes       ( ) * Oral hydration, medications [K]       6/13/2017 2:01 PM EDT by Anita Ramirez         Morphine iv x 2. Brilinta  G tube.                     Interventions       (X) WBC       6/13/2017 2:01 PM EDT by Anita Ramirez         wbc 9.5, na 133.                     Medications       (X) Antibiotics [E]       6/13/2017 2:01 PM EDT by Anita Ramirez         MAXIPIME iv q 8h, lovenox sq, diflucan per G tube. Vanc iv                                          * Milestone              Additional Notes       [PLAN ; D/C to [facility] when bed available]              Per MEDICINE;   Acute encephalopathy POA improved, due to sepsis       Severe sepsis POA with WBC, fever, Tachy, lactate 2.2.       UTI due to indwelling pinto catheter POA urine culture with GNR and C albicans       CT abdomen No free air. No evidence of fluid collection or acute intraperitoneal process       pCXR with no ASD       Received a 30cc/kg NS bolus in ER.         Cefepime and vanco, wean to cefepime and fluconazole       C albicans in urine, fluconazole x 1 week       BC remain negative       Fever and leukocytosis resolved       Po third generation cephalosporin and fluconazole x 1 week       D/C to [facility] when bed available               CAD s/p CABG 2014 x 3v at HD       Hx cardiac stents (twice before CABG and once after)       Ischemic cardiomyopathy       HTN       ASA, Brilinta, lasix and statin                S/p out of hospital cardiac arrest 4/28/17 resulting in DORA       S/P PEG and trach 5/12/17       Resumed TF           Sepsis and Other Febrile Illness, without Focal Infection - Care Day 3 (6/11/2017) by Stella Albrecht        Review Status Review Entered       Completed 6/13/2017       Details              Care Day: 3 Care Date: 6/11/2017 Level of Care: Telemetry       Guideline Day 3        Level Of Care       (X) Floor              Clinical Status       (X) * Mental status normal or at baseline              Routes       (X) * Oral hydration       6/13/2017 1:52 PM EDT by Kelsey Simon         TUBE FEEDING IS BASELINE.              (X) Diet as tolerated       6/13/2017 1:52 PM EDT by Kelsey Simon         TUBE FEEDING              (X) Parenteral or oral medication       6/13/2017 1:52 PM EDT by Kelsey Simon         Morphine iv x 5. brilinta po .                     Medications       (X) Antibiotics       6/13/2017 1:52 PM EDT by Kelsey Simon         Maxipime iv q 8h. difluan per G tube. , VANC iv q 8h.              (X) Possible DVT prophylaxis       6/13/2017 1:52 PM EDT by Sonia Telles                     6/13/2017 1:52 PM EDT by Kelsey Simon       Subject: Additional Clinical Information       99.3  °F (37.4  °C) 92 bp 127/73 -- At rest 18 sat 98 % Tracheal collar ----                                   * Milestone              Additional Notes       Lovenox held today.               PER MEDICINE- Acute encephalopathy POA improved, due to sepsis. Severe sepsis POA with WBC, fever, Tachy, lactate 2.2.       UTI due to indwelling pinto catheter POA .       .       Transition to cefepime and vanco till cultures back.       Yeast in urine, asked lab to speciate, add fluconazole       Follow up urine and blood cultures.       Resume TF.       Fever and leukocytosis resolving, plan for retrun to [facility] in AM if stable.               CAD s/p CABG 2014 x 3v at HD. Hx cardiac stents (twice before CABG and once after). Ischemic cardiomyopathy. HTN.ASA, Brilinta, lasix and statin                S/p out of hospital cardiac arrest 4/28/17 resulting in DORA. S/P PEG and trach 5/12/17. Resume TF               DM type 2 POA. SSI PRN                Urine retention with chronic indwelling pinto catheter POA. Pinto inserted 5/31                [facility] POA.        Code Status: Full.        Surrogate Decision Maker: Family.          DVT Prophylaxis: lovenox.              (wbc 13. 6)           Sepsis and Other Febrile Illness, without Focal Infection - Care Day 2 (6/10/2017) by Grace Durant        Review Status Review Entered       Completed 6/12/2017       Details              Care Day: 2 Care Date: 6/10/2017 Level of Care: Telemetry       Guideline Day 2        Level Of Care       (X) ICU or floor              Clinical Status       (X) * Hemodynamic stability       6/12/2017 4:43 PM EDT by Kendrick Cantrell         98.6  °F (37  °C)  103 /76 -- -- 18 SAT 98 % trache collar.              (X) * Fever absent or reduced              Routes       (X) Parenteral or oral medications       6/12/2017 4:43 PM EDT by Kendrick Cantrell         Patient has  G Tube for po meds.              ( ) Diet as tolerated       6/12/2017 4:43 PM EDT by Kendrick Cantrell         diet ; tube feeding                     Interventions       (X) WBC       6/12/2017 4:43 PM EDT by Kendrick Cantrell         wbc 20.3, hh 9.8/ 30.8. Neut 84, ANC 16.9.                     Medications       (X) Antibiotics       6/12/2017 4:43 PM EDT by Kendrick Cantrell         Maxipime 1 g iv q 8h, VANC iv q 8h. Rocephin iv .              (X) Possible DVT prophylaxis       6/12/2017 4:43 PM EDT by Kendrick Cantrell         Lovenox sq, other meds; lasix per G tube. Protonix per G tube, Brilinta G tube, ultram po x 2                                          * Milestone              Additional Notes       PER MEDICINE;           Acute encephalopathy POA       Severe sepsis POA with WBC, fever, Tachy, lactate 2.2.       UTI due to indwelling pinto catheter POA        CT abdomen No free air. No evidence of fluid collection or acute intraperitoneal process       pCXR with no ASD.       Received a 30cc/kg NS bolus in ER.  .       Transition to cefepime and vanco till cultures back.       Follow up urine and blood cultures.       MS seems closer to baseline, hold on head CT       Resume TF.               CAD s/p CABG 2014 x 3v at HD       Hx cardiac stents (twice before CABG and once after)       Ischemic cardiomyopathy       HTN.  ASA, effient, lasix and statin                S/p out of hospital cardiac arrest 4/28/17 resulting in DORA.    S/P PEG and trach 5/12/17              DM type 2 POA. , 81, 94, 85. SSI PRN                Urine retention with chronic indwelling pinto catheter POA. Pinto inserted 5/31                [place] POA.        Code Status: Full.        Surrogate Decision Maker: Family.          DVT Prophylaxis: lovenox . ..                 Subjective:       PT not speaking for me, family communicating with him in his native language.       More awake per family, was unresponsive yesterday AM.       Febrile overnight, now afebrile.       Complaints of moderate right shoulder pain(old) and pain around the PEG tube(since it was put in).

## 2017-06-16 LAB
BACTERIA SPEC CULT: NORMAL
CC UR VC: NORMAL
SERVICE CMNT-IMP: NORMAL

## 2017-06-16 PROCEDURE — 74011250636 HC RX REV CODE- 250/636: Performed by: PHYSICAL MEDICINE & REHABILITATION

## 2017-06-16 PROCEDURE — 74011000250 HC RX REV CODE- 250: Performed by: PHYSICAL MEDICINE & REHABILITATION

## 2017-06-16 PROCEDURE — 74011636637 HC RX REV CODE- 636/637: Performed by: PHYSICAL MEDICINE & REHABILITATION

## 2017-06-16 PROCEDURE — 74011250637 HC RX REV CODE- 250/637: Performed by: PHYSICAL MEDICINE & REHABILITATION

## 2017-06-16 RX ORDER — TRIAMCINOLONE ACETONIDE 40 MG/ML
40 INJECTION, SUSPENSION INTRA-ARTICULAR; INTRAMUSCULAR ONCE
Status: COMPLETED | OUTPATIENT
Start: 2017-06-16 | End: 2017-06-16

## 2017-06-16 RX ORDER — LIDOCAINE HYDROCHLORIDE 20 MG/ML
5 INJECTION, SOLUTION EPIDURAL; INFILTRATION; INTRACAUDAL; PERINEURAL ONCE
Status: COMPLETED | OUTPATIENT
Start: 2017-06-16 | End: 2017-06-16

## 2017-06-16 RX ORDER — ADHESIVE BANDAGE
20 BANDAGE TOPICAL
Status: DISCONTINUED | OUTPATIENT
Start: 2017-06-16 | End: 2017-07-06 | Stop reason: HOSPADM

## 2017-06-16 RX ADMIN — TRAMADOL HYDROCHLORIDE 50 MG: 50 TABLET, FILM COATED ORAL at 04:52

## 2017-06-16 RX ADMIN — FLUCONAZOLE 100 MG: 100 TABLET ORAL at 08:25

## 2017-06-16 RX ADMIN — GUAIFENESIN 200 MG: 100 SOLUTION ORAL at 20:35

## 2017-06-16 RX ADMIN — OXYCODONE HYDROCHLORIDE 5 MG: 5 TABLET ORAL at 20:33

## 2017-06-16 RX ADMIN — TRAMADOL HYDROCHLORIDE 50 MG: 50 TABLET, FILM COATED ORAL at 11:19

## 2017-06-16 RX ADMIN — POTASSIUM CHLORIDE 20 MEQ: 1.5 POWDER, FOR SOLUTION ORAL at 11:24

## 2017-06-16 RX ADMIN — LIDOCAINE HYDROCHLORIDE 100 MG: 20 INJECTION, SOLUTION INTRAVENOUS at 17:04

## 2017-06-16 RX ADMIN — TRIAMCINOLONE ACETONIDE 40 MG: 40 INJECTION, SUSPENSION INTRA-ARTICULAR; INTRAMUSCULAR at 17:04

## 2017-06-16 RX ADMIN — TICAGRELOR 90 MG: 90 TABLET ORAL at 11:24

## 2017-06-16 RX ADMIN — IPRATROPIUM BROMIDE AND ALBUTEROL SULFATE 3 ML: .5; 3 SOLUTION RESPIRATORY (INHALATION) at 20:33

## 2017-06-16 RX ADMIN — TICAGRELOR 90 MG: 90 TABLET ORAL at 20:34

## 2017-06-16 RX ADMIN — INSULIN LISPRO 4 UNITS: 100 INJECTION, SOLUTION INTRAVENOUS; SUBCUTANEOUS at 06:53

## 2017-06-16 RX ADMIN — OXYCODONE HYDROCHLORIDE 5 MG: 5 TABLET ORAL at 15:21

## 2017-06-16 RX ADMIN — GUAIFENESIN 200 MG: 100 SOLUTION ORAL at 15:21

## 2017-06-16 RX ADMIN — ATORVASTATIN CALCIUM 40 MG: 40 TABLET, FILM COATED ORAL at 08:24

## 2017-06-16 RX ADMIN — ASPIRIN 81 MG 81 MG: 81 TABLET ORAL at 11:25

## 2017-06-16 RX ADMIN — IPRATROPIUM BROMIDE AND ALBUTEROL SULFATE 3 ML: .5; 3 SOLUTION RESPIRATORY (INHALATION) at 08:24

## 2017-06-16 RX ADMIN — PANTOPRAZOLE SODIUM 40 MG: 40 GRANULE, DELAYED RELEASE ORAL at 08:25

## 2017-06-16 RX ADMIN — GUAIFENESIN 200 MG: 100 SOLUTION ORAL at 18:27

## 2017-06-16 RX ADMIN — MAGNESIUM HYDROXIDE 20 ML: 400 SUSPENSION ORAL at 20:34

## 2017-06-16 RX ADMIN — CEFDINIR 300 MG: 300 CAPSULE ORAL at 08:25

## 2017-06-16 RX ADMIN — ENOXAPARIN SODIUM 40 MG: 40 INJECTION SUBCUTANEOUS at 11:24

## 2017-06-16 RX ADMIN — QUETIAPINE FUMARATE 25 MG: 25 TABLET, FILM COATED ORAL at 20:33

## 2017-06-16 RX ADMIN — CEFDINIR 300 MG: 300 CAPSULE ORAL at 20:33

## 2017-06-16 RX ADMIN — GUAIFENESIN 200 MG: 100 SOLUTION ORAL at 08:23

## 2017-06-17 PROCEDURE — 74011636637 HC RX REV CODE- 636/637: Performed by: PHYSICAL MEDICINE & REHABILITATION

## 2017-06-17 PROCEDURE — 74011000250 HC RX REV CODE- 250: Performed by: PHYSICAL MEDICINE & REHABILITATION

## 2017-06-17 PROCEDURE — 74011250637 HC RX REV CODE- 250/637: Performed by: PHYSICAL MEDICINE & REHABILITATION

## 2017-06-17 PROCEDURE — 74011250636 HC RX REV CODE- 250/636: Performed by: PHYSICAL MEDICINE & REHABILITATION

## 2017-06-17 RX ORDER — OXYCODONE HYDROCHLORIDE 5 MG/1
10 TABLET ORAL
Status: DISCONTINUED | OUTPATIENT
Start: 2017-06-17 | End: 2017-07-06 | Stop reason: HOSPADM

## 2017-06-17 RX ORDER — GABAPENTIN 250 MG/5ML
300 SOLUTION ORAL
Status: DISCONTINUED | OUTPATIENT
Start: 2017-06-17 | End: 2017-07-06 | Stop reason: HOSPADM

## 2017-06-17 RX ORDER — OXYCODONE HYDROCHLORIDE 5 MG/1
5 TABLET ORAL
Status: DISCONTINUED | OUTPATIENT
Start: 2017-06-17 | End: 2017-07-06 | Stop reason: HOSPADM

## 2017-06-17 RX ADMIN — OXYCODONE HYDROCHLORIDE 10 MG: 5 TABLET ORAL at 21:11

## 2017-06-17 RX ADMIN — CEFDINIR 300 MG: 300 CAPSULE ORAL at 09:12

## 2017-06-17 RX ADMIN — ATORVASTATIN CALCIUM 40 MG: 40 TABLET, FILM COATED ORAL at 09:12

## 2017-06-17 RX ADMIN — IPRATROPIUM BROMIDE AND ALBUTEROL SULFATE 3 ML: .5; 3 SOLUTION RESPIRATORY (INHALATION) at 09:06

## 2017-06-17 RX ADMIN — OXYCODONE HYDROCHLORIDE 5 MG: 5 TABLET ORAL at 00:10

## 2017-06-17 RX ADMIN — PANTOPRAZOLE SODIUM 40 MG: 40 GRANULE, DELAYED RELEASE ORAL at 09:05

## 2017-06-17 RX ADMIN — FUROSEMIDE 40 MG: 40 TABLET ORAL at 09:12

## 2017-06-17 RX ADMIN — POTASSIUM CHLORIDE 20 MEQ: 1.5 POWDER, FOR SOLUTION ORAL at 09:12

## 2017-06-17 RX ADMIN — INSULIN LISPRO 4 UNITS: 100 INJECTION, SOLUTION INTRAVENOUS; SUBCUTANEOUS at 06:32

## 2017-06-17 RX ADMIN — ALPRAZOLAM 0.25 MG: 0.25 TABLET ORAL at 00:10

## 2017-06-17 RX ADMIN — ALPRAZOLAM 0.25 MG: 0.25 TABLET ORAL at 19:12

## 2017-06-17 RX ADMIN — GABAPENTIN 300 MG: 250 SOLUTION ORAL at 21:11

## 2017-06-17 RX ADMIN — GUAIFENESIN 200 MG: 100 SOLUTION ORAL at 21:12

## 2017-06-17 RX ADMIN — ENOXAPARIN SODIUM 40 MG: 40 INJECTION SUBCUTANEOUS at 09:11

## 2017-06-17 RX ADMIN — OXYCODONE HYDROCHLORIDE 5 MG: 5 TABLET ORAL at 17:15

## 2017-06-17 RX ADMIN — OXYCODONE HYDROCHLORIDE 5 MG: 5 TABLET ORAL at 09:18

## 2017-06-17 RX ADMIN — FLUCONAZOLE 100 MG: 100 TABLET ORAL at 09:12

## 2017-06-17 RX ADMIN — DIPHENHYDRAMINE HYDROCHLORIDE 25 MG: 25 CAPSULE ORAL at 00:24

## 2017-06-17 RX ADMIN — TICAGRELOR 90 MG: 90 TABLET ORAL at 09:12

## 2017-06-17 RX ADMIN — TICAGRELOR 90 MG: 90 TABLET ORAL at 21:11

## 2017-06-17 RX ADMIN — GUAIFENESIN 200 MG: 100 SOLUTION ORAL at 09:11

## 2017-06-17 RX ADMIN — IPRATROPIUM BROMIDE AND ALBUTEROL SULFATE 3 ML: .5; 3 SOLUTION RESPIRATORY (INHALATION) at 21:11

## 2017-06-17 RX ADMIN — GUAIFENESIN 200 MG: 100 SOLUTION ORAL at 13:04

## 2017-06-17 RX ADMIN — GUAIFENESIN 200 MG: 100 SOLUTION ORAL at 16:15

## 2017-06-17 RX ADMIN — OXYCODONE HYDROCHLORIDE 5 MG: 5 TABLET ORAL at 04:46

## 2017-06-17 RX ADMIN — ASPIRIN 81 MG 81 MG: 81 TABLET ORAL at 09:12

## 2017-06-17 RX ADMIN — INSULIN LISPRO 4 UNITS: 100 INJECTION, SOLUTION INTRAVENOUS; SUBCUTANEOUS at 00:10

## 2017-06-17 RX ADMIN — INSULIN LISPRO 4 UNITS: 100 INJECTION, SOLUTION INTRAVENOUS; SUBCUTANEOUS at 13:04

## 2017-06-17 RX ADMIN — CEFDINIR 300 MG: 300 CAPSULE ORAL at 21:11

## 2017-06-17 RX ADMIN — TRAMADOL HYDROCHLORIDE 50 MG: 50 TABLET, FILM COATED ORAL at 13:18

## 2017-06-17 RX ADMIN — DIPHENHYDRAMINE HYDROCHLORIDE 25 MG: 25 CAPSULE ORAL at 20:15

## 2017-06-18 PROCEDURE — 74011636637 HC RX REV CODE- 636/637: Performed by: PHYSICAL MEDICINE & REHABILITATION

## 2017-06-18 PROCEDURE — 74011250636 HC RX REV CODE- 250/636: Performed by: PHYSICAL MEDICINE & REHABILITATION

## 2017-06-18 PROCEDURE — 74011000250 HC RX REV CODE- 250: Performed by: PHYSICAL MEDICINE & REHABILITATION

## 2017-06-18 PROCEDURE — 74011250637 HC RX REV CODE- 250/637: Performed by: PHYSICAL MEDICINE & REHABILITATION

## 2017-06-18 RX ORDER — LIDOCAINE 50 MG/G
1-2 PATCH TOPICAL
Status: DISCONTINUED | OUTPATIENT
Start: 2017-06-18 | End: 2017-07-06 | Stop reason: HOSPADM

## 2017-06-18 RX ORDER — GABAPENTIN 100 MG/1
100 CAPSULE ORAL 2 TIMES DAILY
Status: DISCONTINUED | OUTPATIENT
Start: 2017-06-18 | End: 2017-07-06 | Stop reason: HOSPADM

## 2017-06-18 RX ORDER — ACETAMINOPHEN 325 MG/1
650 TABLET ORAL 2 TIMES DAILY
Status: DISCONTINUED | OUTPATIENT
Start: 2017-06-18 | End: 2017-07-06 | Stop reason: HOSPADM

## 2017-06-18 RX ADMIN — ACETAMINOPHEN 650 MG: 325 TABLET, FILM COATED ORAL at 21:11

## 2017-06-18 RX ADMIN — GUAIFENESIN 200 MG: 100 SOLUTION ORAL at 16:01

## 2017-06-18 RX ADMIN — INSULIN LISPRO 4 UNITS: 100 INJECTION, SOLUTION INTRAVENOUS; SUBCUTANEOUS at 06:00

## 2017-06-18 RX ADMIN — GUAIFENESIN 200 MG: 100 SOLUTION ORAL at 08:14

## 2017-06-18 RX ADMIN — QUETIAPINE FUMARATE 25 MG: 25 TABLET, FILM COATED ORAL at 01:50

## 2017-06-18 RX ADMIN — ACETAMINOPHEN 650 MG: 325 TABLET, FILM COATED ORAL at 11:51

## 2017-06-18 RX ADMIN — IPRATROPIUM BROMIDE AND ALBUTEROL SULFATE 3 ML: .5; 3 SOLUTION RESPIRATORY (INHALATION) at 21:16

## 2017-06-18 RX ADMIN — TICAGRELOR 90 MG: 90 TABLET ORAL at 21:14

## 2017-06-18 RX ADMIN — GUAIFENESIN 200 MG: 100 SOLUTION ORAL at 21:16

## 2017-06-18 RX ADMIN — DIPHENHYDRAMINE HYDROCHLORIDE 25 MG: 25 CAPSULE ORAL at 13:19

## 2017-06-18 RX ADMIN — GUAIFENESIN 200 MG: 100 SOLUTION ORAL at 12:00

## 2017-06-18 RX ADMIN — GABAPENTIN 100 MG: 100 CAPSULE ORAL at 16:01

## 2017-06-18 RX ADMIN — CEFDINIR 300 MG: 300 CAPSULE ORAL at 21:13

## 2017-06-18 RX ADMIN — DIPHENHYDRAMINE HYDROCHLORIDE 25 MG: 25 CAPSULE ORAL at 21:15

## 2017-06-18 RX ADMIN — FLUCONAZOLE 100 MG: 100 TABLET ORAL at 08:15

## 2017-06-18 RX ADMIN — OXYCODONE HYDROCHLORIDE 10 MG: 5 TABLET ORAL at 21:14

## 2017-06-18 RX ADMIN — ATORVASTATIN CALCIUM 40 MG: 40 TABLET, FILM COATED ORAL at 08:14

## 2017-06-18 RX ADMIN — QUETIAPINE FUMARATE 25 MG: 25 TABLET, FILM COATED ORAL at 21:15

## 2017-06-18 RX ADMIN — GABAPENTIN 100 MG: 100 CAPSULE ORAL at 10:43

## 2017-06-18 RX ADMIN — PANTOPRAZOLE SODIUM 40 MG: 40 GRANULE, DELAYED RELEASE ORAL at 08:13

## 2017-06-18 RX ADMIN — GABAPENTIN 300 MG: 250 SOLUTION ORAL at 21:09

## 2017-06-18 RX ADMIN — OXYCODONE HYDROCHLORIDE 10 MG: 5 TABLET ORAL at 01:49

## 2017-06-18 RX ADMIN — MAGNESIUM HYDROXIDE 20 ML: 400 SUSPENSION ORAL at 21:16

## 2017-06-18 RX ADMIN — TICAGRELOR 90 MG: 90 TABLET ORAL at 08:15

## 2017-06-18 RX ADMIN — CEFDINIR 300 MG: 300 CAPSULE ORAL at 08:15

## 2017-06-18 RX ADMIN — ENOXAPARIN SODIUM 40 MG: 40 INJECTION SUBCUTANEOUS at 08:14

## 2017-06-18 RX ADMIN — IPRATROPIUM BROMIDE AND ALBUTEROL SULFATE 3 ML: .5; 3 SOLUTION RESPIRATORY (INHALATION) at 08:14

## 2017-06-18 RX ADMIN — ALPRAZOLAM 0.25 MG: 0.25 TABLET ORAL at 21:13

## 2017-06-18 RX ADMIN — ASPIRIN 81 MG 81 MG: 81 TABLET ORAL at 08:15

## 2017-06-18 RX ADMIN — OXYCODONE HYDROCHLORIDE 5 MG: 5 TABLET ORAL at 10:44

## 2017-06-19 LAB
ALBUMIN SERPL BCP-MCNC: 2.7 G/DL (ref 3.5–5)
ALBUMIN/GLOB SERPL: 0.6 {RATIO} (ref 1.1–2.2)
ALP SERPL-CCNC: 129 U/L (ref 45–117)
ALT SERPL-CCNC: 25 U/L (ref 12–78)
ANION GAP BLD CALC-SCNC: 10 MMOL/L (ref 5–15)
AST SERPL W P-5'-P-CCNC: 19 U/L (ref 15–37)
BILIRUB SERPL-MCNC: 0.4 MG/DL (ref 0.2–1)
BUN SERPL-MCNC: 14 MG/DL (ref 6–20)
BUN/CREAT SERPL: 21 (ref 12–20)
CALCIUM SERPL-MCNC: 8.8 MG/DL (ref 8.5–10.1)
CHLORIDE SERPL-SCNC: 99 MMOL/L (ref 97–108)
CO2 SERPL-SCNC: 29 MMOL/L (ref 21–32)
CREAT SERPL-MCNC: 0.67 MG/DL (ref 0.7–1.3)
ERYTHROCYTE [DISTWIDTH] IN BLOOD BY AUTOMATED COUNT: 16.1 % (ref 11.5–14.5)
GLOBULIN SER CALC-MCNC: 4.6 G/DL (ref 2–4)
GLUCOSE SERPL-MCNC: 200 MG/DL (ref 65–100)
HCT VFR BLD AUTO: 33.6 % (ref 36.6–50.3)
HGB BLD-MCNC: 10.5 G/DL (ref 12.1–17)
MCH RBC QN AUTO: 26.6 PG (ref 26–34)
MCHC RBC AUTO-ENTMCNC: 31.3 G/DL (ref 30–36.5)
MCV RBC AUTO: 85.3 FL (ref 80–99)
PLATELET # BLD AUTO: 283 K/UL (ref 150–400)
POTASSIUM SERPL-SCNC: 4.1 MMOL/L (ref 3.5–5.1)
PROT SERPL-MCNC: 7.3 G/DL (ref 6.4–8.2)
RBC # BLD AUTO: 3.94 M/UL (ref 4.1–5.7)
SODIUM SERPL-SCNC: 138 MMOL/L (ref 136–145)
WBC # BLD AUTO: 11.3 K/UL (ref 4.1–11.1)

## 2017-06-19 PROCEDURE — 74011000250 HC RX REV CODE- 250: Performed by: PHYSICAL MEDICINE & REHABILITATION

## 2017-06-19 PROCEDURE — 74011250637 HC RX REV CODE- 250/637: Performed by: PHYSICAL MEDICINE & REHABILITATION

## 2017-06-19 PROCEDURE — 80053 COMPREHEN METABOLIC PANEL: CPT | Performed by: PHYSICAL MEDICINE & REHABILITATION

## 2017-06-19 PROCEDURE — 36415 COLL VENOUS BLD VENIPUNCTURE: CPT | Performed by: PHYSICAL MEDICINE & REHABILITATION

## 2017-06-19 PROCEDURE — 85027 COMPLETE CBC AUTOMATED: CPT | Performed by: PHYSICAL MEDICINE & REHABILITATION

## 2017-06-19 PROCEDURE — 74011250636 HC RX REV CODE- 250/636: Performed by: PHYSICAL MEDICINE & REHABILITATION

## 2017-06-19 RX ORDER — FLUOCINONIDE 0.5 MG/G
CREAM TOPICAL
Status: DISCONTINUED | OUTPATIENT
Start: 2017-06-19 | End: 2017-07-06 | Stop reason: HOSPADM

## 2017-06-19 RX ADMIN — OXYCODONE HYDROCHLORIDE 10 MG: 5 TABLET ORAL at 02:46

## 2017-06-19 RX ADMIN — GABAPENTIN 100 MG: 100 CAPSULE ORAL at 16:21

## 2017-06-19 RX ADMIN — CEFDINIR 300 MG: 300 CAPSULE ORAL at 09:47

## 2017-06-19 RX ADMIN — ASPIRIN 81 MG 81 MG: 81 TABLET ORAL at 09:48

## 2017-06-19 RX ADMIN — OXYCODONE HYDROCHLORIDE 5 MG: 5 TABLET ORAL at 16:22

## 2017-06-19 RX ADMIN — CEFDINIR 300 MG: 300 CAPSULE ORAL at 20:50

## 2017-06-19 RX ADMIN — TICAGRELOR 90 MG: 90 TABLET ORAL at 20:49

## 2017-06-19 RX ADMIN — OXYCODONE HYDROCHLORIDE 10 MG: 5 TABLET ORAL at 09:48

## 2017-06-19 RX ADMIN — GABAPENTIN 100 MG: 100 CAPSULE ORAL at 09:49

## 2017-06-19 RX ADMIN — GUAIFENESIN 200 MG: 100 SOLUTION ORAL at 16:21

## 2017-06-19 RX ADMIN — ENOXAPARIN SODIUM 40 MG: 40 INJECTION SUBCUTANEOUS at 09:48

## 2017-06-19 RX ADMIN — ACETAMINOPHEN 650 MG: 325 TABLET, FILM COATED ORAL at 20:48

## 2017-06-19 RX ADMIN — ONDANSETRON 4 MG: 4 TABLET, ORALLY DISINTEGRATING ORAL at 17:00

## 2017-06-19 RX ADMIN — DIPHENHYDRAMINE HYDROCHLORIDE 25 MG: 25 CAPSULE ORAL at 02:47

## 2017-06-19 RX ADMIN — MAGNESIUM HYDROXIDE 20 ML: 400 SUSPENSION ORAL at 20:48

## 2017-06-19 RX ADMIN — PANTOPRAZOLE SODIUM 40 MG: 40 GRANULE, DELAYED RELEASE ORAL at 09:50

## 2017-06-19 RX ADMIN — TICAGRELOR 90 MG: 90 TABLET ORAL at 09:47

## 2017-06-19 RX ADMIN — FUROSEMIDE 40 MG: 40 TABLET ORAL at 09:47

## 2017-06-19 RX ADMIN — IPRATROPIUM BROMIDE AND ALBUTEROL SULFATE 3 ML: .5; 3 SOLUTION RESPIRATORY (INHALATION) at 09:48

## 2017-06-19 RX ADMIN — OXYCODONE HYDROCHLORIDE 10 MG: 5 TABLET ORAL at 20:48

## 2017-06-19 RX ADMIN — ATORVASTATIN CALCIUM 40 MG: 40 TABLET, FILM COATED ORAL at 09:49

## 2017-06-19 RX ADMIN — FLUCONAZOLE 100 MG: 100 TABLET ORAL at 09:48

## 2017-06-19 RX ADMIN — POTASSIUM CHLORIDE 20 MEQ: 1.5 POWDER, FOR SOLUTION ORAL at 09:47

## 2017-06-19 RX ADMIN — GUAIFENESIN 200 MG: 100 SOLUTION ORAL at 20:48

## 2017-06-19 RX ADMIN — GUAIFENESIN 200 MG: 100 SOLUTION ORAL at 09:49

## 2017-06-19 RX ADMIN — IPRATROPIUM BROMIDE AND ALBUTEROL SULFATE 3 ML: .5; 3 SOLUTION RESPIRATORY (INHALATION) at 20:51

## 2017-06-19 RX ADMIN — GABAPENTIN 300 MG: 250 SOLUTION ORAL at 20:50

## 2017-06-19 RX ADMIN — GUAIFENESIN 200 MG: 100 SOLUTION ORAL at 13:05

## 2017-06-20 PROCEDURE — 74011636637 HC RX REV CODE- 636/637: Performed by: PHYSICAL MEDICINE & REHABILITATION

## 2017-06-20 PROCEDURE — 74011000250 HC RX REV CODE- 250: Performed by: PHYSICAL MEDICINE & REHABILITATION

## 2017-06-20 PROCEDURE — 74011250637 HC RX REV CODE- 250/637: Performed by: PHYSICAL MEDICINE & REHABILITATION

## 2017-06-20 PROCEDURE — 74011250636 HC RX REV CODE- 250/636: Performed by: PHYSICAL MEDICINE & REHABILITATION

## 2017-06-20 RX ADMIN — GABAPENTIN 300 MG: 250 SOLUTION ORAL at 22:24

## 2017-06-20 RX ADMIN — FUROSEMIDE 40 MG: 40 TABLET ORAL at 09:42

## 2017-06-20 RX ADMIN — GABAPENTIN 100 MG: 100 CAPSULE ORAL at 19:11

## 2017-06-20 RX ADMIN — INSULIN LISPRO 4 UNITS: 100 INJECTION, SOLUTION INTRAVENOUS; SUBCUTANEOUS at 12:46

## 2017-06-20 RX ADMIN — TICAGRELOR 90 MG: 90 TABLET ORAL at 09:40

## 2017-06-20 RX ADMIN — QUETIAPINE FUMARATE 25 MG: 25 TABLET, FILM COATED ORAL at 00:04

## 2017-06-20 RX ADMIN — GUAIFENESIN 200 MG: 100 SOLUTION ORAL at 09:41

## 2017-06-20 RX ADMIN — IPRATROPIUM BROMIDE AND ALBUTEROL SULFATE 3 ML: .5; 3 SOLUTION RESPIRATORY (INHALATION) at 09:43

## 2017-06-20 RX ADMIN — ENOXAPARIN SODIUM 40 MG: 40 INJECTION SUBCUTANEOUS at 09:41

## 2017-06-20 RX ADMIN — FLUCONAZOLE 100 MG: 100 TABLET ORAL at 09:42

## 2017-06-20 RX ADMIN — ASPIRIN 81 MG 81 MG: 81 TABLET ORAL at 09:43

## 2017-06-20 RX ADMIN — ATORVASTATIN CALCIUM 40 MG: 40 TABLET, FILM COATED ORAL at 09:41

## 2017-06-20 RX ADMIN — CEFDINIR 300 MG: 300 CAPSULE ORAL at 09:40

## 2017-06-20 RX ADMIN — GUAIFENESIN 200 MG: 100 SOLUTION ORAL at 22:26

## 2017-06-20 RX ADMIN — ACETAMINOPHEN 650 MG: 325 TABLET, FILM COATED ORAL at 09:42

## 2017-06-20 RX ADMIN — PANTOPRAZOLE SODIUM 40 MG: 40 GRANULE, DELAYED RELEASE ORAL at 06:26

## 2017-06-20 RX ADMIN — IPRATROPIUM BROMIDE AND ALBUTEROL SULFATE 3 ML: .5; 3 SOLUTION RESPIRATORY (INHALATION) at 20:31

## 2017-06-20 RX ADMIN — ACETAMINOPHEN 650 MG: 325 TABLET, FILM COATED ORAL at 22:29

## 2017-06-20 RX ADMIN — MAGNESIUM HYDROXIDE 20 ML: 400 SUSPENSION ORAL at 22:28

## 2017-06-20 RX ADMIN — GUAIFENESIN 200 MG: 100 SOLUTION ORAL at 19:11

## 2017-06-20 RX ADMIN — POTASSIUM CHLORIDE 20 MEQ: 1.5 POWDER, FOR SOLUTION ORAL at 09:42

## 2017-06-20 RX ADMIN — OXYCODONE HYDROCHLORIDE 10 MG: 5 TABLET ORAL at 22:49

## 2017-06-20 RX ADMIN — QUETIAPINE FUMARATE 25 MG: 25 TABLET, FILM COATED ORAL at 22:34

## 2017-06-20 RX ADMIN — TICAGRELOR 90 MG: 90 TABLET ORAL at 22:27

## 2017-06-20 RX ADMIN — OXYCODONE HYDROCHLORIDE 10 MG: 5 TABLET ORAL at 09:42

## 2017-06-20 RX ADMIN — OXYCODONE HYDROCHLORIDE 5 MG: 5 TABLET ORAL at 19:11

## 2017-06-20 RX ADMIN — GUAIFENESIN 200 MG: 100 SOLUTION ORAL at 12:20

## 2017-06-20 RX ADMIN — ONDANSETRON 4 MG: 4 TABLET, ORALLY DISINTEGRATING ORAL at 12:20

## 2017-06-20 RX ADMIN — GABAPENTIN 100 MG: 100 CAPSULE ORAL at 09:41

## 2017-06-20 RX ADMIN — CEFDINIR 300 MG: 300 CAPSULE ORAL at 22:26

## 2017-06-21 PROCEDURE — 74011000250 HC RX REV CODE- 250: Performed by: PHYSICAL MEDICINE & REHABILITATION

## 2017-06-21 PROCEDURE — 74011250637 HC RX REV CODE- 250/637

## 2017-06-21 PROCEDURE — 74011250637 HC RX REV CODE- 250/637: Performed by: PHYSICAL MEDICINE & REHABILITATION

## 2017-06-21 PROCEDURE — 74011250636 HC RX REV CODE- 250/636: Performed by: PHYSICAL MEDICINE & REHABILITATION

## 2017-06-21 RX ORDER — DIPHENHYDRAMINE HCL 12.5MG/5ML
ELIXIR ORAL
Status: COMPLETED
Start: 2017-06-21 | End: 2017-06-21

## 2017-06-21 RX ORDER — DIPHENHYDRAMINE HCL 12.5MG/5ML
25 ELIXIR ORAL
Status: DISCONTINUED | OUTPATIENT
Start: 2017-06-21 | End: 2017-07-06 | Stop reason: HOSPADM

## 2017-06-21 RX ADMIN — TICAGRELOR 90 MG: 90 TABLET ORAL at 10:28

## 2017-06-21 RX ADMIN — MAGNESIUM HYDROXIDE 20 ML: 400 SUSPENSION ORAL at 20:33

## 2017-06-21 RX ADMIN — POTASSIUM CHLORIDE 20 MEQ: 1.5 POWDER, FOR SOLUTION ORAL at 08:25

## 2017-06-21 RX ADMIN — ATORVASTATIN CALCIUM 40 MG: 40 TABLET, FILM COATED ORAL at 08:26

## 2017-06-21 RX ADMIN — TICAGRELOR 90 MG: 90 TABLET ORAL at 20:33

## 2017-06-21 RX ADMIN — GUAIFENESIN 200 MG: 100 SOLUTION ORAL at 12:28

## 2017-06-21 RX ADMIN — ACETAMINOPHEN 650 MG: 325 TABLET, FILM COATED ORAL at 17:51

## 2017-06-21 RX ADMIN — DIPHENHYDRAMINE HYDROCHLORIDE 25 MG: 12.5 SOLUTION ORAL at 06:00

## 2017-06-21 RX ADMIN — ACETAMINOPHEN 650 MG: 325 TABLET, FILM COATED ORAL at 20:31

## 2017-06-21 RX ADMIN — PANTOPRAZOLE SODIUM 40 MG: 40 GRANULE, DELAYED RELEASE ORAL at 06:14

## 2017-06-21 RX ADMIN — IPRATROPIUM BROMIDE AND ALBUTEROL SULFATE 3 ML: .5; 3 SOLUTION RESPIRATORY (INHALATION) at 08:26

## 2017-06-21 RX ADMIN — ACETAMINOPHEN 650 MG: 325 TABLET, FILM COATED ORAL at 08:26

## 2017-06-21 RX ADMIN — FUROSEMIDE 40 MG: 40 TABLET ORAL at 08:30

## 2017-06-21 RX ADMIN — OXYCODONE HYDROCHLORIDE 10 MG: 5 TABLET ORAL at 20:31

## 2017-06-21 RX ADMIN — ENOXAPARIN SODIUM 40 MG: 40 INJECTION SUBCUTANEOUS at 08:25

## 2017-06-21 RX ADMIN — GUAIFENESIN 200 MG: 100 SOLUTION ORAL at 15:42

## 2017-06-21 RX ADMIN — GABAPENTIN 100 MG: 100 CAPSULE ORAL at 08:26

## 2017-06-21 RX ADMIN — QUETIAPINE FUMARATE 25 MG: 25 TABLET, FILM COATED ORAL at 22:53

## 2017-06-21 RX ADMIN — OXYCODONE HYDROCHLORIDE 10 MG: 5 TABLET ORAL at 15:41

## 2017-06-21 RX ADMIN — GABAPENTIN 100 MG: 100 CAPSULE ORAL at 15:42

## 2017-06-21 RX ADMIN — GUAIFENESIN 200 MG: 100 SOLUTION ORAL at 22:53

## 2017-06-21 RX ADMIN — IPRATROPIUM BROMIDE AND ALBUTEROL SULFATE 3 ML: .5; 3 SOLUTION RESPIRATORY (INHALATION) at 20:33

## 2017-06-21 RX ADMIN — FLUCONAZOLE 100 MG: 100 TABLET ORAL at 08:26

## 2017-06-21 RX ADMIN — GUAIFENESIN 200 MG: 100 SOLUTION ORAL at 08:25

## 2017-06-21 RX ADMIN — ASPIRIN 81 MG 81 MG: 81 TABLET ORAL at 08:26

## 2017-06-21 RX ADMIN — GABAPENTIN 300 MG: 250 SOLUTION ORAL at 22:53

## 2017-06-22 PROCEDURE — 74011000250 HC RX REV CODE- 250: Performed by: PHYSICAL MEDICINE & REHABILITATION

## 2017-06-22 PROCEDURE — 74011636637 HC RX REV CODE- 636/637: Performed by: PHYSICAL MEDICINE & REHABILITATION

## 2017-06-22 PROCEDURE — 74011250637 HC RX REV CODE- 250/637: Performed by: PHYSICAL MEDICINE & REHABILITATION

## 2017-06-22 PROCEDURE — 74011250636 HC RX REV CODE- 250/636: Performed by: PHYSICAL MEDICINE & REHABILITATION

## 2017-06-22 RX ORDER — BACLOFEN 10 MG/1
5 TABLET ORAL 2 TIMES DAILY
Status: DISCONTINUED | OUTPATIENT
Start: 2017-06-22 | End: 2017-07-04

## 2017-06-22 RX ADMIN — ONDANSETRON 4 MG: 4 TABLET, ORALLY DISINTEGRATING ORAL at 15:01

## 2017-06-22 RX ADMIN — MAGNESIUM HYDROXIDE 20 ML: 400 SUSPENSION ORAL at 22:29

## 2017-06-22 RX ADMIN — GABAPENTIN 100 MG: 100 CAPSULE ORAL at 17:06

## 2017-06-22 RX ADMIN — TICAGRELOR 90 MG: 90 TABLET ORAL at 09:01

## 2017-06-22 RX ADMIN — GUAIFENESIN 200 MG: 100 SOLUTION ORAL at 12:11

## 2017-06-22 RX ADMIN — INSULIN LISPRO 4 UNITS: 100 INJECTION, SOLUTION INTRAVENOUS; SUBCUTANEOUS at 00:12

## 2017-06-22 RX ADMIN — ATORVASTATIN CALCIUM 40 MG: 40 TABLET, FILM COATED ORAL at 08:54

## 2017-06-22 RX ADMIN — GUAIFENESIN 200 MG: 100 SOLUTION ORAL at 17:06

## 2017-06-22 RX ADMIN — BACLOFEN 5 MG: 10 TABLET ORAL at 22:29

## 2017-06-22 RX ADMIN — POTASSIUM CHLORIDE 20 MEQ: 1.5 POWDER, FOR SOLUTION ORAL at 08:54

## 2017-06-22 RX ADMIN — DIPHENHYDRAMINE HYDROCHLORIDE 25 MG: 12.5 SOLUTION ORAL at 01:08

## 2017-06-22 RX ADMIN — PANTOPRAZOLE SODIUM 40 MG: 40 GRANULE, DELAYED RELEASE ORAL at 06:18

## 2017-06-22 RX ADMIN — QUETIAPINE FUMARATE 25 MG: 25 TABLET, FILM COATED ORAL at 22:36

## 2017-06-22 RX ADMIN — ENOXAPARIN SODIUM 40 MG: 40 INJECTION SUBCUTANEOUS at 08:52

## 2017-06-22 RX ADMIN — IPRATROPIUM BROMIDE AND ALBUTEROL SULFATE 3 ML: .5; 3 SOLUTION RESPIRATORY (INHALATION) at 08:52

## 2017-06-22 RX ADMIN — FUROSEMIDE 40 MG: 40 TABLET ORAL at 08:59

## 2017-06-22 RX ADMIN — OXYCODONE HYDROCHLORIDE 5 MG: 5 TABLET ORAL at 22:29

## 2017-06-22 RX ADMIN — GABAPENTIN 300 MG: 250 SOLUTION ORAL at 22:44

## 2017-06-22 RX ADMIN — ACETAMINOPHEN 650 MG: 325 TABLET, FILM COATED ORAL at 22:30

## 2017-06-22 RX ADMIN — GUAIFENESIN 200 MG: 100 SOLUTION ORAL at 08:52

## 2017-06-22 RX ADMIN — ASPIRIN 81 MG 81 MG: 81 TABLET ORAL at 08:54

## 2017-06-22 RX ADMIN — IPRATROPIUM BROMIDE AND ALBUTEROL SULFATE 3 ML: .5; 3 SOLUTION RESPIRATORY (INHALATION) at 23:10

## 2017-06-22 RX ADMIN — ACETAMINOPHEN 650 MG: 325 TABLET, FILM COATED ORAL at 08:54

## 2017-06-22 RX ADMIN — GABAPENTIN 100 MG: 100 CAPSULE ORAL at 08:54

## 2017-06-22 RX ADMIN — TICAGRELOR 90 MG: 90 TABLET ORAL at 22:43

## 2017-06-22 RX ADMIN — GUAIFENESIN 200 MG: 100 SOLUTION ORAL at 22:28

## 2017-06-23 PROCEDURE — 74011250637 HC RX REV CODE- 250/637: Performed by: PHYSICAL MEDICINE & REHABILITATION

## 2017-06-23 PROCEDURE — 74011000250 HC RX REV CODE- 250: Performed by: PHYSICAL MEDICINE & REHABILITATION

## 2017-06-23 PROCEDURE — 74011636637 HC RX REV CODE- 636/637: Performed by: PHYSICAL MEDICINE & REHABILITATION

## 2017-06-23 RX ADMIN — OXYCODONE HYDROCHLORIDE 5 MG: 5 TABLET ORAL at 12:17

## 2017-06-23 RX ADMIN — ASPIRIN 81 MG 81 MG: 81 TABLET ORAL at 08:30

## 2017-06-23 RX ADMIN — BACLOFEN 5 MG: 10 TABLET ORAL at 21:00

## 2017-06-23 RX ADMIN — MAGNESIUM HYDROXIDE 30 ML: 400 SUSPENSION ORAL at 17:20

## 2017-06-23 RX ADMIN — GUAIFENESIN 200 MG: 100 SOLUTION ORAL at 17:24

## 2017-06-23 RX ADMIN — MAGNESIUM HYDROXIDE 20 ML: 400 SUSPENSION ORAL at 20:39

## 2017-06-23 RX ADMIN — GUAIFENESIN 200 MG: 100 SOLUTION ORAL at 20:38

## 2017-06-23 RX ADMIN — BACLOFEN 5 MG: 10 TABLET ORAL at 08:30

## 2017-06-23 RX ADMIN — ACETAMINOPHEN 650 MG: 325 TABLET, FILM COATED ORAL at 20:38

## 2017-06-23 RX ADMIN — IPRATROPIUM BROMIDE AND ALBUTEROL SULFATE 3 ML: .5; 3 SOLUTION RESPIRATORY (INHALATION) at 20:43

## 2017-06-23 RX ADMIN — OXYCODONE HYDROCHLORIDE 5 MG: 5 TABLET ORAL at 08:33

## 2017-06-23 RX ADMIN — ATORVASTATIN CALCIUM 40 MG: 40 TABLET, FILM COATED ORAL at 08:31

## 2017-06-23 RX ADMIN — GABAPENTIN 100 MG: 100 CAPSULE ORAL at 09:00

## 2017-06-23 RX ADMIN — INSULIN LISPRO 4 UNITS: 100 INJECTION, SOLUTION INTRAVENOUS; SUBCUTANEOUS at 00:19

## 2017-06-23 RX ADMIN — GUAIFENESIN 200 MG: 100 SOLUTION ORAL at 08:34

## 2017-06-23 RX ADMIN — GABAPENTIN 300 MG: 250 SOLUTION ORAL at 22:12

## 2017-06-23 RX ADMIN — ACETAMINOPHEN 650 MG: 325 TABLET, FILM COATED ORAL at 08:31

## 2017-06-23 RX ADMIN — POTASSIUM CHLORIDE 20 MEQ: 1.5 POWDER, FOR SOLUTION ORAL at 08:31

## 2017-06-23 RX ADMIN — FUROSEMIDE 40 MG: 40 TABLET ORAL at 08:30

## 2017-06-23 RX ADMIN — TICAGRELOR 90 MG: 90 TABLET ORAL at 09:00

## 2017-06-23 RX ADMIN — GUAIFENESIN 200 MG: 100 SOLUTION ORAL at 12:17

## 2017-06-23 RX ADMIN — IPRATROPIUM BROMIDE AND ALBUTEROL SULFATE 3 ML: .5; 3 SOLUTION RESPIRATORY (INHALATION) at 08:30

## 2017-06-23 RX ADMIN — OXYCODONE HYDROCHLORIDE 10 MG: 5 TABLET ORAL at 20:37

## 2017-06-23 RX ADMIN — PANTOPRAZOLE SODIUM 40 MG: 40 GRANULE, DELAYED RELEASE ORAL at 06:25

## 2017-06-23 RX ADMIN — QUETIAPINE FUMARATE 25 MG: 25 TABLET, FILM COATED ORAL at 22:12

## 2017-06-23 RX ADMIN — ACETAMINOPHEN 650 MG: 325 TABLET, FILM COATED ORAL at 14:22

## 2017-06-23 RX ADMIN — GABAPENTIN 100 MG: 100 CAPSULE ORAL at 17:20

## 2017-06-23 RX ADMIN — TICAGRELOR 90 MG: 90 TABLET ORAL at 22:12

## 2017-06-23 RX ADMIN — OXYCODONE HYDROCHLORIDE 5 MG: 5 TABLET ORAL at 16:04

## 2017-06-24 PROCEDURE — 74011636637 HC RX REV CODE- 636/637: Performed by: PHYSICAL MEDICINE & REHABILITATION

## 2017-06-24 PROCEDURE — 74011250637 HC RX REV CODE- 250/637: Performed by: PHYSICAL MEDICINE & REHABILITATION

## 2017-06-24 PROCEDURE — 74011000250 HC RX REV CODE- 250: Performed by: PHYSICAL MEDICINE & REHABILITATION

## 2017-06-24 RX ADMIN — GABAPENTIN 300 MG: 250 SOLUTION ORAL at 20:58

## 2017-06-24 RX ADMIN — BACLOFEN 5 MG: 10 TABLET ORAL at 09:17

## 2017-06-24 RX ADMIN — GUAIFENESIN 200 MG: 100 SOLUTION ORAL at 09:20

## 2017-06-24 RX ADMIN — GABAPENTIN 100 MG: 100 CAPSULE ORAL at 09:17

## 2017-06-24 RX ADMIN — QUETIAPINE FUMARATE 25 MG: 25 TABLET, FILM COATED ORAL at 23:50

## 2017-06-24 RX ADMIN — ASPIRIN 81 MG 81 MG: 81 TABLET ORAL at 09:18

## 2017-06-24 RX ADMIN — IPRATROPIUM BROMIDE AND ALBUTEROL SULFATE 3 ML: .5; 3 SOLUTION RESPIRATORY (INHALATION) at 20:54

## 2017-06-24 RX ADMIN — ACETAMINOPHEN 650 MG: 325 TABLET, FILM COATED ORAL at 20:55

## 2017-06-24 RX ADMIN — FUROSEMIDE 40 MG: 40 TABLET ORAL at 09:18

## 2017-06-24 RX ADMIN — GUAIFENESIN 200 MG: 100 SOLUTION ORAL at 12:31

## 2017-06-24 RX ADMIN — OXYCODONE HYDROCHLORIDE 5 MG: 5 TABLET ORAL at 10:51

## 2017-06-24 RX ADMIN — MAGNESIUM HYDROXIDE 20 ML: 400 SUSPENSION ORAL at 20:54

## 2017-06-24 RX ADMIN — GABAPENTIN 100 MG: 100 CAPSULE ORAL at 16:54

## 2017-06-24 RX ADMIN — OXYCODONE HYDROCHLORIDE 5 MG: 5 TABLET ORAL at 09:19

## 2017-06-24 RX ADMIN — PANTOPRAZOLE SODIUM 40 MG: 40 GRANULE, DELAYED RELEASE ORAL at 06:30

## 2017-06-24 RX ADMIN — INSULIN LISPRO 6 UNITS: 100 INJECTION, SOLUTION INTRAVENOUS; SUBCUTANEOUS at 00:29

## 2017-06-24 RX ADMIN — ACETAMINOPHEN 650 MG: 325 TABLET, FILM COATED ORAL at 09:18

## 2017-06-24 RX ADMIN — POTASSIUM CHLORIDE 20 MEQ: 1.5 POWDER, FOR SOLUTION ORAL at 09:18

## 2017-06-24 RX ADMIN — TICAGRELOR 90 MG: 90 TABLET ORAL at 20:57

## 2017-06-24 RX ADMIN — BACLOFEN 5 MG: 10 TABLET ORAL at 20:55

## 2017-06-24 RX ADMIN — ATORVASTATIN CALCIUM 40 MG: 40 TABLET, FILM COATED ORAL at 09:17

## 2017-06-24 RX ADMIN — OXYCODONE HYDROCHLORIDE 10 MG: 5 TABLET ORAL at 20:55

## 2017-06-24 RX ADMIN — IPRATROPIUM BROMIDE AND ALBUTEROL SULFATE 3 ML: .5; 3 SOLUTION RESPIRATORY (INHALATION) at 09:19

## 2017-06-24 RX ADMIN — OXYCODONE HYDROCHLORIDE 5 MG: 5 TABLET ORAL at 17:05

## 2017-06-24 RX ADMIN — GUAIFENESIN 200 MG: 100 SOLUTION ORAL at 16:54

## 2017-06-24 RX ADMIN — TICAGRELOR 90 MG: 90 TABLET ORAL at 09:19

## 2017-06-24 RX ADMIN — GUAIFENESIN 200 MG: 100 SOLUTION ORAL at 20:54

## 2017-06-25 PROCEDURE — 74011250637 HC RX REV CODE- 250/637: Performed by: PHYSICAL MEDICINE & REHABILITATION

## 2017-06-25 PROCEDURE — 74011000250 HC RX REV CODE- 250: Performed by: PHYSICAL MEDICINE & REHABILITATION

## 2017-06-25 PROCEDURE — 74011636637 HC RX REV CODE- 636/637: Performed by: PHYSICAL MEDICINE & REHABILITATION

## 2017-06-25 PROCEDURE — 94762 N-INVAS EAR/PLS OXIMTRY CONT: CPT

## 2017-06-25 RX ADMIN — ACETAMINOPHEN 650 MG: 325 TABLET, FILM COATED ORAL at 08:39

## 2017-06-25 RX ADMIN — GUAIFENESIN 200 MG: 100 SOLUTION ORAL at 08:40

## 2017-06-25 RX ADMIN — GUAIFENESIN 200 MG: 100 SOLUTION ORAL at 21:36

## 2017-06-25 RX ADMIN — GUAIFENESIN 200 MG: 100 SOLUTION ORAL at 16:28

## 2017-06-25 RX ADMIN — IPRATROPIUM BROMIDE AND ALBUTEROL SULFATE 3 ML: .5; 3 SOLUTION RESPIRATORY (INHALATION) at 20:57

## 2017-06-25 RX ADMIN — BACLOFEN 5 MG: 10 TABLET ORAL at 08:40

## 2017-06-25 RX ADMIN — GUAIFENESIN 200 MG: 100 SOLUTION ORAL at 12:29

## 2017-06-25 RX ADMIN — FUROSEMIDE 40 MG: 40 TABLET ORAL at 08:41

## 2017-06-25 RX ADMIN — PANTOPRAZOLE SODIUM 40 MG: 40 GRANULE, DELAYED RELEASE ORAL at 06:44

## 2017-06-25 RX ADMIN — QUETIAPINE FUMARATE 25 MG: 25 TABLET, FILM COATED ORAL at 21:37

## 2017-06-25 RX ADMIN — BACLOFEN 5 MG: 10 TABLET ORAL at 21:37

## 2017-06-25 RX ADMIN — ACETAMINOPHEN 650 MG: 325 TABLET, FILM COATED ORAL at 20:49

## 2017-06-25 RX ADMIN — TICAGRELOR 90 MG: 90 TABLET ORAL at 08:39

## 2017-06-25 RX ADMIN — TICAGRELOR 90 MG: 90 TABLET ORAL at 21:39

## 2017-06-25 RX ADMIN — OXYCODONE HYDROCHLORIDE 10 MG: 5 TABLET ORAL at 20:48

## 2017-06-25 RX ADMIN — POTASSIUM CHLORIDE 20 MEQ: 1.5 POWDER, FOR SOLUTION ORAL at 08:41

## 2017-06-25 RX ADMIN — GABAPENTIN 300 MG: 250 SOLUTION ORAL at 21:39

## 2017-06-25 RX ADMIN — INSULIN LISPRO 4 UNITS: 100 INJECTION, SOLUTION INTRAVENOUS; SUBCUTANEOUS at 00:00

## 2017-06-25 RX ADMIN — OXYCODONE HYDROCHLORIDE 10 MG: 5 TABLET ORAL at 08:39

## 2017-06-25 RX ADMIN — OXYCODONE HYDROCHLORIDE 10 MG: 5 TABLET ORAL at 01:48

## 2017-06-25 RX ADMIN — OXYCODONE HYDROCHLORIDE 10 MG: 5 TABLET ORAL at 16:30

## 2017-06-25 RX ADMIN — GABAPENTIN 100 MG: 100 CAPSULE ORAL at 16:27

## 2017-06-25 RX ADMIN — IPRATROPIUM BROMIDE AND ALBUTEROL SULFATE 3 ML: .5; 3 SOLUTION RESPIRATORY (INHALATION) at 08:41

## 2017-06-25 RX ADMIN — MAGNESIUM HYDROXIDE 20 ML: 400 SUSPENSION ORAL at 21:37

## 2017-06-25 RX ADMIN — OXYCODONE HYDROCHLORIDE 10 MG: 5 TABLET ORAL at 12:37

## 2017-06-25 RX ADMIN — ASPIRIN 81 MG 81 MG: 81 TABLET ORAL at 08:41

## 2017-06-25 RX ADMIN — GABAPENTIN 100 MG: 100 CAPSULE ORAL at 08:41

## 2017-06-25 RX ADMIN — ATORVASTATIN CALCIUM 40 MG: 40 TABLET, FILM COATED ORAL at 08:41

## 2017-06-26 ENCOUNTER — HOSPITAL ENCOUNTER (OUTPATIENT)
Dept: GENERAL RADIOLOGY | Age: 57
Discharge: HOME | End: 2017-06-26

## 2017-06-26 ENCOUNTER — APPOINTMENT (OUTPATIENT)
Dept: GENERAL RADIOLOGY | Age: 57
End: 2017-06-26
Attending: PHYSICAL MEDICINE & REHABILITATION

## 2017-06-26 PROCEDURE — 74011250637 HC RX REV CODE- 250/637: Performed by: PHYSICAL MEDICINE & REHABILITATION

## 2017-06-26 PROCEDURE — 71020 XR CHEST PA LAT: CPT

## 2017-06-26 PROCEDURE — 74011636637 HC RX REV CODE- 636/637: Performed by: PHYSICAL MEDICINE & REHABILITATION

## 2017-06-26 PROCEDURE — 74011000250 HC RX REV CODE- 250: Performed by: PHYSICAL MEDICINE & REHABILITATION

## 2017-06-26 RX ADMIN — PANTOPRAZOLE SODIUM 40 MG: 40 GRANULE, DELAYED RELEASE ORAL at 06:00

## 2017-06-26 RX ADMIN — GABAPENTIN 100 MG: 100 CAPSULE ORAL at 18:46

## 2017-06-26 RX ADMIN — ASPIRIN 81 MG 81 MG: 81 TABLET ORAL at 09:10

## 2017-06-26 RX ADMIN — ALPRAZOLAM 0.25 MG: 0.25 TABLET ORAL at 00:01

## 2017-06-26 RX ADMIN — GUAIFENESIN 200 MG: 100 SOLUTION ORAL at 18:46

## 2017-06-26 RX ADMIN — TICAGRELOR 90 MG: 90 TABLET ORAL at 09:09

## 2017-06-26 RX ADMIN — BACLOFEN 5 MG: 10 TABLET ORAL at 09:09

## 2017-06-26 RX ADMIN — ACETAMINOPHEN 650 MG: 325 TABLET, FILM COATED ORAL at 09:10

## 2017-06-26 RX ADMIN — IPRATROPIUM BROMIDE AND ALBUTEROL SULFATE 3 ML: .5; 3 SOLUTION RESPIRATORY (INHALATION) at 09:09

## 2017-06-26 RX ADMIN — GABAPENTIN 300 MG: 250 SOLUTION ORAL at 22:32

## 2017-06-26 RX ADMIN — BACLOFEN 5 MG: 10 TABLET ORAL at 22:32

## 2017-06-26 RX ADMIN — ATORVASTATIN CALCIUM 40 MG: 40 TABLET, FILM COATED ORAL at 09:09

## 2017-06-26 RX ADMIN — FUROSEMIDE 40 MG: 40 TABLET ORAL at 09:09

## 2017-06-26 RX ADMIN — QUETIAPINE FUMARATE 25 MG: 25 TABLET, FILM COATED ORAL at 22:32

## 2017-06-26 RX ADMIN — OXYCODONE HYDROCHLORIDE 10 MG: 5 TABLET ORAL at 09:20

## 2017-06-26 RX ADMIN — GUAIFENESIN 200 MG: 100 SOLUTION ORAL at 12:14

## 2017-06-26 RX ADMIN — GUAIFENESIN 200 MG: 100 SOLUTION ORAL at 22:31

## 2017-06-26 RX ADMIN — OXYCODONE HYDROCHLORIDE 10 MG: 5 TABLET ORAL at 22:34

## 2017-06-26 RX ADMIN — IPRATROPIUM BROMIDE AND ALBUTEROL SULFATE 3 ML: .5; 3 SOLUTION RESPIRATORY (INHALATION) at 22:32

## 2017-06-26 RX ADMIN — POTASSIUM CHLORIDE 20 MEQ: 1.5 POWDER, FOR SOLUTION ORAL at 09:10

## 2017-06-26 RX ADMIN — MAGNESIUM HYDROXIDE 20 ML: 400 SUSPENSION ORAL at 22:31

## 2017-06-26 RX ADMIN — INSULIN LISPRO 6 UNITS: 100 INJECTION, SOLUTION INTRAVENOUS; SUBCUTANEOUS at 00:01

## 2017-06-26 RX ADMIN — TICAGRELOR 90 MG: 90 TABLET ORAL at 21:00

## 2017-06-26 RX ADMIN — GUAIFENESIN 200 MG: 100 SOLUTION ORAL at 09:09

## 2017-06-26 RX ADMIN — ACETAMINOPHEN 650 MG: 325 TABLET, FILM COATED ORAL at 18:50

## 2017-06-26 RX ADMIN — GABAPENTIN 100 MG: 100 CAPSULE ORAL at 09:10

## 2017-06-27 LAB
ANION GAP BLD CALC-SCNC: 8 MMOL/L (ref 5–15)
BASOPHILS # BLD AUTO: 0 K/UL (ref 0–0.1)
BASOPHILS # BLD: 0 % (ref 0–1)
BUN SERPL-MCNC: 17 MG/DL (ref 6–20)
BUN/CREAT SERPL: 27 (ref 12–20)
CALCIUM SERPL-MCNC: 8.9 MG/DL (ref 8.5–10.1)
CHLORIDE SERPL-SCNC: 99 MMOL/L (ref 97–108)
CO2 SERPL-SCNC: 30 MMOL/L (ref 21–32)
CREAT SERPL-MCNC: 0.62 MG/DL (ref 0.7–1.3)
EOSINOPHIL # BLD: 0.3 K/UL (ref 0–0.4)
EOSINOPHIL NFR BLD: 2 % (ref 0–7)
ERYTHROCYTE [DISTWIDTH] IN BLOOD BY AUTOMATED COUNT: 16.6 % (ref 11.5–14.5)
GLUCOSE SERPL-MCNC: 109 MG/DL (ref 65–100)
HCT VFR BLD AUTO: 32.9 % (ref 36.6–50.3)
HGB BLD-MCNC: 10.1 G/DL (ref 12.1–17)
LYMPHOCYTES # BLD AUTO: 23 % (ref 12–49)
LYMPHOCYTES # BLD: 2.4 K/UL (ref 0.8–3.5)
MCH RBC QN AUTO: 26.3 PG (ref 26–34)
MCHC RBC AUTO-ENTMCNC: 30.7 G/DL (ref 30–36.5)
MCV RBC AUTO: 85.7 FL (ref 80–99)
MONOCYTES # BLD: 0.7 K/UL (ref 0–1)
MONOCYTES NFR BLD AUTO: 6 % (ref 5–13)
NEUTS SEG # BLD: 7 K/UL (ref 1.8–8)
NEUTS SEG NFR BLD AUTO: 69 % (ref 32–75)
PLATELET # BLD AUTO: 262 K/UL (ref 150–400)
POTASSIUM SERPL-SCNC: 3.7 MMOL/L (ref 3.5–5.1)
RBC # BLD AUTO: 3.84 M/UL (ref 4.1–5.7)
SODIUM SERPL-SCNC: 137 MMOL/L (ref 136–145)
WBC # BLD AUTO: 10.3 K/UL (ref 4.1–11.1)

## 2017-06-27 PROCEDURE — 85025 COMPLETE CBC W/AUTO DIFF WBC: CPT | Performed by: PHYSICAL MEDICINE & REHABILITATION

## 2017-06-27 PROCEDURE — 74011250637 HC RX REV CODE- 250/637: Performed by: PHYSICAL MEDICINE & REHABILITATION

## 2017-06-27 PROCEDURE — 80048 BASIC METABOLIC PNL TOTAL CA: CPT | Performed by: PHYSICAL MEDICINE & REHABILITATION

## 2017-06-27 PROCEDURE — 36415 COLL VENOUS BLD VENIPUNCTURE: CPT | Performed by: PHYSICAL MEDICINE & REHABILITATION

## 2017-06-27 PROCEDURE — 74011000250 HC RX REV CODE- 250: Performed by: PHYSICAL MEDICINE & REHABILITATION

## 2017-06-27 RX ADMIN — OXYCODONE HYDROCHLORIDE 5 MG: 5 TABLET ORAL at 20:52

## 2017-06-27 RX ADMIN — IPRATROPIUM BROMIDE AND ALBUTEROL SULFATE 3 ML: .5; 3 SOLUTION RESPIRATORY (INHALATION) at 20:50

## 2017-06-27 RX ADMIN — ATORVASTATIN CALCIUM 40 MG: 40 TABLET, FILM COATED ORAL at 08:26

## 2017-06-27 RX ADMIN — GUAIFENESIN 200 MG: 100 SOLUTION ORAL at 08:26

## 2017-06-27 RX ADMIN — FUROSEMIDE 40 MG: 40 TABLET ORAL at 08:26

## 2017-06-27 RX ADMIN — QUETIAPINE FUMARATE 25 MG: 25 TABLET, FILM COATED ORAL at 20:49

## 2017-06-27 RX ADMIN — ACETAMINOPHEN 650 MG: 325 TABLET, FILM COATED ORAL at 08:25

## 2017-06-27 RX ADMIN — TICAGRELOR 90 MG: 90 TABLET ORAL at 08:26

## 2017-06-27 RX ADMIN — OXYCODONE HYDROCHLORIDE 5 MG: 5 TABLET ORAL at 12:34

## 2017-06-27 RX ADMIN — GUAIFENESIN 200 MG: 100 SOLUTION ORAL at 20:49

## 2017-06-27 RX ADMIN — GABAPENTIN 100 MG: 100 CAPSULE ORAL at 08:26

## 2017-06-27 RX ADMIN — TICAGRELOR 90 MG: 90 TABLET ORAL at 20:48

## 2017-06-27 RX ADMIN — GUAIFENESIN 200 MG: 100 SOLUTION ORAL at 17:18

## 2017-06-27 RX ADMIN — BACLOFEN 5 MG: 10 TABLET ORAL at 08:27

## 2017-06-27 RX ADMIN — GABAPENTIN 300 MG: 250 SOLUTION ORAL at 20:50

## 2017-06-27 RX ADMIN — ACETAMINOPHEN 650 MG: 325 TABLET, FILM COATED ORAL at 20:48

## 2017-06-27 RX ADMIN — GABAPENTIN 100 MG: 100 CAPSULE ORAL at 17:18

## 2017-06-27 RX ADMIN — MAGNESIUM HYDROXIDE 20 ML: 400 SUSPENSION ORAL at 23:51

## 2017-06-27 RX ADMIN — PANTOPRAZOLE SODIUM 40 MG: 40 GRANULE, DELAYED RELEASE ORAL at 06:14

## 2017-06-27 RX ADMIN — GUAIFENESIN 200 MG: 100 SOLUTION ORAL at 12:34

## 2017-06-27 RX ADMIN — ASPIRIN 81 MG 81 MG: 81 TABLET ORAL at 08:26

## 2017-06-27 RX ADMIN — POTASSIUM CHLORIDE 20 MEQ: 1.5 POWDER, FOR SOLUTION ORAL at 08:26

## 2017-06-27 RX ADMIN — ALPRAZOLAM 0.25 MG: 0.25 TABLET ORAL at 23:51

## 2017-06-27 RX ADMIN — IPRATROPIUM BROMIDE AND ALBUTEROL SULFATE 3 ML: .5; 3 SOLUTION RESPIRATORY (INHALATION) at 08:27

## 2017-06-27 RX ADMIN — BACLOFEN 5 MG: 10 TABLET ORAL at 20:48

## 2017-06-27 RX ADMIN — MAGNESIUM HYDROXIDE 30 ML: 400 SUSPENSION ORAL at 12:38

## 2017-06-28 ENCOUNTER — HOSPITAL ENCOUNTER (OUTPATIENT)
Dept: GENERAL RADIOLOGY | Age: 57
Discharge: HOME | End: 2017-06-28
Attending: PHYSICAL MEDICINE & REHABILITATION

## 2017-06-28 DIAGNOSIS — G93.1 ANOXIC BRAIN INJURY (HCC): ICD-10-CM

## 2017-06-28 DIAGNOSIS — R13.10 DYSPHAGIA: ICD-10-CM

## 2017-06-28 PROCEDURE — 74011250637 HC RX REV CODE- 250/637: Performed by: PHYSICAL MEDICINE & REHABILITATION

## 2017-06-28 PROCEDURE — 74011000250 HC RX REV CODE- 250: Performed by: PHYSICAL MEDICINE & REHABILITATION

## 2017-06-28 PROCEDURE — 77030021684

## 2017-06-28 PROCEDURE — 74011636637 HC RX REV CODE- 636/637: Performed by: PHYSICAL MEDICINE & REHABILITATION

## 2017-06-28 PROCEDURE — 74230 X-RAY XM SWLNG FUNCJ C+: CPT

## 2017-06-28 RX ADMIN — INSULIN LISPRO 4 UNITS: 100 INJECTION, SOLUTION INTRAVENOUS; SUBCUTANEOUS at 19:16

## 2017-06-28 RX ADMIN — ALPRAZOLAM 0.25 MG: 0.25 TABLET ORAL at 20:58

## 2017-06-28 RX ADMIN — GABAPENTIN 100 MG: 100 CAPSULE ORAL at 17:30

## 2017-06-28 RX ADMIN — FUROSEMIDE 40 MG: 40 TABLET ORAL at 09:21

## 2017-06-28 RX ADMIN — BACLOFEN 5 MG: 10 TABLET ORAL at 09:22

## 2017-06-28 RX ADMIN — IPRATROPIUM BROMIDE AND ALBUTEROL SULFATE 3 ML: .5; 3 SOLUTION RESPIRATORY (INHALATION) at 09:21

## 2017-06-28 RX ADMIN — GUAIFENESIN 200 MG: 100 SOLUTION ORAL at 20:53

## 2017-06-28 RX ADMIN — TICAGRELOR 90 MG: 90 TABLET ORAL at 22:00

## 2017-06-28 RX ADMIN — ACETAMINOPHEN 650 MG: 325 TABLET, FILM COATED ORAL at 20:44

## 2017-06-28 RX ADMIN — MAGNESIUM HYDROXIDE 20 ML: 400 SUSPENSION ORAL at 20:44

## 2017-06-28 RX ADMIN — QUETIAPINE FUMARATE 25 MG: 25 TABLET, FILM COATED ORAL at 22:01

## 2017-06-28 RX ADMIN — TICAGRELOR 90 MG: 90 TABLET ORAL at 09:29

## 2017-06-28 RX ADMIN — ACETAMINOPHEN 650 MG: 325 TABLET, FILM COATED ORAL at 09:21

## 2017-06-28 RX ADMIN — OXYCODONE HYDROCHLORIDE 10 MG: 5 TABLET ORAL at 20:44

## 2017-06-28 RX ADMIN — ASPIRIN 81 MG 81 MG: 81 TABLET ORAL at 09:21

## 2017-06-28 RX ADMIN — ATORVASTATIN CALCIUM 40 MG: 40 TABLET, FILM COATED ORAL at 09:21

## 2017-06-28 RX ADMIN — POTASSIUM CHLORIDE 20 MEQ: 1.5 POWDER, FOR SOLUTION ORAL at 09:20

## 2017-06-28 RX ADMIN — GABAPENTIN 100 MG: 100 CAPSULE ORAL at 09:21

## 2017-06-28 RX ADMIN — BACLOFEN 5 MG: 10 TABLET ORAL at 20:45

## 2017-06-28 RX ADMIN — PANTOPRAZOLE SODIUM 40 MG: 40 GRANULE, DELAYED RELEASE ORAL at 06:07

## 2017-06-28 RX ADMIN — GUAIFENESIN 200 MG: 100 SOLUTION ORAL at 12:47

## 2017-06-28 RX ADMIN — GUAIFENESIN 200 MG: 100 SOLUTION ORAL at 09:20

## 2017-06-28 RX ADMIN — GUAIFENESIN 200 MG: 100 SOLUTION ORAL at 17:30

## 2017-06-28 RX ADMIN — OXYCODONE HYDROCHLORIDE 5 MG: 5 TABLET ORAL at 06:07

## 2017-06-28 RX ADMIN — OXYCODONE HYDROCHLORIDE 10 MG: 5 TABLET ORAL at 13:20

## 2017-06-28 RX ADMIN — GABAPENTIN 300 MG: 250 SOLUTION ORAL at 20:54

## 2017-06-28 RX ADMIN — IPRATROPIUM BROMIDE AND ALBUTEROL SULFATE 3 ML: .5; 3 SOLUTION RESPIRATORY (INHALATION) at 20:53

## 2017-06-29 PROCEDURE — 74011636637 HC RX REV CODE- 636/637: Performed by: PHYSICAL MEDICINE & REHABILITATION

## 2017-06-29 PROCEDURE — 74011000250 HC RX REV CODE- 250: Performed by: PHYSICAL MEDICINE & REHABILITATION

## 2017-06-29 PROCEDURE — 74011250637 HC RX REV CODE- 250/637: Performed by: PHYSICAL MEDICINE & REHABILITATION

## 2017-06-29 PROCEDURE — 77010033678 HC OXYGEN DAILY

## 2017-06-29 PROCEDURE — 94762 N-INVAS EAR/PLS OXIMTRY CONT: CPT

## 2017-06-29 RX ADMIN — OXYCODONE HYDROCHLORIDE 10 MG: 5 TABLET ORAL at 21:09

## 2017-06-29 RX ADMIN — QUETIAPINE FUMARATE 25 MG: 25 TABLET, FILM COATED ORAL at 21:09

## 2017-06-29 RX ADMIN — ACETAMINOPHEN 650 MG: 325 TABLET, FILM COATED ORAL at 21:09

## 2017-06-29 RX ADMIN — GUAIFENESIN 200 MG: 100 SOLUTION ORAL at 21:09

## 2017-06-29 RX ADMIN — OXYCODONE HYDROCHLORIDE 5 MG: 5 TABLET ORAL at 16:30

## 2017-06-29 RX ADMIN — GUAIFENESIN 200 MG: 100 SOLUTION ORAL at 16:28

## 2017-06-29 RX ADMIN — GABAPENTIN 100 MG: 100 CAPSULE ORAL at 09:15

## 2017-06-29 RX ADMIN — GUAIFENESIN 200 MG: 100 SOLUTION ORAL at 09:16

## 2017-06-29 RX ADMIN — ACETAMINOPHEN 650 MG: 325 TABLET, FILM COATED ORAL at 09:15

## 2017-06-29 RX ADMIN — OXYCODONE HYDROCHLORIDE 5 MG: 5 TABLET ORAL at 12:42

## 2017-06-29 RX ADMIN — ASPIRIN 81 MG 81 MG: 81 TABLET ORAL at 09:16

## 2017-06-29 RX ADMIN — GABAPENTIN 300 MG: 250 SOLUTION ORAL at 21:09

## 2017-06-29 RX ADMIN — BACLOFEN 5 MG: 10 TABLET ORAL at 09:15

## 2017-06-29 RX ADMIN — OXYCODONE HYDROCHLORIDE 10 MG: 5 TABLET ORAL at 09:17

## 2017-06-29 RX ADMIN — GABAPENTIN 100 MG: 100 CAPSULE ORAL at 16:28

## 2017-06-29 RX ADMIN — GUAIFENESIN 200 MG: 100 SOLUTION ORAL at 12:40

## 2017-06-29 RX ADMIN — POTASSIUM CHLORIDE 20 MEQ: 1.5 POWDER, FOR SOLUTION ORAL at 09:15

## 2017-06-29 RX ADMIN — PANTOPRAZOLE SODIUM 40 MG: 40 GRANULE, DELAYED RELEASE ORAL at 06:48

## 2017-06-29 RX ADMIN — MAGNESIUM HYDROXIDE 20 ML: 400 SUSPENSION ORAL at 21:10

## 2017-06-29 RX ADMIN — IPRATROPIUM BROMIDE AND ALBUTEROL SULFATE 3 ML: .5; 3 SOLUTION RESPIRATORY (INHALATION) at 21:09

## 2017-06-29 RX ADMIN — IPRATROPIUM BROMIDE AND ALBUTEROL SULFATE 3 ML: .5; 3 SOLUTION RESPIRATORY (INHALATION) at 09:16

## 2017-06-29 RX ADMIN — INSULIN LISPRO 6 UNITS: 100 INJECTION, SOLUTION INTRAVENOUS; SUBCUTANEOUS at 00:25

## 2017-06-29 RX ADMIN — BACLOFEN 5 MG: 10 TABLET ORAL at 21:09

## 2017-06-29 RX ADMIN — TICAGRELOR 90 MG: 90 TABLET ORAL at 09:15

## 2017-06-29 RX ADMIN — TICAGRELOR 90 MG: 90 TABLET ORAL at 21:11

## 2017-06-29 RX ADMIN — FUROSEMIDE 40 MG: 40 TABLET ORAL at 09:16

## 2017-06-29 RX ADMIN — ATORVASTATIN CALCIUM 40 MG: 40 TABLET, FILM COATED ORAL at 09:15

## 2017-06-29 NOTE — PROGRESS NOTES
2255 - pt placed on cont pulse ox as ordered; capped on 2lpm n/c    0515 - pt removed from cont pulse ox and placed back on Room Air

## 2017-06-30 PROCEDURE — 74011250637 HC RX REV CODE- 250/637: Performed by: PHYSICAL MEDICINE & REHABILITATION

## 2017-06-30 PROCEDURE — 74011000250 HC RX REV CODE- 250: Performed by: PHYSICAL MEDICINE & REHABILITATION

## 2017-06-30 RX ORDER — GUAIFENESIN 100 MG/5ML
200 SOLUTION ORAL 3 TIMES DAILY
Status: DISCONTINUED | OUTPATIENT
Start: 2017-06-30 | End: 2017-07-03

## 2017-06-30 RX ADMIN — ASPIRIN 81 MG 81 MG: 81 TABLET ORAL at 08:34

## 2017-06-30 RX ADMIN — PANTOPRAZOLE SODIUM 40 MG: 40 GRANULE, DELAYED RELEASE ORAL at 07:09

## 2017-06-30 RX ADMIN — GUAIFENESIN 200 MG: 100 SOLUTION ORAL at 21:58

## 2017-06-30 RX ADMIN — GABAPENTIN 100 MG: 100 CAPSULE ORAL at 08:34

## 2017-06-30 RX ADMIN — ALPRAZOLAM 0.25 MG: 0.25 TABLET ORAL at 02:20

## 2017-06-30 RX ADMIN — OXYCODONE HYDROCHLORIDE 5 MG: 5 TABLET ORAL at 08:37

## 2017-06-30 RX ADMIN — TICAGRELOR 90 MG: 90 TABLET ORAL at 21:57

## 2017-06-30 RX ADMIN — OXYCODONE HYDROCHLORIDE 10 MG: 5 TABLET ORAL at 02:20

## 2017-06-30 RX ADMIN — GABAPENTIN 300 MG: 250 SOLUTION ORAL at 21:59

## 2017-06-30 RX ADMIN — TICAGRELOR 90 MG: 90 TABLET ORAL at 08:38

## 2017-06-30 RX ADMIN — BACLOFEN 5 MG: 10 TABLET ORAL at 08:35

## 2017-06-30 RX ADMIN — GABAPENTIN 100 MG: 100 CAPSULE ORAL at 17:19

## 2017-06-30 RX ADMIN — ACETAMINOPHEN 650 MG: 325 TABLET, FILM COATED ORAL at 20:44

## 2017-06-30 RX ADMIN — ACETAMINOPHEN 650 MG: 325 TABLET, FILM COATED ORAL at 08:35

## 2017-06-30 RX ADMIN — IPRATROPIUM BROMIDE AND ALBUTEROL SULFATE 3 ML: .5; 3 SOLUTION RESPIRATORY (INHALATION) at 08:35

## 2017-06-30 RX ADMIN — POTASSIUM CHLORIDE 20 MEQ: 1.5 POWDER, FOR SOLUTION ORAL at 08:34

## 2017-06-30 RX ADMIN — GUAIFENESIN 200 MG: 100 SOLUTION ORAL at 13:16

## 2017-06-30 RX ADMIN — QUETIAPINE FUMARATE 25 MG: 25 TABLET, FILM COATED ORAL at 21:57

## 2017-06-30 RX ADMIN — MAGNESIUM HYDROXIDE 20 ML: 400 SUSPENSION ORAL at 20:45

## 2017-06-30 RX ADMIN — GUAIFENESIN 200 MG: 100 SOLUTION ORAL at 08:34

## 2017-06-30 RX ADMIN — BACLOFEN 5 MG: 10 TABLET ORAL at 20:45

## 2017-06-30 RX ADMIN — DIPHENHYDRAMINE HYDROCHLORIDE 25 MG: 12.5 SOLUTION ORAL at 21:58

## 2017-06-30 RX ADMIN — FUROSEMIDE 40 MG: 40 TABLET ORAL at 08:35

## 2017-06-30 RX ADMIN — IPRATROPIUM BROMIDE AND ALBUTEROL SULFATE 3 ML: .5; 3 SOLUTION RESPIRATORY (INHALATION) at 20:44

## 2017-06-30 RX ADMIN — OXYCODONE HYDROCHLORIDE 10 MG: 5 TABLET ORAL at 20:45

## 2017-06-30 RX ADMIN — ATORVASTATIN CALCIUM 40 MG: 40 TABLET, FILM COATED ORAL at 08:34

## 2017-07-01 PROCEDURE — 74011250637 HC RX REV CODE- 250/637: Performed by: PHYSICAL MEDICINE & REHABILITATION

## 2017-07-01 PROCEDURE — 74011000250 HC RX REV CODE- 250: Performed by: PHYSICAL MEDICINE & REHABILITATION

## 2017-07-01 PROCEDURE — 74011636637 HC RX REV CODE- 636/637: Performed by: PHYSICAL MEDICINE & REHABILITATION

## 2017-07-01 RX ADMIN — TICAGRELOR 90 MG: 90 TABLET ORAL at 21:20

## 2017-07-01 RX ADMIN — BACLOFEN 5 MG: 10 TABLET ORAL at 21:20

## 2017-07-01 RX ADMIN — GABAPENTIN 300 MG: 250 SOLUTION ORAL at 22:00

## 2017-07-01 RX ADMIN — OXYCODONE HYDROCHLORIDE 10 MG: 5 TABLET ORAL at 04:46

## 2017-07-01 RX ADMIN — ATORVASTATIN CALCIUM 40 MG: 40 TABLET, FILM COATED ORAL at 09:13

## 2017-07-01 RX ADMIN — GUAIFENESIN 200 MG: 100 SOLUTION ORAL at 13:18

## 2017-07-01 RX ADMIN — INSULIN LISPRO 6 UNITS: 100 INJECTION, SOLUTION INTRAVENOUS; SUBCUTANEOUS at 01:09

## 2017-07-01 RX ADMIN — ACETAMINOPHEN 650 MG: 325 TABLET, FILM COATED ORAL at 21:19

## 2017-07-01 RX ADMIN — IPRATROPIUM BROMIDE AND ALBUTEROL SULFATE 3 ML: .5; 3 SOLUTION RESPIRATORY (INHALATION) at 21:20

## 2017-07-01 RX ADMIN — GABAPENTIN 100 MG: 100 CAPSULE ORAL at 17:24

## 2017-07-01 RX ADMIN — FUROSEMIDE 40 MG: 40 TABLET ORAL at 09:15

## 2017-07-01 RX ADMIN — PANTOPRAZOLE SODIUM 40 MG: 40 GRANULE, DELAYED RELEASE ORAL at 06:35

## 2017-07-01 RX ADMIN — ACETAMINOPHEN 650 MG: 325 TABLET, FILM COATED ORAL at 09:13

## 2017-07-01 RX ADMIN — QUETIAPINE FUMARATE 25 MG: 25 TABLET, FILM COATED ORAL at 21:46

## 2017-07-01 RX ADMIN — IPRATROPIUM BROMIDE AND ALBUTEROL SULFATE 3 ML: .5; 3 SOLUTION RESPIRATORY (INHALATION) at 09:14

## 2017-07-01 RX ADMIN — BACLOFEN 5 MG: 10 TABLET ORAL at 09:13

## 2017-07-01 RX ADMIN — GUAIFENESIN 200 MG: 100 SOLUTION ORAL at 21:20

## 2017-07-01 RX ADMIN — GABAPENTIN 100 MG: 100 CAPSULE ORAL at 09:13

## 2017-07-01 RX ADMIN — POTASSIUM CHLORIDE 20 MEQ: 1.5 POWDER, FOR SOLUTION ORAL at 09:13

## 2017-07-01 RX ADMIN — OXYCODONE HYDROCHLORIDE 5 MG: 5 TABLET ORAL at 15:19

## 2017-07-01 RX ADMIN — ASPIRIN 81 MG 81 MG: 81 TABLET ORAL at 09:13

## 2017-07-01 RX ADMIN — TICAGRELOR 90 MG: 90 TABLET ORAL at 09:14

## 2017-07-01 RX ADMIN — GUAIFENESIN 200 MG: 100 SOLUTION ORAL at 06:35

## 2017-07-01 RX ADMIN — OXYCODONE HYDROCHLORIDE 5 MG: 5 TABLET ORAL at 20:45

## 2017-07-01 RX ADMIN — MAGNESIUM HYDROXIDE 20 ML: 400 SUSPENSION ORAL at 21:20

## 2017-07-02 PROCEDURE — 74011636637 HC RX REV CODE- 636/637: Performed by: PHYSICAL MEDICINE & REHABILITATION

## 2017-07-02 PROCEDURE — 74011000250 HC RX REV CODE- 250: Performed by: PHYSICAL MEDICINE & REHABILITATION

## 2017-07-02 PROCEDURE — 74011250637 HC RX REV CODE- 250/637: Performed by: PHYSICAL MEDICINE & REHABILITATION

## 2017-07-02 RX ADMIN — GUAIFENESIN 200 MG: 100 SOLUTION ORAL at 06:18

## 2017-07-02 RX ADMIN — TICAGRELOR 90 MG: 90 TABLET ORAL at 20:39

## 2017-07-02 RX ADMIN — POTASSIUM CHLORIDE 20 MEQ: 1.5 POWDER, FOR SOLUTION ORAL at 09:44

## 2017-07-02 RX ADMIN — ASPIRIN 81 MG 81 MG: 81 TABLET ORAL at 09:44

## 2017-07-02 RX ADMIN — GABAPENTIN 300 MG: 250 SOLUTION ORAL at 20:38

## 2017-07-02 RX ADMIN — INSULIN LISPRO 4 UNITS: 100 INJECTION, SOLUTION INTRAVENOUS; SUBCUTANEOUS at 00:00

## 2017-07-02 RX ADMIN — GABAPENTIN 100 MG: 100 CAPSULE ORAL at 17:55

## 2017-07-02 RX ADMIN — GUAIFENESIN 200 MG: 100 SOLUTION ORAL at 20:38

## 2017-07-02 RX ADMIN — IPRATROPIUM BROMIDE AND ALBUTEROL SULFATE 3 ML: .5; 3 SOLUTION RESPIRATORY (INHALATION) at 20:38

## 2017-07-02 RX ADMIN — PANTOPRAZOLE SODIUM 40 MG: 40 GRANULE, DELAYED RELEASE ORAL at 06:18

## 2017-07-02 RX ADMIN — GABAPENTIN 100 MG: 100 CAPSULE ORAL at 09:44

## 2017-07-02 RX ADMIN — TICAGRELOR 90 MG: 90 TABLET ORAL at 12:23

## 2017-07-02 RX ADMIN — QUETIAPINE FUMARATE 25 MG: 25 TABLET, FILM COATED ORAL at 20:38

## 2017-07-02 RX ADMIN — ACETAMINOPHEN 650 MG: 325 TABLET, FILM COATED ORAL at 20:37

## 2017-07-02 RX ADMIN — ATORVASTATIN CALCIUM 40 MG: 40 TABLET, FILM COATED ORAL at 09:44

## 2017-07-02 RX ADMIN — IPRATROPIUM BROMIDE AND ALBUTEROL SULFATE 3 ML: .5; 3 SOLUTION RESPIRATORY (INHALATION) at 09:45

## 2017-07-02 RX ADMIN — GUAIFENESIN 200 MG: 100 SOLUTION ORAL at 12:24

## 2017-07-02 RX ADMIN — BACLOFEN 5 MG: 10 TABLET ORAL at 09:44

## 2017-07-02 RX ADMIN — BACLOFEN 5 MG: 10 TABLET ORAL at 20:37

## 2017-07-02 RX ADMIN — FUROSEMIDE 40 MG: 40 TABLET ORAL at 09:45

## 2017-07-02 RX ADMIN — MAGNESIUM HYDROXIDE 20 ML: 400 SUSPENSION ORAL at 20:37

## 2017-07-02 RX ADMIN — ACETAMINOPHEN 650 MG: 325 TABLET, FILM COATED ORAL at 09:48

## 2017-07-02 RX ADMIN — OXYCODONE HYDROCHLORIDE 10 MG: 5 TABLET ORAL at 20:37

## 2017-07-03 PROCEDURE — 74011000250 HC RX REV CODE- 250: Performed by: PHYSICAL MEDICINE & REHABILITATION

## 2017-07-03 PROCEDURE — 74011250637 HC RX REV CODE- 250/637: Performed by: PHYSICAL MEDICINE & REHABILITATION

## 2017-07-03 RX ORDER — GUAIFENESIN 100 MG/5ML
200 SOLUTION ORAL 2 TIMES DAILY
Status: DISCONTINUED | OUTPATIENT
Start: 2017-07-03 | End: 2017-07-06 | Stop reason: HOSPADM

## 2017-07-03 RX ADMIN — OXYCODONE HYDROCHLORIDE 5 MG: 5 TABLET ORAL at 20:44

## 2017-07-03 RX ADMIN — PANTOPRAZOLE SODIUM 40 MG: 40 GRANULE, DELAYED RELEASE ORAL at 05:50

## 2017-07-03 RX ADMIN — QUETIAPINE FUMARATE 25 MG: 25 TABLET, FILM COATED ORAL at 22:34

## 2017-07-03 RX ADMIN — TICAGRELOR 90 MG: 90 TABLET ORAL at 22:33

## 2017-07-03 RX ADMIN — FUROSEMIDE 40 MG: 40 TABLET ORAL at 09:34

## 2017-07-03 RX ADMIN — IPRATROPIUM BROMIDE AND ALBUTEROL SULFATE 3 ML: .5; 3 SOLUTION RESPIRATORY (INHALATION) at 20:44

## 2017-07-03 RX ADMIN — IPRATROPIUM BROMIDE AND ALBUTEROL SULFATE 3 ML: .5; 3 SOLUTION RESPIRATORY (INHALATION) at 09:32

## 2017-07-03 RX ADMIN — ACETAMINOPHEN 650 MG: 325 TABLET, FILM COATED ORAL at 09:34

## 2017-07-03 RX ADMIN — ACETAMINOPHEN 650 MG: 325 TABLET, FILM COATED ORAL at 20:44

## 2017-07-03 RX ADMIN — BACLOFEN 5 MG: 10 TABLET ORAL at 20:45

## 2017-07-03 RX ADMIN — OXYCODONE HYDROCHLORIDE 5 MG: 5 TABLET ORAL at 09:44

## 2017-07-03 RX ADMIN — ATORVASTATIN CALCIUM 40 MG: 40 TABLET, FILM COATED ORAL at 09:34

## 2017-07-03 RX ADMIN — BACLOFEN 5 MG: 10 TABLET ORAL at 09:33

## 2017-07-03 RX ADMIN — GUAIFENESIN 200 MG: 100 SOLUTION ORAL at 20:44

## 2017-07-03 RX ADMIN — GABAPENTIN 100 MG: 100 CAPSULE ORAL at 09:32

## 2017-07-03 RX ADMIN — GABAPENTIN 100 MG: 100 CAPSULE ORAL at 18:19

## 2017-07-03 RX ADMIN — POTASSIUM CHLORIDE 20 MEQ: 1.5 POWDER, FOR SOLUTION ORAL at 09:31

## 2017-07-03 RX ADMIN — GUAIFENESIN 200 MG: 100 SOLUTION ORAL at 05:50

## 2017-07-03 RX ADMIN — MAGNESIUM HYDROXIDE 20 ML: 400 SUSPENSION ORAL at 20:44

## 2017-07-03 RX ADMIN — TICAGRELOR 90 MG: 90 TABLET ORAL at 12:30

## 2017-07-03 RX ADMIN — ASPIRIN 81 MG 81 MG: 81 TABLET ORAL at 09:34

## 2017-07-03 RX ADMIN — OXYCODONE HYDROCHLORIDE 5 MG: 5 TABLET ORAL at 12:31

## 2017-07-03 RX ADMIN — GABAPENTIN 300 MG: 250 SOLUTION ORAL at 22:33

## 2017-07-04 LAB
ALBUMIN SERPL BCP-MCNC: 3 G/DL (ref 3.5–5)
ALBUMIN/GLOB SERPL: 0.6 {RATIO} (ref 1.1–2.2)
ALP SERPL-CCNC: 144 U/L (ref 45–117)
ALT SERPL-CCNC: 36 U/L (ref 12–78)
ANION GAP BLD CALC-SCNC: 8 MMOL/L (ref 5–15)
AST SERPL W P-5'-P-CCNC: 23 U/L (ref 15–37)
BILIRUB SERPL-MCNC: 0.6 MG/DL (ref 0.2–1)
BUN SERPL-MCNC: 11 MG/DL (ref 6–20)
BUN/CREAT SERPL: 17 (ref 12–20)
CALCIUM SERPL-MCNC: 8.9 MG/DL (ref 8.5–10.1)
CHLORIDE SERPL-SCNC: 102 MMOL/L (ref 97–108)
CO2 SERPL-SCNC: 27 MMOL/L (ref 21–32)
CREAT SERPL-MCNC: 0.64 MG/DL (ref 0.7–1.3)
ERYTHROCYTE [DISTWIDTH] IN BLOOD BY AUTOMATED COUNT: 16.5 % (ref 11.5–14.5)
GLOBULIN SER CALC-MCNC: 4.9 G/DL (ref 2–4)
GLUCOSE SERPL-MCNC: 118 MG/DL (ref 65–100)
HCT VFR BLD AUTO: 34.6 % (ref 36.6–50.3)
HGB BLD-MCNC: 10.7 G/DL (ref 12.1–17)
MCH RBC QN AUTO: 26.6 PG (ref 26–34)
MCHC RBC AUTO-ENTMCNC: 30.9 G/DL (ref 30–36.5)
MCV RBC AUTO: 86.1 FL (ref 80–99)
PLATELET # BLD AUTO: 295 K/UL (ref 150–400)
POTASSIUM SERPL-SCNC: 4 MMOL/L (ref 3.5–5.1)
PROT SERPL-MCNC: 7.9 G/DL (ref 6.4–8.2)
RBC # BLD AUTO: 4.02 M/UL (ref 4.1–5.7)
SODIUM SERPL-SCNC: 137 MMOL/L (ref 136–145)
WBC # BLD AUTO: 14.2 K/UL (ref 4.1–11.1)

## 2017-07-04 PROCEDURE — 80053 COMPREHEN METABOLIC PANEL: CPT | Performed by: PHYSICAL MEDICINE & REHABILITATION

## 2017-07-04 PROCEDURE — 74011250637 HC RX REV CODE- 250/637: Performed by: PHYSICAL MEDICINE & REHABILITATION

## 2017-07-04 PROCEDURE — 85027 COMPLETE CBC AUTOMATED: CPT | Performed by: PHYSICAL MEDICINE & REHABILITATION

## 2017-07-04 PROCEDURE — 74011000250 HC RX REV CODE- 250: Performed by: PHYSICAL MEDICINE & REHABILITATION

## 2017-07-04 PROCEDURE — 36415 COLL VENOUS BLD VENIPUNCTURE: CPT | Performed by: PHYSICAL MEDICINE & REHABILITATION

## 2017-07-04 PROCEDURE — 74011636637 HC RX REV CODE- 636/637: Performed by: PHYSICAL MEDICINE & REHABILITATION

## 2017-07-04 RX ORDER — BACLOFEN 10 MG/1
5 TABLET ORAL
Status: DISCONTINUED | OUTPATIENT
Start: 2017-07-04 | End: 2017-07-06 | Stop reason: HOSPADM

## 2017-07-04 RX ADMIN — ATORVASTATIN CALCIUM 40 MG: 40 TABLET, FILM COATED ORAL at 08:54

## 2017-07-04 RX ADMIN — TICAGRELOR 90 MG: 90 TABLET ORAL at 20:56

## 2017-07-04 RX ADMIN — IPRATROPIUM BROMIDE AND ALBUTEROL SULFATE 3 ML: .5; 3 SOLUTION RESPIRATORY (INHALATION) at 08:59

## 2017-07-04 RX ADMIN — BACLOFEN 5 MG: 10 TABLET ORAL at 20:52

## 2017-07-04 RX ADMIN — POTASSIUM CHLORIDE 20 MEQ: 1.5 POWDER, FOR SOLUTION ORAL at 08:55

## 2017-07-04 RX ADMIN — GUAIFENESIN 200 MG: 100 SOLUTION ORAL at 08:59

## 2017-07-04 RX ADMIN — BACLOFEN 5 MG: 10 TABLET ORAL at 08:54

## 2017-07-04 RX ADMIN — ACETAMINOPHEN 650 MG: 325 TABLET, FILM COATED ORAL at 20:51

## 2017-07-04 RX ADMIN — TICAGRELOR 90 MG: 90 TABLET ORAL at 08:54

## 2017-07-04 RX ADMIN — ASPIRIN 81 MG 81 MG: 81 TABLET ORAL at 08:55

## 2017-07-04 RX ADMIN — ACETAMINOPHEN 650 MG: 325 TABLET, FILM COATED ORAL at 08:58

## 2017-07-04 RX ADMIN — GABAPENTIN 300 MG: 250 SOLUTION ORAL at 21:00

## 2017-07-04 RX ADMIN — IPRATROPIUM BROMIDE AND ALBUTEROL SULFATE 3 ML: .5; 3 SOLUTION RESPIRATORY (INHALATION) at 22:11

## 2017-07-04 RX ADMIN — GUAIFENESIN 200 MG: 100 SOLUTION ORAL at 20:54

## 2017-07-04 RX ADMIN — GABAPENTIN 100 MG: 100 CAPSULE ORAL at 08:55

## 2017-07-04 RX ADMIN — MAGNESIUM HYDROXIDE 20 ML: 400 SUSPENSION ORAL at 20:52

## 2017-07-04 RX ADMIN — QUETIAPINE FUMARATE 25 MG: 25 TABLET, FILM COATED ORAL at 22:11

## 2017-07-04 RX ADMIN — INSULIN LISPRO 4 UNITS: 100 INJECTION, SOLUTION INTRAVENOUS; SUBCUTANEOUS at 18:27

## 2017-07-04 RX ADMIN — PANTOPRAZOLE SODIUM 40 MG: 40 GRANULE, DELAYED RELEASE ORAL at 06:03

## 2017-07-04 RX ADMIN — INSULIN LISPRO 6 UNITS: 100 INJECTION, SOLUTION INTRAVENOUS; SUBCUTANEOUS at 00:56

## 2017-07-04 RX ADMIN — GABAPENTIN 100 MG: 100 CAPSULE ORAL at 15:52

## 2017-07-04 RX ADMIN — OXYCODONE HYDROCHLORIDE 5 MG: 5 TABLET ORAL at 20:59

## 2017-07-04 RX ADMIN — FUROSEMIDE 40 MG: 40 TABLET ORAL at 08:57

## 2017-07-05 LAB
APPEARANCE UR: ABNORMAL
BACTERIA URNS QL MICRO: ABNORMAL /HPF
BILIRUB UR QL: NEGATIVE
COLOR UR: ABNORMAL
EPITH CASTS URNS QL MICRO: ABNORMAL /LPF
GLUCOSE UR STRIP.AUTO-MCNC: NEGATIVE MG/DL
HGB UR QL STRIP: ABNORMAL
HYALINE CASTS URNS QL MICRO: ABNORMAL /LPF (ref 0–5)
KETONES UR QL STRIP.AUTO: NEGATIVE MG/DL
LEUKOCYTE ESTERASE UR QL STRIP.AUTO: ABNORMAL
NITRITE UR QL STRIP.AUTO: NEGATIVE
PH UR STRIP: 7.5 [PH] (ref 5–8)
PROT UR STRIP-MCNC: NEGATIVE MG/DL
RBC #/AREA URNS HPF: ABNORMAL /HPF (ref 0–5)
SP GR UR REFRACTOMETRY: 1.01 (ref 1–1.03)
UROBILINOGEN UR QL STRIP.AUTO: 0.2 EU/DL (ref 0.2–1)
WBC URNS QL MICRO: >100 /HPF (ref 0–4)

## 2017-07-05 PROCEDURE — 87086 URINE CULTURE/COLONY COUNT: CPT | Performed by: PHYSICAL MEDICINE & REHABILITATION

## 2017-07-05 PROCEDURE — 81001 URINALYSIS AUTO W/SCOPE: CPT | Performed by: PHYSICAL MEDICINE & REHABILITATION

## 2017-07-05 PROCEDURE — 87186 SC STD MICRODIL/AGAR DIL: CPT | Performed by: PHYSICAL MEDICINE & REHABILITATION

## 2017-07-05 PROCEDURE — 74011000250 HC RX REV CODE- 250: Performed by: PHYSICAL MEDICINE & REHABILITATION

## 2017-07-05 PROCEDURE — 74011250637 HC RX REV CODE- 250/637: Performed by: PHYSICAL MEDICINE & REHABILITATION

## 2017-07-05 PROCEDURE — 87077 CULTURE AEROBIC IDENTIFY: CPT | Performed by: PHYSICAL MEDICINE & REHABILITATION

## 2017-07-05 RX ORDER — SULFAMETHOXAZOLE AND TRIMETHOPRIM 800; 160 MG/1; MG/1
1 TABLET ORAL EVERY 12 HOURS
Status: DISCONTINUED | OUTPATIENT
Start: 2017-07-05 | End: 2017-07-06 | Stop reason: HOSPADM

## 2017-07-05 RX ADMIN — GABAPENTIN 300 MG: 250 SOLUTION ORAL at 22:00

## 2017-07-05 RX ADMIN — POTASSIUM CHLORIDE 20 MEQ: 1.5 POWDER, FOR SOLUTION ORAL at 09:22

## 2017-07-05 RX ADMIN — SULFAMETHOXAZOLE AND TRIMETHOPRIM 1 TABLET: 800; 160 TABLET ORAL at 21:04

## 2017-07-05 RX ADMIN — GUAIFENESIN 200 MG: 100 SOLUTION ORAL at 21:05

## 2017-07-05 RX ADMIN — IPRATROPIUM BROMIDE AND ALBUTEROL SULFATE 3 ML: .5; 3 SOLUTION RESPIRATORY (INHALATION) at 21:04

## 2017-07-05 RX ADMIN — IPRATROPIUM BROMIDE AND ALBUTEROL SULFATE 3 ML: .5; 3 SOLUTION RESPIRATORY (INHALATION) at 09:22

## 2017-07-05 RX ADMIN — OXYCODONE HYDROCHLORIDE 5 MG: 5 TABLET ORAL at 14:36

## 2017-07-05 RX ADMIN — GABAPENTIN 100 MG: 100 CAPSULE ORAL at 16:47

## 2017-07-05 RX ADMIN — ACETAMINOPHEN 650 MG: 325 TABLET, FILM COATED ORAL at 09:22

## 2017-07-05 RX ADMIN — ASPIRIN 81 MG 81 MG: 81 TABLET ORAL at 09:22

## 2017-07-05 RX ADMIN — ACETAMINOPHEN 650 MG: 325 TABLET, FILM COATED ORAL at 21:04

## 2017-07-05 RX ADMIN — GUAIFENESIN 200 MG: 100 SOLUTION ORAL at 09:23

## 2017-07-05 RX ADMIN — MAGNESIUM HYDROXIDE 20 ML: 400 SUSPENSION ORAL at 21:05

## 2017-07-05 RX ADMIN — TICAGRELOR 90 MG: 90 TABLET ORAL at 09:23

## 2017-07-05 RX ADMIN — TICAGRELOR 90 MG: 90 TABLET ORAL at 21:41

## 2017-07-05 RX ADMIN — OXYCODONE HYDROCHLORIDE 10 MG: 5 TABLET ORAL at 04:14

## 2017-07-05 RX ADMIN — BACLOFEN 5 MG: 10 TABLET ORAL at 21:05

## 2017-07-05 RX ADMIN — PANTOPRAZOLE SODIUM 40 MG: 40 GRANULE, DELAYED RELEASE ORAL at 06:11

## 2017-07-05 RX ADMIN — ATORVASTATIN CALCIUM 40 MG: 40 TABLET, FILM COATED ORAL at 09:22

## 2017-07-05 RX ADMIN — QUETIAPINE FUMARATE 25 MG: 25 TABLET, FILM COATED ORAL at 21:42

## 2017-07-05 RX ADMIN — FUROSEMIDE 40 MG: 40 TABLET ORAL at 09:22

## 2017-07-05 RX ADMIN — GABAPENTIN 100 MG: 100 CAPSULE ORAL at 09:22

## 2017-07-06 VITALS
HEIGHT: 71 IN | WEIGHT: 185 LBS | SYSTOLIC BLOOD PRESSURE: 123 MMHG | BODY MASS INDEX: 25.9 KG/M2 | HEART RATE: 92 BPM | DIASTOLIC BLOOD PRESSURE: 80 MMHG | OXYGEN SATURATION: 100 %

## 2017-07-06 PROCEDURE — 74011636637 HC RX REV CODE- 636/637: Performed by: PHYSICAL MEDICINE & REHABILITATION

## 2017-07-06 PROCEDURE — 74011000250 HC RX REV CODE- 250: Performed by: PHYSICAL MEDICINE & REHABILITATION

## 2017-07-06 PROCEDURE — 74011250637 HC RX REV CODE- 250/637: Performed by: PHYSICAL MEDICINE & REHABILITATION

## 2017-07-06 RX ORDER — TRIPROLIDINE/PSEUDOEPHEDRINE 2.5MG-60MG
400 TABLET ORAL
Status: COMPLETED | OUTPATIENT
Start: 2017-07-06 | End: 2017-07-06

## 2017-07-06 RX ADMIN — ASPIRIN 81 MG 81 MG: 81 TABLET ORAL at 09:57

## 2017-07-06 RX ADMIN — GABAPENTIN 100 MG: 100 CAPSULE ORAL at 09:57

## 2017-07-06 RX ADMIN — INSULIN LISPRO 6 UNITS: 100 INJECTION, SOLUTION INTRAVENOUS; SUBCUTANEOUS at 00:08

## 2017-07-06 RX ADMIN — PANTOPRAZOLE SODIUM 40 MG: 40 GRANULE, DELAYED RELEASE ORAL at 06:07

## 2017-07-06 RX ADMIN — GABAPENTIN 100 MG: 100 CAPSULE ORAL at 15:00

## 2017-07-06 RX ADMIN — IPRATROPIUM BROMIDE AND ALBUTEROL SULFATE 3 ML: .5; 3 SOLUTION RESPIRATORY (INHALATION) at 09:57

## 2017-07-06 RX ADMIN — ACETAMINOPHEN 650 MG: 325 TABLET, FILM COATED ORAL at 09:57

## 2017-07-06 RX ADMIN — IBUPROFEN 400 MG: 100 SUSPENSION ORAL at 14:57

## 2017-07-06 RX ADMIN — ATORVASTATIN CALCIUM 40 MG: 40 TABLET, FILM COATED ORAL at 09:56

## 2017-07-06 RX ADMIN — OXYCODONE HYDROCHLORIDE 10 MG: 5 TABLET ORAL at 13:01

## 2017-07-06 RX ADMIN — TICAGRELOR 90 MG: 90 TABLET ORAL at 09:56

## 2017-07-06 RX ADMIN — ALPRAZOLAM 0.25 MG: 0.25 TABLET ORAL at 13:19

## 2017-07-06 RX ADMIN — SULFAMETHOXAZOLE AND TRIMETHOPRIM 1 TABLET: 800; 160 TABLET ORAL at 09:57

## 2017-07-06 RX ADMIN — FUROSEMIDE 40 MG: 40 TABLET ORAL at 09:57

## 2017-07-06 RX ADMIN — POTASSIUM CHLORIDE 20 MEQ: 1.5 POWDER, FOR SOLUTION ORAL at 09:57

## 2017-07-06 RX ADMIN — GUAIFENESIN 200 MG: 100 SOLUTION ORAL at 09:58

## 2017-07-08 LAB
BACTERIA SPEC CULT: ABNORMAL
CC UR VC: ABNORMAL
SERVICE CMNT-IMP: ABNORMAL

## 2017-07-18 ENCOUNTER — HOME HEALTH ADMISSION (OUTPATIENT)
Dept: HOME HEALTH SERVICES | Facility: HOME HEALTH | Age: 57
End: 2017-07-18
Payer: COMMERCIAL

## 2017-07-19 ENCOUNTER — HOME CARE VISIT (OUTPATIENT)
Dept: SCHEDULING | Facility: HOME HEALTH | Age: 57
End: 2017-07-19
Payer: COMMERCIAL

## 2017-07-19 VITALS
SYSTOLIC BLOOD PRESSURE: 130 MMHG | OXYGEN SATURATION: 98 % | HEART RATE: 96 BPM | RESPIRATION RATE: 18 BRPM | TEMPERATURE: 96.7 F | DIASTOLIC BLOOD PRESSURE: 75 MMHG

## 2017-07-19 VITALS
HEART RATE: 95 BPM | SYSTOLIC BLOOD PRESSURE: 135 MMHG | TEMPERATURE: 98.2 F | DIASTOLIC BLOOD PRESSURE: 92 MMHG | OXYGEN SATURATION: 99 % | RESPIRATION RATE: 20 BRPM

## 2017-07-19 PROCEDURE — G0152 HHCP-SERV OF OT,EA 15 MIN: HCPCS

## 2017-07-19 PROCEDURE — 400013 HH SOC

## 2017-07-19 PROCEDURE — G0151 HHCP-SERV OF PT,EA 15 MIN: HCPCS

## 2017-07-19 PROCEDURE — G0153 HHCP-SVS OF S/L PATH,EA 15MN: HCPCS

## 2017-07-20 ENCOUNTER — HOME CARE VISIT (OUTPATIENT)
Dept: SCHEDULING | Facility: HOME HEALTH | Age: 57
End: 2017-07-20
Payer: COMMERCIAL

## 2017-07-20 VITALS
RESPIRATION RATE: 17 BRPM | DIASTOLIC BLOOD PRESSURE: 87 MMHG | TEMPERATURE: 98 F | SYSTOLIC BLOOD PRESSURE: 139 MMHG | OXYGEN SATURATION: 97 % | HEART RATE: 96 BPM

## 2017-07-20 PROCEDURE — G0153 HHCP-SVS OF S/L PATH,EA 15MN: HCPCS

## 2017-07-21 ENCOUNTER — HOME CARE VISIT (OUTPATIENT)
Dept: SCHEDULING | Facility: HOME HEALTH | Age: 57
End: 2017-07-21
Payer: COMMERCIAL

## 2017-07-21 VITALS
SYSTOLIC BLOOD PRESSURE: 120 MMHG | HEIGHT: 69 IN | HEART RATE: 98 BPM | OXYGEN SATURATION: 95 % | TEMPERATURE: 99.7 F | RESPIRATION RATE: 18 BRPM | BODY MASS INDEX: 27.55 KG/M2 | WEIGHT: 186 LBS | DIASTOLIC BLOOD PRESSURE: 70 MMHG

## 2017-07-21 VITALS
TEMPERATURE: 97.7 F | OXYGEN SATURATION: 98 % | DIASTOLIC BLOOD PRESSURE: 88 MMHG | HEART RATE: 100 BPM | SYSTOLIC BLOOD PRESSURE: 139 MMHG

## 2017-07-21 VITALS
DIASTOLIC BLOOD PRESSURE: 83 MMHG | TEMPERATURE: 97.6 F | HEART RATE: 99 BPM | OXYGEN SATURATION: 94 % | SYSTOLIC BLOOD PRESSURE: 134 MMHG | RESPIRATION RATE: 20 BRPM

## 2017-07-21 PROCEDURE — G0153 HHCP-SVS OF S/L PATH,EA 15MN: HCPCS

## 2017-07-21 PROCEDURE — G0299 HHS/HOSPICE OF RN EA 15 MIN: HCPCS

## 2017-07-22 ENCOUNTER — HOME CARE VISIT (OUTPATIENT)
Dept: SCHEDULING | Facility: HOME HEALTH | Age: 57
End: 2017-07-22
Payer: COMMERCIAL

## 2017-07-22 ENCOUNTER — HOME CARE VISIT (OUTPATIENT)
Dept: HOME HEALTH SERVICES | Facility: HOME HEALTH | Age: 57
End: 2017-07-22
Payer: COMMERCIAL

## 2017-07-22 VITALS
DIASTOLIC BLOOD PRESSURE: 78 MMHG | TEMPERATURE: 97.7 F | RESPIRATION RATE: 16 BRPM | HEART RATE: 100 BPM | SYSTOLIC BLOOD PRESSURE: 128 MMHG | OXYGEN SATURATION: 98 %

## 2017-07-22 PROCEDURE — G0299 HHS/HOSPICE OF RN EA 15 MIN: HCPCS

## 2017-07-24 ENCOUNTER — HOME CARE VISIT (OUTPATIENT)
Dept: SCHEDULING | Facility: HOME HEALTH | Age: 57
End: 2017-07-24
Payer: COMMERCIAL

## 2017-07-24 ENCOUNTER — HOME CARE VISIT (OUTPATIENT)
Dept: HOME HEALTH SERVICES | Facility: HOME HEALTH | Age: 57
End: 2017-07-24
Payer: COMMERCIAL

## 2017-07-24 VITALS
RESPIRATION RATE: 20 BRPM | DIASTOLIC BLOOD PRESSURE: 80 MMHG | OXYGEN SATURATION: 98 % | HEART RATE: 100 BPM | TEMPERATURE: 98.5 F | SYSTOLIC BLOOD PRESSURE: 134 MMHG

## 2017-07-24 VITALS
OXYGEN SATURATION: 98 % | HEART RATE: 120 BPM | TEMPERATURE: 96.6 F | SYSTOLIC BLOOD PRESSURE: 129 MMHG | DIASTOLIC BLOOD PRESSURE: 80 MMHG | RESPIRATION RATE: 20 BRPM

## 2017-07-24 PROCEDURE — G0153 HHCP-SVS OF S/L PATH,EA 15MN: HCPCS

## 2017-07-24 PROCEDURE — G0152 HHCP-SERV OF OT,EA 15 MIN: HCPCS

## 2017-07-24 PROCEDURE — A4216 STERILE WATER/SALINE, 10 ML: HCPCS

## 2017-07-25 ENCOUNTER — HOME CARE VISIT (OUTPATIENT)
Dept: SCHEDULING | Facility: HOME HEALTH | Age: 57
End: 2017-07-25
Payer: COMMERCIAL

## 2017-07-25 VITALS
RESPIRATION RATE: 18 BRPM | HEART RATE: 88 BPM | OXYGEN SATURATION: 98 % | TEMPERATURE: 98.6 F | SYSTOLIC BLOOD PRESSURE: 122 MMHG | DIASTOLIC BLOOD PRESSURE: 78 MMHG

## 2017-07-25 PROCEDURE — G0156 HHCP-SVS OF AIDE,EA 15 MIN: HCPCS

## 2017-07-25 PROCEDURE — G0299 HHS/HOSPICE OF RN EA 15 MIN: HCPCS

## 2017-07-26 ENCOUNTER — HOME CARE VISIT (OUTPATIENT)
Dept: SCHEDULING | Facility: HOME HEALTH | Age: 57
End: 2017-07-26
Payer: COMMERCIAL

## 2017-07-26 VITALS
TEMPERATURE: 97.6 F | DIASTOLIC BLOOD PRESSURE: 80 MMHG | RESPIRATION RATE: 18 BRPM | SYSTOLIC BLOOD PRESSURE: 126 MMHG | OXYGEN SATURATION: 99 % | HEART RATE: 91 BPM

## 2017-07-26 VITALS
RESPIRATION RATE: 18 BRPM | HEART RATE: 93 BPM | OXYGEN SATURATION: 98 % | SYSTOLIC BLOOD PRESSURE: 128 MMHG | DIASTOLIC BLOOD PRESSURE: 80 MMHG | TEMPERATURE: 98 F

## 2017-07-26 PROCEDURE — G0153 HHCP-SVS OF S/L PATH,EA 15MN: HCPCS

## 2017-07-26 PROCEDURE — G0158 HHC OT ASSISTANT EA 15: HCPCS

## 2017-07-26 PROCEDURE — G0157 HHC PT ASSISTANT EA 15: HCPCS

## 2017-07-27 ENCOUNTER — HOSPITAL ENCOUNTER (OUTPATIENT)
Dept: NON INVASIVE DIAGNOSTICS | Age: 57
Discharge: HOME | End: 2017-07-27
Attending: INTERNAL MEDICINE
Payer: COMMERCIAL

## 2017-07-27 VITALS
SYSTOLIC BLOOD PRESSURE: 121 MMHG | OXYGEN SATURATION: 98 % | DIASTOLIC BLOOD PRESSURE: 83 MMHG | TEMPERATURE: 98.4 F | RESPIRATION RATE: 20 BRPM | HEART RATE: 96 BPM

## 2017-07-27 DIAGNOSIS — R55 SYNCOPE AND COLLAPSE: ICD-10-CM

## 2017-07-27 DIAGNOSIS — I25.10 CORONARY ATHEROSCLEROSIS OF NATIVE CORONARY ARTERY: ICD-10-CM

## 2017-07-27 PROCEDURE — 93306 TTE W/DOPPLER COMPLETE: CPT

## 2017-07-28 ENCOUNTER — HOME CARE VISIT (OUTPATIENT)
Dept: SCHEDULING | Facility: HOME HEALTH | Age: 57
End: 2017-07-28
Payer: COMMERCIAL

## 2017-07-28 VITALS
DIASTOLIC BLOOD PRESSURE: 78 MMHG | SYSTOLIC BLOOD PRESSURE: 140 MMHG | OXYGEN SATURATION: 98 % | HEART RATE: 86 BPM | TEMPERATURE: 98.8 F

## 2017-07-28 VITALS
SYSTOLIC BLOOD PRESSURE: 120 MMHG | DIASTOLIC BLOOD PRESSURE: 78 MMHG | HEART RATE: 84 BPM | OXYGEN SATURATION: 98 % | RESPIRATION RATE: 16 BRPM

## 2017-07-28 PROCEDURE — G0299 HHS/HOSPICE OF RN EA 15 MIN: HCPCS

## 2017-07-28 PROCEDURE — G0157 HHC PT ASSISTANT EA 15: HCPCS

## 2017-07-28 PROCEDURE — G0156 HHCP-SVS OF AIDE,EA 15 MIN: HCPCS

## 2017-07-30 ENCOUNTER — HOME CARE VISIT (OUTPATIENT)
Dept: HOME HEALTH SERVICES | Facility: HOME HEALTH | Age: 57
End: 2017-07-30
Payer: COMMERCIAL

## 2017-07-31 ENCOUNTER — HOME CARE VISIT (OUTPATIENT)
Dept: HOME HEALTH SERVICES | Facility: HOME HEALTH | Age: 57
End: 2017-07-31
Payer: COMMERCIAL

## 2017-08-03 ENCOUNTER — HOME CARE VISIT (OUTPATIENT)
Dept: SCHEDULING | Facility: HOME HEALTH | Age: 57
End: 2017-08-03
Payer: COMMERCIAL

## 2017-08-03 VITALS
RESPIRATION RATE: 16 BRPM | TEMPERATURE: 98.5 F | DIASTOLIC BLOOD PRESSURE: 65 MMHG | HEART RATE: 97 BPM | SYSTOLIC BLOOD PRESSURE: 110 MMHG | OXYGEN SATURATION: 97 %

## 2017-08-03 VITALS
BODY MASS INDEX: 26.32 KG/M2 | OXYGEN SATURATION: 97 % | DIASTOLIC BLOOD PRESSURE: 64 MMHG | WEIGHT: 188 LBS | HEIGHT: 71 IN | TEMPERATURE: 98.5 F | HEART RATE: 97 BPM | SYSTOLIC BLOOD PRESSURE: 110 MMHG

## 2017-08-03 PROCEDURE — G0151 HHCP-SERV OF PT,EA 15 MIN: HCPCS

## 2017-08-03 PROCEDURE — G0299 HHS/HOSPICE OF RN EA 15 MIN: HCPCS

## 2017-08-07 ENCOUNTER — HOME CARE VISIT (OUTPATIENT)
Dept: SCHEDULING | Facility: HOME HEALTH | Age: 57
End: 2017-08-07
Payer: COMMERCIAL

## 2017-08-07 VITALS
OXYGEN SATURATION: 99 % | RESPIRATION RATE: 18 BRPM | DIASTOLIC BLOOD PRESSURE: 52 MMHG | TEMPERATURE: 97.8 F | SYSTOLIC BLOOD PRESSURE: 104 MMHG | HEART RATE: 77 BPM

## 2017-08-07 PROCEDURE — G0157 HHC PT ASSISTANT EA 15: HCPCS

## 2017-08-09 ENCOUNTER — HOME CARE VISIT (OUTPATIENT)
Dept: SCHEDULING | Facility: HOME HEALTH | Age: 57
End: 2017-08-09
Payer: COMMERCIAL

## 2017-08-09 VITALS
OXYGEN SATURATION: 99 % | RESPIRATION RATE: 17 BRPM | SYSTOLIC BLOOD PRESSURE: 100 MMHG | DIASTOLIC BLOOD PRESSURE: 56 MMHG | TEMPERATURE: 97.8 F | HEART RATE: 76 BPM

## 2017-08-09 VITALS
HEART RATE: 87 BPM | DIASTOLIC BLOOD PRESSURE: 50 MMHG | TEMPERATURE: 97.9 F | SYSTOLIC BLOOD PRESSURE: 100 MMHG | RESPIRATION RATE: 18 BRPM | OXYGEN SATURATION: 97 %

## 2017-08-09 PROCEDURE — G0299 HHS/HOSPICE OF RN EA 15 MIN: HCPCS

## 2017-08-09 PROCEDURE — G0157 HHC PT ASSISTANT EA 15: HCPCS

## 2017-08-11 ENCOUNTER — HOME CARE VISIT (OUTPATIENT)
Dept: SCHEDULING | Facility: HOME HEALTH | Age: 57
End: 2017-08-11
Payer: COMMERCIAL

## 2017-08-11 ENCOUNTER — HOME CARE VISIT (OUTPATIENT)
Dept: HOME HEALTH SERVICES | Facility: HOME HEALTH | Age: 57
End: 2017-08-11
Payer: COMMERCIAL

## 2017-08-11 VITALS
DIASTOLIC BLOOD PRESSURE: 8 MMHG | HEART RATE: 76 BPM | TEMPERATURE: 98 F | OXYGEN SATURATION: 98 % | SYSTOLIC BLOOD PRESSURE: 105 MMHG

## 2017-08-11 PROCEDURE — G0156 HHCP-SVS OF AIDE,EA 15 MIN: HCPCS

## 2017-08-11 PROCEDURE — G0152 HHCP-SERV OF OT,EA 15 MIN: HCPCS

## 2017-08-11 PROCEDURE — G0157 HHC PT ASSISTANT EA 15: HCPCS

## 2017-08-13 VITALS
RESPIRATION RATE: 17 BRPM | OXYGEN SATURATION: 96 % | TEMPERATURE: 98.3 F | HEART RATE: 82 BPM | SYSTOLIC BLOOD PRESSURE: 104 MMHG | DIASTOLIC BLOOD PRESSURE: 62 MMHG

## 2017-08-14 ENCOUNTER — HOME CARE VISIT (OUTPATIENT)
Dept: SCHEDULING | Facility: HOME HEALTH | Age: 57
End: 2017-08-14
Payer: COMMERCIAL

## 2017-08-14 VITALS
SYSTOLIC BLOOD PRESSURE: 102 MMHG | OXYGEN SATURATION: 98 % | DIASTOLIC BLOOD PRESSURE: 65 MMHG | TEMPERATURE: 97.3 F | RESPIRATION RATE: 17 BRPM | HEART RATE: 66 BPM

## 2017-08-14 PROCEDURE — G0157 HHC PT ASSISTANT EA 15: HCPCS

## 2017-08-14 PROCEDURE — G0152 HHCP-SERV OF OT,EA 15 MIN: HCPCS

## 2017-08-15 ENCOUNTER — HOME CARE VISIT (OUTPATIENT)
Dept: SCHEDULING | Facility: HOME HEALTH | Age: 57
End: 2017-08-15
Payer: COMMERCIAL

## 2017-08-15 ENCOUNTER — HOME CARE VISIT (OUTPATIENT)
Dept: HOME HEALTH SERVICES | Facility: HOME HEALTH | Age: 57
End: 2017-08-15
Payer: COMMERCIAL

## 2017-08-15 PROCEDURE — G0156 HHCP-SVS OF AIDE,EA 15 MIN: HCPCS

## 2017-08-16 ENCOUNTER — HOME CARE VISIT (OUTPATIENT)
Dept: SCHEDULING | Facility: HOME HEALTH | Age: 57
End: 2017-08-16
Payer: COMMERCIAL

## 2017-08-16 VITALS
DIASTOLIC BLOOD PRESSURE: 64 MMHG | OXYGEN SATURATION: 98 % | RESPIRATION RATE: 17 BRPM | HEART RATE: 86 BPM | SYSTOLIC BLOOD PRESSURE: 120 MMHG

## 2017-08-16 VITALS
HEART RATE: 82 BPM | SYSTOLIC BLOOD PRESSURE: 101 MMHG | DIASTOLIC BLOOD PRESSURE: 57 MMHG | OXYGEN SATURATION: 97 % | RESPIRATION RATE: 20 BRPM | TEMPERATURE: 98.4 F

## 2017-08-16 PROCEDURE — G0153 HHCP-SVS OF S/L PATH,EA 15MN: HCPCS

## 2017-08-16 PROCEDURE — G0299 HHS/HOSPICE OF RN EA 15 MIN: HCPCS

## 2017-08-16 PROCEDURE — G0157 HHC PT ASSISTANT EA 15: HCPCS

## 2017-08-17 ENCOUNTER — HOME CARE VISIT (OUTPATIENT)
Dept: SCHEDULING | Facility: HOME HEALTH | Age: 57
End: 2017-08-17
Payer: COMMERCIAL

## 2017-08-17 VITALS
SYSTOLIC BLOOD PRESSURE: 105 MMHG | OXYGEN SATURATION: 98 % | RESPIRATION RATE: 14 BRPM | TEMPERATURE: 96.8 F | HEART RATE: 85 BPM | DIASTOLIC BLOOD PRESSURE: 75 MMHG

## 2017-08-17 PROCEDURE — G0152 HHCP-SERV OF OT,EA 15 MIN: HCPCS

## 2017-08-18 ENCOUNTER — HOME CARE VISIT (OUTPATIENT)
Dept: SCHEDULING | Facility: HOME HEALTH | Age: 57
End: 2017-08-18
Payer: COMMERCIAL

## 2017-08-18 PROCEDURE — G0156 HHCP-SVS OF AIDE,EA 15 MIN: HCPCS

## 2017-08-21 ENCOUNTER — HOME CARE VISIT (OUTPATIENT)
Dept: SCHEDULING | Facility: HOME HEALTH | Age: 57
End: 2017-08-21
Payer: COMMERCIAL

## 2017-08-21 VITALS — DIASTOLIC BLOOD PRESSURE: 60 MMHG | SYSTOLIC BLOOD PRESSURE: 108 MMHG

## 2017-08-21 PROCEDURE — G0157 HHC PT ASSISTANT EA 15: HCPCS

## 2017-08-21 PROCEDURE — G0299 HHS/HOSPICE OF RN EA 15 MIN: HCPCS

## 2017-08-22 ENCOUNTER — HOME CARE VISIT (OUTPATIENT)
Dept: SCHEDULING | Facility: HOME HEALTH | Age: 57
End: 2017-08-22
Payer: COMMERCIAL

## 2017-08-22 VITALS
SYSTOLIC BLOOD PRESSURE: 100 MMHG | TEMPERATURE: 98.2 F | DIASTOLIC BLOOD PRESSURE: 60 MMHG | OXYGEN SATURATION: 98 % | HEART RATE: 76 BPM

## 2017-08-22 VITALS
TEMPERATURE: 97.9 F | RESPIRATION RATE: 20 BRPM | HEART RATE: 99 BPM | SYSTOLIC BLOOD PRESSURE: 114 MMHG | DIASTOLIC BLOOD PRESSURE: 75 MMHG | OXYGEN SATURATION: 98 %

## 2017-08-22 PROCEDURE — G0152 HHCP-SERV OF OT,EA 15 MIN: HCPCS

## 2017-08-22 PROCEDURE — G0153 HHCP-SVS OF S/L PATH,EA 15MN: HCPCS

## 2017-08-23 ENCOUNTER — HOME CARE VISIT (OUTPATIENT)
Dept: SCHEDULING | Facility: HOME HEALTH | Age: 57
End: 2017-08-23
Payer: COMMERCIAL

## 2017-08-23 VITALS
HEART RATE: 65 BPM | OXYGEN SATURATION: 98 % | DIASTOLIC BLOOD PRESSURE: 68 MMHG | RESPIRATION RATE: 16 BRPM | SYSTOLIC BLOOD PRESSURE: 115 MMHG

## 2017-08-23 PROCEDURE — G0156 HHCP-SVS OF AIDE,EA 15 MIN: HCPCS

## 2017-08-23 PROCEDURE — G0299 HHS/HOSPICE OF RN EA 15 MIN: HCPCS

## 2017-08-23 PROCEDURE — G0157 HHC PT ASSISTANT EA 15: HCPCS

## 2017-08-23 PROCEDURE — G0155 HHCP-SVS OF CSW,EA 15 MIN: HCPCS

## 2017-08-24 ENCOUNTER — HOME CARE VISIT (OUTPATIENT)
Dept: HOME HEALTH SERVICES | Facility: HOME HEALTH | Age: 57
End: 2017-08-24
Payer: COMMERCIAL

## 2017-08-24 VITALS
HEART RATE: 75 BPM | TEMPERATURE: 97.6 F | DIASTOLIC BLOOD PRESSURE: 60 MMHG | OXYGEN SATURATION: 94 % | SYSTOLIC BLOOD PRESSURE: 106 MMHG | RESPIRATION RATE: 18 BRPM

## 2017-08-25 ENCOUNTER — HOME CARE VISIT (OUTPATIENT)
Dept: SCHEDULING | Facility: HOME HEALTH | Age: 57
End: 2017-08-25
Payer: COMMERCIAL

## 2017-08-25 PROCEDURE — G0156 HHCP-SVS OF AIDE,EA 15 MIN: HCPCS

## 2017-08-25 PROCEDURE — G0152 HHCP-SERV OF OT,EA 15 MIN: HCPCS

## 2017-08-25 RX ORDER — METOPROLOL TARTRATE 25 MG/1
TABLET, FILM COATED ORAL
Qty: 60 TAB | Refills: 0 | Status: SHIPPED | OUTPATIENT
Start: 2017-08-25 | End: 2017-10-24 | Stop reason: SDUPTHER

## 2017-08-26 VITALS
HEART RATE: 75 BPM | TEMPERATURE: 97.3 F | OXYGEN SATURATION: 96 % | SYSTOLIC BLOOD PRESSURE: 110 MMHG | DIASTOLIC BLOOD PRESSURE: 66 MMHG

## 2017-08-28 ENCOUNTER — HOME CARE VISIT (OUTPATIENT)
Dept: SCHEDULING | Facility: HOME HEALTH | Age: 57
End: 2017-08-28
Payer: COMMERCIAL

## 2017-08-28 PROCEDURE — G0156 HHCP-SVS OF AIDE,EA 15 MIN: HCPCS

## 2017-08-29 ENCOUNTER — HOME CARE VISIT (OUTPATIENT)
Dept: SCHEDULING | Facility: HOME HEALTH | Age: 57
End: 2017-08-29
Payer: COMMERCIAL

## 2017-08-29 VITALS
HEART RATE: 93 BPM | SYSTOLIC BLOOD PRESSURE: 109 MMHG | OXYGEN SATURATION: 99 % | TEMPERATURE: 96.7 F | RESPIRATION RATE: 16 BRPM | DIASTOLIC BLOOD PRESSURE: 74 MMHG

## 2017-08-29 VITALS
DIASTOLIC BLOOD PRESSURE: 74 MMHG | HEART RATE: 93 BPM | OXYGEN SATURATION: 99 % | TEMPERATURE: 96.7 F | SYSTOLIC BLOOD PRESSURE: 109 MMHG

## 2017-08-29 PROCEDURE — G0299 HHS/HOSPICE OF RN EA 15 MIN: HCPCS

## 2017-08-29 PROCEDURE — G0152 HHCP-SERV OF OT,EA 15 MIN: HCPCS

## 2017-08-29 PROCEDURE — G0153 HHCP-SVS OF S/L PATH,EA 15MN: HCPCS

## 2017-08-30 ENCOUNTER — HOME CARE VISIT (OUTPATIENT)
Dept: HOME HEALTH SERVICES | Facility: HOME HEALTH | Age: 57
End: 2017-08-30
Payer: COMMERCIAL

## 2017-08-31 ENCOUNTER — HOME CARE VISIT (OUTPATIENT)
Dept: SCHEDULING | Facility: HOME HEALTH | Age: 57
End: 2017-08-31

## 2017-09-01 ENCOUNTER — HOME CARE VISIT (OUTPATIENT)
Dept: SCHEDULING | Facility: HOME HEALTH | Age: 57
End: 2017-09-01
Payer: COMMERCIAL

## 2017-09-01 ENCOUNTER — HOME CARE VISIT (OUTPATIENT)
Dept: HOME HEALTH SERVICES | Facility: HOME HEALTH | Age: 57
End: 2017-09-01
Payer: COMMERCIAL

## 2017-09-01 VITALS — SYSTOLIC BLOOD PRESSURE: 111 MMHG | TEMPERATURE: 96.7 F | DIASTOLIC BLOOD PRESSURE: 75 MMHG | HEART RATE: 86 BPM

## 2017-09-01 PROCEDURE — G0156 HHCP-SVS OF AIDE,EA 15 MIN: HCPCS

## 2017-09-01 PROCEDURE — G0152 HHCP-SERV OF OT,EA 15 MIN: HCPCS

## 2017-09-05 ENCOUNTER — HOSPITAL ENCOUNTER (OUTPATIENT)
Dept: GENERAL RADIOLOGY | Age: 57
Discharge: HOME | End: 2017-09-05
Attending: OTOLARYNGOLOGY
Payer: COMMERCIAL

## 2017-09-05 DIAGNOSIS — R13.10 DYSPHAGIA: ICD-10-CM

## 2017-09-05 DIAGNOSIS — Z43.0 ATTENTION TO TRACHEOSTOMY (HCC): ICD-10-CM

## 2017-09-05 DIAGNOSIS — R06.83 SNORING: ICD-10-CM

## 2017-09-05 DIAGNOSIS — J38.00 VOCAL CORD PARALYSIS: ICD-10-CM

## 2017-09-05 PROCEDURE — G8997 SWALLOW GOAL STATUS: HCPCS | Performed by: SPEECH-LANGUAGE PATHOLOGIST

## 2017-09-05 PROCEDURE — 92611 MOTION FLUOROSCOPY/SWALLOW: CPT | Performed by: SPEECH-LANGUAGE PATHOLOGIST

## 2017-09-05 PROCEDURE — 74230 X-RAY XM SWLNG FUNCJ C+: CPT

## 2017-09-05 PROCEDURE — G8998 SWALLOW D/C STATUS: HCPCS | Performed by: SPEECH-LANGUAGE PATHOLOGIST

## 2017-09-05 PROCEDURE — G8996 SWALLOW CURRENT STATUS: HCPCS | Performed by: SPEECH-LANGUAGE PATHOLOGIST

## 2017-09-05 NOTE — PROGRESS NOTES
[address] [place] [address] 12, [facility] 22.    Speech Pathology Modified barium swallow Study        Patient: Terra Reyes (56 y.o. male)  Date: 9/5/2017  Primary Diagnosis: Snoring [R06.83]  Dysphagia [R13.10]  Vocal cord paralysis [J38.00]  Attention to tracheostomy ([address] [Z43.0]        Precautions: none       ASSESSMENT :  Based on the objective data described below, the patient presents with mild oral dysphagia (fast impulsive possibly incomplete chewing) and moderate-severe pharyngeal dysphagia c/b incomplete and decreased epiglottic inversion and laryngeal elevation allowing for penetration before the swallow and that  Material is then aspirated with the force of the swallow. Patient was unaware and no effort was made to eject. Penetrated material remains in laryngeal vestibule thereafter. With a cued cough, material was not fully cleared and very trace amounts of barium remained below the TVFs later in the study. Results were similar with thin cup sips and nectar cup sips as above    There was no penetration or aspiration with thin spoon sips, nectar with chin tuck, or honey thick liquids. With solids, patient chewed and swallowed very quickly and may have incomplete chewed, but fully cleared bolus with several swallows and no liquid wash. This occurred very quickly within moments of cracker being introduced. Attempted multiple small pills uncrushed in puree and large barium tablet with sip of honey liquid, both swallowed without difficulty. Patient will benefit from skilled intervention to address the above impairments.   Patients rehabilitation potential is considered to be Good to Fair  Factors which may influence rehabilitation potential include:   []              None noted  []              Mental ability/status  [x]              Medical condition  []              Home/family situation and support systems  []              Safety awareness  []              Pain tolerance/management  []              Other:      PLAN :  Recommendations and Planned Interventions:  1. Continue honey thick liquids at home, [facility] soft or soft solids  2. Continue home SLP tx as desired by home SLP POC  3. Trials of nectar liquids with chin tuck if desired by [facility] SLP, but patient will need 1:1 cueing for this  4. Begin to introduce meds PO. Discussed this with son and to start with small pills at first and progress as tolerated. Can be given crushed, or in puree or with honey liquids. SUBJECTIVE:   Patient agreeable to text. Son present to translate    OBJECTIVE:     Past Medical History:   Diagnosis Date    Anoxic brain injury [facility])     following cardiac arrest; MRI shows involvement BG and temporal lobes    Aspiration [facility]     during cardiac arrest intubation    Bradycardia     CAD (coronary artery disease)     Cardiac arrest ([facility]     no intervention by [place] cardiology. [address] [facility] [facility]    Diabetes [facility])     Essential hypertension     Hyperlipidemia      Past Surgical History:   Procedure Laterality Date    HX CORONARY ARTERY BYPASS GRAFT      HX CORONARY STENT PLACEMENT      HX HEART CATHETERIZATION      HX OTHER SURGICAL  05/12/2017    PEG and trach    [facility] TUBE CHANGE  5/25/2017          Prior Level of Function/Home Situation: lives at home with family     Diet prior to admission: [facility] soft and honey liquids  Current Diet:  same   Radiologist: Raine Paci Views: Lateral  Patient Position: upright in chair      Trial 1: Trial 2:   Consistency Presented: Solid; Thin liquid;Pill/Tablet; Nectar thick liquid;Honey thick liquid;Puree     How Presented: SLP-fed/presented;Cup/gulp; Self-fed/presented;Spoon           Bolus Acceptance: Impaired     Bolus Formation/Control: No impairment:    :     Propulsion: No impairment     Oral Residue: None     Initiation of Swallow: Triggered at vallecula           Penetration: Trace; Before swallow;During swallow; After swallow; To cords     Aspiration/Timing: Silent ;Trace; Before;During     Pharyngeal Clearance: 10-50%; Less than 10%     Attempted Modifications: Chin tuck; Left head turn;Right head turn     Effective Modifications: Chin tuck; Small sips and bites     Cues for Modifications: Minimal             Trial 3: Trial 4:                            :    :                                                                           Laryngeal Elevation: Incomplete laryngeal closure; Reduced excursion with laryngeal vestibule gap  Aspiration/Penetration Score: 8 (Aspiration-Contrast passes cords/glottis with no effort to eject, ie/silent aspiration)  Pharyngeal Symmetry: Not assessed  Pharyngeal-Esophageal Segment: No impairment  Pharyngeal Dysfunction: None    Oral Phase Severity: Other (comment)  Pharyngeal Phase Severity: Moderately severe  NOMS:   The NOMS functional outcome measure was used to quantify this patient's level of swallowing impairment. Based on the NOMS, the patient was determined to be at level  for swallow function     G Codes: In compliance with CMSs Claims Based Outcome Reporting, the following G-code set was chosen for this patient based the use of the NOMS functional outcome to quantify this patient's level of swallowing impairment. Using the NOMS, the patient was determined to be at level 3 for swallow function which correlates with the CL= 60-79% level of severity.     Based on the objective assessment provided within this note, the current, goal, and discharge g-codes are as follows:    Swallow  Swallowing:   Swallow Current Status CL= 60-79%   Swallow Goal Status CL= 60-79%   Swallow D/C Status CL= 60-79%        NOMS Swallowing Levels:  Level 1 (CN): NPO  Level 2 (CM): NPO but takes consistency in therapy  Level 3 (CL): Takes less than 50% of nutrition p.o. and continues with nonoral feedings; and/or safe with mod cues; and/or max diet restriction  Level 4 (CK): Safe swallow but needs mod cues; and/or mod diet restriction; and/or still requires some nonoral feeding/supplements  Level 5 (CJ): Safe swallow with min diet restriction; and/or needs min cues  Level 6 (CI): Independent with p.o.; rare cues; usually self cues; may need to avoid some foods or needs extra time  Level 7 ([address] [place] [address]): Independent for all p.o.  SAJAN. (2003). National Outcomes Measurement System (NOMS): Adult Speech-Language Pathology User's Guide.        COMMUNICATION/EDUCATION:   The patients plan of care including findings from [facility], recommendations, planned interventions, and recommended diet changes were discussed with: Speech Therapist.from [address]           Thank you for this referral.  Denise Adame, SLP  Time Calculation: 30 mins

## 2017-09-06 ENCOUNTER — HOME CARE VISIT (OUTPATIENT)
Dept: SCHEDULING | Facility: HOME HEALTH | Age: 57
End: 2017-09-06
Payer: COMMERCIAL

## 2017-09-06 ENCOUNTER — HOME CARE VISIT (OUTPATIENT)
Dept: HOME HEALTH SERVICES | Facility: HOME HEALTH | Age: 57
End: 2017-09-06
Payer: COMMERCIAL

## 2017-09-06 VITALS
DIASTOLIC BLOOD PRESSURE: 65 MMHG | OXYGEN SATURATION: 98 % | HEART RATE: 82 BPM | TEMPERATURE: 97.7 F | SYSTOLIC BLOOD PRESSURE: 95 MMHG | RESPIRATION RATE: 16 BRPM

## 2017-09-06 PROCEDURE — G0153 HHCP-SVS OF S/L PATH,EA 15MN: HCPCS

## 2017-09-07 ENCOUNTER — HOME CARE VISIT (OUTPATIENT)
Dept: SCHEDULING | Facility: HOME HEALTH | Age: 57
End: 2017-09-07
Payer: COMMERCIAL

## 2017-09-07 ENCOUNTER — HOME CARE VISIT (OUTPATIENT)
Dept: HOME HEALTH SERVICES | Facility: HOME HEALTH | Age: 57
End: 2017-09-07
Payer: COMMERCIAL

## 2017-09-07 VITALS
SYSTOLIC BLOOD PRESSURE: 105 MMHG | RESPIRATION RATE: 16 BRPM | DIASTOLIC BLOOD PRESSURE: 65 MMHG | OXYGEN SATURATION: 98 % | HEART RATE: 86 BPM | TEMPERATURE: 98.6 F

## 2017-09-07 PROCEDURE — G0299 HHS/HOSPICE OF RN EA 15 MIN: HCPCS

## 2017-09-08 ENCOUNTER — HOME CARE VISIT (OUTPATIENT)
Dept: SCHEDULING | Facility: HOME HEALTH | Age: 57
End: 2017-09-08
Payer: COMMERCIAL

## 2017-09-08 VITALS
TEMPERATURE: 97.7 F | DIASTOLIC BLOOD PRESSURE: 61 MMHG | RESPIRATION RATE: 20 BRPM | OXYGEN SATURATION: 98 % | HEART RATE: 74 BPM | SYSTOLIC BLOOD PRESSURE: 94 MMHG

## 2017-09-08 PROCEDURE — G0153 HHCP-SVS OF S/L PATH,EA 15MN: HCPCS

## 2017-09-08 PROCEDURE — G0156 HHCP-SVS OF AIDE,EA 15 MIN: HCPCS

## 2017-09-28 ENCOUNTER — HOSPITAL ENCOUNTER (OUTPATIENT)
Dept: MRI IMAGING | Age: 57
Discharge: HOME | End: 2017-09-28
Attending: FAMILY MEDICINE
Payer: COMMERCIAL

## 2017-09-28 DIAGNOSIS — M50.30 DEGENERATION OF CERVICAL INTERVERTEBRAL DISC: ICD-10-CM

## 2017-09-28 PROCEDURE — 72141 MRI NECK SPINE W/O DYE: CPT

## 2017-10-11 ENCOUNTER — HOSPITAL ENCOUNTER (OUTPATIENT)
Dept: MRI IMAGING | Age: 57
Discharge: HOME | End: 2017-10-11
Attending: ORTHOPAEDIC SURGERY
Payer: COMMERCIAL

## 2017-10-11 DIAGNOSIS — M25.511 RIGHT SHOULDER PAIN: ICD-10-CM

## 2017-10-11 DIAGNOSIS — M54.12 CERVICAL RADICULOPATHY: ICD-10-CM

## 2017-10-11 PROCEDURE — 73221 MRI JOINT UPR EXTREM W/O DYE: CPT

## 2017-10-24 RX ORDER — METOPROLOL TARTRATE 25 MG/1
TABLET, FILM COATED ORAL
Qty: 60 TAB | Refills: 0 | Status: SHIPPED | OUTPATIENT
Start: 2017-10-24 | End: 2017-12-05 | Stop reason: SDUPTHER

## 2017-10-25 ENCOUNTER — HOSPITAL ENCOUNTER (OUTPATIENT)
Dept: GENERAL RADIOLOGY | Age: 57
Discharge: HOME | End: 2017-10-25
Payer: COMMERCIAL

## 2017-10-25 DIAGNOSIS — R06.02 SOB (SHORTNESS OF BREATH): ICD-10-CM

## 2017-10-25 PROCEDURE — 71020 XR CHEST PA LAT: CPT

## 2017-11-17 ENCOUNTER — HOSPITAL ENCOUNTER (OUTPATIENT)
Dept: GENERAL RADIOLOGY | Age: 57
Discharge: HOME | End: 2017-11-17
Attending: FAMILY MEDICINE
Payer: COMMERCIAL

## 2017-11-17 DIAGNOSIS — R13.10 DYSPHAGIA: ICD-10-CM

## 2017-11-17 PROCEDURE — 74230 X-RAY XM SWLNG FUNCJ C+: CPT

## 2017-12-06 RX ORDER — METOPROLOL TARTRATE 25 MG/1
TABLET, FILM COATED ORAL
Qty: 60 TAB | Refills: 0 | Status: SHIPPED | OUTPATIENT
Start: 2017-12-06

## 2018-01-22 RX ORDER — METOPROLOL TARTRATE 25 MG/1
TABLET, FILM COATED ORAL
Qty: 60 TAB | Refills: 0 | OUTPATIENT
Start: 2018-01-22

## 2020-12-15 NOTE — PROGRESS NOTES
Interdisciplinary team rounds were held 5/23/2017 with the following team members:Diabetes Treatment Specialist, Nursing, Nutrition, Occupational Therapy, Pharmacy, Physical Therapy, Physician, Respiratory therapy and Clinical Coordinator. Plan of care discussed. Goal: See MD orders and progress notes for further  interventions and desired outcomes. [Reviewed no changes] : Reviewed no changes

## 2022-03-18 PROBLEM — G93.1 ANOXIC BRAIN INJURY (HCC): Status: ACTIVE | Noted: 2017-05-20

## 2022-03-18 PROBLEM — I67.82 MODERATE ANOXIC-ISCHEMIC ENCEPHALOPATHY (HCC): Status: ACTIVE | Noted: 2017-05-31

## 2022-03-18 PROBLEM — G93.1 MODERATE ANOXIC-ISCHEMIC ENCEPHALOPATHY (HCC): Status: ACTIVE | Noted: 2017-05-31

## 2022-03-18 PROBLEM — J15.69 PNEUMONIA DUE TO SERRATIA MARCESCENS (HCC): Status: ACTIVE | Noted: 2017-05-22

## 2022-03-19 PROBLEM — I65.23 STENOSIS OF BOTH INTERNAL CAROTID ARTERIES: Status: ACTIVE | Noted: 2017-05-31

## 2022-03-19 PROBLEM — J96.90 RESPIRATORY FAILURE: Status: ACTIVE | Noted: 2017-05-19

## 2022-03-19 PROBLEM — Z86.74 H/O CARDIAC ARREST: Status: ACTIVE | Noted: 2017-05-19

## 2022-03-19 PROBLEM — R65.20 SEVERE SEPSIS (HCC): Status: ACTIVE | Noted: 2017-06-09

## 2022-03-19 PROBLEM — B85.2 LICE INFESTATION: Status: ACTIVE | Noted: 2017-05-22

## 2022-03-19 PROBLEM — I63.312 THROMBOTIC STROKE INVOLVING LEFT MIDDLE CEREBRAL ARTERY (HCC): Status: ACTIVE | Noted: 2017-05-31

## 2022-03-19 PROBLEM — A41.9 SEVERE SEPSIS (HCC): Status: ACTIVE | Noted: 2017-06-09

## 2022-03-19 PROBLEM — N39.0 UTI (URINARY TRACT INFECTION): Status: ACTIVE | Noted: 2017-06-13

## 2022-03-20 PROBLEM — Z71.89 COUNSELING REGARDING GOALS OF CARE: Status: ACTIVE | Noted: 2017-06-02

## 2022-03-20 PROBLEM — I42.9 CARDIOMYOPATHY (HCC): Status: ACTIVE | Noted: 2017-05-22

## 2023-06-07 NOTE — PROGRESS NOTES
CM was advised pt needed therapy notes before being able to discharge to [facility]. CM provided message to PT/OT and they have worked with pt and entering notes. CM met with pt's daughter and she advised she needed a consent that was completed at last hospitalization. CM was able to locate the document and printed one copy for pt. CM advised to make additional copies and to keep them for their records. CM is still awaiting response from [facility] regarding disposition. Care Management Interventions  PCP Verified by CM: Yes (Dr. Manju Martinez )  Mode of Transport at Discharge: Other (see comment) ([facility] transport to [facility] )  Transition of Care Consult (CM Consult):  Other [facility] inpatient rehab)  Discharge Durable Medical Equipment: No  Health Maintenance Reviewed: Yes  Physical Therapy Consult: Yes  Occupational Therapy Consult: Yes  Speech Therapy Consult: Yes  Current Support Network: Lives with Spouse (pt lives with spouse, son, daughter and mother)  Confirm Follow Up Transport:  (at baseline pt drove and was independent with ADL's )  Discharge Location  Discharge Placement: Rehab hospital/unit acute [facility] )    ZURDO Crockett, [address] [facility]   [phone] Cimetidine Counseling:  I discussed with the patient the risks of Cimetidine including but not limited to gynecomastia, headache, diarrhea, nausea, drowsiness, arrhythmias, pancreatitis, skin rashes, psychosis, bone marrow suppression and kidney toxicity.

## 2024-08-27 NOTE — PROGRESS NOTES
Patient's daughter requesting nurse to help fill out disability paperwork. Informed daughter she could obtain information through medical records.  consulted to speak with daughter and gave her guidance in the process. [4 x 4] : 4 x 4  [Abdominal Pad] : Abdominal Pad [0] : left 0 [1+] : left 1+ [Ankle Swelling (On Exam)] : present [Ankle Swelling Bilaterally] : bilaterally  [Ankle Swelling On The Left] : moderate [Skin Ulcer] : ulcer [Alert] : alert [Oriented to Person] : oriented to person [Oriented to Place] : oriented to place [Calm] : calm [Varicose Veins Of Lower Extremities] : not present [] : not present [Purpura] : no purpura  [Petechiae] : no petechiae [Skin Induration] : no induration [de-identified] : calm , accompanied by sister [de-identified] : HTN, CHF [de-identified] : Bilateral bunion deformities  [de-identified] : DFU 2 medial 1st metatarsal head  [de-identified] : IDDM with neuropathy  [FreeTextEntry1] : Right Foot  [FreeTextEntry2] : 1.4 [FreeTextEntry3] : 1.4 [FreeTextEntry4] : 0.1 [de-identified] : small to moderate serosanguineous [de-identified] : none [de-identified] : pressure [de-identified] : none [de-identified] : intact [de-identified] : none [de-identified] : none [de-identified] : 100% [de-identified] : none [de-identified] : none [de-identified] : Zarina [de-identified] : Mechanically cleansed with sterile gauze and normal saline 0.9% Dry Dressing  CIRCULATION  Dorsalis Pedis: R/L Non-Palpable secondary to swelling. Audible via Doppler exam Posterior Tibialis: R/L Non-Palpable secondary to swelling. Audible via Doppler exam Extremity Color: Pigmented Extremity Temperature: Warm Capillary Refill: < 3 seconds bilaterally  [de-identified] : 3x Weekly [de-identified] : Primary Dressing

## 2024-10-28 ENCOUNTER — OFFICE VISIT (OUTPATIENT)
Dept: PRIMARY CARE CLINIC | Facility: CLINIC | Age: 64
End: 2024-10-28
Payer: MEDICAID

## 2024-10-28 VITALS
HEART RATE: 76 BPM | DIASTOLIC BLOOD PRESSURE: 70 MMHG | BODY MASS INDEX: 22.76 KG/M2 | TEMPERATURE: 97.1 F | SYSTOLIC BLOOD PRESSURE: 105 MMHG | HEIGHT: 70 IN | OXYGEN SATURATION: 98 % | RESPIRATION RATE: 12 BRPM | WEIGHT: 159 LBS

## 2024-10-28 DIAGNOSIS — M51.369 BULGING LUMBAR DISC: ICD-10-CM

## 2024-10-28 DIAGNOSIS — E55.9 VITAMIN D DEFICIENCY: ICD-10-CM

## 2024-10-28 DIAGNOSIS — Z12.5 PROSTATE CANCER SCREENING: ICD-10-CM

## 2024-10-28 DIAGNOSIS — M79.601 RIGHT ARM PAIN: ICD-10-CM

## 2024-10-28 DIAGNOSIS — M79.604 RIGHT LEG PAIN: ICD-10-CM

## 2024-10-28 DIAGNOSIS — E11.9 TYPE 2 DIABETES MELLITUS WITHOUT COMPLICATION, WITHOUT LONG-TERM CURRENT USE OF INSULIN (HCC): ICD-10-CM

## 2024-10-28 DIAGNOSIS — Z12.11 COLON CANCER SCREENING: ICD-10-CM

## 2024-10-28 DIAGNOSIS — Z95.1 S/P CABG (CORONARY ARTERY BYPASS GRAFT): ICD-10-CM

## 2024-10-28 DIAGNOSIS — M24.569 CONTRACTURE OF JOINT OF LOWER LEG: ICD-10-CM

## 2024-10-28 DIAGNOSIS — I25.10 CORONARY ARTERY DISEASE DUE TO LIPID RICH PLAQUE: ICD-10-CM

## 2024-10-28 DIAGNOSIS — K74.60 CIRRHOSIS OF LIVER WITHOUT ASCITES, UNSPECIFIED HEPATIC CIRRHOSIS TYPE (HCC): ICD-10-CM

## 2024-10-28 DIAGNOSIS — Z29.89 SEIZURE PROPHYLAXIS: ICD-10-CM

## 2024-10-28 DIAGNOSIS — I25.83 CORONARY ARTERY DISEASE DUE TO LIPID RICH PLAQUE: ICD-10-CM

## 2024-10-28 DIAGNOSIS — I25.10 CORONARY ARTERY DISEASE DUE TO LIPID RICH PLAQUE: Primary | ICD-10-CM

## 2024-10-28 DIAGNOSIS — Z86.74 HISTORY OF CARDIAC ARREST: ICD-10-CM

## 2024-10-28 DIAGNOSIS — G93.1 HYPOXIC BRAIN INJURY (HCC): ICD-10-CM

## 2024-10-28 DIAGNOSIS — I25.83 CORONARY ARTERY DISEASE DUE TO LIPID RICH PLAQUE: Primary | ICD-10-CM

## 2024-10-28 DIAGNOSIS — D64.9 ANEMIA, UNSPECIFIED TYPE: ICD-10-CM

## 2024-10-28 LAB
25(OH)D3 SERPL-MCNC: 9.3 NG/ML (ref 30–100)
ALBUMIN SERPL-MCNC: 3.9 G/DL (ref 3.5–5)
ALBUMIN/GLOB SERPL: 0.9 (ref 1.1–2.2)
ALP SERPL-CCNC: 95 U/L (ref 45–117)
ALT SERPL-CCNC: 26 U/L (ref 12–78)
ANION GAP SERPL CALC-SCNC: 8 MMOL/L (ref 2–12)
AST SERPL-CCNC: 18 U/L (ref 15–37)
BILIRUB SERPL-MCNC: 0.6 MG/DL (ref 0.2–1)
BUN SERPL-MCNC: 12 MG/DL (ref 6–20)
BUN/CREAT SERPL: 15 (ref 12–20)
CALCIUM SERPL-MCNC: 9.8 MG/DL (ref 8.5–10.1)
CHLORIDE SERPL-SCNC: 98 MMOL/L (ref 97–108)
CHOLEST SERPL-MCNC: 161 MG/DL
CO2 SERPL-SCNC: 33 MMOL/L (ref 21–32)
CREAT SERPL-MCNC: 0.8 MG/DL (ref 0.7–1.3)
ERYTHROCYTE [DISTWIDTH] IN BLOOD BY AUTOMATED COUNT: 18.4 % (ref 11.5–14.5)
FERRITIN SERPL-MCNC: 92 NG/ML (ref 26–388)
GLOBULIN SER CALC-MCNC: 4.3 G/DL (ref 2–4)
GLUCOSE SERPL-MCNC: 77 MG/DL (ref 65–100)
HCT VFR BLD AUTO: 41.6 % (ref 36.6–50.3)
HDLC SERPL-MCNC: 63 MG/DL
HDLC SERPL: 2.6 (ref 0–5)
HGB BLD-MCNC: 13.1 G/DL (ref 12.1–17)
IRON SATN MFR SERPL: 15 % (ref 20–50)
IRON SERPL-MCNC: 49 UG/DL (ref 35–150)
LDLC SERPL CALC-MCNC: 75.6 MG/DL (ref 0–100)
MCH RBC QN AUTO: 30.1 PG (ref 26–34)
MCHC RBC AUTO-ENTMCNC: 31.5 G/DL (ref 30–36.5)
MCV RBC AUTO: 95.6 FL (ref 80–99)
NRBC # BLD: 0 K/UL (ref 0–0.01)
NRBC BLD-RTO: 0 PER 100 WBC
PLATELET # BLD AUTO: 182 K/UL (ref 150–400)
PMV BLD AUTO: 12.3 FL (ref 8.9–12.9)
POTASSIUM SERPL-SCNC: 4 MMOL/L (ref 3.5–5.1)
PROT SERPL-MCNC: 8.2 G/DL (ref 6.4–8.2)
PSA SERPL-MCNC: 0.5 NG/ML (ref 0.01–4)
RBC # BLD AUTO: 4.35 M/UL (ref 4.1–5.7)
SODIUM SERPL-SCNC: 139 MMOL/L (ref 136–145)
TIBC SERPL-MCNC: 326 UG/DL (ref 250–450)
TRIGL SERPL-MCNC: 112 MG/DL
VLDLC SERPL CALC-MCNC: 22.4 MG/DL
WBC # BLD AUTO: 8.9 K/UL (ref 4.1–11.1)

## 2024-10-28 PROCEDURE — 99204 OFFICE O/P NEW MOD 45 MIN: CPT | Performed by: INTERNAL MEDICINE

## 2024-10-28 PROCEDURE — 3078F DIAST BP <80 MM HG: CPT | Performed by: INTERNAL MEDICINE

## 2024-10-28 PROCEDURE — 3074F SYST BP LT 130 MM HG: CPT | Performed by: INTERNAL MEDICINE

## 2024-10-28 RX ORDER — BUPRENORPHINE 10 UG/H
PATCH TRANSDERMAL
COMMUNITY
Start: 2024-09-17 | End: 2024-10-28 | Stop reason: SDUPTHER

## 2024-10-28 RX ORDER — METFORMIN HYDROCHLORIDE 500 MG/1
TABLET, EXTENDED RELEASE ORAL
COMMUNITY

## 2024-10-28 RX ORDER — BACLOFEN 10 MG/1
10 TABLET ORAL
COMMUNITY

## 2024-10-28 RX ORDER — ATORVASTATIN CALCIUM 40 MG/1
40 TABLET, FILM COATED ORAL DAILY
COMMUNITY

## 2024-10-28 RX ORDER — PRAMIPEXOLE DIHYDROCHLORIDE 0.25 MG/1
0.25 TABLET ORAL 3 TIMES DAILY
COMMUNITY

## 2024-10-28 RX ORDER — IPRATROPIUM BROMIDE AND ALBUTEROL SULFATE 2.5; .5 MG/3ML; MG/3ML
3 SOLUTION RESPIRATORY (INHALATION) EVERY 4 HOURS PRN
COMMUNITY

## 2024-10-28 RX ORDER — PREGABALIN 150 MG/1
1 CAPSULE ORAL
COMMUNITY
Start: 2024-10-07

## 2024-10-28 RX ORDER — ESCITALOPRAM OXALATE 10 MG/1
TABLET ORAL
COMMUNITY
Start: 2024-10-02

## 2024-10-28 RX ORDER — TAMSULOSIN HYDROCHLORIDE 0.4 MG/1
0.4 CAPSULE ORAL DAILY
COMMUNITY

## 2024-10-28 RX ORDER — LEVETIRACETAM 500 MG/1
500 TABLET ORAL 2 TIMES DAILY
COMMUNITY

## 2024-10-28 RX ORDER — NALOXONE HYDROCHLORIDE 0.4 MG/ML
0.4 INJECTION, SOLUTION INTRAMUSCULAR; INTRAVENOUS; SUBCUTANEOUS PRN
Status: SHIPPED | OUTPATIENT
Start: 2024-10-28

## 2024-10-28 RX ORDER — FUROSEMIDE 40 MG/1
TABLET ORAL
COMMUNITY

## 2024-10-28 RX ORDER — BUPRENORPHINE 10 UG/H
1 PATCH TRANSDERMAL WEEKLY
Qty: 4 PATCH | Refills: 0 | Status: SHIPPED | OUTPATIENT
Start: 2024-10-28 | End: 2024-11-27

## 2024-10-28 RX ORDER — POTASSIUM CHLORIDE 750 MG/1
20 TABLET, EXTENDED RELEASE ORAL DAILY
COMMUNITY

## 2024-10-28 RX ORDER — ONDANSETRON 4 MG/1
TABLET, ORALLY DISINTEGRATING ORAL
COMMUNITY

## 2024-10-28 SDOH — ECONOMIC STABILITY: FOOD INSECURITY: WITHIN THE PAST 12 MONTHS, THE FOOD YOU BOUGHT JUST DIDN'T LAST AND YOU DIDN'T HAVE MONEY TO GET MORE.: NEVER TRUE

## 2024-10-28 SDOH — ECONOMIC STABILITY: FOOD INSECURITY: WITHIN THE PAST 12 MONTHS, YOU WORRIED THAT YOUR FOOD WOULD RUN OUT BEFORE YOU GOT MONEY TO BUY MORE.: NEVER TRUE

## 2024-10-28 SDOH — ECONOMIC STABILITY: INCOME INSECURITY: HOW HARD IS IT FOR YOU TO PAY FOR THE VERY BASICS LIKE FOOD, HOUSING, MEDICAL CARE, AND HEATING?: NOT HARD AT ALL

## 2024-10-28 ASSESSMENT — PATIENT HEALTH QUESTIONNAIRE - PHQ9
SUM OF ALL RESPONSES TO PHQ QUESTIONS 1-9: 0
2. FEELING DOWN, DEPRESSED OR HOPELESS: NOT AT ALL
SUM OF ALL RESPONSES TO PHQ QUESTIONS 1-9: 0
SUM OF ALL RESPONSES TO PHQ9 QUESTIONS 1 & 2: 0
1. LITTLE INTEREST OR PLEASURE IN DOING THINGS: NOT AT ALL
SUM OF ALL RESPONSES TO PHQ QUESTIONS 1-9: 0
SUM OF ALL RESPONSES TO PHQ QUESTIONS 1-9: 0

## 2024-10-28 NOTE — PROGRESS NOTES
\"Have you been to the ER, urgent care clinic since your last visit?  Hospitalized since your last visit?\"    NO    “Have you seen or consulted any other health care providers outside of [facility] since your last visit?”    NO        “Have you had a colorectal cancer screening such as a colonoscopy/FIT/Cologuard?    NO    No colonoscopy on file  No cologuard on file  No FIT/FOBT on file   No flexible sigmoidoscopy on file         Click Here for Release of Records Request   
Count.  He has a low blood count. Blood tests will be conducted to monitor this condition.    7. Positive D-Dimer.  CT neg for PE    8. Anxiety.  He reports anxiety. Lexapro has been discussed as a potential treatment option.    9. Depression.  He reports depression. Lexapro has been discussed as a potential treatment option.    10. Diabetes.  He has diabetes and is currently on metformin. He should continue his current medication regimen and monitor his blood sugar levels.    11. Seizures.  He has a history of seizures and is currently on Keppra. He should continue his current medication regimen.    12. Hyperlipidemia.  He has cholesterol issues. He should continue his current medication regimen and follow up with his primary care physician for further management.    13. Pain Management.  He reports pain in his right hand and leg. A prescription for Lyrica will be provided for pain management. Gabapentin and Lyrica have been discussed as potential treatment options.    14. Health Maintenance.  A Cologuard test will be ordered for colon cancer screening.         1. Coronary artery disease due to lipid rich plaque  -     Comprehensive Metabolic Panel; Future  -     Lipid Panel; Future  2. History of cardiac arrest  -     [facility]  3. S/P CABG (coronary artery bypass graft)  4. Hypoxic brain injury (HCC)  -     [facility]  -     [facility] - Physical Therapy at [facility]  5. Bulging lumbar disc  6. Type 2 diabetes mellitus without complication, without long-term current use of insulin (HCC)  -     Microalbumin / Creatinine Urine Ratio; Future  7. Cirrhosis of liver without ascites, unspecified hepatic cirrhosis type (HCC)  -     [facility] - [facility] [place] [facility]  8. Seizure prophylaxis  -     [facility]  9. Prostate cancer screening  -     PSA Screening;

## 2024-10-29 ENCOUNTER — CLINICAL DOCUMENTATION (OUTPATIENT)
Age: 64
End: 2024-10-29

## 2024-10-29 DIAGNOSIS — E55.9 VITAMIN D DEFICIENCY: Primary | ICD-10-CM

## 2024-10-29 RX ORDER — ERGOCALCIFEROL 1.25 MG/1
50000 CAPSULE, LIQUID FILLED ORAL WEEKLY
Qty: 12 CAPSULE | Refills: 1 | Status: SHIPPED | OUTPATIENT
Start: 2024-10-29

## 2024-10-29 NOTE — PROGRESS NOTES
10/29/2024 1137am: New pt referral received. Referred by Singh Guerrero MD at [facility]. Records in [facility]. Dx: Cirrhosis without ascites. Routine appt

## 2024-11-01 ENCOUNTER — TELEPHONE (OUTPATIENT)
Dept: PRIMARY CARE CLINIC | Facility: CLINIC | Age: 64
End: 2024-11-01

## 2024-11-01 NOTE — TELEPHONE ENCOUNTER
Patient's daughter, Jud Cloud, called on behalf of her father to request the following medications:    Pregablin 150mg  Gabapentin (Did not see Gabapentin on the patient's med list)    Refills were requested to be sent to   [facility] on [address]    *I let the daughter know that Dr. EISENBERG wasn't in the office this afternoon and he has up to 72 hours to review and send rx's to pharmacy*

## 2024-11-04 DIAGNOSIS — M79.604 RIGHT LEG PAIN: ICD-10-CM

## 2024-11-04 DIAGNOSIS — M79.601 RIGHT ARM PAIN: Primary | ICD-10-CM

## 2024-11-04 RX ORDER — PREGABALIN 150 MG/1
150 CAPSULE ORAL DAILY
Qty: 90 CAPSULE | Refills: 0 | Status: SHIPPED | OUTPATIENT
Start: 2024-11-04 | End: 2025-02-02

## 2024-11-12 ENCOUNTER — TELEPHONE (OUTPATIENT)
Dept: PRIMARY CARE CLINIC | Facility: CLINIC | Age: 64
End: 2024-11-12

## 2024-11-12 NOTE — TELEPHONE ENCOUNTER
Patient daughter calling with list of concerns:    Patient  Linette is recommending the patient have order placed for PT and OT.    Patient was seen by Dr. Kauffman a pain management doctor that recommended that the patient needs a back brace to help help with walking. That doctor also recommended that the patient try medical marijuana. Told the patient daughter that I don't believe any of our doctors recommending that or write prescriptions for that.  Patient daughter wants to know if that it accurate and if it is where can the patient get it from.     Patient daughter is also asking for portable ramp for her father     Patient daughter is also concern about patient nutrition and wants orders place for high protein shakes. She stated that the patient does eat certain food including chicken. She would like the order placed with [facility].     Can call the daughter at [phone]

## 2024-11-20 ENCOUNTER — TELEPHONE (OUTPATIENT)
Dept: PRIMARY CARE CLINIC | Facility: CLINIC | Age: 64
End: 2024-11-20

## 2024-11-20 NOTE — TELEPHONE ENCOUNTER
Linette Hidalgo called inquiring about paperwork that was faxed over either on 11/13 or 11/14 to Dr Guerrero to complete, sign and fax back.  She said it had to do with DMAS 352.  Please call Linette back to let her know if it was received or not at Ph# [phone]

## 2025-04-23 ENCOUNTER — TELEPHONE (OUTPATIENT)
Dept: PRIMARY CARE CLINIC | Facility: CLINIC | Age: 65
End: 2025-04-23

## 2025-04-23 NOTE — TELEPHONE ENCOUNTER
Patient office appointment 5/22/2025 at 11:45 am with Dr. Guerrero.    Patient daughter wants patient to have a wrist monitor to monitor his heart rate, advised her he will need to be seen.    If he has a problem with his heart rate, please take him to [facility] or the ER.

## 2025-05-03 DIAGNOSIS — E55.9 VITAMIN D DEFICIENCY: ICD-10-CM

## 2025-05-05 RX ORDER — ERGOCALCIFEROL 1.25 MG/1
50000 CAPSULE, LIQUID FILLED ORAL WEEKLY
Qty: 12 CAPSULE | Refills: 0 | Status: SHIPPED | OUTPATIENT
Start: 2025-05-05

## 2025-05-05 NOTE — TELEPHONE ENCOUNTER
PCP: Singh Guerrero MD    Last Visit 10/28/2024   Future Appointments   Date Time Provider Department Center   5/22/2025 11:45 AM Singh Guerrero MD [facility]       Requested Prescriptions     Pending Prescriptions Disp Refills    vitamin D (ERGOCALCIFEROL) 1.25 MG (31089 UT) CAPS capsule [Pharmacy Med Name: Vitamin D (Ergocalciferol) 1.25 MG (16484 UT) Oral Capsule] 12 capsule 0     Sig: Take 1 capsule by mouth once a week         Other Comments: Last Refill   10/29/24

## 2025-05-22 ENCOUNTER — TELEPHONE (OUTPATIENT)
Dept: PRIMARY CARE CLINIC | Facility: CLINIC | Age: 65
End: 2025-05-22

## 2025-05-22 ENCOUNTER — OFFICE VISIT (OUTPATIENT)
Dept: PRIMARY CARE CLINIC | Facility: CLINIC | Age: 65
End: 2025-05-22
Payer: MEDICAID

## 2025-05-22 VITALS
DIASTOLIC BLOOD PRESSURE: 71 MMHG | TEMPERATURE: 97.1 F | HEART RATE: 73 BPM | RESPIRATION RATE: 12 BRPM | OXYGEN SATURATION: 99 % | BODY MASS INDEX: 22.81 KG/M2 | HEIGHT: 70 IN | SYSTOLIC BLOOD PRESSURE: 114 MMHG

## 2025-05-22 DIAGNOSIS — E55.9 VITAMIN D DEFICIENCY: Primary | ICD-10-CM

## 2025-05-22 DIAGNOSIS — I25.83 CORONARY ARTERY DISEASE DUE TO LIPID RICH PLAQUE: ICD-10-CM

## 2025-05-22 DIAGNOSIS — R00.1 BRADYCARDIA: ICD-10-CM

## 2025-05-22 DIAGNOSIS — E11.9 TYPE 2 DIABETES MELLITUS WITHOUT COMPLICATION, WITHOUT LONG-TERM CURRENT USE OF INSULIN (HCC): ICD-10-CM

## 2025-05-22 DIAGNOSIS — R53.1 RIGHT SIDED WEAKNESS: ICD-10-CM

## 2025-05-22 DIAGNOSIS — G93.1 HYPOXIC BRAIN INJURY (HCC): ICD-10-CM

## 2025-05-22 DIAGNOSIS — R13.10 DYSPHAGIA, UNSPECIFIED TYPE: ICD-10-CM

## 2025-05-22 DIAGNOSIS — Z13.1 SCREENING FOR DIABETES MELLITUS: ICD-10-CM

## 2025-05-22 DIAGNOSIS — Z29.89 SEIZURE PROPHYLAXIS: ICD-10-CM

## 2025-05-22 DIAGNOSIS — I25.10 CORONARY ARTERY DISEASE DUE TO LIPID RICH PLAQUE: ICD-10-CM

## 2025-05-22 DIAGNOSIS — Z86.74 HISTORY OF CARDIAC ARREST: ICD-10-CM

## 2025-05-22 PROCEDURE — 99214 OFFICE O/P EST MOD 30 MIN: CPT | Performed by: INTERNAL MEDICINE

## 2025-05-22 PROCEDURE — 3078F DIAST BP <80 MM HG: CPT | Performed by: INTERNAL MEDICINE

## 2025-05-22 PROCEDURE — 3074F SYST BP LT 130 MM HG: CPT | Performed by: INTERNAL MEDICINE

## 2025-05-22 SDOH — ECONOMIC STABILITY: FOOD INSECURITY: WITHIN THE PAST 12 MONTHS, YOU WORRIED THAT YOUR FOOD WOULD RUN OUT BEFORE YOU GOT MONEY TO BUY MORE.: NEVER TRUE

## 2025-05-22 SDOH — ECONOMIC STABILITY: FOOD INSECURITY: WITHIN THE PAST 12 MONTHS, THE FOOD YOU BOUGHT JUST DIDN'T LAST AND YOU DIDN'T HAVE MONEY TO GET MORE.: NEVER TRUE

## 2025-05-22 ASSESSMENT — PATIENT HEALTH QUESTIONNAIRE - PHQ9
SUM OF ALL RESPONSES TO PHQ QUESTIONS 1-9: 0
1. LITTLE INTEREST OR PLEASURE IN DOING THINGS: NOT AT ALL
SUM OF ALL RESPONSES TO PHQ QUESTIONS 1-9: 0
2. FEELING DOWN, DEPRESSED OR HOPELESS: NOT AT ALL

## 2025-05-22 NOTE — PROGRESS NOTES
..\"Have you been to the ER, urgent care clinic since your last visit?  Hospitalized since your last visit?\"    NO    “Have you seen or consulted any other health care providers outside of [facility] since your last visit?”    NO        “Have you had a colorectal cancer screening such as a colonoscopy/FIT/Cologuard?    NO    No colonoscopy on file  No cologuard on file  No FIT/FOBT on file   No flexible sigmoidoscopy on file         Click Here for Release of Records Request   
Anoxic brain injury (HCC)     following cardiac arrest; MRI shows involvement BG and temporal lobes    Aspiration pneumonia (HCC)     during cardiac arrest intubation    Bradycardia     CAD (coronary artery disease)     Cardiac arrest (HCC)     no intervention by [place] cardiology.  [facility]    Diabetes (HCC)     Essential hypertension     Hyperlipidemia      Past Surgical History:   Procedure Laterality Date    CARDIAC CATHETERIZATION      CORONARY ANGIOPLASTY WITH STENT PLACEMENT      CORONARY ARTERY BYPASS GRAFT      OTHER SURGICAL HISTORY  05/12/2017    PEG and trach     Current Outpatient Medications   Medication Sig    vitamin D (ERGOCALCIFEROL) 1.25 MG (52164 UT) CAPS capsule Take 1 capsule by mouth once a week    pregabalin (LYRICA) 150 MG capsule Take 1 capsule by mouth daily for 90 days. Max Daily Amount: 150 mg    baclofen (LIORESAL) 10 MG tablet Take 1 tablet by mouth    escitalopram (LEXAPRO) 10 MG tablet TAKE 1 TABLET BY MOUTH ONCE DAILY FOR 90 DAYS    LASIX 40 MG tablet Take by mouth    ipratropium 0.5 mg-albuterol 2.5 mg (DUONEB) 0.5-2.5 (3) MG/3ML SOLN nebulizer solution Inhale 3 mLs into the lungs every 4 hours as needed    levETIRAcetam (KEPPRA) 500 MG tablet Take 1 tablet by mouth 2 times daily    metFORMIN (GLUCOPHAGE-XR) 500 MG extended release tablet TAKE 4 TABLETS BY MOUTH IN THE EVENING WITH EVENING MEAL FOR 90 DAYS    ondansetron (ZOFRAN-ODT) 4 MG disintegrating tablet DISSOLVE 1 TABLET IN MOUTH EVERY 8 HOURS AS NEEDED FOR NAUSEA    potassium chloride (KLOR-CON) 10 MEQ extended release tablet Take 2 tablets by mouth daily    tamsulosin (FLOMAX) 0.4 MG capsule Take 1 capsule by mouth daily    ASPIRIN 81 PO Take by mouth    atorvastatin (LIPITOR) 40 MG tablet Take 1 tablet by mouth daily    pramipexole (MIRAPEX) 0.25 MG tablet Take 1 tablet by mouth 3 times daily     Current Facility-Administered Medications   Medication Dose Route Frequency    naloxone (NARCAN) injection 0.4 mg

## 2025-05-22 NOTE — TELEPHONE ENCOUNTER
Patients daughter is calling, she says the [facility] needs a copy of the prescription for the mattress sent to them along with the notes from the visit.

## 2025-05-27 ENCOUNTER — TELEPHONE (OUTPATIENT)
Dept: PRIMARY CARE CLINIC | Facility: CLINIC | Age: 65
End: 2025-05-27

## 2025-05-27 NOTE — TELEPHONE ENCOUNTER
David with [facility] called to let Dr EISENBERG know that they do not take the patient's insurance which is [employer]

## 2025-05-30 NOTE — TELEPHONE ENCOUNTER
Faxed last office note with script for air mattress.     Phone: [phone]  Fax: [phone]    Scan in media.

## 2025-06-04 DIAGNOSIS — M79.604 RIGHT LEG PAIN: ICD-10-CM

## 2025-06-04 DIAGNOSIS — M79.601 RIGHT ARM PAIN: ICD-10-CM

## 2025-06-04 RX ORDER — PREGABALIN 150 MG/1
CAPSULE ORAL
Qty: 90 CAPSULE | Refills: 0 | Status: SHIPPED | OUTPATIENT
Start: 2025-06-04 | End: 2025-09-02

## 2025-06-04 NOTE — TELEPHONE ENCOUNTER
PCP: Singh Guerrero MD    Last Visit 5/22/2025   Future Appointments   Date Time Provider Department Center   8/5/2025 11:00 AM Lopez Del Castillo MD [facility]   9/22/2025 11:30 AM Singh Guerrero MD [facility]       Requested Prescriptions     Pending Prescriptions Disp Refills    pregabalin (LYRICA) 150 MG capsule [Pharmacy Med Name: Pregabalin 150 MG Oral Capsule] 30 capsule 0     Sig: TAKE 1 CAPSULE BY MOUTH ONCE DAILY **MAX  150  MG**         Other Comments: Last Refill 11/04/2024

## 2025-06-06 NOTE — TELEPHONE ENCOUNTER
[facility] came into the office to let Dr. EISENBERG know that they do not accept the patient's insurance, [facility] Medicaid.

## 2025-06-09 NOTE — TELEPHONE ENCOUNTER
Gave patient daughter   [facility]  [phone] · Medical clinic  [address] · [phone]  Open ? Closes 7?PM

## 2025-07-22 ENCOUNTER — TELEPHONE (OUTPATIENT)
Dept: PRIMARY CARE CLINIC | Facility: CLINIC | Age: 65
End: 2025-07-22

## 2025-07-22 NOTE — TELEPHONE ENCOUNTER
Spoke to pt daughter, who states that the dentist office sent over a form for Dr Guerrero to complete for pt to have a tooth extracted. Dr Guerrero was unable complete paperwork because pt will need to be cleared from his neurologist due to history of brain damage. Pt dtr then called the dentist office while this writer was on the phone, the phone at the dentist office was then on speaker phone, it was a male and a female voice. The female voice from the dentist office stated that the pt will be placed under local anesthesia and would only need a clearance from Dr Guerrero because the pt is taking 81 MG of Asprin daily. The male voice stated that he wanted to be sure because pt has a history of stroke. The pt daughter stated that the pt has gotten several teeth extracted before with out any issues. The male voice then stated that if for whatever reason the pt begins to bleed and it is unable to stop then the pt will to go to ER. Then he told the pt daughter to bring pt into office at 3pm for his extraction. This writer then asked if it was something that was needed on my end and the pt daughter told me no and thanked me. The phone call was then disconnected.

## 2025-07-24 NOTE — PROGRESS NOTES
Left detailed message on voicemail informing her of the appointments pertaining to surgery.   MD reviewed with CM. Anticipate d/c 6/3/2017 if cleared by team.    [facility] updated.     [address]  Ext [phone]

## 2025-08-01 PROBLEM — Z86.73 HISTORY OF STROKE: Status: ACTIVE | Noted: 2017-05-31

## 2025-08-15 DIAGNOSIS — E55.9 VITAMIN D DEFICIENCY: ICD-10-CM

## 2025-08-17 RX ORDER — ERGOCALCIFEROL 1.25 MG/1
50000 CAPSULE, LIQUID FILLED ORAL WEEKLY
Qty: 12 CAPSULE | Refills: 0 | Status: SHIPPED | OUTPATIENT
Start: 2025-08-17

## 2025-08-25 DIAGNOSIS — M79.604 RIGHT LEG PAIN: ICD-10-CM

## 2025-08-25 DIAGNOSIS — M79.601 RIGHT ARM PAIN: ICD-10-CM

## 2025-08-25 RX ORDER — PREGABALIN 150 MG/1
150 CAPSULE ORAL DAILY
Qty: 90 CAPSULE | Refills: 0 | Status: SHIPPED | OUTPATIENT
Start: 2025-08-25 | End: 2025-11-23